# Patient Record
Sex: FEMALE | Race: WHITE | NOT HISPANIC OR LATINO | Employment: OTHER | URBAN - METROPOLITAN AREA
[De-identification: names, ages, dates, MRNs, and addresses within clinical notes are randomized per-mention and may not be internally consistent; named-entity substitution may affect disease eponyms.]

---

## 2017-01-26 RX ORDER — LOSARTAN POTASSIUM 25 MG/1
TABLET ORAL
Qty: 90 TAB | Refills: 2 | Status: SHIPPED | OUTPATIENT
Start: 2017-01-26 | End: 2017-10-24 | Stop reason: SDUPTHER

## 2017-02-24 DIAGNOSIS — F41.8 SITUATIONAL ANXIETY: ICD-10-CM

## 2017-02-27 RX ORDER — LORAZEPAM 0.5 MG/1
0.5 TABLET ORAL EVERY 8 HOURS PRN
Qty: 15 TAB | Refills: 0 | Status: CANCELLED | OUTPATIENT
Start: 2017-02-27

## 2017-02-27 NOTE — TELEPHONE ENCOUNTER
From: Ambar Jj  To: Khloe Bernstein M.D.  Sent: 2/24/2017 12:03 PM PST  Subject: Medication Renewal Request    Original authorizing provider: Khloe Bernstein M.D.    Ambar Jj would like a refill of the following medications:  lorazepam (ATIVAN) 0.5 MG Tab [Khloe Bernstein M.D.]    Preferred pharmacy: [facility] [address] [place] [address], [place] - [address]    Comment:

## 2017-03-30 ENCOUNTER — TELEPHONE (OUTPATIENT)
Dept: MEDICAL GROUP | Facility: LAB | Age: 57
End: 2017-03-30

## 2017-03-30 DIAGNOSIS — K57.32 DIVERTICULITIS OF SIGMOID COLON: ICD-10-CM

## 2017-03-30 NOTE — TELEPHONE ENCOUNTER
----- Message from Khloe Bernstein M.D. sent at 3/30/2017  7:49 AM PDT -----  Regarding: FW: Procedure Question  Contact: [phone]  Please see if we can fit her in somewhere. Also does she have a GI doc?  ----- Message -----     From: Pratima Meza, Praveen Ass't     Sent: 3/29/2017   9:15 AM       To: Khloe Bernstein M.D.  Subject: FW: Procedure Question                               ----- Message -----     From: Ambar Jj     Sent: 3/29/2017   9:11 AM       To: Jennifer Allen Ma  Subject: Procedure Question                               Dear Dr. Bernstein,  I am still having a lot of problems with my stomach.  Unless I am eating a clear liquid diet, I have pain and difficulty having a BM.  I am having pain on my lower left side in the front and the back.  It's not constant, comes and goes.  I don't think I have a fever. Since finishing the antibiotics for diverticulitis,  I have about 2 good days a week and the rest I don't feel good.  Should I make an appointment with you or should I go see a gastroenterologist?

## 2017-03-30 NOTE — TELEPHONE ENCOUNTER
----- Message from Your Healthcare Team sent at 3/30/2017  9:50 AM PDT -----  Regarding: RE:(No subject)  Contact: [phone]  Yes, please.  I have been to him before.  He did my colonoscopy in 2014.  04/12 would work really good for me as I have that day off.  Thank you  ----- Message -----  From: Aminta Chapa, Praveen Ass't  Sent: 3/30/2017  8:34 AM PDT  To: Ambar Garcia Aleshialinda  Subject: (No subject)    Dr Bernstein would like to refer you to a GI specialist.  Do you want her to refer you to Dr Donaldson at [facility]? unfortunately we do not have any appts here until the week of 4/10/17  Thanks  Pratima.

## 2017-04-17 ENCOUNTER — OFFICE VISIT (OUTPATIENT)
Dept: MEDICAL GROUP | Facility: LAB | Age: 57
End: 2017-04-17
Payer: COMMERCIAL

## 2017-04-17 ENCOUNTER — HOSPITAL ENCOUNTER (OUTPATIENT)
Dept: RADIOLOGY | Facility: MEDICAL CENTER | Age: 57
End: 2017-04-17
Attending: FAMILY MEDICINE
Payer: COMMERCIAL

## 2017-04-17 ENCOUNTER — HOSPITAL ENCOUNTER (OUTPATIENT)
Dept: LAB | Facility: MEDICAL CENTER | Age: 57
End: 2017-04-17
Attending: FAMILY MEDICINE
Payer: COMMERCIAL

## 2017-04-17 ENCOUNTER — TELEPHONE (OUTPATIENT)
Dept: MEDICAL GROUP | Facility: LAB | Age: 57
End: 2017-04-17

## 2017-04-17 VITALS
BODY MASS INDEX: 26.53 KG/M2 | WEIGHT: 169 LBS | SYSTOLIC BLOOD PRESSURE: 102 MMHG | HEART RATE: 84 BPM | HEIGHT: 67 IN | OXYGEN SATURATION: 97 % | DIASTOLIC BLOOD PRESSURE: 76 MMHG | TEMPERATURE: 97.8 F | RESPIRATION RATE: 16 BRPM

## 2017-04-17 DIAGNOSIS — E55.9 VITAMIN D DEFICIENCY DISEASE: ICD-10-CM

## 2017-04-17 DIAGNOSIS — R10.32 CHRONIC BILATERAL LOWER ABDOMINAL PAIN: ICD-10-CM

## 2017-04-17 DIAGNOSIS — R10.31 CHRONIC BILATERAL LOWER ABDOMINAL PAIN: ICD-10-CM

## 2017-04-17 DIAGNOSIS — K57.32 DIVERTICULITIS OF LARGE INTESTINE WITHOUT PERFORATION OR ABSCESS WITHOUT BLEEDING: ICD-10-CM

## 2017-04-17 DIAGNOSIS — G89.29 CHRONIC BILATERAL LOWER ABDOMINAL PAIN: ICD-10-CM

## 2017-04-17 DIAGNOSIS — R80.9 PROTEINURIA: ICD-10-CM

## 2017-04-17 DIAGNOSIS — R19.5 CHANGE IN STOOL: ICD-10-CM

## 2017-04-17 LAB
25(OH)D3 SERPL-MCNC: 22 NG/ML (ref 30–100)
ALBUMIN SERPL BCP-MCNC: 4.1 G/DL (ref 3.2–4.9)
ALBUMIN/GLOB SERPL: 1.2 G/DL
ALP SERPL-CCNC: 106 U/L (ref 30–99)
ALT SERPL-CCNC: 16 U/L (ref 2–50)
ANION GAP SERPL CALC-SCNC: 6 MMOL/L (ref 0–11.9)
AST SERPL-CCNC: 17 U/L (ref 12–45)
BASOPHILS # BLD AUTO: 0.9 % (ref 0–1.8)
BASOPHILS # BLD: 0.05 K/UL (ref 0–0.12)
BILIRUB SERPL-MCNC: 0.3 MG/DL (ref 0.1–1.5)
BUN SERPL-MCNC: 8 MG/DL (ref 8–22)
CALCIUM SERPL-MCNC: 9.3 MG/DL (ref 8.5–10.5)
CHLORIDE SERPL-SCNC: 106 MMOL/L (ref 96–112)
CO2 SERPL-SCNC: 28 MMOL/L (ref 20–33)
CREAT SERPL-MCNC: 0.67 MG/DL (ref 0.5–1.4)
CREAT UR-MCNC: 18.7 MG/DL
EOSINOPHIL # BLD AUTO: 0.11 K/UL (ref 0–0.51)
EOSINOPHIL NFR BLD: 1.9 % (ref 0–6.9)
ERYTHROCYTE [DISTWIDTH] IN BLOOD BY AUTOMATED COUNT: 39.9 FL (ref 35.9–50)
GFR SERPL CREATININE-BSD FRML MDRD: >60 ML/MIN/1.73 M 2
GLOBULIN SER CALC-MCNC: 3.5 G/DL (ref 1.9–3.5)
GLUCOSE SERPL-MCNC: 100 MG/DL (ref 65–99)
HCT VFR BLD AUTO: 42 % (ref 37–47)
HGB BLD-MCNC: 13.9 G/DL (ref 12–16)
IMM GRANULOCYTES # BLD AUTO: 0 K/UL (ref 0–0.11)
IMM GRANULOCYTES NFR BLD AUTO: 0 % (ref 0–0.9)
LYMPHOCYTES # BLD AUTO: 1.64 K/UL (ref 1–4.8)
LYMPHOCYTES NFR BLD: 28.6 % (ref 22–41)
MCH RBC QN AUTO: 29.3 PG (ref 27–33)
MCHC RBC AUTO-ENTMCNC: 33.1 G/DL (ref 33.6–35)
MCV RBC AUTO: 88.4 FL (ref 81.4–97.8)
MICROALBUMIN UR-MCNC: <0.7 MG/DL
MICROALBUMIN/CREAT UR: NORMAL MG/G (ref 0–30)
MONOCYTES # BLD AUTO: 0.29 K/UL (ref 0–0.85)
MONOCYTES NFR BLD AUTO: 5.1 % (ref 0–13.4)
NEUTROPHILS # BLD AUTO: 3.64 K/UL (ref 2–7.15)
NEUTROPHILS NFR BLD: 63.5 % (ref 44–72)
NRBC # BLD AUTO: 0 K/UL
NRBC BLD AUTO-RTO: 0 /100 WBC
PLATELET # BLD AUTO: 268 K/UL (ref 164–446)
PMV BLD AUTO: 9.9 FL (ref 9–12.9)
POTASSIUM SERPL-SCNC: 4.4 MMOL/L (ref 3.6–5.5)
PROT SERPL-MCNC: 7.6 G/DL (ref 6–8.2)
RBC # BLD AUTO: 4.75 M/UL (ref 4.2–5.4)
SODIUM SERPL-SCNC: 140 MMOL/L (ref 135–145)
WBC # BLD AUTO: 5.7 K/UL (ref 4.8–10.8)

## 2017-04-17 PROCEDURE — 82570 ASSAY OF URINE CREATININE: CPT

## 2017-04-17 PROCEDURE — 85025 COMPLETE CBC W/AUTO DIFF WBC: CPT

## 2017-04-17 PROCEDURE — 36415 COLL VENOUS BLD VENIPUNCTURE: CPT

## 2017-04-17 PROCEDURE — 82306 VITAMIN D 25 HYDROXY: CPT

## 2017-04-17 PROCEDURE — 80053 COMPREHEN METABOLIC PANEL: CPT

## 2017-04-17 PROCEDURE — 99214 OFFICE O/P EST MOD 30 MIN: CPT | Performed by: FAMILY MEDICINE

## 2017-04-17 PROCEDURE — 82043 UR ALBUMIN QUANTITATIVE: CPT

## 2017-04-17 PROCEDURE — 74177 CT ABD & PELVIS W/CONTRAST: CPT

## 2017-04-17 PROCEDURE — 700117 HCHG RX CONTRAST REV CODE 255: Performed by: FAMILY MEDICINE

## 2017-04-17 RX ORDER — CIPROFLOXACIN 500 MG/1
500 TABLET, FILM COATED ORAL 2 TIMES DAILY
Qty: 20 TAB | Refills: 0 | Status: SHIPPED | OUTPATIENT
Start: 2017-04-17 | End: 2018-05-03

## 2017-04-17 RX ORDER — METRONIDAZOLE 500 MG/1
500 TABLET ORAL 3 TIMES DAILY
Qty: 30 TAB | Refills: 0 | Status: SHIPPED | OUTPATIENT
Start: 2017-04-17 | End: 2018-05-03

## 2017-04-17 RX ADMIN — IOHEXOL 100 ML: 350 INJECTION, SOLUTION INTRAVENOUS at 13:12

## 2017-04-17 NOTE — TELEPHONE ENCOUNTER
[email or web address] Released Result Comments      Entered by Khloe Bernstein M.D. at 4/17/2017  2:24 PM     Read by Ambar Jj at 4/17/2017  3:32 PM     Ambar,     The CT scan shows that you have some inflammation but it's difficult to tell if it's new or old. I would like to start you on antibiotics for diverticulitis again to see how you feel. I also think that we should have you see a surgeon to see if we need to have this part of the colon resected. Is this okay with you?     Dr SPRAGUE       Result Notes      Sent my chart asking patient if this is ok

## 2017-04-17 NOTE — MR AVS SNAPSHOT
"        Ambar Jj   2017 9:20 AM   Office Visit   MRN: [id number]    Department:  [facility]   Dept Phone:  [phone]    Description:  Female : 1960   Provider:  Khloe Bernstein M.D.           Reason for Visit     Follow-Up stomach issues      Allergies as of 2017     No Known Allergies      You were diagnosed with     Chronic bilateral lower abdominal pain   [925404]       Change in stool   [726584]       Proteinuria   [791.0.ICD-9-CM]       Vitamin D deficiency disease   [908773]         Vital Signs     Blood Pressure Pulse Temperature Respirations Height Weight    102/76 mmHg 84 36.6 °C (97.8 °F) 16 1.702 m (5' 7\") 76.658 kg (169 lb)    Body Mass Index Oxygen Saturation Smoking Status             26.46 kg/m2 97% Former Smoker         Basic Information     Date Of Birth Sex Race Ethnicity Preferred Language    1960 Female White Non- English      Your appointments     2017  1:00 PM   CT BODY WITH with [facility] CT 1   IMAGING [facility]    [facility] [place]  [address]  [address]  [facility] [id number] [phone]           Taking medications as regularly scheduled is strongly encouraged.  *For Abdominal CT-Patient needs to  oral contrast and instruction from the department at least 2 hours prior to exam. Patient may  contrast at any imaging facility.              Problem List              ICD-10-CM Priority Class Noted - Resolved    Hypertension I10   Unknown - Present    Reactive airway disease J45.909   Unknown - Present    GERD (gastroesophageal reflux disease) K21.9   Unknown - Present    Vitamin D deficiency disease E55.9   10/7/2013 - Present    Urinary incontinence R32   2015 - Present    Family history of breast cancer in mother Z80.3   10/15/2015 - Present    History of tobacco use Z87.891   10/15/2015 - Present    History of hysterectomy for benign disease Z90.710   10/15/2015 - Present   " Hypertriglyceridemia E78.1   11/29/2016 - Present    Diverticulosis of sigmoid colon K57.30   12/9/2016 - Present      Health Maintenance        Date Due Completion Dates    MAMMOGRAM 12/1/2016 12/1/2015, 11/1/2013, 9/5/2012, 3/21/2011, 3/16/2011, 3/15/2010, 3/15/2010, 2/19/2009, 2/19/2009, 8/23/2005    COLONOSCOPY 1/23/2019 1/23/2014    IMM DTaP/Tdap/Td Vaccine (2 - Td) 8/6/2022 8/6/2012            Current Immunizations     Tdap Vaccine 8/6/2012  8:54 AM      Below and/or attached are the medications your provider expects you to take. Review all of your home medications and newly ordered medications with your provider and/or pharmacist. Follow medication instructions as directed by your provider and/or pharmacist. Please keep your medication list with you and share with your provider. Update the information when medications are discontinued, doses are changed, or new medications (including over-the-counter products) are added; and carry medication information at all times in the event of emergency situations     Allergies:  No Known Allergies          Medications  Valid as of: April 17, 2017 - 10:06 AM    Generic Name Brand Name Tablet Size Instructions for use    Albuterol Sulfate (Aero Soln) albuterol 108 (90 BASE) MCG/ACT INHALE TWO PUFFS BY MOUTH EVERY 6 HOURS AS NEEDED FOR SHORTNESS OF BREATH        Ciprofloxacin HCl (Tab) CIPRO 500 MG Take 1 Tab by mouth 2 times a day.        Ibuprofen (Tab) MOTRIN 200 MG Take 200 mg by mouth every 6 hours as needed.        LORazepam (Tab) ATIVAN 0.5 MG Take 1 Tab by mouth every 8 hours as needed for Anxiety.        Losartan Potassium (Tab) COZAAR 25 MG TAKE 1 TABLET BY MOUTH EVERY DAY        MetroNIDAZOLE (Tab) FLAGYL 500 MG Take 1 Tab by mouth 3 times a day.        Multiple Vitamins-Minerals   Take 1 Tab by mouth every day.        Nitrofurantoin Monohyd Macro (Cap) MACROBID 100 MG Take 1 Cap by mouth 2 times a day.        Omeprazole (CAPSULE DELAYED RELEASE) PRILOSEC 20 MG  TAKE ONE CAPSULE BY MOUTH EVERY DAY        .                 Medicines prescribed today were sent to:     [id number] - [place], [place] - [address] AT [facility] [address]    [address] [place] [address]    Phone: [phone] Fax: [phone]    Open 24 Hours?: No      Medication refill instructions:       If your prescription bottle indicates you have medication refills left, it is not necessary to call your provider’s office. Please contact your pharmacy and they will refill your medication.    If your prescription bottle indicates you do not have any refills left, you may request refills at any time through one of the following ways: The online [facility] system (except Urgent Care), by calling your provider’s office, or by asking your pharmacy to contact your provider’s office with a refill request. Medication refills are processed only during regular business hours and may not be available until the next business day. Your provider may request additional information or to have a follow-up visit with you prior to refilling your medication.   *Please Note: Medication refills are assigned a new Rx number when refilled electronically. Your pharmacy may indicate that no refills were authorized even though a new prescription for the same medication is available at the pharmacy. Please request the medicine by name with the pharmacy before contacting your provider for a refill.        Your To Do List     Future Labs/Procedures Complete By Expires    CBC WITH DIFFERENTIAL  As directed 4/17/2018    COMP METABOLIC PANEL  As directed 4/17/2018    CT-ABDOMEN-PELVIS WITH  As directed 4/17/2018    Comments:    Co sign reqstd, chngd to just with iv & oral contrast- jlw 4/17    MICROALBUMIN CREAT RATIO URINE  As directed 4/17/2018    VITAMIN D,25 HYDROXY  As directed 4/17/2018         [employer] Access Code: Activation code not generated  Current [facility] Status: Active

## 2017-04-17 NOTE — PROGRESS NOTES
Chief Complaint   Patient presents with   • Follow-Up     stomach issues       HISTORY OF PRESENT ILLNESS: Patient is a 56 y.o. female established patient who presents today to follow-up on abdominal pain. She has made an appointment with GI however she cannot be seen until June     Patient has a history of diverticulosis and diverticulitis. She was treated at the end of November, early December 2016 for diverticulitis. She states that she felt good for about 5 weeks then after that 5 week she started to have issues again which included chronic cramping and abdominal pain. She states that she will sometimes use the restroom 8 times a day. She feels like she has to have a bowel movement but can only go a little bit. Her stool is smaller, like a chunk or a piece. She is taking Metamucil once a day. Over the weekend she took some prune juice and she had a good bowel movement but she had a lot of cramping with her bowel movement. Reports that the pain is on both the right lower side and the left lower side of her abdomen, more on the left lower side. She states that sometimes she does just follow a liquid diet and she feels better but when she goes back to regular diet she has cramping again.     GI appt in June with Dr Donaldson . He did her previous colonoscopy. She denies any family history of colon cancer. She did have a colonoscopy in January 2014 and she had a polyp removed and she is on the 5 year plan.    Patient also had a hysterectomy done in 2001 and also had mesh placed for a cystocele. She is wondering if this is causing some of her pain.    She denies any fever, cough. No chest pain. She is having more anxiety lately because of how she feels.    Coffee this am     Patient Active Problem List    Diagnosis Date Noted   • Diverticulosis of sigmoid colon 12/09/2016   • Hypertriglyceridemia 11/29/2016   • Family history of breast cancer in mother 10/15/2015   • History of tobacco use 10/15/2015   • History of  hysterectomy for benign disease 10/15/2015   • Urinary incontinence 05/06/2015   • Vitamin D deficiency disease 10/07/2013   • GERD (gastroesophageal reflux disease)    • Hypertension    • Reactive airway disease         Allergies:Review of patient's allergies indicates no known allergies.    Current Outpatient Prescriptions   Medication Sig Dispense Refill   • losartan (COZAAR) 25 MG Tab TAKE 1 TABLET BY MOUTH EVERY DAY 90 Tab 2   • omeprazole (PRILOSEC) 20 MG delayed-release capsule TAKE ONE CAPSULE BY MOUTH EVERY DAY 90 Cap 0   • nitrofurantoin monohydr macro (MACROBID) 100 MG Cap Take 1 Cap by mouth 2 times a day. 14 Cap 0   • ciprofloxacin (CIPRO) 500 MG Tab Take 1 Tab by mouth 2 times a day. 20 Tab 0   • metronidazole (FLAGYL) 500 MG Tab Take 1 Tab by mouth 3 times a day. 30 Tab 0   • Multiple Vitamins-Minerals (EMERGEN-C FIVE PO) Take 1 Tab by mouth every day.     • lorazepam (ATIVAN) 0.5 MG Tab Take 1 Tab by mouth every 8 hours as needed for Anxiety. 15 Tab 0   • albuterol (PROVENTIL HFA) 108 (90 BASE) MCG/ACT Aero Soln inhalation aerosol INHALE TWO PUFFS BY MOUTH EVERY 6 HOURS AS NEEDED FOR SHORTNESS OF BREATH 6.7 g 3   • ibuprofen (MOTRIN) 200 MG TABS Take 200 mg by mouth every 6 hours as needed.       No current facility-administered medications for this visit.       Social History   Substance Use Topics   • Smoking status: Former Smoker -- 1.00 packs/day for 34 years     Types: Cigarettes     Quit date: 01/01/2010   • Smokeless tobacco: Never Used      Comment: Jan 2010   • Alcohol Use: 7.2 oz/week     6 Standard drinks or equivalent, 6 Cans of beer per week       Social History     Social History Narrative       Family History   Problem Relation Age of Onset   • Cancer Mother      Breast   • Lung Disease Mother      COPD   • Heart Disease Mother    • Diabetes Mother    • Lung Disease Father      COPD   • Heart Disease Father    • Alcohol/Drug Sister    • Lung Disease Sister      sister 2 Asthma   •  "Heart Disease Sister      sister 1 Heart murmur   • Alcohol/Drug Brother    • Other Son      IBS; working on his diet       Exam:    Blood pressure 102/76, pulse 84, temperature 36.6 °C (97.8 °F), resp. rate 16, height 1.702 m (5' 7\"), weight 76.658 kg (169 lb), SpO2 97 %.      Physical Exam   Constitutional:  Appears well-developed and well-nourished. Is active and cooperative.  Non-toxic appearance but appears to not feel well.   Head: Normocephalic and atraumatic.   Right Ear: External ear normal. Left Ear: External ear normal.   Eyes: Conjunctivae, EOM and lids are normal. No scleral icterus.   Cardiovascular: Normal rate, regular rhythm and normal heart sounds.    Pulmonary/Chest: Effort normal and breath sounds normal.   Abdominal: Soft. No CVA tenderness. She does have some tenderness to palpation in the right lower quadrant, left lower quadrant, more on the left lower quadrant. The pain in the left lower quadrant is worse with palpation.  No rigidity and no guarding.   Neurological: Alert. No tremor. No display of seizure activity. Coordination and gait normal.   Skin: Skin is warm and dry. Patient is not diaphoretic.   Psychiatric: patient has a normal mood and affect; speech is normal and behavior is normal.      Assessment/Plan:    1. Chronic bilateral lower abdominal pain  Discussed with patient. I would like to check a CT scan. Concerned that she may have an abscess or diverticulitis again. Patient is in agreement with plan. If her CT scan is negative I would like her to return to clinic for a GYN exam. Labs as below. CT scan has been scheduled for today.  - COMP METABOLIC PANEL; Future  - CBC WITH DIFFERENTIAL; Future  - CT-ABDOMEN-PELVIS WITH; Future    2. Change in stool  CT of the abdomen, pelvis  - CT-ABDOMEN-PELVIS WITH; Future    3. Proteinuria  Check labs  - MICROALBUMIN CREAT RATIO URINE; Future    4. Vitamin D deficiency disease  Check vitamin D  - VITAMIN D,25 HYDROXY; Future           Please " note that this dictation was created using voice recognition software. I have made every reasonable attempt to correct obvious errors, but I expect that there are errors of grammar and possibly content that I did not discover before finalizing the note.

## 2017-04-17 NOTE — TELEPHONE ENCOUNTER
----- Message from Khloe Bernstein M.D. sent at 4/17/2017  2:24 PM PDT -----  Ambar,    The CT scan shows that you have some inflammation but it's difficult to tell if it's new or old. I would like to start you on antibiotics for diverticulitis again to see how you feel. I also think that we should have you see a surgeon to see if we need to have this part of the colon resected. Is this okay with you?    Dr SPRAGUE

## 2017-04-21 DIAGNOSIS — F41.8 SITUATIONAL ANXIETY: ICD-10-CM

## 2017-04-21 RX ORDER — LORAZEPAM 0.5 MG/1
0.5 TABLET ORAL EVERY 8 HOURS PRN
Qty: 15 TAB | Refills: 0 | Status: SHIPPED
Start: 2017-04-21 | End: 2018-05-03

## 2017-04-21 NOTE — TELEPHONE ENCOUNTER
Was the patient seen in the last year in this department? Yes     4/11/16 #15. LOV 4/17/17    Does patient have an active prescription for medications requested? No     Received Request Via: Patient

## 2017-05-25 RX ORDER — OMEPRAZOLE 20 MG/1
CAPSULE, DELAYED RELEASE ORAL
Qty: 90 CAP | Refills: 0 | Status: SHIPPED | OUTPATIENT
Start: 2017-05-25 | End: 2017-10-24 | Stop reason: SDUPTHER

## 2017-05-25 NOTE — TELEPHONE ENCOUNTER
Was the patient seen in the last year in this department? Yes     Does patient have an active prescription for medications requested? No     Received Request Via: Pharmacy     Last visit:4/14/17

## 2017-06-12 DIAGNOSIS — J45.902: ICD-10-CM

## 2017-06-12 RX ORDER — ALBUTEROL SULFATE 90 UG/1
AEROSOL, METERED RESPIRATORY (INHALATION)
Qty: 6.7 G | Refills: 3 | Status: SHIPPED | OUTPATIENT
Start: 2017-06-12 | End: 2018-06-14 | Stop reason: SDUPTHER

## 2017-06-12 NOTE — TELEPHONE ENCOUNTER
Was the patient seen in the last year in this department? Yes     Does patient have an active prescription for medications requested? No     Received Request Via: Patient     Last visit:12/9/16

## 2017-10-24 RX ORDER — OMEPRAZOLE 20 MG/1
20 CAPSULE, DELAYED RELEASE ORAL
Qty: 90 CAP | Refills: 0 | Status: SHIPPED | OUTPATIENT
Start: 2017-10-24 | End: 2018-03-05 | Stop reason: SDUPTHER

## 2018-01-18 ENCOUNTER — HOSPITAL ENCOUNTER (OUTPATIENT)
Dept: RADIOLOGY | Facility: MEDICAL CENTER | Age: 58
End: 2018-01-18
Attending: FAMILY MEDICINE
Payer: COMMERCIAL

## 2018-01-18 DIAGNOSIS — Z12.31 SCREENING MAMMOGRAM, ENCOUNTER FOR: ICD-10-CM

## 2018-01-18 PROCEDURE — 77067 SCR MAMMO BI INCL CAD: CPT

## 2018-03-06 RX ORDER — OMEPRAZOLE 20 MG/1
CAPSULE, DELAYED RELEASE ORAL
Qty: 90 CAP | Refills: 0 | Status: SHIPPED | OUTPATIENT
Start: 2018-03-06 | End: 2018-06-26 | Stop reason: SDUPTHER

## 2018-03-14 ENCOUNTER — PATIENT OUTREACH (OUTPATIENT)
Dept: HEALTH INFORMATION MANAGEMENT | Facility: OTHER | Age: 58
End: 2018-03-14

## 2018-03-14 NOTE — PROGRESS NOTES
Outcome: Left Message    Please transfer to Patient Outreach Team at [phone] when patient returns call.    WebIZ Checked & Epic Updated:  yes    HealthConnect Verified: no    Attempt # 1

## 2018-04-23 RX ORDER — LOSARTAN POTASSIUM 25 MG/1
TABLET ORAL
Refills: 0 | OUTPATIENT
Start: 2018-04-23

## 2018-04-23 NOTE — TELEPHONE ENCOUNTER
Was the patient seen in the last year in this department? No 4/17/17    Does patient have an active prescription for medications requested? No     Received Request Via: Pharmacy

## 2018-04-24 RX ORDER — LOSARTAN POTASSIUM 25 MG/1
25 TABLET ORAL
Qty: 30 TAB | Refills: 0 | Status: SHIPPED | OUTPATIENT
Start: 2018-04-24 | End: 2018-05-21 | Stop reason: SDUPTHER

## 2018-04-24 NOTE — TELEPHONE ENCOUNTER
Was the patient seen in the last year in this department? No   APPT 5/3  Does patient have an active prescription for medications requested? No     Received Request Via: Patient

## 2018-04-27 NOTE — PROGRESS NOTES
1. Attempt #: 2    2. HealthConnect Verified: yes    3. Verify PCP: no      5.  Reviewed/Updated the following with patient:       •   Communication Preference Obtained? NO    9. Care Gap Scheduling (Attempt to Schedule EACH Overdue Care Gap!)     There are no preventive care reminders to display for this patient.     Patient has completed No Call - Was made to the pt for the [employer] project - Health Maintenance is up todate.

## 2018-05-02 ENCOUNTER — TELEPHONE (OUTPATIENT)
Dept: MEDICAL GROUP | Facility: LAB | Age: 58
End: 2018-05-02

## 2018-05-02 NOTE — TELEPHONE ENCOUNTER
ESTABLISHED PATIENT PRE-VISIT PLANNING     Note: Patient will not be contacted if there is no indication to call.     1.  Reviewed notes from the last few office visits within the medical group: Yes    2.  If any orders were placed at last visit or intended to be done for this visit (i.e. 6 mos follow-up), do we have Results/Consult Notes?        •  Labs - Labs ordered, completed on 4/17/17 and results are in chart.       •  Imaging - Imaging was not ordered at last office visit.       •  Referrals - No referrals were ordered at last office visit.    3. Is this appointment scheduled as a Hospital Follow-Up? No    4.  Immunizations were updated in [employer] using WebIZ?: Yes       •  Web Iz Recommendations: MMR  and VARICELLA (Chicken Pox)     5.  Patient is due for the following Health Maintenance Topics:   There are no preventive care reminders to display for this patient.    - Patient has completed TDAP Immunization(s) per WebIZ. Chart has been updated.    6.  MDX printed for Provider? NO    7.  Patient was NOT informed to arrive 15 min prior to their scheduled appointment and bring in their medication bottles.

## 2018-05-03 ENCOUNTER — OFFICE VISIT (OUTPATIENT)
Dept: MEDICAL GROUP | Facility: LAB | Age: 58
End: 2018-05-03
Payer: COMMERCIAL

## 2018-05-03 VITALS
BODY MASS INDEX: 27.15 KG/M2 | DIASTOLIC BLOOD PRESSURE: 78 MMHG | RESPIRATION RATE: 16 BRPM | TEMPERATURE: 98.3 F | HEIGHT: 67 IN | HEART RATE: 84 BPM | WEIGHT: 173 LBS | SYSTOLIC BLOOD PRESSURE: 110 MMHG | OXYGEN SATURATION: 97 %

## 2018-05-03 DIAGNOSIS — I10 ESSENTIAL HYPERTENSION: ICD-10-CM

## 2018-05-03 DIAGNOSIS — E55.9 VITAMIN D DEFICIENCY DISEASE: ICD-10-CM

## 2018-05-03 DIAGNOSIS — R80.9 PROTEINURIA, UNSPECIFIED TYPE: ICD-10-CM

## 2018-05-03 DIAGNOSIS — E78.1 HYPERTRIGLYCERIDEMIA: ICD-10-CM

## 2018-05-03 DIAGNOSIS — K21.9 GASTROESOPHAGEAL REFLUX DISEASE, ESOPHAGITIS PRESENCE NOT SPECIFIED: ICD-10-CM

## 2018-05-03 PROCEDURE — 99214 OFFICE O/P EST MOD 30 MIN: CPT | Performed by: FAMILY MEDICINE

## 2018-05-03 ASSESSMENT — PATIENT HEALTH QUESTIONNAIRE - PHQ9: CLINICAL INTERPRETATION OF PHQ2 SCORE: 0

## 2018-05-03 NOTE — PROGRESS NOTES
"Chief Complaint   Patient presents with   • Hypertension     med refills       HISTORY OF PRESENT ILLNESS: Patient is a 57 y.o. female established patient who presents today to follow up     Hypertension  This is a chronic problem. She is taking medication as directed. Is checking BP at home and it is running /70-80. No CP.   Sometimes \"funny little heart beats\" and she takes a deep breath and it goes away. She is due for labs      Vitamin d insufficiency   This is chronic. She is not taking vit d supplementation     Started on probiotic and this helps a lot. Her GI tract is still difficult, sometimes diarrhea and sometimes constipation     Patient Active Problem List    Diagnosis Date Noted   • Chronic bilateral lower abdominal pain 04/17/2017   • Change in stool 04/17/2017   • Proteinuria 04/17/2017   • Diverticulosis of sigmoid colon 12/09/2016   • Hypertriglyceridemia 11/29/2016   • Family history of breast cancer in mother 10/15/2015   • History of tobacco use 10/15/2015   • History of hysterectomy for benign disease 10/15/2015   • Urinary incontinence 05/06/2015   • Vitamin D deficiency disease 10/07/2013   • GERD (gastroesophageal reflux disease)  Chronic. Prilosec controls symptoms     • Hypertension    • Reactive airway disease         Allergies:Patient has no known allergies.      Social History     Social History   • Marital status:      Spouse name: N/A   • Number of children: N/A   • Years of education: N/A     Occupational History   • Not on file.     Social History Main Topics   • Smoking status: Former Smoker     Packs/day: 1.00     Years: 34.00     Types: Cigarettes     Quit date: 1/1/2010   • Smokeless tobacco: Never Used      Comment: Jan 2010   • Alcohol use 7.2 oz/week     6 Standard drinks or equivalent, 6 Cans of beer per week   • Drug use: No   • Sexual activity: Yes     Partners: Male     Other Topics Concern   • Not on file     Social History Narrative   • No narrative on file " "    She is    Family History   Problem Relation Age of Onset   • Cancer Mother      Breast   • Lung Disease Mother      COPD   • Heart Disease Mother    • Diabetes Mother    • Lung Disease Father      COPD   • Heart Disease Father    • Alcohol/Drug Sister    • Lung Disease Sister      sister 2 Asthma   • Heart Disease Sister      sister 1 Heart murmur   • Alcohol/Drug Brother    • Other Son      IBS; working on his diet       ROS:         Exam:    Blood pressure 110/78, pulse 84, temperature 36.8 °C (98.3 °F), resp. rate 16, height 1.702 m (5' 7\"), weight 78.5 kg (173 lb), SpO2 97 %.    General:  Well nourished, well developed female in NAD    Physical Exam   Constitutional:  Appears well-developed and well-nourished. Is active and cooperative.  Non-toxic appearance.   HENT:   Head: Normocephalic and atraumatic.   Right Ear: External ear normal. Left Ear: External ear normal.   Eyes: Conjunctivae, EOM and lids are normal. No scleral icterus.   Cardiovascular: Normal rate, regular rhythm and normal heart sounds.    Pulmonary/Chest: Effort normal and breath sounds normal.   Neurological:. No tremor. No display of seizure activity. Coordination and gait normal.   Skin: Skin is warm and dry. Patient is not diaphoretic.   Psychiatric: patient has a normal mood and affect; speech is normal and behavior is normal.         Assessment/Plan:    1. Essential hypertension  Controlled. No change in medication. Check labs   - MICROALBUMIN CREAT RATIO URINE; Future  - CBC WITH DIFFERENTIAL; Future  - COMP METABOLIC PANEL; Future  - TSH; Future    2. Gastroesophageal reflux disease, esophagitis presence not specified  Stable on medication, no change     3. Vitamin D deficiency disease  Check vitamin d level   - VITAMIN D,25 HYDROXY; Future    4. Hypertriglyceridemia  Check labs. Not on medication   - COMP METABOLIC PANEL; Future  - LIPID PROFILE; Future    5. Proteinuria, unspecified type  Check labs   - MICROALBUMIN CREAT RATIO " URINE; Future     Follow up after labs     Please note that this dictation was created using voice recognition software. I have made every reasonable attempt to correct obvious errors, but I expect that there are errors of grammar and possibly content that I did not discover before finalizing the note.

## 2018-06-08 DIAGNOSIS — F41.8 SITUATIONAL ANXIETY: ICD-10-CM

## 2018-06-08 RX ORDER — LORAZEPAM 0.5 MG/1
0.5 TABLET ORAL EVERY 8 HOURS PRN
Qty: 15 TAB | Refills: 0 | Status: SHIPPED
Start: 2018-06-08 | End: 2019-07-15 | Stop reason: SDUPTHER

## 2018-06-08 NOTE — TELEPHONE ENCOUNTER
Was the patient seen in the last year in this department? Yes     Does patient have an active prescription for medications requested? No     Received Request Via: Patient     Per  last fill: 4-26-17 # 15  Patient only takes this when flying.

## 2018-06-14 DIAGNOSIS — J45.902: ICD-10-CM

## 2018-06-15 ENCOUNTER — PATIENT OUTREACH (OUTPATIENT)
Dept: HEALTH INFORMATION MANAGEMENT | Facility: OTHER | Age: 58
End: 2018-06-15

## 2018-06-15 NOTE — PROGRESS NOTES
Outcome: Left Message    Please transfer to Patient Outreach Team at [phone] when patient returns call.    WebIZ Checked & Epic Updated:  yes    HealthConnect Verified: yes    Attempt # 1

## 2018-06-26 ENCOUNTER — OFFICE VISIT (OUTPATIENT)
Dept: MEDICAL GROUP | Facility: PHYSICIAN GROUP | Age: 58
End: 2018-06-26
Payer: COMMERCIAL

## 2018-06-26 VITALS
DIASTOLIC BLOOD PRESSURE: 66 MMHG | HEIGHT: 67 IN | BODY MASS INDEX: 27.47 KG/M2 | OXYGEN SATURATION: 95 % | RESPIRATION RATE: 16 BRPM | SYSTOLIC BLOOD PRESSURE: 122 MMHG | TEMPERATURE: 98.1 F | HEART RATE: 91 BPM | WEIGHT: 175 LBS

## 2018-06-26 DIAGNOSIS — D22.9 MULTIPLE NEVI: ICD-10-CM

## 2018-06-26 DIAGNOSIS — K57.30 DIVERTICULOSIS OF SIGMOID COLON: ICD-10-CM

## 2018-06-26 DIAGNOSIS — E55.9 VITAMIN D DEFICIENCY DISEASE: ICD-10-CM

## 2018-06-26 DIAGNOSIS — K21.9 GASTROESOPHAGEAL REFLUX DISEASE, ESOPHAGITIS PRESENCE NOT SPECIFIED: ICD-10-CM

## 2018-06-26 DIAGNOSIS — Z00.00 WELL ADULT EXAM: ICD-10-CM

## 2018-06-26 DIAGNOSIS — Z12.11 ENCOUNTER FOR SCREENING COLONOSCOPY: ICD-10-CM

## 2018-06-26 DIAGNOSIS — I10 ESSENTIAL HYPERTENSION: ICD-10-CM

## 2018-06-26 PROCEDURE — 99214 OFFICE O/P EST MOD 30 MIN: CPT | Performed by: NURSE PRACTITIONER

## 2018-06-26 RX ORDER — OMEPRAZOLE 20 MG/1
20 CAPSULE, DELAYED RELEASE ORAL
Qty: 90 CAP | Refills: 3 | Status: SHIPPED | OUTPATIENT
Start: 2018-06-26 | End: 2018-10-28

## 2018-06-26 ASSESSMENT — PAIN SCALES - GENERAL: PAINLEVEL: NO PAIN

## 2018-06-26 NOTE — ASSESSMENT & PLAN NOTE
Chronic in nature. BP today is 122/66.  She continues to take losartan 25 mg daily states that she noticed her blood pressure got a little more elevated when she gained some weight.  Patient states that she is overall feeling well and not having side effects on medication.  Patient denies chest pain, palpitations, dizziness, blurry vision.

## 2018-06-26 NOTE — ASSESSMENT & PLAN NOTE
Chronic in nature.  Stable.  Patient states that last diverticulitis was 2015.  States that at that time she did complete a course of antibiotics.  States that she has not had symptoms since that time.  Patient does follow with gastroenterology and will be due for an updated colonoscopy in January.

## 2018-06-27 NOTE — ASSESSMENT & PLAN NOTE
Chronic in nature.  Stable.  Patient is taking omeprazole 20 mg daily.  States that this is a chronic long-term issue and she has previously had esophageal dilation.  Patient states that she does follow with gastroenterology.

## 2018-06-27 NOTE — PROGRESS NOTES
Chief Complaint   Patient presents with   • Establish Care     New Patient        HISTORY OF THE PRESENT ILLNESS: This is a 57 y.o. female new patient to our practice. This pleasant patient is here today to discuss HTN, GERD.    Hypertension  Chronic in nature. BP today is 122/66.  She continues to take losartan 25 mg daily states that she noticed her blood pressure got a little more elevated when she gained some weight.  Patient states that she is overall feeling well and not having side effects on medication.  Patient denies chest pain, palpitations, dizziness, blurry vision.    Diverticulosis of sigmoid colon  Chronic in nature.  Stable.  Patient states that last diverticulitis was 2015.  States that at that time she did complete a course of antibiotics.  States that she has not had symptoms since that time.  Patient does follow with gastroenterology and will be due for an updated colonoscopy in January.      GERD (gastroesophageal reflux disease)  Chronic in nature.  Stable.  Patient is taking omeprazole 20 mg daily.  States that this is a chronic long-term issue and she has previously had esophageal dilation.  Patient states that she does follow with gastroenterology.      Past Medical History:   Diagnosis Date   • Anesthesia     PONV   • Bronchitis 2010   • GERD (gastroesophageal reflux disease)    • Heart burn    • HTN     resolved with wt loss and lifestyle changes   • Indigestion    • Other specified symptom associated with female genital organs    • Pap smear 1/29/9    Normal   • Psychiatric problem     anxiety   • Reactive airway disease    • Unspecified urinary incontinence    • Urinary bladder disorder        Past Surgical History:   Procedure Laterality Date   • BLADDER SUSPENSION N/A 5/6/2015    Procedure: BLADDER SUSPENSION [57.89] TOT;  Surgeon: aYzmin Armando M.D.;  Location: SURGERY SAME DAY [facility];  Service:    • ANTERIOR AND POSTERIOR REPAIR N/A 5/6/2015    Procedure: ANTERIOR AND  POSTERIOR REPAIR [70.53];  Surgeon: Yazmin Armando M.D.;  Location: SURGERY SAME DAY [facility];  Service:    • CYSTOSCOPY N/A 2015    Procedure: CYSTOSCOPY;  Surgeon: Yazmin Armando M.D.;  Location: SURGERY SAME DAY [facility];  Service:    • ABDOMINAL HYSTERECTOMY TOTAL      Ovaries intact   • BLADDER SUSPENSION     • CHOLECYSTECTOMY     • ENDOSCOPY     • ENDOSCOPY PROCEDURE      dilation due to stricture   • TONSILLECTOMY AND ADENOIDECTOMY         Family Status   Relation Status   • Mother    • Father    • Sister Alive   • Brother    • Son Alive     Family History   Problem Relation Age of Onset   • Cancer Mother      Breast   • Lung Disease Mother      COPD   • Heart Disease Mother    • Diabetes Mother    • Lung Disease Father      COPD   • Heart Disease Father    • Alcohol/Drug Sister    • Lung Disease Sister      sister 2 Asthma   • Heart Disease Sister      sister 1 Heart murmur   • Alcohol/Drug Brother    • Other Son      IBS; working on his diet       Social History   Substance Use Topics   • Smoking status: Former Smoker     Packs/day: 1.00     Years: 34.00     Types: Cigarettes     Quit date: 2010   • Smokeless tobacco: Never Used      Comment: 2010   • Alcohol use 7.2 oz/week     6 Cans of beer, 6 Standard drinks or equivalent per week       Allergies: Codeine    Current Outpatient Prescriptions Ordered in [facility]   Medication Sig Dispense Refill   • omeprazole (PRILOSEC) 20 MG delayed-release capsule Take 1 Cap by mouth every day. 90 Cap 3   • PROAIR  (90 Base) MCG/ACT Aero Soln inhalation aerosol INHALE 2 PUFFS BY MOUTH EVERY 6 HOURS AS NEEDED FOR SHORTNESS OF BREATH 8.5 g 0   • losartan (COZAAR) 25 MG Tab TAKE 1 TABLET BY MOUTH EVERY DAY 30 Tab 3   • ibuprofen (MOTRIN) 200 MG TABS Take 200 mg by mouth every 6 hours as needed.       No current Epic-ordered facility-administered medications on file.        Review of Systems   Constitutional:  Negative  "for fever, chills, weight loss and malaise/fatigue.   HENT:  Negative for ear pain, nosebleeds, congestion, sore throat and neck pain.    Eyes:  Negative for blurred vision.   Respiratory:  Negative for cough, sputum production, shortness of breath and wheezing.    Cardiovascular:  Negative for chest pain, palpitations, orthopnea and leg swelling.   Gastrointestinal:  Negative for heartburn, nausea, vomiting and abdominal pain.   Genitourinary:  Negative for dysuria, urgency and frequency.   Musculoskeletal:  Negative for myalgias, back pain and joint pain.   Skin:  Negative for rash and itching.   Neurological:  Negative for dizziness, tingling, tremors, sensory change, focal weakness and headaches.   Endo/Heme/Allergies:  Does not bruise/bleed easily.   Psychiatric/Behavioral:  Negative for depression, anxiety, or memory loss.     All other systems reviewed and are negative except as in HPI.    Exam: Blood pressure 122/66, pulse 91, temperature 36.7 °C (98.1 °F), resp. rate 16, height 1.702 m (5' 7\"), weight 79.4 kg (175 lb), SpO2 95 %.  General:  Normal appearing. No distress.  HEENT:  Normocephalic. Eyes conjunctiva clear lids without ptosis, pupils equal and reactive to light accommodation, ears normal shape and contour, canals with bilateral cerumen impaction, tympanic membranes are not visualized.  Nasal mucosa benign, oropharynx is without erythema, edema or exudates.   Neck:  Supple without JVD or bruit. Thyroid is not enlarged.  Pulmonary:  Clear to ausculation.  Normal effort. No rales, ronchi, or wheezing.  Cardiovascular:  Regular rate and rhythm without murmur. Carotid and radial pulses are intact and equal bilaterally.  Neurologic:  Grossly nonfocal  Skin:  Warm and dry.  No obvious lesions.  Musculoskeletal:  Normal gait. No extremity cyanosis, clubbing, or edema.  Psych:  Normal mood and affect. Alert and oriented x3. Judgment and insight is normal.    Procedure: Cerumen Removal  Risks and benefits " of procedure discussed with patient.  Cerumen removed with  Lavage  Patient tolerated the procedure well  Pt educated about proper care of ear canal. Q-tip cleaning discouraged, use of debrox and warm water lavage discussed.  Post lavage curette performed by provider, Post procedure exam with clear canal and normal TM.      PLAN:    1. Encounter for screening colonoscopy  - REFERRAL TO GI FOR COLONOSCOPY    2. Essential hypertension  Continue current medication.  - MICROALBUMIN CREAT RATIO URINE; Future    3. Diverticulosis of sigmoid colon  Continue follow-up as needed, follow-up with GI.    4. Well adult exam  - COMP METABOLIC PANEL; Future  - LIPID PROFILE; Future    5. Vitamin D deficiency disease  - VITAMIN D,25 HYDROXY; Future    6. Multiple nevi  Patient states she does not want a referral at this time but will re-referral to dermatology regarding multiple moles.    7. Gastroesophageal reflux disease, esophagitis presence not specified  Plan to continue current medication, refill provided at this time.  - omeprazole (PRILOSEC) 20 MG delayed-release capsule; Take 1 Cap by mouth every day.  Dispense: 90 Cap; Refill: 3    Follow-up for annual exam or sooner as needed. Patient is encouraged to be seen in the emergency room for chest pain, palpitations, shortness of breath, dizziness, severe abdominal pain or other concerning symptoms.    Please note that this dictation was created using voice recognition software. I have made every reasonable attempt to correct obvious errors, but I expect that there are errors of grammar and possibly content that I did not discover before finalizing the note.      Assessment/Plan  1. Encounter for screening colonoscopy  REFERRAL TO GI FOR COLONOSCOPY   2. Essential hypertension  MICROALBUMIN CREAT RATIO URINE   3. Diverticulosis of sigmoid colon     4. Well adult exam  COMP METABOLIC PANEL    LIPID PROFILE   5. Vitamin D deficiency disease  VITAMIN D,25 HYDROXY   6. Multiple  katrina     7. Gastroesophageal reflux disease, esophagitis presence not specified  omeprazole (PRILOSEC) 20 MG delayed-release capsule         I have placed the below orders and discussed them with an approved delegating provider. The MA is performing the below orders under the direction of Dr. Silva.

## 2018-07-23 ENCOUNTER — HOSPITAL ENCOUNTER (OUTPATIENT)
Dept: LAB | Facility: MEDICAL CENTER | Age: 58
End: 2018-07-23
Attending: NURSE PRACTITIONER
Payer: COMMERCIAL

## 2018-07-23 DIAGNOSIS — I10 ESSENTIAL HYPERTENSION: ICD-10-CM

## 2018-07-23 DIAGNOSIS — Z00.00 WELL ADULT EXAM: ICD-10-CM

## 2018-07-23 DIAGNOSIS — E55.9 VITAMIN D DEFICIENCY DISEASE: ICD-10-CM

## 2018-07-23 LAB
25(OH)D3 SERPL-MCNC: 27 NG/ML (ref 30–100)
ALBUMIN SERPL BCP-MCNC: 4.4 G/DL (ref 3.2–4.9)
ALBUMIN/GLOB SERPL: 1.7 G/DL
ALP SERPL-CCNC: 96 U/L (ref 30–99)
ALT SERPL-CCNC: 17 U/L (ref 2–50)
ANION GAP SERPL CALC-SCNC: 5 MMOL/L (ref 0–11.9)
AST SERPL-CCNC: 18 U/L (ref 12–45)
BILIRUB SERPL-MCNC: 0.4 MG/DL (ref 0.1–1.5)
BUN SERPL-MCNC: 9 MG/DL (ref 8–22)
CALCIUM SERPL-MCNC: 9.4 MG/DL (ref 8.5–10.5)
CHLORIDE SERPL-SCNC: 107 MMOL/L (ref 96–112)
CHOLEST SERPL-MCNC: 137 MG/DL (ref 100–199)
CO2 SERPL-SCNC: 27 MMOL/L (ref 20–33)
CREAT SERPL-MCNC: 0.71 MG/DL (ref 0.5–1.4)
CREAT UR-MCNC: 212.8 MG/DL
GLOBULIN SER CALC-MCNC: 2.6 G/DL (ref 1.9–3.5)
GLUCOSE SERPL-MCNC: 95 MG/DL (ref 65–99)
HDLC SERPL-MCNC: 35 MG/DL
LDLC SERPL CALC-MCNC: 80 MG/DL
MICROALBUMIN UR-MCNC: <0.7 MG/DL
MICROALBUMIN/CREAT UR: NORMAL MG/G (ref 0–30)
POTASSIUM SERPL-SCNC: 4 MMOL/L (ref 3.6–5.5)
PROT SERPL-MCNC: 7 G/DL (ref 6–8.2)
SODIUM SERPL-SCNC: 139 MMOL/L (ref 135–145)
TRIGL SERPL-MCNC: 112 MG/DL (ref 0–149)

## 2018-07-23 PROCEDURE — 82570 ASSAY OF URINE CREATININE: CPT

## 2018-07-23 PROCEDURE — 36415 COLL VENOUS BLD VENIPUNCTURE: CPT

## 2018-07-23 PROCEDURE — 80061 LIPID PANEL: CPT

## 2018-07-23 PROCEDURE — 82043 UR ALBUMIN QUANTITATIVE: CPT

## 2018-07-23 PROCEDURE — 80053 COMPREHEN METABOLIC PANEL: CPT

## 2018-07-23 PROCEDURE — 82306 VITAMIN D 25 HYDROXY: CPT

## 2018-08-15 ENCOUNTER — APPOINTMENT (OUTPATIENT)
Dept: RADIOLOGY | Facility: MEDICAL CENTER | Age: 58
End: 2018-08-15
Attending: EMERGENCY MEDICINE
Payer: COMMERCIAL

## 2018-08-15 ENCOUNTER — HOSPITAL ENCOUNTER (EMERGENCY)
Facility: MEDICAL CENTER | Age: 58
End: 2018-08-15
Attending: EMERGENCY MEDICINE
Payer: COMMERCIAL

## 2018-08-15 VITALS
WEIGHT: 172.4 LBS | HEIGHT: 67 IN | BODY MASS INDEX: 27.06 KG/M2 | SYSTOLIC BLOOD PRESSURE: 138 MMHG | OXYGEN SATURATION: 97 % | HEART RATE: 75 BPM | DIASTOLIC BLOOD PRESSURE: 80 MMHG | TEMPERATURE: 97.3 F | RESPIRATION RATE: 20 BRPM

## 2018-08-15 DIAGNOSIS — K57.92 DIVERTICULITIS: Primary | ICD-10-CM

## 2018-08-15 LAB
ALBUMIN SERPL BCP-MCNC: 4.2 G/DL (ref 3.2–4.9)
ALBUMIN/GLOB SERPL: 1.2 G/DL
ALP SERPL-CCNC: 108 U/L (ref 30–99)
ALT SERPL-CCNC: 16 U/L (ref 2–50)
ANION GAP SERPL CALC-SCNC: 10 MMOL/L (ref 0–11.9)
APTT PPP: 24.4 SEC (ref 24.7–36)
AST SERPL-CCNC: 20 U/L (ref 12–45)
BASOPHILS # BLD AUTO: 0.2 % (ref 0–1.8)
BASOPHILS # BLD: 0.02 K/UL (ref 0–0.12)
BILIRUB SERPL-MCNC: 0.7 MG/DL (ref 0.1–1.5)
BUN SERPL-MCNC: 7 MG/DL (ref 8–22)
CALCIUM SERPL-MCNC: 9.6 MG/DL (ref 8.5–10.5)
CHLORIDE SERPL-SCNC: 107 MMOL/L (ref 96–112)
CO2 SERPL-SCNC: 23 MMOL/L (ref 20–33)
CREAT SERPL-MCNC: 0.69 MG/DL (ref 0.5–1.4)
EOSINOPHIL # BLD AUTO: 0.03 K/UL (ref 0–0.51)
EOSINOPHIL NFR BLD: 0.4 % (ref 0–6.9)
ERYTHROCYTE [DISTWIDTH] IN BLOOD BY AUTOMATED COUNT: 41.2 FL (ref 35.9–50)
GLOBULIN SER CALC-MCNC: 3.4 G/DL (ref 1.9–3.5)
GLUCOSE SERPL-MCNC: 111 MG/DL (ref 65–99)
HCT VFR BLD AUTO: 26.8 % (ref 37–47)
HGB BLD-MCNC: 9.1 G/DL (ref 12–16)
IMM GRANULOCYTES # BLD AUTO: 0.03 K/UL (ref 0–0.11)
IMM GRANULOCYTES NFR BLD AUTO: 0.4 % (ref 0–0.9)
INR PPP: 1.1 (ref 0.87–1.13)
LIPASE SERPL-CCNC: 10 U/L (ref 11–82)
LYMPHOCYTES # BLD AUTO: 0.87 K/UL (ref 1–4.8)
LYMPHOCYTES NFR BLD: 10.5 % (ref 22–41)
MCH RBC QN AUTO: 30.4 PG (ref 27–33)
MCHC RBC AUTO-ENTMCNC: 34 G/DL (ref 33.6–35)
MCV RBC AUTO: 89.6 FL (ref 81.4–97.8)
MONOCYTES # BLD AUTO: 0.43 K/UL (ref 0–0.85)
MONOCYTES NFR BLD AUTO: 5.2 % (ref 0–13.4)
NEUTROPHILS # BLD AUTO: 6.92 K/UL (ref 2–7.15)
NEUTROPHILS NFR BLD: 83.3 % (ref 44–72)
NRBC # BLD AUTO: 0 K/UL
NRBC BLD-RTO: 0 /100 WBC
PLATELET # BLD AUTO: 133 K/UL (ref 164–446)
PMV BLD AUTO: 10.4 FL (ref 9–12.9)
POTASSIUM SERPL-SCNC: 3.8 MMOL/L (ref 3.6–5.5)
PROT SERPL-MCNC: 7.6 G/DL (ref 6–8.2)
PROTHROMBIN TIME: 13.9 SEC (ref 12–14.6)
RBC # BLD AUTO: 2.99 M/UL (ref 4.2–5.4)
SODIUM SERPL-SCNC: 140 MMOL/L (ref 135–145)
WBC # BLD AUTO: 8.3 K/UL (ref 4.8–10.8)

## 2018-08-15 PROCEDURE — 700111 HCHG RX REV CODE 636 W/ 250 OVERRIDE (IP): Performed by: EMERGENCY MEDICINE

## 2018-08-15 PROCEDURE — 99285 EMERGENCY DEPT VISIT HI MDM: CPT

## 2018-08-15 PROCEDURE — 700102 HCHG RX REV CODE 250 W/ 637 OVERRIDE(OP): Performed by: EMERGENCY MEDICINE

## 2018-08-15 PROCEDURE — 80053 COMPREHEN METABOLIC PANEL: CPT

## 2018-08-15 PROCEDURE — 85610 PROTHROMBIN TIME: CPT

## 2018-08-15 PROCEDURE — 700117 HCHG RX CONTRAST REV CODE 255: Performed by: EMERGENCY MEDICINE

## 2018-08-15 PROCEDURE — 96374 THER/PROPH/DIAG INJ IV PUSH: CPT

## 2018-08-15 PROCEDURE — 83690 ASSAY OF LIPASE: CPT

## 2018-08-15 PROCEDURE — 36415 COLL VENOUS BLD VENIPUNCTURE: CPT

## 2018-08-15 PROCEDURE — 304561 HCHG STAT O2

## 2018-08-15 PROCEDURE — 94760 N-INVAS EAR/PLS OXIMETRY 1: CPT

## 2018-08-15 PROCEDURE — 85730 THROMBOPLASTIN TIME PARTIAL: CPT

## 2018-08-15 PROCEDURE — 85025 COMPLETE CBC W/AUTO DIFF WBC: CPT

## 2018-08-15 PROCEDURE — 96375 TX/PRO/DX INJ NEW DRUG ADDON: CPT

## 2018-08-15 PROCEDURE — 74177 CT ABD & PELVIS W/CONTRAST: CPT

## 2018-08-15 PROCEDURE — A9270 NON-COVERED ITEM OR SERVICE: HCPCS | Performed by: EMERGENCY MEDICINE

## 2018-08-15 RX ORDER — HYDROMORPHONE HYDROCHLORIDE 2 MG/ML
0.5 INJECTION, SOLUTION INTRAMUSCULAR; INTRAVENOUS; SUBCUTANEOUS ONCE
Status: COMPLETED | OUTPATIENT
Start: 2018-08-15 | End: 2018-08-15

## 2018-08-15 RX ORDER — METRONIDAZOLE 500 MG/1
500 TABLET ORAL 2 TIMES DAILY
Qty: 20 TAB | Refills: 0 | Status: SHIPPED | OUTPATIENT
Start: 2018-08-15 | End: 2018-08-25

## 2018-08-15 RX ORDER — ONDANSETRON 4 MG/1
4 TABLET, ORALLY DISINTEGRATING ORAL EVERY 4 HOURS PRN
Qty: 10 TAB | Refills: 0 | Status: SHIPPED | OUTPATIENT
Start: 2018-08-15 | End: 2018-10-28

## 2018-08-15 RX ORDER — METRONIDAZOLE 500 MG/1
500 TABLET ORAL ONCE
Status: COMPLETED | OUTPATIENT
Start: 2018-08-15 | End: 2018-08-15

## 2018-08-15 RX ORDER — CIPROFLOXACIN 500 MG/1
500 TABLET, FILM COATED ORAL 2 TIMES DAILY
Qty: 20 TAB | Refills: 0 | Status: SHIPPED | OUTPATIENT
Start: 2018-08-15 | End: 2018-08-25

## 2018-08-15 RX ORDER — CIPROFLOXACIN 500 MG/1
500 TABLET, FILM COATED ORAL ONCE
Status: COMPLETED | OUTPATIENT
Start: 2018-08-15 | End: 2018-08-15

## 2018-08-15 RX ORDER — TRAMADOL HYDROCHLORIDE 50 MG/1
50 TABLET ORAL EVERY 4 HOURS PRN
Qty: 10 TAB | Refills: 0 | Status: SHIPPED | OUTPATIENT
Start: 2018-08-15 | End: 2018-08-18

## 2018-08-15 RX ORDER — ONDANSETRON 2 MG/ML
4 INJECTION INTRAMUSCULAR; INTRAVENOUS ONCE
Status: COMPLETED | OUTPATIENT
Start: 2018-08-15 | End: 2018-08-15

## 2018-08-15 RX ADMIN — IOHEXOL 100 ML: 350 INJECTION, SOLUTION INTRAVENOUS at 08:55

## 2018-08-15 RX ADMIN — ONDANSETRON 4 MG: 2 INJECTION, SOLUTION INTRAMUSCULAR; INTRAVENOUS at 07:35

## 2018-08-15 RX ADMIN — FENTANYL CITRATE 50 MCG: 50 INJECTION, SOLUTION INTRAMUSCULAR; INTRAVENOUS at 07:35

## 2018-08-15 RX ADMIN — METRONIDAZOLE 500 MG: 500 TABLET ORAL at 11:54

## 2018-08-15 RX ADMIN — HYDROMORPHONE HYDROCHLORIDE 0.5 MG: 2 INJECTION INTRAMUSCULAR; INTRAVENOUS; SUBCUTANEOUS at 09:03

## 2018-08-15 RX ADMIN — CIPROFLOXACIN HYDROCHLORIDE 500 MG: 500 TABLET, FILM COATED ORAL at 12:15

## 2018-08-15 NOTE — DISCHARGE INSTRUCTIONS
Follow-up with primary care 1-2 days for reevaluation, medication management close blood pressure monitoring.  Follow-up with GI as scheduled next month.    Ciprofloxacin twice daily for 10 days.  Flagyl twice daily for 10 days.  Tramadol every 4-6 hours as needed for breakthrough pain.  Zofran every 4-6 hours as needed for nausea or vomiting.  Clear liquid diet for 24-48 hours, then advance to bland as tolerated.    Return to the emergency department for persistent worsening abdominal pain, vomiting, rectal bleeding, fever or other new concerns.    Diverticulitis  Diverticulitis is when small pockets that have formed in your colon (large intestine) become infected or swollen.  Follow these instructions at home:  · Follow your doctor's instructions.  · Follow a special diet if told by your doctor.  · When you feel better, your doctor may tell you to change your diet. You may be told to eat a lot of fiber. Fruits and vegetables are good sources of fiber. Fiber makes it easier to poop (have bowel movements).  · Take supplements or probiotics as told by your doctor.  · Only take medicines as told by your doctor.  · Keep all follow-up visits with your doctor.  Contact a doctor if:  · Your pain does not get better.  · You have a hard time eating food.  · You are not pooping like normal.  Get help right away if:  · Your pain gets worse.  · Your problems do not get better.  · Your problems suddenly get worse.  · You have a fever.  · You keep throwing up (vomiting).  · You have bloody or black, tarry poop (stool).  This information is not intended to replace advice given to you by your health care provider. Make sure you discuss any questions you have with your health care provider.  Document Released: 06/05/2009 Document Revised: 05/25/2017 Document Reviewed: 11/12/2014  [employer] Interactive Patient Education © 2017 [employer]      Diverticulitis  Diverticulitis is when small pockets that have formed in your colon  (large intestine) become infected or swollen.  Follow these instructions at home:  · Follow your doctor's instructions.  · Follow a special diet if told by your doctor.  · When you feel better, your doctor may tell you to change your diet. You may be told to eat a lot of fiber. Fruits and vegetables are good sources of fiber. Fiber makes it easier to poop (have bowel movements).  · Take supplements or probiotics as told by your doctor.  · Only take medicines as told by your doctor.  · Keep all follow-up visits with your doctor.  Contact a doctor if:  · Your pain does not get better.  · You have a hard time eating food.  · You are not pooping like normal.  Get help right away if:  · Your pain gets worse.  · Your problems do not get better.  · Your problems suddenly get worse.  · You have a fever.  · You keep throwing up (vomiting).  · You have bloody or black, tarry poop (stool).  This information is not intended to replace advice given to you by your health care provider. Make sure you discuss any questions you have with your health care provider.  Document Released: 06/05/2009 Document Revised: 05/25/2017 Document Reviewed: 11/12/2014  [employer] Interactive Patient Education © 2017 [employer]

## 2018-08-15 NOTE — ED NOTES
PIV established and patent, dressing CDI. Blood drawn and sent to lab.   Pt medicated for pain per MAR.

## 2018-08-15 NOTE — ED PROVIDER NOTES
"ED Provider Note    CHIEF COMPLAINT  Chief Complaint   Patient presents with   • LLQ Pain     since July increasing to sever last night, hx diverticulitis   • Fever     Last night 100.4 at home last night, pt took Advil       HPI  Ambar Jj is a 57 y.o. female who presents to the emergency department through triage for abdominal pain.  Patient with history of diverticulitis, describes intermittent abdominal pain \"since July\" that feels similar to previous episodes of diverticulitis.  Patient made an appointment with her GI physician, Dr. Osgood, but does not have an appointment until September.  States pain has improved intermittently with liquid diet but as soon as she returns to softer solid diet pain recurs.  Pain is constant and progressive over the last few days, 8 out of 10, sharp currently in the suprapubic and left lower quadrant with pain radiating to her low back.  Fever overnight.  Nausea without vomiting.  Urinary frequency, dysuria without urgency.  Patient describes failed bladder suspension but denies history for recurrent infections.    REVIEW OF SYSTEMS  See HPI for further details. All other systems are negative.     PAST MEDICAL HISTORY   has a past medical history of Anesthesia; Bronchitis (2010); Diverticulitis (11/2017); GERD (gastroesophageal reflux disease); Heart burn; HTN; Indigestion; Other specified symptom associated with female genital organs; Pap smear (1/29/9); Psychiatric problem; Reactive airway disease; Unspecified urinary incontinence; and Urinary bladder disorder.    SOCIAL HISTORY  Social History     Social History Main Topics   • Smoking status: Former Smoker     Packs/day: 1.00     Years: 34.00     Types: Cigarettes     Quit date: 1/1/2010   • Smokeless tobacco: Never Used      Comment: Jan 2010   • Alcohol use 7.2 oz/week     6 Cans of beer, 6 Standard drinks or equivalent per week      Comment: weekly    • Drug use: Yes     Types: Inhaled      Comment: marijuana " "occasionally    • Sexual activity: Yes     Partners: Male       SURGICAL HISTORY   has a past surgical history that includes bladder suspension; endoscopy; tonsillectomy and adenoidectomy; cholecystectomy; abdominal hysterectomy total; endoscopy procedure; bladder suspension (N/A, 5/6/2015); anterior and posterior repair (N/A, 5/6/2015); and cystoscopy (N/A, 5/6/2015).    CURRENT MEDICATIONS  Home Medications     Reviewed by Vida Mckinnon R.N. (Registered Nurse) on 08/15/18 at 0659  Med List Status: <None>   Medication Last Dose Status   ibuprofen (MOTRIN) 200 MG TABS 6/26/2018 Active   losartan (COZAAR) 25 MG Tab 6/26/2018 Active   omeprazole (PRILOSEC) 20 MG delayed-release capsule  Active   PROAIR  (90 Base) MCG/ACT Aero Soln inhalation aerosol 6/26/2018 Active                ALLERGIES  Allergies   Allergen Reactions   • Codeine Vomiting       PHYSICAL EXAM  VITAL SIGNS: /80   Pulse 75   Temp 36.3 °C (97.3 °F)   Resp 20   Ht 1.702 m (5' 7\")   Wt 78.2 kg (172 lb 6.4 oz)   SpO2 97%   BMI 27.00 kg/m²   Pulse ox interpretation: I interpret this pulse ox as normal.  Constitutional: Alert.  Mild distress secondary discomfort.  Tearful.  HENT: Normocephalic, atraumatic. Bilateral external ears normal, Nose normal. Moist mucous membranes.    Eyes: Pupils are equal and reactive, Conjunctiva normal.   Neck: Normal range of motion, Supple  Lymphatic: No lymphadenopathy noted.   Cardiovascular: Regular rate and rhythm, no murmurs. Distal pulses intact.    Thorax & Lungs: Normal breath sounds.  No wheezing/rales/ronchi. No increased work of breathing  Abdomen: Soft, non-distended.  Tender to palpation lower abdomen without rebound, guarding or peritonitis.  No palpable pulsatile mass.  Skin: Warm, Dry, No erythema, No rash.   Musculoskeletal: Good range of motion in all major joints.   Neurologic: Alert and oriented ×4.  Moves for extremity spontaneously.  Psychiatric: Affect normal, Judgment normal, " Mood normal.       DIAGNOSTIC STUDIES / PROCEDURES    LABS  Results for orders placed or performed during the hospital encounter of 08/15/18   COMP METABOLIC PANEL   Result Value Ref Range    Sodium 140 135 - 145 mmol/L    Potassium 3.8 3.6 - 5.5 mmol/L    Chloride 107 96 - 112 mmol/L    Co2 23 20 - 33 mmol/L    Anion Gap 10.0 0.0 - 11.9    Glucose 111 (H) 65 - 99 mg/dL    Bun 7 (L) 8 - 22 mg/dL    Creatinine 0.69 0.50 - 1.40 mg/dL    Calcium 9.6 8.5 - 10.5 mg/dL    AST(SGOT) 20 12 - 45 U/L    ALT(SGPT) 16 2 - 50 U/L    Alkaline Phosphatase 108 (H) 30 - 99 U/L    Total Bilirubin 0.7 0.1 - 1.5 mg/dL    Albumin 4.2 3.2 - 4.9 g/dL    Total Protein 7.6 6.0 - 8.2 g/dL    Globulin 3.4 1.9 - 3.5 g/dL    A-G Ratio 1.2 g/dL   LIPASE   Result Value Ref Range    Lipase 10 (L) 11 - 82 U/L   APTT   Result Value Ref Range    APTT 24.4 (L) 24.7 - 36.0 sec   PROTHROMBIN TIME (INR)   Result Value Ref Range    PT 13.9 12.0 - 14.6 sec    INR 1.10 0.87 - 1.13   ESTIMATED GFR   Result Value Ref Range    GFR If African American >60 >60 mL/min/1.73 m 2    GFR If Non African American >60 >60 mL/min/1.73 m 2   CBC WITH DIFFERENTIAL   Result Value Ref Range    WBC 8.3 4.8 - 10.8 K/uL    RBC 2.99 (L) 4.20 - 5.40 M/uL    Hemoglobin 9.1 (L) 12.0 - 16.0 g/dL    Hematocrit 26.8 (L) 37.0 - 47.0 %    MCV 89.6 81.4 - 97.8 fL    MCH 30.4 27.0 - 33.0 pg    MCHC 34.0 33.6 - 35.0 g/dL    RDW 41.2 35.9 - 50.0 fL    Platelet Count 133 (L) 164 - 446 K/uL    MPV 10.4 9.0 - 12.9 fL    Neutrophils-Polys 83.30 (H) 44.00 - 72.00 %    Lymphocytes 10.50 (L) 22.00 - 41.00 %    Monocytes 5.20 0.00 - 13.40 %    Eosinophils 0.40 0.00 - 6.90 %    Basophils 0.20 0.00 - 1.80 %    Immature Granulocytes 0.40 0.00 - 0.90 %    Nucleated RBC 0.00 /100 WBC    Neutrophils (Absolute) 6.92 2.00 - 7.15 K/uL    Lymphs (Absolute) 0.87 (L) 1.00 - 4.80 K/uL    Monos (Absolute) 0.43 0.00 - 0.85 K/uL    Eos (Absolute) 0.03 0.00 - 0.51 K/uL    Baso (Absolute) 0.02 0.00 - 0.12 K/uL     Immature Granulocytes (abs) 0.03 0.00 - 0.11 K/uL    NRBC (Absolute) 0.00 K/uL       RADIOLOGY  CT-ABDOMEN-PELVIS WITH   Final Result         1. Long segment wall thickening in the sigmoid colon with surrounding stranding in the area of multiple diverticula, in keeping with acute diverticulitis.      2. The left ovary adjacent to the diverticulitis is enlarged and heterogeneous, concerning for infection as well          COURSE & MEDICAL DECISION MAKING  Nursing notes and vital signs were reviewed. (See chart for details)  The patients records were reviewed, history was obtained from the patient;     ED evaluation for left lower quadrant abdominal pain most consistent with diverticulitis.  No radiographic evidence for abscess or perforation.  Labs are within normal limits, no leukocytosis or bandemia.  No electrolyte derangement.  Renal function is preserved.  Abdominal exam with tenderness, otherwise benign, no peritonitis or acute abdomen.  Imaging does suggest enlarging heterogeneous left ovary, patient is aware of this finding will follow up with primary care/GI/gynecology  for resolution, however suspect inflammatory response due to adjacent infection.  Patient denies any vaginal discharge or bleeding.  Symptomatology is consistent with previous diverticulitis flares.  Vital signs are stable without fever tachycardia.  Patient was never hypotensive.  No clinical evidence for sepsis.  Pain controlled with fentanyl and much more so with Dilaudid.  Tolerating oral fluids.  First of ciprofloxacin and Flagyl given in the emergency department.    Patient is stable for discharge at this time, anticipatory guidance provided, ciprofloxacin and Flagyl for 10 days, tramadol and Zofran for breakthrough symptoms, close follow-up is encouraged, and strict ED return instructions have been detailed. Patient is agreeable to the disposition and plan.    Patient's blood pressure was elevated in the emergency department, and has  been referred to primary care for close monitoring.      FINAL IMPRESSION  (K57.92) Diverticulitis  (primary encounter diagnosis)      Electronically signed by: Eli Martínez, 8/15/2018 8:24 AM      This dictation was created using voice recognition software. The accuracy of the dictation is limited to the abilities of the software. I expect there may be some errors of grammar and possibly content. The nursing notes were reviewed and certain aspects of this information were incorporated into this note.

## 2018-08-15 NOTE — ED NOTES
Pt states she was nauseated in the night but didn't vomit. Pt states it is painful to void and have a BM.

## 2018-08-15 NOTE — ED TRIAGE NOTES
"Chief Complaint   Patient presents with   • LLQ Pain     since July increasing to sever last night, hx diverticulitis   • Fever     Last night 100.4 at home last night, pt took Advil     Blood pressure 138/80, pulse (!) 101, temperature 36.3 °C (97.3 °F), resp. rate 20, height 1.702 m (5' 7\"), weight 78.2 kg (172 lb 6.4 oz), SpO2 100 %.    Pt ambulated with a steady gait to triage c/o 7/10 LLQ pain. Pt has an appointment with her GI specialist September, states she is in so much at this point she cannot wait until then to be seen. Pt reports Advil usually helps with this type of pain but it did not touch it this time.   "

## 2018-08-16 ENCOUNTER — PATIENT OUTREACH (OUTPATIENT)
Dept: HEALTH INFORMATION MANAGEMENT | Facility: OTHER | Age: 58
End: 2018-08-16

## 2018-08-16 NOTE — PROGRESS NOTES
Placed discharge outreach phone call to pt s/p ER discharge 8/15/18.  Left voicemail providing my contact information and instructions to call with any questions or concerns.

## 2018-08-18 ENCOUNTER — PATIENT MESSAGE (OUTPATIENT)
Dept: MEDICAL GROUP | Facility: PHYSICIAN GROUP | Age: 58
End: 2018-08-18

## 2018-08-20 ENCOUNTER — PATIENT MESSAGE (OUTPATIENT)
Dept: MEDICAL GROUP | Facility: PHYSICIAN GROUP | Age: 58
End: 2018-08-20

## 2018-09-05 ENCOUNTER — APPOINTMENT (OUTPATIENT)
Dept: RADIOLOGY | Facility: MEDICAL CENTER | Age: 58
DRG: 872 | End: 2018-09-05
Attending: EMERGENCY MEDICINE
Payer: COMMERCIAL

## 2018-09-05 ENCOUNTER — HOSPITAL ENCOUNTER (INPATIENT)
Facility: MEDICAL CENTER | Age: 58
LOS: 6 days | DRG: 872 | End: 2018-09-12
Attending: EMERGENCY MEDICINE | Admitting: SURGERY
Payer: COMMERCIAL

## 2018-09-05 ENCOUNTER — HOSPITAL ENCOUNTER (OUTPATIENT)
Dept: RADIOLOGY | Facility: MEDICAL CENTER | Age: 58
End: 2018-09-05
Attending: INTERNAL MEDICINE
Payer: COMMERCIAL

## 2018-09-05 DIAGNOSIS — K57.92 DIVERTICULITIS: ICD-10-CM

## 2018-09-05 DIAGNOSIS — R10.32 ABDOMINAL PAIN, LEFT LOWER QUADRANT: ICD-10-CM

## 2018-09-05 DIAGNOSIS — E86.0 DEHYDRATION: ICD-10-CM

## 2018-09-05 LAB
ALBUMIN SERPL BCP-MCNC: 3.6 G/DL (ref 3.2–4.9)
ALBUMIN/GLOB SERPL: 1 G/DL
ALP SERPL-CCNC: 81 U/L (ref 30–99)
ALT SERPL-CCNC: 12 U/L (ref 2–50)
ANION GAP SERPL CALC-SCNC: 15 MMOL/L (ref 0–11.9)
APPEARANCE UR: CLEAR
AST SERPL-CCNC: 15 U/L (ref 12–45)
BACTERIA #/AREA URNS HPF: ABNORMAL /HPF
BASOPHILS # BLD AUTO: 0.3 % (ref 0–1.8)
BASOPHILS # BLD: 0.04 K/UL (ref 0–0.12)
BILIRUB SERPL-MCNC: 0.5 MG/DL (ref 0.1–1.5)
BILIRUB UR QL STRIP.AUTO: ABNORMAL
BUN SERPL-MCNC: 6 MG/DL (ref 8–22)
CALCIUM SERPL-MCNC: 9.1 MG/DL (ref 8.5–10.5)
CHLORIDE SERPL-SCNC: 105 MMOL/L (ref 96–112)
CO2 SERPL-SCNC: 21 MMOL/L (ref 20–33)
COLOR UR: ABNORMAL
CREAT SERPL-MCNC: 0.68 MG/DL (ref 0.5–1.4)
EKG IMPRESSION: NORMAL
EOSINOPHIL # BLD AUTO: 0.06 K/UL (ref 0–0.51)
EOSINOPHIL NFR BLD: 0.4 % (ref 0–6.9)
EPI CELLS #/AREA URNS HPF: ABNORMAL /HPF
ERYTHROCYTE [DISTWIDTH] IN BLOOD BY AUTOMATED COUNT: 40.9 FL (ref 35.9–50)
GLOBULIN SER CALC-MCNC: 3.6 G/DL (ref 1.9–3.5)
GLUCOSE SERPL-MCNC: 119 MG/DL (ref 65–99)
GLUCOSE UR STRIP.AUTO-MCNC: NEGATIVE MG/DL
GRAN CASTS #/AREA URNS LPF: ABNORMAL /LPF
HCT VFR BLD AUTO: 33.3 % (ref 37–47)
HGB BLD-MCNC: 11.4 G/DL (ref 12–16)
HYALINE CASTS #/AREA URNS LPF: ABNORMAL /LPF
IMM GRANULOCYTES # BLD AUTO: 0.05 K/UL (ref 0–0.11)
IMM GRANULOCYTES NFR BLD AUTO: 0.4 % (ref 0–0.9)
KETONES UR STRIP.AUTO-MCNC: NEGATIVE MG/DL
LACTATE BLD-SCNC: 1.5 MMOL/L (ref 0.5–2)
LEUKOCYTE ESTERASE UR QL STRIP.AUTO: ABNORMAL
LIPASE SERPL-CCNC: 12 U/L (ref 11–82)
LYMPHOCYTES # BLD AUTO: 1.12 K/UL (ref 1–4.8)
LYMPHOCYTES NFR BLD: 8.4 % (ref 22–41)
MCH RBC QN AUTO: 29.9 PG (ref 27–33)
MCHC RBC AUTO-ENTMCNC: 34.2 G/DL (ref 33.6–35)
MCV RBC AUTO: 87.4 FL (ref 81.4–97.8)
MICRO URNS: ABNORMAL
MONOCYTES # BLD AUTO: 0.97 K/UL (ref 0–0.85)
MONOCYTES NFR BLD AUTO: 7.3 % (ref 0–13.4)
NEUTROPHILS # BLD AUTO: 11.13 K/UL (ref 2–7.15)
NEUTROPHILS NFR BLD: 83.2 % (ref 44–72)
NITRITE UR QL STRIP.AUTO: NEGATIVE
NRBC # BLD AUTO: 0 K/UL
NRBC BLD-RTO: 0 /100 WBC
PH UR STRIP.AUTO: 5.5 [PH]
PLATELET # BLD AUTO: 325 K/UL (ref 164–446)
PMV BLD AUTO: 10.1 FL (ref 9–12.9)
POTASSIUM SERPL-SCNC: 3 MMOL/L (ref 3.6–5.5)
PROT SERPL-MCNC: 7.2 G/DL (ref 6–8.2)
PROT UR QL STRIP: 30 MG/DL
RBC # BLD AUTO: 3.81 M/UL (ref 4.2–5.4)
RBC # URNS HPF: ABNORMAL /HPF
RBC UR QL AUTO: NEGATIVE
SODIUM SERPL-SCNC: 141 MMOL/L (ref 135–145)
SP GR UR STRIP.AUTO: >=1.045
TROPONIN I SERPL-MCNC: <0.01 NG/ML (ref 0–0.04)
UROBILINOGEN UR STRIP.AUTO-MCNC: 1 MG/DL
WBC # BLD AUTO: 13.4 K/UL (ref 4.8–10.8)
WBC #/AREA URNS HPF: ABNORMAL /HPF

## 2018-09-05 PROCEDURE — 76830 TRANSVAGINAL US NON-OB: CPT

## 2018-09-05 PROCEDURE — G0378 HOSPITAL OBSERVATION PER HR: HCPCS

## 2018-09-05 PROCEDURE — 99285 EMERGENCY DEPT VISIT HI MDM: CPT

## 2018-09-05 PROCEDURE — 99220 PR INITIAL OBSERVATION CARE,LEVL III: CPT | Performed by: INTERNAL MEDICINE

## 2018-09-05 PROCEDURE — 700111 HCHG RX REV CODE 636 W/ 250 OVERRIDE (IP): Performed by: EMERGENCY MEDICINE

## 2018-09-05 PROCEDURE — 96375 TX/PRO/DX INJ NEW DRUG ADDON: CPT

## 2018-09-05 PROCEDURE — 96365 THER/PROPH/DIAG IV INF INIT: CPT

## 2018-09-05 PROCEDURE — 83690 ASSAY OF LIPASE: CPT

## 2018-09-05 PROCEDURE — 700102 HCHG RX REV CODE 250 W/ 637 OVERRIDE(OP): Performed by: EMERGENCY MEDICINE

## 2018-09-05 PROCEDURE — 80053 COMPREHEN METABOLIC PANEL: CPT

## 2018-09-05 PROCEDURE — 84484 ASSAY OF TROPONIN QUANT: CPT

## 2018-09-05 PROCEDURE — 96367 TX/PROPH/DG ADDL SEQ IV INF: CPT

## 2018-09-05 PROCEDURE — 85025 COMPLETE CBC W/AUTO DIFF WBC: CPT

## 2018-09-05 PROCEDURE — A9270 NON-COVERED ITEM OR SERVICE: HCPCS | Performed by: EMERGENCY MEDICINE

## 2018-09-05 PROCEDURE — 700101 HCHG RX REV CODE 250: Performed by: EMERGENCY MEDICINE

## 2018-09-05 PROCEDURE — 700105 HCHG RX REV CODE 258: Performed by: EMERGENCY MEDICINE

## 2018-09-05 PROCEDURE — 74177 CT ABD & PELVIS W/CONTRAST: CPT

## 2018-09-05 PROCEDURE — 96361 HYDRATE IV INFUSION ADD-ON: CPT

## 2018-09-05 PROCEDURE — 83605 ASSAY OF LACTIC ACID: CPT

## 2018-09-05 PROCEDURE — 93005 ELECTROCARDIOGRAM TRACING: CPT | Performed by: EMERGENCY MEDICINE

## 2018-09-05 PROCEDURE — 81001 URINALYSIS AUTO W/SCOPE: CPT

## 2018-09-05 RX ORDER — DEXTROSE AND SODIUM CHLORIDE 5; .45 G/100ML; G/100ML
INJECTION, SOLUTION INTRAVENOUS CONTINUOUS
Status: DISCONTINUED | OUTPATIENT
Start: 2018-09-05 | End: 2018-09-06

## 2018-09-05 RX ORDER — MORPHINE SULFATE 10 MG/ML
5 INJECTION, SOLUTION INTRAMUSCULAR; INTRAVENOUS
Status: COMPLETED | OUTPATIENT
Start: 2018-09-05 | End: 2018-09-06

## 2018-09-05 RX ORDER — POTASSIUM CHLORIDE 20 MEQ/1
40 TABLET, EXTENDED RELEASE ORAL ONCE
Status: COMPLETED | OUTPATIENT
Start: 2018-09-05 | End: 2018-09-05

## 2018-09-05 RX ORDER — SODIUM CHLORIDE 9 MG/ML
1000 INJECTION, SOLUTION INTRAVENOUS ONCE
Status: COMPLETED | OUTPATIENT
Start: 2018-09-05 | End: 2018-09-05

## 2018-09-05 RX ORDER — ONDANSETRON 2 MG/ML
4 INJECTION INTRAMUSCULAR; INTRAVENOUS ONCE
Status: COMPLETED | OUTPATIENT
Start: 2018-09-05 | End: 2018-09-05

## 2018-09-05 RX ORDER — POTASSIUM CHLORIDE 7.45 MG/ML
10 INJECTION INTRAVENOUS ONCE
Status: COMPLETED | OUTPATIENT
Start: 2018-09-05 | End: 2018-09-06

## 2018-09-05 RX ORDER — ONDANSETRON 2 MG/ML
4 INJECTION INTRAMUSCULAR; INTRAVENOUS EVERY 8 HOURS PRN
Status: DISCONTINUED | OUTPATIENT
Start: 2018-09-05 | End: 2018-09-06

## 2018-09-05 RX ORDER — LORAZEPAM 2 MG/ML
1 INJECTION INTRAMUSCULAR ONCE
Status: COMPLETED | OUTPATIENT
Start: 2018-09-05 | End: 2018-09-05

## 2018-09-05 RX ORDER — CIPROFLOXACIN 500 MG/1
500 TABLET, FILM COATED ORAL 2 TIMES DAILY
Status: ON HOLD | COMMUNITY
End: 2018-09-12

## 2018-09-05 RX ORDER — METRONIDAZOLE 500 MG/1
500 TABLET ORAL 3 TIMES DAILY
Status: ON HOLD | COMMUNITY
End: 2018-09-12

## 2018-09-05 RX ORDER — TRAMADOL HYDROCHLORIDE 50 MG/1
50 TABLET ORAL EVERY 4 HOURS PRN
COMMUNITY
End: 2018-10-28

## 2018-09-05 RX ORDER — MORPHINE SULFATE 4 MG/ML
4 INJECTION, SOLUTION INTRAMUSCULAR; INTRAVENOUS ONCE
Status: DISPENSED | OUTPATIENT
Start: 2018-09-05 | End: 2018-09-06

## 2018-09-05 RX ORDER — CIPROFLOXACIN 2 MG/ML
400 INJECTION, SOLUTION INTRAVENOUS EVERY 12 HOURS
Status: DISCONTINUED | OUTPATIENT
Start: 2018-09-05 | End: 2018-09-07

## 2018-09-05 RX ADMIN — LORAZEPAM 1 MG: 2 INJECTION INTRAMUSCULAR; INTRAVENOUS at 21:01

## 2018-09-05 RX ADMIN — CIPROFLOXACIN 400 MG: 2 INJECTION INTRAVENOUS at 21:48

## 2018-09-05 RX ADMIN — SODIUM CHLORIDE 1000 ML: 9 INJECTION, SOLUTION INTRAVENOUS at 22:30

## 2018-09-05 RX ADMIN — ONDANSETRON 4 MG: 2 INJECTION INTRAMUSCULAR; INTRAVENOUS at 21:01

## 2018-09-05 RX ADMIN — POTASSIUM CHLORIDE 40 MEQ: 1500 TABLET, EXTENDED RELEASE ORAL at 23:27

## 2018-09-05 RX ADMIN — METRONIDAZOLE 500 MG: 500 INJECTION, SOLUTION INTRAVENOUS at 23:24

## 2018-09-05 RX ADMIN — SODIUM CHLORIDE 1000 ML: 9 INJECTION, SOLUTION INTRAVENOUS at 20:52

## 2018-09-05 ASSESSMENT — PAIN SCALES - GENERAL: PAINLEVEL_OUTOF10: 0

## 2018-09-06 ENCOUNTER — APPOINTMENT (OUTPATIENT)
Dept: RADIOLOGY | Facility: MEDICAL CENTER | Age: 58
DRG: 872 | End: 2018-09-06
Attending: SURGERY
Payer: COMMERCIAL

## 2018-09-06 PROBLEM — A41.9 SEPSIS (HCC): Status: ACTIVE | Noted: 2018-09-06

## 2018-09-06 PROBLEM — K57.20 COLONIC DIVERTICULAR ABSCESS: Status: ACTIVE | Noted: 2018-09-06

## 2018-09-06 PROBLEM — E87.6 HYPOKALEMIA: Status: ACTIVE | Noted: 2018-09-06

## 2018-09-06 LAB
ANION GAP SERPL CALC-SCNC: 6 MMOL/L (ref 0–11.9)
APTT PPP: 33.7 SEC (ref 24.7–36)
BASOPHILS # BLD AUTO: 0.4 % (ref 0–1.8)
BASOPHILS # BLD: 0.05 K/UL (ref 0–0.12)
BUN SERPL-MCNC: 3 MG/DL (ref 8–22)
CALCIUM SERPL-MCNC: 8.8 MG/DL (ref 8.5–10.5)
CHLORIDE SERPL-SCNC: 108 MMOL/L (ref 96–112)
CO2 SERPL-SCNC: 26 MMOL/L (ref 20–33)
CREAT SERPL-MCNC: 0.65 MG/DL (ref 0.5–1.4)
EOSINOPHIL # BLD AUTO: 0.04 K/UL (ref 0–0.51)
EOSINOPHIL NFR BLD: 0.3 % (ref 0–6.9)
ERYTHROCYTE [DISTWIDTH] IN BLOOD BY AUTOMATED COUNT: 42.2 FL (ref 35.9–50)
GLUCOSE SERPL-MCNC: 118 MG/DL (ref 65–99)
HCT VFR BLD AUTO: 30.5 % (ref 37–47)
HGB BLD-MCNC: 9.9 G/DL (ref 12–16)
IMM GRANULOCYTES # BLD AUTO: 0.05 K/UL (ref 0–0.11)
IMM GRANULOCYTES NFR BLD AUTO: 0.4 % (ref 0–0.9)
INR PPP: 1.47 (ref 0.87–1.13)
LYMPHOCYTES # BLD AUTO: 1.34 K/UL (ref 1–4.8)
LYMPHOCYTES NFR BLD: 11.7 % (ref 22–41)
MAGNESIUM SERPL-MCNC: 1.5 MG/DL (ref 1.5–2.5)
MCH RBC QN AUTO: 28.6 PG (ref 27–33)
MCHC RBC AUTO-ENTMCNC: 32.5 G/DL (ref 33.6–35)
MCV RBC AUTO: 88.2 FL (ref 81.4–97.8)
MONOCYTES # BLD AUTO: 1.04 K/UL (ref 0–0.85)
MONOCYTES NFR BLD AUTO: 9.1 % (ref 0–13.4)
NEUTROPHILS # BLD AUTO: 8.91 K/UL (ref 2–7.15)
NEUTROPHILS NFR BLD: 78.1 % (ref 44–72)
NRBC # BLD AUTO: 0 K/UL
NRBC BLD-RTO: 0 /100 WBC
PLATELET # BLD AUTO: 287 K/UL (ref 164–446)
PMV BLD AUTO: 10 FL (ref 9–12.9)
POTASSIUM SERPL-SCNC: 3.5 MMOL/L (ref 3.6–5.5)
PROTHROMBIN TIME: 17.5 SEC (ref 12–14.6)
RBC # BLD AUTO: 3.46 M/UL (ref 4.2–5.4)
SODIUM SERPL-SCNC: 140 MMOL/L (ref 135–145)
TROPONIN I SERPL-MCNC: 0.03 NG/ML (ref 0–0.04)
WBC # BLD AUTO: 11.4 K/UL (ref 4.8–10.8)

## 2018-09-06 PROCEDURE — 87040 BLOOD CULTURE FOR BACTERIA: CPT | Mod: 91

## 2018-09-06 PROCEDURE — A9270 NON-COVERED ITEM OR SERVICE: HCPCS | Performed by: INTERNAL MEDICINE

## 2018-09-06 PROCEDURE — 700101 HCHG RX REV CODE 250: Performed by: EMERGENCY MEDICINE

## 2018-09-06 PROCEDURE — 36415 COLL VENOUS BLD VENIPUNCTURE: CPT

## 2018-09-06 PROCEDURE — 87070 CULTURE OTHR SPECIMN AEROBIC: CPT

## 2018-09-06 PROCEDURE — 700101 HCHG RX REV CODE 250

## 2018-09-06 PROCEDURE — 83735 ASSAY OF MAGNESIUM: CPT

## 2018-09-06 PROCEDURE — 700112 HCHG RX REV CODE 229: Performed by: SURGERY

## 2018-09-06 PROCEDURE — 700111 HCHG RX REV CODE 636 W/ 250 OVERRIDE (IP): Performed by: INTERNAL MEDICINE

## 2018-09-06 PROCEDURE — 85025 COMPLETE CBC W/AUTO DIFF WBC: CPT

## 2018-09-06 PROCEDURE — 96366 THER/PROPH/DIAG IV INF ADDON: CPT

## 2018-09-06 PROCEDURE — 700102 HCHG RX REV CODE 250 W/ 637 OVERRIDE(OP): Performed by: INTERNAL MEDICINE

## 2018-09-06 PROCEDURE — 94760 N-INVAS EAR/PLS OXIMETRY 1: CPT

## 2018-09-06 PROCEDURE — 99233 SBSQ HOSP IP/OBS HIGH 50: CPT | Performed by: INTERNAL MEDICINE

## 2018-09-06 PROCEDURE — 700111 HCHG RX REV CODE 636 W/ 250 OVERRIDE (IP): Performed by: EMERGENCY MEDICINE

## 2018-09-06 PROCEDURE — 84484 ASSAY OF TROPONIN QUANT: CPT

## 2018-09-06 PROCEDURE — 0W9J30Z DRAINAGE OF PELVIC CAVITY WITH DRAINAGE DEVICE, PERCUTANEOUS APPROACH: ICD-10-PCS | Performed by: RADIOLOGY

## 2018-09-06 PROCEDURE — 85610 PROTHROMBIN TIME: CPT

## 2018-09-06 PROCEDURE — A9270 NON-COVERED ITEM OR SERVICE: HCPCS | Performed by: SURGERY

## 2018-09-06 PROCEDURE — 700105 HCHG RX REV CODE 258: Performed by: INTERNAL MEDICINE

## 2018-09-06 PROCEDURE — 700101 HCHG RX REV CODE 250: Performed by: INTERNAL MEDICINE

## 2018-09-06 PROCEDURE — 85730 THROMBOPLASTIN TIME PARTIAL: CPT

## 2018-09-06 PROCEDURE — 87205 SMEAR GRAM STAIN: CPT

## 2018-09-06 PROCEDURE — 700105 HCHG RX REV CODE 258: Performed by: EMERGENCY MEDICINE

## 2018-09-06 PROCEDURE — 4410013 CT-DRAIN-RETROPERITONEAL

## 2018-09-06 PROCEDURE — 96367 TX/PROPH/DG ADDL SEQ IV INF: CPT

## 2018-09-06 PROCEDURE — 700111 HCHG RX REV CODE 636 W/ 250 OVERRIDE (IP)

## 2018-09-06 PROCEDURE — 96376 TX/PRO/DX INJ SAME DRUG ADON: CPT

## 2018-09-06 PROCEDURE — 770020 HCHG ROOM/CARE - TELE (206)

## 2018-09-06 PROCEDURE — 80048 BASIC METABOLIC PNL TOTAL CA: CPT

## 2018-09-06 RX ORDER — SODIUM CHLORIDE 9 MG/ML
30 INJECTION, SOLUTION INTRAVENOUS
Status: DISCONTINUED | OUTPATIENT
Start: 2018-09-06 | End: 2018-09-11

## 2018-09-06 RX ORDER — OXYCODONE HYDROCHLORIDE 10 MG/1
10 TABLET ORAL
Status: DISCONTINUED | OUTPATIENT
Start: 2018-09-06 | End: 2018-09-12 | Stop reason: HOSPADM

## 2018-09-06 RX ORDER — DEXTROSE MONOHYDRATE, SODIUM CHLORIDE, AND POTASSIUM CHLORIDE 50; 2.98; 4.5 G/1000ML; G/1000ML; G/1000ML
INJECTION, SOLUTION INTRAVENOUS CONTINUOUS
Status: DISCONTINUED | OUTPATIENT
Start: 2018-09-06 | End: 2018-09-08

## 2018-09-06 RX ORDER — OMEPRAZOLE 20 MG/1
20 CAPSULE, DELAYED RELEASE ORAL
Status: DISCONTINUED | OUTPATIENT
Start: 2018-09-06 | End: 2018-09-12 | Stop reason: HOSPADM

## 2018-09-06 RX ORDER — MAGNESIUM SULFATE HEPTAHYDRATE 40 MG/ML
2 INJECTION, SOLUTION INTRAVENOUS ONCE
Status: COMPLETED | OUTPATIENT
Start: 2018-09-06 | End: 2018-09-06

## 2018-09-06 RX ORDER — ONDANSETRON 2 MG/ML
4 INJECTION INTRAMUSCULAR; INTRAVENOUS EVERY 4 HOURS PRN
Status: DISCONTINUED | OUTPATIENT
Start: 2018-09-06 | End: 2018-09-12 | Stop reason: HOSPADM

## 2018-09-06 RX ORDER — ONDANSETRON 2 MG/ML
INJECTION INTRAMUSCULAR; INTRAVENOUS
Status: COMPLETED
Start: 2018-09-06 | End: 2018-09-07

## 2018-09-06 RX ORDER — SODIUM CHLORIDE 9 MG/ML
500 INJECTION, SOLUTION INTRAVENOUS
Status: ACTIVE | OUTPATIENT
Start: 2018-09-06 | End: 2018-09-06

## 2018-09-06 RX ORDER — SODIUM CHLORIDE 9 MG/ML
INJECTION, SOLUTION INTRAVENOUS
Status: ACTIVE
Start: 2018-09-06 | End: 2018-09-06

## 2018-09-06 RX ORDER — LIDOCAINE HYDROCHLORIDE 10 MG/ML
INJECTION, SOLUTION INFILTRATION; PERINEURAL
Status: COMPLETED
Start: 2018-09-06 | End: 2018-09-06

## 2018-09-06 RX ORDER — MIDAZOLAM HYDROCHLORIDE 1 MG/ML
.5-2 INJECTION INTRAMUSCULAR; INTRAVENOUS PRN
Status: ACTIVE | OUTPATIENT
Start: 2018-09-06 | End: 2018-09-06

## 2018-09-06 RX ORDER — NALOXONE HYDROCHLORIDE 0.4 MG/ML
INJECTION, SOLUTION INTRAMUSCULAR; INTRAVENOUS; SUBCUTANEOUS
Status: COMPLETED
Start: 2018-09-06 | End: 2018-09-06

## 2018-09-06 RX ORDER — MIDAZOLAM HYDROCHLORIDE 1 MG/ML
INJECTION INTRAMUSCULAR; INTRAVENOUS
Status: COMPLETED
Start: 2018-09-06 | End: 2018-09-06

## 2018-09-06 RX ORDER — LOSARTAN POTASSIUM 50 MG/1
25 TABLET ORAL
Status: DISCONTINUED | OUTPATIENT
Start: 2018-09-06 | End: 2018-09-12 | Stop reason: HOSPADM

## 2018-09-06 RX ORDER — ONDANSETRON 4 MG/1
4 TABLET, ORALLY DISINTEGRATING ORAL EVERY 4 HOURS PRN
Status: DISCONTINUED | OUTPATIENT
Start: 2018-09-06 | End: 2018-09-12 | Stop reason: HOSPADM

## 2018-09-06 RX ORDER — DOCUSATE SODIUM 100 MG/1
100 CAPSULE, LIQUID FILLED ORAL 2 TIMES DAILY
Status: DISCONTINUED | OUTPATIENT
Start: 2018-09-06 | End: 2018-09-12 | Stop reason: HOSPADM

## 2018-09-06 RX ORDER — ACETAMINOPHEN 325 MG/1
650 TABLET ORAL EVERY 6 HOURS PRN
Status: DISCONTINUED | OUTPATIENT
Start: 2018-09-06 | End: 2018-09-12 | Stop reason: HOSPADM

## 2018-09-06 RX ORDER — SODIUM CHLORIDE 9 MG/ML
500 INJECTION, SOLUTION INTRAVENOUS
Status: COMPLETED | OUTPATIENT
Start: 2018-09-06 | End: 2018-09-06

## 2018-09-06 RX ORDER — ALBUTEROL SULFATE 90 UG/1
2 AEROSOL, METERED RESPIRATORY (INHALATION)
Status: DISCONTINUED | OUTPATIENT
Start: 2018-09-06 | End: 2018-09-12 | Stop reason: HOSPADM

## 2018-09-06 RX ORDER — ONDANSETRON 2 MG/ML
4 INJECTION INTRAMUSCULAR; INTRAVENOUS PRN
Status: ACTIVE | OUTPATIENT
Start: 2018-09-06 | End: 2018-09-06

## 2018-09-06 RX ORDER — OXYCODONE HYDROCHLORIDE 5 MG/1
5 TABLET ORAL
Status: DISCONTINUED | OUTPATIENT
Start: 2018-09-06 | End: 2018-09-12 | Stop reason: HOSPADM

## 2018-09-06 RX ADMIN — OXYCODONE HYDROCHLORIDE 2.5 MG: 5 TABLET ORAL at 09:27

## 2018-09-06 RX ADMIN — DEXTROSE AND SODIUM CHLORIDE: 5; 450 INJECTION, SOLUTION INTRAVENOUS at 00:32

## 2018-09-06 RX ADMIN — LIDOCAINE HYDROCHLORIDE: 10 INJECTION, SOLUTION INFILTRATION; PERINEURAL at 15:00

## 2018-09-06 RX ADMIN — FENTANYL CITRATE 50 MCG: 50 INJECTION, SOLUTION INTRAMUSCULAR; INTRAVENOUS at 15:18

## 2018-09-06 RX ADMIN — MORPHINE SULFATE 5 MG: 10 INJECTION INTRAVENOUS at 00:33

## 2018-09-06 RX ADMIN — DOCUSATE SODIUM 100 MG: 100 CAPSULE, LIQUID FILLED ORAL at 18:18

## 2018-09-06 RX ADMIN — MAGNESIUM SULFATE IN WATER 2 G: 40 INJECTION, SOLUTION INTRAVENOUS at 10:27

## 2018-09-06 RX ADMIN — POTASSIUM CHLORIDE 10 MEQ: 7.46 INJECTION, SOLUTION INTRAVENOUS at 00:33

## 2018-09-06 RX ADMIN — MIDAZOLAM 2 MG: 1 INJECTION INTRAMUSCULAR; INTRAVENOUS at 15:18

## 2018-09-06 RX ADMIN — OXYCODONE HYDROCHLORIDE 2.5 MG: 5 TABLET ORAL at 18:18

## 2018-09-06 RX ADMIN — METRONIDAZOLE 500 MG: 500 INJECTION, SOLUTION INTRAVENOUS at 14:04

## 2018-09-06 RX ADMIN — CIPROFLOXACIN 400 MG: 2 INJECTION INTRAVENOUS at 05:25

## 2018-09-06 RX ADMIN — OMEPRAZOLE 20 MG: 20 CAPSULE, DELAYED RELEASE ORAL at 05:24

## 2018-09-06 RX ADMIN — METRONIDAZOLE 500 MG: 500 INJECTION, SOLUTION INTRAVENOUS at 21:35

## 2018-09-06 RX ADMIN — SODIUM CHLORIDE 500 ML: 9 INJECTION, SOLUTION INTRAVENOUS at 06:38

## 2018-09-06 RX ADMIN — CIPROFLOXACIN 400 MG: 2 INJECTION INTRAVENOUS at 18:19

## 2018-09-06 RX ADMIN — LOSARTAN POTASSIUM 25 MG: 50 TABLET ORAL at 05:22

## 2018-09-06 RX ADMIN — METRONIDAZOLE 500 MG: 500 INJECTION, SOLUTION INTRAVENOUS at 06:42

## 2018-09-06 RX ADMIN — MIDAZOLAM HYDROCHLORIDE 2 MG: 1 INJECTION INTRAMUSCULAR; INTRAVENOUS at 15:18

## 2018-09-06 RX ADMIN — ONDANSETRON 4 MG: 2 INJECTION INTRAMUSCULAR; INTRAVENOUS at 14:04

## 2018-09-06 RX ADMIN — OXYCODONE HYDROCHLORIDE 5 MG: 5 TABLET ORAL at 02:21

## 2018-09-06 RX ADMIN — POTASSIUM CHLORIDE, DEXTROSE MONOHYDRATE AND SODIUM CHLORIDE: 300; 5; 450 INJECTION, SOLUTION INTRAVENOUS at 09:34

## 2018-09-06 RX ADMIN — ONDANSETRON 4 MG: 2 INJECTION INTRAMUSCULAR; INTRAVENOUS at 09:24

## 2018-09-06 ASSESSMENT — ENCOUNTER SYMPTOMS
RESPIRATORY NEGATIVE: 1
FEVER: 0
NEUROLOGICAL NEGATIVE: 1
CHILLS: 0
HEADACHES: 0
NECK PAIN: 0
PSYCHIATRIC NEGATIVE: 1
DIZZINESS: 0
SORE THROAT: 0
DIARRHEA: 0
WHEEZING: 0
VOMITING: 1
ABDOMINAL PAIN: 1
COUGH: 0
MYALGIAS: 0
CARDIOVASCULAR NEGATIVE: 1
NAUSEA: 0
PALPITATIONS: 0
SPUTUM PRODUCTION: 0
SEIZURES: 0
NAUSEA: 1
FLANK PAIN: 0
DIAPHORESIS: 0
SHORTNESS OF BREATH: 0
BRUISES/BLEEDS EASILY: 0
BACK PAIN: 0
BLOOD IN STOOL: 0
CONSTITUTIONAL NEGATIVE: 1
EYES NEGATIVE: 1
BLURRED VISION: 0
VOMITING: 0
MUSCULOSKELETAL NEGATIVE: 1
FOCAL WEAKNESS: 0

## 2018-09-06 ASSESSMENT — COGNITIVE AND FUNCTIONAL STATUS - GENERAL
SUGGESTED CMS G CODE MODIFIER DAILY ACTIVITY: CH
SUGGESTED CMS G CODE MODIFIER MOBILITY: CH
DAILY ACTIVITIY SCORE: 24
MOBILITY SCORE: 24

## 2018-09-06 ASSESSMENT — PATIENT HEALTH QUESTIONNAIRE - PHQ9
2. FEELING DOWN, DEPRESSED, IRRITABLE, OR HOPELESS: NOT AT ALL
SUM OF ALL RESPONSES TO PHQ9 QUESTIONS 1 AND 2: 0
1. LITTLE INTEREST OR PLEASURE IN DOING THINGS: NOT AT ALL

## 2018-09-06 ASSESSMENT — LIFESTYLE VARIABLES
TOTAL SCORE: 2
HAVE YOU EVER FELT YOU SHOULD CUT DOWN ON YOUR DRINKING: YES
EVER_SMOKED: YES
TOTAL SCORE: 2
HOW MANY TIMES IN THE PAST YEAR HAVE YOU HAD 5 OR MORE DRINKS IN A DAY: 7
EVER_SMOKED: YES
HAVE PEOPLE ANNOYED YOU BY CRITICIZING YOUR DRINKING: NO
ON A TYPICAL DAY WHEN YOU DRINK ALCOHOL HOW MANY DRINKS DO YOU HAVE: 2
CONSUMPTION TOTAL: POSITIVE
EVER HAD A DRINK FIRST THING IN THE MORNING TO STEADY YOUR NERVES TO GET RID OF A HANGOVER: YES
AVERAGE NUMBER OF DAYS PER WEEK YOU HAVE A DRINK CONTAINING ALCOHOL: 3
EVER FELT BAD OR GUILTY ABOUT YOUR DRINKING: NO
ALCOHOL_USE: YES
DOES PATIENT WANT TO STOP DRINKING: NO
TOTAL SCORE: 2

## 2018-09-06 ASSESSMENT — PAIN SCALES - GENERAL
PAINLEVEL_OUTOF10: 2
PAINLEVEL_OUTOF10: 5
PAINLEVEL_OUTOF10: 0
PAINLEVEL_OUTOF10: 2
PAINLEVEL_OUTOF10: 6
PAINLEVEL_OUTOF10: 5
PAINLEVEL_OUTOF10: 5
PAINLEVEL_OUTOF10: 2

## 2018-09-06 ASSESSMENT — COPD QUESTIONNAIRES
DURING THE PAST 4 WEEKS HOW MUCH DID YOU FEEL SHORT OF BREATH: NONE/LITTLE OF THE TIME
DO YOU EVER COUGH UP ANY MUCUS OR PHLEGM?: NO/ONLY WITH OCCASIONAL COLDS OR INFECTIONS
HAVE YOU SMOKED AT LEAST 100 CIGARETTES IN YOUR ENTIRE LIFE: YES
IN THE PAST 12 MONTHS DO YOU DO LESS THAN YOU USED TO BECAUSE OF YOUR BREATHING PROBLEMS: DISAGREE/UNSURE
COPD SCREENING SCORE: 3
HAVE YOU SMOKED AT LEAST 100 CIGARETTES IN YOUR ENTIRE LIFE: YES
DO YOU EVER COUGH UP ANY MUCUS OR PHLEGM?: NO/ONLY WITH OCCASIONAL COLDS OR INFECTIONS
COPD SCREENING SCORE: 3
DURING THE PAST 4 WEEKS HOW MUCH DID YOU FEEL SHORT OF BREATH: NONE/LITTLE OF THE TIME

## 2018-09-06 NOTE — PROGRESS NOTES
[facility]ist Progress Note    Date of Service: 2018    Chief Complaint  57 y.o. female admitted 2018 with sepsis due to acute diverticulitis with diverticular abscesses.    Interval Problem Update  Pt states pain is controlled.  Pt reports flatus.  Denies palpitations.    Primary  Gen Surgery  IR    Disposition  TBD      Review of Systems   Constitutional: Negative.    HENT: Negative.    Eyes: Negative.    Respiratory: Negative.    Cardiovascular: Negative.    Gastrointestinal: Positive for abdominal pain. Negative for nausea and vomiting.   Genitourinary: Negative.    Musculoskeletal: Negative.    Skin: Negative.    Neurological: Negative.    Psychiatric/Behavioral: Negative.       Physical Exam  Laboratory/Imaging   Hemodynamics  Temp (24hrs), Av °C (98.6 °F), Min:36.3 °C (97.4 °F), Max:37.3 °C (99.2 °F)   Temperature: 37.2 °C (98.9 °F)  Pulse  Av.4  Min: 100  Max: 119 Heart Rate (Monitored): (!) 108  Blood Pressure: 102/62, NIBP: 129/64      Respiratory      Respiration: 17, Pulse Oximetry: 92 %, O2 Daily Delivery Respiratory : Room Air with O2 Available             Fluids    Intake/Output Summary (Last 24 hours) at 18 0915  Last data filed at 18 0223   Gross per 24 hour   Intake                0 ml   Output              400 ml   Net             -400 ml       Nutrition  Orders Placed This Encounter   Procedures   • DIET NPO     Standing Status:   Standing     Number of Occurrences:   1     Order Specific Question:   Type:     Answer:   At Midnight [5]     Order Specific Question:   Restrict to:     Answer:   Sips with Medications [3]     Physical Exam   Constitutional: She is oriented to person, place, and time. No distress.   HENT:   Head: Normocephalic and atraumatic.   Mouth/Throat: No oropharyngeal exudate.   Eyes: Pupils are equal, round, and reactive to light. Conjunctivae and EOM are normal. No scleral icterus.   Neck: Normal range of motion. Neck supple.    Cardiovascular: Tachycardia present.  Exam reveals no gallop and no friction rub.    No murmur heard.  Pulmonary/Chest: Effort normal and breath sounds normal.   Abdominal: Soft. Bowel sounds are normal. She exhibits no distension. There is tenderness (LLQ). There is no rebound and no guarding.   Musculoskeletal: Normal range of motion. She exhibits no edema, tenderness or deformity.   Neurological: She is alert and oriented to person, place, and time. She has normal reflexes. No cranial nerve deficit.   Skin: Skin is warm and dry.   Psychiatric: She has a normal mood and affect. Her behavior is normal.   Nursing note and vitals reviewed.      Recent Labs      09/05/18 2010 09/06/18 0458   WBC  13.4*  11.4*   RBC  3.81*  3.46*   HEMOGLOBIN  11.4*  9.9*   HEMATOCRIT  33.3*  30.5*   MCV  87.4  88.2   MCH  29.9  28.6   MCHC  34.2  32.5*   RDW  40.9  42.2   PLATELETCT  325  287   MPV  10.1  10.0     Recent Labs      09/05/18 2010 09/06/18 0459   SODIUM  141  140   POTASSIUM  3.0*  3.5*   CHLORIDE  105  108   CO2  21  26   GLUCOSE  119*  118*   BUN  6*  3*   CREATININE  0.68  0.65   CALCIUM  9.1  8.8     Recent Labs      09/06/18 0458   APTT  33.7   INR  1.47*                  Assessment/Plan     Sepsis (HCC)- (present on admission)   Assessment & Plan    This is sepsis (without associated acute organ dysfunction).   Likely source is diverticulitis  Patient has been started on IV fluids per septic protocol  Lactate is within normal limits  Patient is started on IV ciprofloxacin and Flagyl  Follow blood cultures  - leukocytosis and tachycardia improving; monitor        Colonic diverticular abscess- (present on admission)   Assessment & Plan    Patient has been started on IV ciprofloxacin and IV Flagyl  N.p.o, IV fluid hydration with D5NS+KCl  Surg recs appreciated; ordered CT-guided drainage by IR  Pain control with oxycodone and IV Dilaudid, monitor respiratory status closely        Hypokalemia- (present on  admission)   Assessment & Plan    Repleting and monitoring        GERD (gastroesophageal reflux disease)- (present on admission)   Assessment & Plan    Continue omeprazole        Reactive airway disease- (present on admission)   Assessment & Plan    Continue albuterol as needed        Hypertension- (present on admission)   Assessment & Plan    Continue losartan          Quality-Core Measures

## 2018-09-06 NOTE — PROGRESS NOTES
IR Procedure Note:    Site Marked and Confirmed with MD, patient and RN pre procedure. Dr. Zepeda placed a left buttock peritoneal drain.  Aspirate x 18cc taken by Sudheer.  The pt tolerated the procedure well; ETCo2 baseline 32, with consistent waveform during the procedure.  Percustay applied to left buttock, CDI and soft.  Pt alert and verbally appropriate post procedure, vital signs stable during procedure and transport, see flow sheet for vital signs.  Report given to Isabelle CLARKE.  RN transported pt to T4-.      Total aspirate = 51cc    Procedure End Time: 1524

## 2018-09-06 NOTE — CARE PLAN
Problem: Nutritional:  Goal: Achieve adequate nutritional intake  Patient will tolerate diet advancement beyond clear liquids and consume 50% of meals.  If goal not achieved in 3 days, then would recommend nutrition supplements.   Outcome: NOT MET

## 2018-09-06 NOTE — ED TRIAGE NOTES
"Chief Complaint   Patient presents with   • Sent by MD     for abnormal ct   • Abdominal Pain     left lower     Pt ambulated to triage , she was sent here by her GI doctor for abnormal CT. CT showed \"Fluid collections in left hemipelvis measuring up to 3.7 cm in diameter are noted which could indicate diverticular abscess\". . Nad.  Informed charge rn.   "

## 2018-09-06 NOTE — ED NOTES
Plan to give IVB of 1 LNc and EKG.  IF pt's tachycardia resolved with second bolus and EKG is clear, she is cleared to transfer to non-cardiac monitored bed per KRISTAL Castillo.  Alerted TRICIA Garcia [facility].

## 2018-09-06 NOTE — PROGRESS NOTES
"Bedside report completed.  A&O x4.  In no acute distress.   /62   Pulse 100   Temp 37.2 °C (98.9 °F)   Resp 17   Ht 1.702 m (5' 7\")   Wt 74.8 kg (164 lb 14.5 oz)   SpO2 92%   Breastfeeding? No   BMI 25.83 kg/m²   Tele monitoring in place.     ml/hr.   Ambulating with stand-by assistance, steady gait.   NPO, Denies N/V at this time.   C/o 4/10 pain, will medicate per MAR.   Last BM pta.    + void.  Call light and personal belongings within reach.  SCDs in place.  Family at bedside.  POC discussed and all questions answered.  Hourly rounding and fall precautions in place.  No additional needs at this time.  "

## 2018-09-06 NOTE — ED NOTES
Med rec complete per pt at bedside  Ok per Pt to discuss medications with visitor/s present  Allergies verified and updated: yes  Antibiotics taken in the last 30 days: Yes  CIPROFLOXACIN 500 mg bid for 10 day course started 9/4/2018.  METRONIDAZOLE 500 mg tid for 10 day course started 9/4/2018.

## 2018-09-06 NOTE — PROGRESS NOTES
Surgical Progress Note    Author: Justus Will Date & Time created: 2018   10:33 AM     Interval Events:  Pain well controlled.  Passing flatus.  CT guided drainage of pelvic fluid collections ordered this morning by me, after discussion with radiologist.    Review of Systems   Constitutional: Negative for fever.   Gastrointestinal: Positive for abdominal pain. Negative for nausea and vomiting.     Hemodynamics:  Temp (24hrs), Av °C (98.6 °F), Min:36.3 °C (97.4 °F), Max:37.3 °C (99.2 °F)  Temperature: 37.2 °C (98.9 °F)  Pulse  Av.4  Min: 100  Max: 119Heart Rate (Monitored): (!) 108  Blood Pressure: 102/62, NIBP: 129/64     Respiratory:    Respiration: 17, Pulse Oximetry: 92 %, O2 Daily Delivery Respiratory : Room Air with O2 Available           Neuro:  GCS = 15       Fluids:    Intake/Output Summary (Last 24 hours) at 18 1033  Last data filed at 18 0223   Gross per 24 hour   Intake                0 ml   Output              400 ml   Net             -400 ml     Weight: 74.8 kg (164 lb 14.5 oz)  Current Diet Order   Procedures   • DIET NPO     Physical Exam   Constitutional: She is oriented to person, place, and time. She appears well-nourished. No distress.   HENT:   Head: Normocephalic.   Eyes: Pupils are equal, round, and reactive to light. No scleral icterus.   Cardiovascular: Normal rate and regular rhythm.    Pulmonary/Chest: Effort normal and breath sounds normal. No respiratory distress.   Abdominal: Soft. Bowel sounds are normal. She exhibits no distension. There is tenderness (LLQ). There is no rebound and no guarding.   Neurological: She is alert and oriented to person, place, and time.   Skin: Skin is warm and dry.   Psychiatric: She has a normal mood and affect. Her behavior is normal.     Labs:  Recent Results (from the past 24 hour(s))   URINALYSIS    Collection Time: 18  7:38 PM   Result Value Ref Range    Micro Urine Req Microscopic     Color DK Yellow      Character Clear     Specific Gravity >=1.045 (A) <1.035    Ph 5.5 5.0 - 8.0    Glucose Negative Negative mg/dL    Ketones Negative Negative mg/dL    Protein 30 (A) Negative mg/dL    Bilirubin Small (A) Negative    Urobilinogen, Urine 1.0 Negative    Nitrite Negative Negative    Leukocyte Esterase Small (A) Negative    Occult Blood Negative Negative   URINE MICROSCOPIC (W/UA)    Collection Time: 09/05/18  7:38 PM   Result Value Ref Range    WBC 5-10 (A) /hpf    RBC 5-10 (A) /hpf    Bacteria Few (A) None /hpf    Epithelial Cells Many (A) /hpf    Hyaline Cast 0-2 /lpf    Granular Casts 0-2 /lpf   CBC WITH DIFFERENTIAL    Collection Time: 09/05/18  8:10 PM   Result Value Ref Range    WBC 13.4 (H) 4.8 - 10.8 K/uL    RBC 3.81 (L) 4.20 - 5.40 M/uL    Hemoglobin 11.4 (L) 12.0 - 16.0 g/dL    Hematocrit 33.3 (L) 37.0 - 47.0 %    MCV 87.4 81.4 - 97.8 fL    MCH 29.9 27.0 - 33.0 pg    MCHC 34.2 33.6 - 35.0 g/dL    RDW 40.9 35.9 - 50.0 fL    Platelet Count 325 164 - 446 K/uL    MPV 10.1 9.0 - 12.9 fL    Neutrophils-Polys 83.20 (H) 44.00 - 72.00 %    Lymphocytes 8.40 (L) 22.00 - 41.00 %    Monocytes 7.30 0.00 - 13.40 %    Eosinophils 0.40 0.00 - 6.90 %    Basophils 0.30 0.00 - 1.80 %    Immature Granulocytes 0.40 0.00 - 0.90 %    Nucleated RBC 0.00 /100 WBC    Neutrophils (Absolute) 11.13 (H) 2.00 - 7.15 K/uL    Lymphs (Absolute) 1.12 1.00 - 4.80 K/uL    Monos (Absolute) 0.97 (H) 0.00 - 0.85 K/uL    Eos (Absolute) 0.06 0.00 - 0.51 K/uL    Baso (Absolute) 0.04 0.00 - 0.12 K/uL    Immature Granulocytes (abs) 0.05 0.00 - 0.11 K/uL    NRBC (Absolute) 0.00 K/uL   CMP    Collection Time: 09/05/18  8:10 PM   Result Value Ref Range    Sodium 141 135 - 145 mmol/L    Potassium 3.0 (L) 3.6 - 5.5 mmol/L    Chloride 105 96 - 112 mmol/L    Co2 21 20 - 33 mmol/L    Anion Gap 15.0 (H) 0.0 - 11.9    Glucose 119 (H) 65 - 99 mg/dL    Bun 6 (L) 8 - 22 mg/dL    Creatinine 0.68 0.50 - 1.40 mg/dL    Calcium 9.1 8.5 - 10.5 mg/dL    AST(SGOT) 15 12 - 45  U/L    ALT(SGPT) 12 2 - 50 U/L    Alkaline Phosphatase 81 30 - 99 U/L    Total Bilirubin 0.5 0.1 - 1.5 mg/dL    Albumin 3.6 3.2 - 4.9 g/dL    Total Protein 7.2 6.0 - 8.2 g/dL    Globulin 3.6 (H) 1.9 - 3.5 g/dL    A-G Ratio 1.0 g/dL   LIPASE    Collection Time: 18  8:10 PM   Result Value Ref Range    Lipase 12 11 - 82 U/L   LACTIC ACID    Collection Time: 18  8:10 PM   Result Value Ref Range    Lactic Acid 1.5 0.5 - 2.0 mmol/L   ESTIMATED GFR    Collection Time: 18  8:10 PM   Result Value Ref Range    GFR If African American >60 >60 mL/min/1.73 m 2    GFR If Non African American >60 >60 mL/min/1.73 m 2   TROPONIN    Collection Time: 18  8:10 PM   Result Value Ref Range    Troponin I <0.01 0.00 - 0.04 ng/mL   EKG (ER)    Collection Time: 18 10:11 PM   Result Value Ref Range    Report       [facility] Emergency Dept.    Test Date:  2018  Pt Name:    SIMEON KAUR                  Department: ER  MRN:        [id number]                      Room:       [facility]  Gender:     Female                       Technician: [id number]  :        1960                   Requested By:TANISHA WORKMAN  Order #:    [id number]                    Reading MD: TANISHA WORKMAN MD    Measurements  Intervals                                Axis  Rate:       108                          P:          38  OR:         148                          QRS:        50  QRSD:       86                           T:          -62  QT:         332  QTc:        445    Interpretive Statements  SINUS TACHYCARDIA  BORDERLINE T ABNORMALITIES, DIFFUSE LEADS  Compared to ECG 2015 14:48:45  T-wave abnormality now present  Sinus rhythm no longer present    Electronically Signed On 2018 22:39:14 PDT by TANISHA WORKMAN MD     CBC WITH DIFFERENTIAL    Collection Time: 18  4:58 AM   Result Value Ref Range    WBC 11.4 (H) 4.8 - 10.8 K/uL    RBC 3.46 (L) 4.20 - 5.40 M/uL    Hemoglobin 9.9 (L) 12.0 - 16.0  g/dL    Hematocrit 30.5 (L) 37.0 - 47.0 %    MCV 88.2 81.4 - 97.8 fL    MCH 28.6 27.0 - 33.0 pg    MCHC 32.5 (L) 33.6 - 35.0 g/dL    RDW 42.2 35.9 - 50.0 fL    Platelet Count 287 164 - 446 K/uL    MPV 10.0 9.0 - 12.9 fL    Neutrophils-Polys 78.10 (H) 44.00 - 72.00 %    Lymphocytes 11.70 (L) 22.00 - 41.00 %    Monocytes 9.10 0.00 - 13.40 %    Eosinophils 0.30 0.00 - 6.90 %    Basophils 0.40 0.00 - 1.80 %    Immature Granulocytes 0.40 0.00 - 0.90 %    Nucleated RBC 0.00 /100 WBC    Neutrophils (Absolute) 8.91 (H) 2.00 - 7.15 K/uL    Lymphs (Absolute) 1.34 1.00 - 4.80 K/uL    Monos (Absolute) 1.04 (H) 0.00 - 0.85 K/uL    Eos (Absolute) 0.04 0.00 - 0.51 K/uL    Baso (Absolute) 0.05 0.00 - 0.12 K/uL    Immature Granulocytes (abs) 0.05 0.00 - 0.11 K/uL    NRBC (Absolute) 0.00 K/uL   Prothrombin time (INR)    Collection Time: 09/06/18  4:58 AM   Result Value Ref Range    PT 17.5 (H) 12.0 - 14.6 sec    INR 1.47 (H) 0.87 - 1.13   APTT    Collection Time: 09/06/18  4:58 AM   Result Value Ref Range    APTT 33.7 24.7 - 36.0 sec   Magnesium    Collection Time: 09/06/18  4:58 AM   Result Value Ref Range    Magnesium 1.5 1.5 - 2.5 mg/dL   TROPONIN    Collection Time: 09/06/18  4:58 AM   Result Value Ref Range    Troponin I 0.03 0.00 - 0.04 ng/mL   BASIC METABOLIC PANEL    Collection Time: 09/06/18  4:59 AM   Result Value Ref Range    Sodium 140 135 - 145 mmol/L    Potassium 3.5 (L) 3.6 - 5.5 mmol/L    Chloride 108 96 - 112 mmol/L    Co2 26 20 - 33 mmol/L    Glucose 118 (H) 65 - 99 mg/dL    Bun 3 (L) 8 - 22 mg/dL    Creatinine 0.65 0.50 - 1.40 mg/dL    Calcium 8.8 8.5 - 10.5 mg/dL    Anion Gap 6.0 0.0 - 11.9   ESTIMATED GFR    Collection Time: 09/06/18  4:59 AM   Result Value Ref Range    GFR If African American >60 >60 mL/min/1.73 m 2    GFR If Non African American >60 >60 mL/min/1.73 m 2     Medical Decision Making, by Problem:  Active Hospital Problems    Diagnosis   • Colonic diverticular abscess [K57.20]     Priority: High   •  Sepsis (HCC) [A41.9]     Priority: High   • Hypokalemia [E87.6]   • GERD (gastroesophageal reflux disease) [K21.9]   • Reactive airway disease [J45.909]   • Hypertension [I10]     Plan:  CT guided drainage of pelvic fluid collections today.  Okay for clear liquid diet post procedure.  Not sure why IM call on this patient, and I will be happy to take this patient back if IM would like.    Quality Measures:  Quality-Core Measures   Reviewed items::  Labs reviewed and Medications reviewed  Simms catheter::  No Simms  DVT: Scheduled for IR procedure today.  Ulcer Prophylaxis::  Yes  Antibiotics:  Treating active infection/contamination beyond 24 hours perioperative coverage and Treating fecal contamination      Discussed patient condition with Family, RN and Patient

## 2018-09-06 NOTE — DIETARY
"Nutrition services: Day 0 of admit.  Ambar Jj is a 57 y.o. female with admitting DX of Colonic diverticular abscess    RD alerted to pt with poor PO intake and weight loss PTA per nutritional admit screen.    Per chart review:  · H/o Diverticulitis several years ago; HTN, GERD.  · Adapted from Surgery note 9/5: Began feeling intermittent abdominal pain in 06/2018 which she attributed to a recurrent bout of diverticulitis. She made an appointment with her gastroenterologist, Dr. Segovia but was not able to be seen until later this month. On 08/15/2018, she presented to the ER at [facility], where she states that she was found to have a simple diverticulitis. She was started on a 10-day regimen of ciprofloxacin and Flagyl, which she tolerated well. She states that she did well until 2 days after that completed and then began feeling poorly. Again, she was seen yesterday by Dr. Segovia who the patient states gave her complete examination and was concerned about the fact that she was anemic. He ordered a CT scan of the abdomen and pelvis which was to be done today. This was done today and he called her stating that she had what appeared to be an abscess in her pelvis and that she should present to the ER. She has not had a bowel movement for 3 days, but states that she has been passing flatus.   · 10 lb weight loss since 8/13/2018; 6% weight loss in 3 weeks is severe.  · S/p CT-guided drainage of diverticular abscess today 9/6.    Assessment:  Height: 170.2 cm (5' 7\")  Weight: 74.8 kg (164 lb 14.5 oz)  Body mass index is 25.83 kg/m².     Diet/Intake: Clear liquid diet now ordered, will start @ dinner.     Evaluation:   1. Poor PO intake and severe weight loss PTA, now on restricted liquid diet. Anticipate timely diet advancement.  2. Labs and meds reviewed.     Malnutrition Risk: Non-severe malnutrition in the context of acute illness as evidenced by decreased energy intake and severe weight loss. "     Recommendations/Plan:  1. ADAT and clinically appropriate.  2. Consider nutrition supplements if pt needs to remain on liquid diet and/or if PO intake <50% of meals.  3. Document PO intake in ADLs.  4. Monitor weight.  5. Nutrition Representative will see pt for ongoing meal and snack preferences as diet advances.     RD following for diet advancement, adequate PO intake, and additional needs.

## 2018-09-06 NOTE — CARE PLAN
Problem: Safety  Goal: Will remain free from injury  Patient is self ambulatory and is not a fall risk. Patient instructed to call for assistance when necessary. Call light left within reach of patient. Hourly rounding in place.     Problem: Infection  Goal: Will remain free from infection    Intervention: Implement standard precautions and perform hand washing before and after patient contact  Hand hygiene performed prior to and after entering pt room. Gloves worn during patient interactions.

## 2018-09-06 NOTE — ASSESSMENT & PLAN NOTE
Patient has been started on IV ciprofloxacin and IV Flagyl  N.p.o, IV fluid hydration with D5NS+KCl  Surg recs appreciated; ordered CT-guided drainage by IR  Pain control with oxycodone and IV Dilaudid, monitor respiratory status closely

## 2018-09-06 NOTE — ASSESSMENT & PLAN NOTE
This is sepsis (without associated acute organ dysfunction).   Likely source is diverticulitis  Patient has been started on IV fluids per septic protocol  Lactate is within normal limits  Patient is started on IV ciprofloxacin and Flagyl  Follow blood cultures  - leukocytosis and tachycardia improving; monitor

## 2018-09-06 NOTE — PROGRESS NOTES
Patient back to floor with IR RN.      Tele  verified by monitor room.      IR drain to L buttock, serosanguineous output.      Per walter Molina for clear liquid diet.      /44, HR 98, SpO2 92% on 2L NC.  A&O x4.

## 2018-09-06 NOTE — OR SURGEON
Immediate Post- Operative Note        PostOp Diagnosis: DIVERTICULAR ABSCESS    Procedure(s): CT DRAINAGE    Estimated Blood Loss: Less than 5 ml        Complications: None            9/6/2018     3:28 PM     Gaurav Zepeda

## 2018-09-06 NOTE — ED PROVIDER NOTES
ED Provider Note    CHIEF COMPLAINT  Chief Complaint   Patient presents with   • Sent by MD     for abnormal ct   • Abdominal Pain     left lower       HPI  Ambar Jj is a 57 y.o. female with a history of diverticulitis and hypertension who presents complaining of left lower quadrant pain and abnormal CT results.    Patient states she had an episode of diverticulitis seen on CT scan several weeks ago.  She was prescribed Flagyl and Cipro which she completed.  She states she felt improved for approximately 2 days and then the pain returned earlier this week.  She has had ongoing, sharp/dull/nonradiating left lower quadrant pain which has not been responding to supratherapeutic amounts of ibuprofen.  Patient admits to nausea and vomiting today.  She denies fever, chills, melena, hematochezia, urinary symptoms.    Patient saw Dr. Segovia from GI yesterday who restarted her on Cipro and Flagyl and ordered an outpatient CT scan.  He called her this evening with the results and advised her to come to the ER as she has an abscess.      ALLERGIES  Allergies   Allergen Reactions   • Codeine Vomiting       CURRENT MEDICATIONS  Home Medications     Reviewed by Bev Navas (Pharmacy Tech) on 09/05/18 at 2153  Med List Status: Complete   Medication Last Dose Status   ciprofloxacin (CIPRO) 500 MG Tab 9/5/2018 Active   ibuprofen (MOTRIN) 200 MG TABS 9/5/2018 Active   losartan (COZAAR) 25 MG Tab 9/5/2018 Active   metroNIDAZOLE (FLAGYL) 500 MG Tab 9/5/2018 Active   Multiple Vitamins-Minerals (EMERGEN-C IMMUNE PO) 9/2/2018 Active   omeprazole (PRILOSEC) 20 MG delayed-release capsule 9/5/2018 Active   ondansetron (ZOFRAN ODT) 4 MG TABLET DISPERSIBLE 8/17/2018 Active   PROAIR  (90 Base) MCG/ACT Aero Soln inhalation aerosol 9/5/2018 Active   Probiotic Product (PROBIOTIC PO) 9/5/2018 Active   tramadol (ULTRAM) 50 MG Tab 9/4/2018 Active                PAST MEDICAL HISTORY   has a past medical history of Anesthesia;  "Bronchitis (2010); Diverticulitis (11/2017); GERD (gastroesophageal reflux disease); Heart burn; HTN; Indigestion; Other specified symptom associated with female genital organs; Pap smear (1/29/9); Psychiatric problem; Reactive airway disease; Unspecified urinary incontinence; and Urinary bladder disorder.    SURGICAL HISTORY   has a past surgical history that includes bladder suspension; endoscopy; tonsillectomy and adenoidectomy; cholecystectomy; abdominal hysterectomy total; endoscopy procedure; bladder suspension (N/A, 5/6/2015); anterior and posterior repair (N/A, 5/6/2015); and cystoscopy (N/A, 5/6/2015).    SOCIAL HISTORY  Social History     Social History Main Topics   • Smoking status: Former Smoker     Packs/day: 1.00     Years: 34.00     Types: Cigarettes     Quit date: 1/1/2010   • Smokeless tobacco: Never Used      Comment: Jan 2010   • Alcohol use 7.2 oz/week     6 Cans of beer, 6 Standard drinks or equivalent per week      Comment: weekly    • Drug use: Yes     Types: Inhaled      Comment: marijuana occasionally    • Sexual activity: Yes     Partners: Male       Family Hx:  Diverticulosis  No history of colon cancer      REVIEW OF SYSTEMS  See HPI for further details.  All other systems are negative except as above in HPI.      PHYSICAL EXAM  VITAL SIGNS: /74   Pulse (!) 108   Temp 36.3 °C (97.4 °F)   Resp (!) 9   Ht 1.702 m (5' 7\")   Wt 73.9 kg (162 lb 14.7 oz)   SpO2 91%   Breastfeeding? No   BMI 25.52 kg/m²     General:  WDWN, nontoxic appearing, anxious appearing; A+Ox3; V/S as above; tachycardic  Skin: warm and dry; good color; no rash  HEENT: NCAT; EOMs intact; PERRL; no scleral icterus; dry mouth and lips  Neck: FROM; no meningismus, no JVD  Cardiovascular: Tachycardic heart rate and regular rhythm.  No murmurs, rubs, or gallops; pulses 2+ bilaterally radially and DP areas  Lungs: Clear to auscultation with good air movement bilaterally.  No wheezes, rhonchi, or rales.   Abdomen: " BS present; soft; minimal tenderness over the left lower quadrant, ND; no rebound, guarding, or rigidity.  No organomegaly or pulsatile mass; no CVAT   Extremities: ARGUETA x 4; no e/o trauma; no pedal edema; neg Damien's  Neurologic: CNs III-XII grossly intact; speech clear; distal sensation intact; strength 5/5 UE/LEs  Psychiatric: Appropriate affect, normal mood    LABS  Results for orders placed or performed during the hospital encounter of 09/05/18   CBC WITH DIFFERENTIAL   Result Value Ref Range    WBC 13.4 (H) 4.8 - 10.8 K/uL    RBC 3.81 (L) 4.20 - 5.40 M/uL    Hemoglobin 11.4 (L) 12.0 - 16.0 g/dL    Hematocrit 33.3 (L) 37.0 - 47.0 %    MCV 87.4 81.4 - 97.8 fL    MCH 29.9 27.0 - 33.0 pg    MCHC 34.2 33.6 - 35.0 g/dL    RDW 40.9 35.9 - 50.0 fL    Platelet Count 325 164 - 446 K/uL    MPV 10.1 9.0 - 12.9 fL    Neutrophils-Polys 83.20 (H) 44.00 - 72.00 %    Lymphocytes 8.40 (L) 22.00 - 41.00 %    Monocytes 7.30 0.00 - 13.40 %    Eosinophils 0.40 0.00 - 6.90 %    Basophils 0.30 0.00 - 1.80 %    Immature Granulocytes 0.40 0.00 - 0.90 %    Nucleated RBC 0.00 /100 WBC    Neutrophils (Absolute) 11.13 (H) 2.00 - 7.15 K/uL    Lymphs (Absolute) 1.12 1.00 - 4.80 K/uL    Monos (Absolute) 0.97 (H) 0.00 - 0.85 K/uL    Eos (Absolute) 0.06 0.00 - 0.51 K/uL    Baso (Absolute) 0.04 0.00 - 0.12 K/uL    Immature Granulocytes (abs) 0.05 0.00 - 0.11 K/uL    NRBC (Absolute) 0.00 K/uL   CMP   Result Value Ref Range    Sodium 141 135 - 145 mmol/L    Potassium 3.0 (L) 3.6 - 5.5 mmol/L    Chloride 105 96 - 112 mmol/L    Co2 21 20 - 33 mmol/L    Anion Gap 15.0 (H) 0.0 - 11.9    Glucose 119 (H) 65 - 99 mg/dL    Bun 6 (L) 8 - 22 mg/dL    Creatinine 0.68 0.50 - 1.40 mg/dL    Calcium 9.1 8.5 - 10.5 mg/dL    AST(SGOT) 15 12 - 45 U/L    ALT(SGPT) 12 2 - 50 U/L    Alkaline Phosphatase 81 30 - 99 U/L    Total Bilirubin 0.5 0.1 - 1.5 mg/dL    Albumin 3.6 3.2 - 4.9 g/dL    Total Protein 7.2 6.0 - 8.2 g/dL    Globulin 3.6 (H) 1.9 - 3.5 g/dL    A-G Ratio  1.0 g/dL   LIPASE   Result Value Ref Range    Lipase 12 11 - 82 U/L   URINALYSIS   Result Value Ref Range    Micro Urine Req Microscopic     Color DK Yellow     Character Clear     Specific Gravity >=1.045 (A) <1.035    Ph 5.5 5.0 - 8.0    Glucose Negative Negative mg/dL    Ketones Negative Negative mg/dL    Protein 30 (A) Negative mg/dL    Bilirubin Small (A) Negative    Urobilinogen, Urine 1.0 Negative    Nitrite Negative Negative    Leukocyte Esterase Small (A) Negative    Occult Blood Negative Negative   LACTIC ACID   Result Value Ref Range    Lactic Acid 1.5 0.5 - 2.0 mmol/L   URINE MICROSCOPIC (W/UA)   Result Value Ref Range    WBC 5-10 (A) /hpf    RBC 5-10 (A) /hpf    Bacteria Few (A) None /hpf    Epithelial Cells Many (A) /hpf    Hyaline Cast 0-2 /lpf    Granular Casts 0-2 /lpf   ESTIMATED GFR   Result Value Ref Range    GFR If African American >60 >60 mL/min/1.73 m 2    GFR If Non African American >60 >60 mL/min/1.73 m 2   TROPONIN   Result Value Ref Range    Troponin I <0.01 0.00 - 0.04 ng/mL   EKG (ER)   Result Value Ref Range    Report       [facility] Emergency Dept.    Test Date:  2018  Pt Name:    SIMEON KAUR                  Department: ER  MRN:        [id number]                      Room:       [facility]  Gender:     Female                       Technician: [id number]  :        1960                   Requested By:TANISHA WORKMAN  Order #:    [id number]                    Reading MD: TANISHA WORKMAN MD    Measurements  Intervals                                Axis  Rate:       108                          P:          38  MI:         148                          QRS:        50  QRSD:       86                           T:          -62  QT:         332  QTc:        445    Interpretive Statements  SINUS TACHYCARDIA  BORDERLINE T ABNORMALITIES, DIFFUSE LEADS  Compared to ECG 2015 14:48:45  T-wave abnormality now present  Sinus rhythm no longer present    Electronically  Signed On 9-5-2018 22:39:14 PDT by TANISHA WORKMAN MD           IMAGING  US-GYN-PELVIS TRANSVAGINAL   Final Result         1.  Technically limited evaluation, the ovaries are not visualized.   2.  Complex masslike structure in the left adnexa, indeterminate, appears to correspond with complex mass or abscess in the left adnexa on CT performed today.              MEDICAL RECORD  I have reviewed patient's medical record and pertinent results are listed below.      CT results from 9/5/18:    1.  Extensive diverticulosis again noted mainly in the sigmoid colon.    2.  Induration and inflammatory change and small mesenteric lymph nodes are noted surrounding the sigmoid colon and there is some wall thickening of the sigmoid colon. Progression of diverticulitis since prior CT is a possibility. Since wall thickening   appears to be present particularly distally in the sigmoid colon, carcinoma is also in the differential diagnosis.    3.  Fluid collections in left hemipelvis measuring up to 3.7 cm in diameter are noted which could indicate diverticular abscess. Infectious involvement of left adnexa and/or left ovary is also a possibility as mentioned on the prior CT. However, these   fluid collections or cysts are new since the prior CT.      COURSE & MEDICAL DECISION MAKING  I have reviewed any medical record information, laboratory studies and radiographic results as noted.    Ambar Jj is a 57 y.o. female who presents for evaluation of diverticulitis with abscess.  Patient has a soft, tender but nonsurgical abdomen.  She is tachycardic and is quite anxious.  She has a dry mouth, has been vomiting, and has had little to eat or drink today.      NS bolus was ordered for possible dehydration related to patient's sxs of vomiting.      9:19 PM  Discussed with Dr. Will who requests holding orders    Pt was re-evaluated at 9:50 PM  Patient was advised of the plan to admit to surgery with continued Cipro/Flagyl.       10:07 PM  I was contacted by the RN regarding patient's disposition to med telemetry.  I feel the patient should go to telemetry given her persistent tachycardia.  Patient's tachycardia is improved after 1 L of normal saline.  I ordered an EKG.    Blood work demonstrates a leukocytosis of 13.4, hemoglobin 11.4, potassium 3.0, anion gap 15, glucose 119.  Patient appears dehydrated based on blood work as well as specific gravity of 1.045 and ketones in her urine.  Second liter of normal saline was ordered.    10:28 PM  Patient's EKG demonstrates sinus tachycardia at a heart rate of 108.  Patient does have borderline T-wave abnormalities in leads V3, V4, V5, V6.  Troponin added to blood work.    10:41 PM  Paging hospitalist to coordinate patient's care given need for serial troponins and management of hypokalemia overnight.    10:44 PM  Discussed with the hospitalist who agrees with plan to admit her and follow troponins. hypoK and tachycardia.    10:48 PM  Dr. Will here in ER and has seen the patient.  I updated him on the patient's course and my plan to have the hospitalist follow the patient given her dehydration, hypokalemia, and EKG changes.  Troponin is pending.    12:36 AM  Lactic acid and troponin are negative.    The total critical care time on this patient is 40 minutes, resuscitating patient, speaking with admitting physician, and deciphering test results. This 40 minutes is exclusive of separately billable procedures.      FINAL IMPRESSION  1. Diverticulitis    2. Dehydration    2. Diverticular abscess    Electronically signed by: Jackie Cisneros, 9/5/2018 8:15 PM

## 2018-09-06 NOTE — ED NOTES
Report to TRICIA Hutchison at [facility].  PT does have a telemetry bed available.  Accepting RN aware of orders for potassium replacement.

## 2018-09-06 NOTE — H&P
DATE OF ADMISSION:  09/05/2018.    CHIEF COMPLAINT:  Abdominal pain.    HISTORY OF PRESENT ILLNESS:  The patient is a 57-year-old female who has had   one episode of diverticulitis a number of years ago.  She states that she   began feeling intermittent abdominal pain in 06/2018 which she attributed to a   recurrent bout of diverticulitis.  She made an appointment with her   gastroenterologist, Dr. Segovia but was not able to be seen until later this   month.  On 08/15/2018, she presented to the ER at [facility], where she states that she was found to have a simple diverticulitis.    She was started on a 10-day regimen of ciprofloxacin and Flagyl, which she   tolerated well.  She states that she did well until 2 days after that   completed and then began feeling poorly.  Again, she was seen yesterday by Dr. Segovia who the patient states gave her complete examination and was concerned   about the fact that she was anemic.  He ordered a CT scan of the abdomen and   pelvis which was to be done today.  This was done today and he called her   stating that she had what appeared to be an abscess in her pelvis and that she   should present to the ER.  She states that she was actually feeling the best   she has felt today and I was actually eating dinner when called, but presented   to the ER.  Of note, she did undergo a colonoscopy performed by Dr. Donaldson in   2014 where she had at least 1 polyp removed.  She was scheduled for followup   colonoscopy in 2019.  She has not had a bowel movement for 3 days, but states   that she has been passing flatus.  She states that Dr. Segovia also told that she   had an anal fissure.    PAST MEDICAL HISTORY:  1.  Hypertension.  2.  Gastroesophageal reflux disease.  3.  Asthma.  4.  History of diverticulitis.    HOME MEDICATIONS:  Losartan 25 mg p.o. daily, omeprazole 20 mg p.o. daily,   ProAir inhaler as needed, Advil p.r.n., and Toradol prescribed from the ER on    08/15/2018 which she uses very little.    ALLERGIES:  PENICILLIN.    PAST SURGICAL HISTORY:  Tonsillectomy as a child, hysterectomy in 2001.  She   states her ovaries remain, 2006 laparoscopic cholecystectomy, 2015 bladder   lift and posterior suspension performed by Dr. Armando which she states was   performed for urinary incontinence.  She remains urinary incontinent.    FAMILY HISTORY:  Mother with breast cancer at age 64.  Sister with   diverticulitis requiring partial colectomy in her 50s.    SOCIAL HISTORY:  She quit smoking in 2010, was a previous pack per day smoker   for 34 years.  She drinks 2-3 drinks of alcohol on the weekends only.  Denies   IV drug use.  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    REVIEW OF SYSTEMS:  A 16-point review of systems is positive for left lower   quadrant abdominal pain, urinary incontinence and loss of 10 pounds since   08/13/2018.    PHYSICAL EXAMINATION:  VITAL SIGNS:  Temperature 36.3 at 1851 this evening, heart rate at 2200 is   108, blood pressure is 129/64, respiratory rate at 2130 is 9, O2 saturation is   91% on 2 L of oxygen by nasal cannula.  GENERAL:  She is an age appropriate looking female in no acute distress.  HEENT:  Pupils equal, round, reactive to light.  No scleral icterus.    Extraocular movement is intact.  She has moist oral mucosa and adequate upper   and lower dentition.  NECK:  Supple.  No JVD, no lymphadenopathy, no thyromegaly.  LUNGS:  Clear to auscultation bilaterally with normal bilateral chest rise.  HEART:  Has a regular rhythm and is mildly tachycardic.  ABDOMEN:  Nondistended.  Has positive bowel sounds, is soft and is tender in   the left lower quadrant.  She has no gross peritonitis.  RECTAL:  Deferred.  PELVIC:  Deferred.  EXTREMITIES:  1+ pulses in all 4 extremities.  No clubbing, cyanosis or edema.  NEUROLOGIC:  Cranial nerves II-XII are grossly intact.  She has no focal   deficits and her gait is not  examined.    LABORATORY DATA:  CBC is remarkable for an elevated white blood cell count at   13,400.  She has a low hemoglobin and hematocrit of 11.4 and 33.3   respectively, which is actually increased from 08/15/2018 when it was noted to   be 9.1 and 26.8.  She has 83% neutrophils, which represents left shift.  A   comprehensive metabolic panel is remarkable for a low potassium at 3.0, and an   elevated glucose of 119 and a low BUN at 6 and low globulin at 3.6.  Lactic   acid is within normal limits at 1.5.  EKG obtained by the ER physician and   this shows borderline T abnormalities and compared to an EKG in 04/30/2015,   the T-wave abnormality is now present and she is in sinus tachycardia.  For   this reason, a troponin was obtained, which is within normal limits.  A   urinalysis shows small amount of bilirubin, elevated protein, small amount of   leukocyte esterase, 5-10 white blood cells per high powered field, 5-10 red   blood cells per high powered field, many epithelial cells, few bacteria.  CT   scan of the abdomen and pelvis shows diverticulosis of the sigmoid colon,   induration of the pericolonic fat throughout the sigmoid region increased   since prior examination, mildly enlarged pericolonic lymph nodes, which are   noted to have increased in size compared to the prior exam, wall thickening of   the sigmoid colon and loculated fluid collections in the left hemipelvis with   2 adjacent collections located on the left side near the left adnexa, the   largest of which measures 3.7 cm in diameter.  No air is noted within these   collections.    ASSESSMENT:  A 57-year-old female with:  1.  Diverticulitis.  2.  Diverticular abscess.  3.  Hypertension.  4.  Gastroesophageal reflux disease.  5.  Asthma.  6.  Hypokalemia.  7.  Hyperglycemia.    The ER physician has contacted the medicine service due to her concerns about   the EKG and he has proposed that this patient will get serial troponins.  The   patient  does not complain of chest pain, but I will be happy to have this done   if this is a recommendation of the medicine service who the ER physician has   asked to see the patient.  This patient will be admitted to my service, kept   n.p.o., given IV fluids including replacement of her potassium, which the ER   physician is doing and I will discuss her in the morning with the   interventional radiologist to see if she is a candidate for drain placement.    If not, we have discussed the fact that she may need to go to the operating   room for a sigmoid colectomy and abdominal washout.  She will be continued on   antibiotics and kept n.p.o. tonight.       ____________________________________     MD RAÚL Sanford / VELMA    DD:  09/05/2018 23:23:35  DT:  09/06/2018 03:40:08    D#:  [id number]  Job#:  [id number]

## 2018-09-06 NOTE — H&P
Hospital Medicine History & Physical Note    Date of Service  9/5/2018    Primary Care Physician  AVINASH Gramajo    Consultants  Surgery     Code Status  Full code    Chief Complaint  Abdominal pain    History of Presenting Illness  57 y.o. female with a past medical history of hypertension and asthma who presented 9/5/2018 with abdominal pain.  Patient was evaluated for abdominal pain in the ER on 8/15/18 and was diagnosed with diverticulitis for which she was prescribed Flagyl and ciprofloxacin.  The patient's pain had initially improved however it recurred again earlier this week.  She reports left lower quadrant sharp pain without radiation.  She reports nausea and vomiting.  She denies any fevers, chills or blood in stool.  She was evaluated by Dr. patterson who started her on ciprofloxacin and Flagyl and ordered a CT abdomen pelvis with IV contrast that revealed diverticulitis and left hemipelvis diverticular abscesses.  The patient was instructed to present to the ER.  Surgery was consulted by the ER physician.    EKG interpreted by me reveals sinus tachycardia with ST depressions from V3 to V6    Review of Systems  Review of Systems   Constitutional: Negative for chills, diaphoresis and fever.   HENT: Negative for hearing loss and sore throat.    Eyes: Negative for blurred vision.   Respiratory: Negative for cough, sputum production, shortness of breath and wheezing.    Cardiovascular: Negative for chest pain, palpitations and leg swelling.   Gastrointestinal: Positive for abdominal pain, nausea and vomiting. Negative for blood in stool and diarrhea.   Genitourinary: Negative for dysuria, flank pain and urgency.   Musculoskeletal: Negative for back pain, joint pain, myalgias and neck pain.   Skin: Negative for rash.   Neurological: Negative for dizziness, focal weakness, seizures and headaches.   Endo/Heme/Allergies: Does not bruise/bleed easily.   Psychiatric/Behavioral: Negative for  suicidal ideas.   All other systems reviewed and are negative.      Past Medical History   has a past medical history of Anesthesia; Bronchitis (2010); Diverticulitis (11/2017); GERD (gastroesophageal reflux disease); Heart burn; HTN; Indigestion; Other specified symptom associated with female genital organs; Pap smear (1/29/9); Psychiatric problem; Reactive airway disease; Unspecified urinary incontinence; and Urinary bladder disorder.    Surgical History   has a past surgical history that includes bladder suspension; endoscopy; tonsillectomy and adenoidectomy; cholecystectomy; abdominal hysterectomy total; endoscopy procedure; bladder suspension (N/A, 5/6/2015); anterior and posterior repair (N/A, 5/6/2015); and cystoscopy (N/A, 5/6/2015).     Family History  family history includes Alcohol/Drug in her brother and sister; Cancer in her mother; Diabetes in her mother; Heart Disease in her father, mother, and sister; Lung Disease in her father, mother, and sister; Other in her son.     Social History   reports that she quit smoking about 8 years ago. Her smoking use included Cigarettes. She has a 34.00 pack-year smoking history. She has never used smokeless tobacco. She reports that she drinks about 7.2 oz of alcohol per week . She reports that she uses drugs, including Inhaled.    Allergies  Allergies   Allergen Reactions   • Codeine Vomiting       Medications  Prior to Admission Medications   Prescriptions Last Dose Informant Patient Reported? Taking?   Multiple Vitamins-Minerals (EMERGEN-C IMMUNE PO) 9/2/2018 at AM Patient Yes Yes   Sig: Take 1 Packet by mouth every day.   PROAIR  (90 Base) MCG/ACT Aero Soln inhalation aerosol 9/5/2018 at am Patient No No   Sig: INHALE 2 PUFFS BY MOUTH EVERY 6 HOURS AS NEEDED FOR SHORTNESS OF BREATH   Probiotic Product (PROBIOTIC PO) 9/5/2018 at 0730 Patient Yes Yes   Sig: Take 1 Cap by mouth every day.   ciprofloxacin (CIPRO) 500 MG Tab 9/5/2018 at 0900 Patient Yes Yes    Sig: Take 500 mg by mouth 2 times a day.   ibuprofen (MOTRIN) 200 MG TABS 9/5/2018 at 1545 Patient Yes No   Sig: Take 200 mg by mouth every 6 hours as needed.   losartan (COZAAR) 25 MG Tab 9/5/2018 at am Patient No No   Sig: TAKE 1 TABLET BY MOUTH EVERY DAY   metroNIDAZOLE (FLAGYL) 500 MG Tab 9/5/2018 at 0900 Patient Yes Yes   Sig: Take 500 mg by mouth 3 times a day.   omeprazole (PRILOSEC) 20 MG delayed-release capsule 9/5/2018 at 0730 Patient No No   Sig: Take 1 Cap by mouth every day.   ondansetron (ZOFRAN ODT) 4 MG TABLET DISPERSIBLE 8/17/2018 at UNKNOWN Patient No No   Sig: Take 1 Tab by mouth every four hours as needed.   tramadol (ULTRAM) 50 MG Tab 9/4/2018 at 1900 Patient Yes Yes   Sig: Take 50 mg by mouth every four hours as needed.      Facility-Administered Medications: None       Physical Exam  Blood Pressure: 139/59   Temperature: 36.9 °C (98.5 °F)   Pulse: (!) 113   Respiration: 16   Pulse Oximetry: 97 %     Physical Exam   Constitutional: She is oriented to person, place, and time. She appears well-developed and well-nourished. No distress.   HENT:   Head: Normocephalic and atraumatic.   Mouth/Throat: Oropharynx is clear and moist.   Eyes: Pupils are equal, round, and reactive to light. Conjunctivae are normal.   Neck: Neck supple. No thyromegaly present.   Cardiovascular: Regular rhythm and normal heart sounds.    Tachycardia   Pulmonary/Chest: Effort normal and breath sounds normal. No respiratory distress. She has no wheezes. She has no rales.   Abdominal: Soft. Bowel sounds are normal. She exhibits no distension. There is tenderness (Left lower quadrant). There is no rebound.   Musculoskeletal: Normal range of motion. She exhibits no edema or tenderness.   Neurological: She is alert and oriented to person, place, and time. No cranial nerve deficit. Coordination normal.   Skin: Skin is warm and dry.   Psychiatric: She has a normal mood and affect. Her behavior is normal.   Nursing note and vitals  reviewed.      Laboratory:  Recent Labs      09/05/18 2010   WBC  13.4*   RBC  3.81*   HEMOGLOBIN  11.4*   HEMATOCRIT  33.3*   MCV  87.4   MCH  29.9   MCHC  34.2   RDW  40.9   PLATELETCT  325   MPV  10.1     Recent Labs      09/05/18 2010   SODIUM  141   POTASSIUM  3.0*   CHLORIDE  105   CO2  21   GLUCOSE  119*   BUN  6*   CREATININE  0.68   CALCIUM  9.1     Recent Labs      09/05/18 2010   ALTSGPT  12   ASTSGOT  15   ALKPHOSPHAT  81   TBILIRUBIN  0.5   LIPASE  12   GLUCOSE  119*                 Lab Results   Component Value Date    TROPONINI <0.01 09/05/2018       Urinalysis:    Recent Labs      09/05/18   1938   SPECGRAVITY  >=1.045*   GLUCOSEUR  Negative   KETONES  Negative   NITRITE  Negative   LEUKESTERAS  Small*   WBCURINE  5-10*   RBCURINE  5-10*   BACTERIA  Few*   EPITHELCELL  Many*        Imaging:  US-GYN-PELVIS TRANSVAGINAL   Final Result         1.  Technically limited evaluation, the ovaries are not visualized.   2.  Complex masslike structure in the left adnexa, indeterminate, appears to correspond with complex mass or abscess in the left adnexa on CT performed today.            Assessment/Plan:  I anticipate this patient will require at least two midnights for appropriate medical management, necessitating inpatient admission.    Sepsis (HCC)   Assessment & Plan    This is sepsis (without associated acute organ dysfunction).   Likely source is diverticulitis  Patient has been started on IV fluids per septic protocol  Lactate is within normal limits  Patient is started on IV ciprofloxacin and Flagyl  Follow blood cultures          Colonic diverticular abscess- (present on admission)   Assessment & Plan    Patient has been started on IV ciprofloxacin and IV Flagyl  N.p.o.  IV fluid hydration with D5NS  Surgery has been consulted  Pain control with oxycodone and IV Dilaudid, monitor respiratory status closely        Hypokalemia- (present on admission)   Assessment & Plan    Replace with IV KCl and K  Dur  Check mag  Monitor BMP        GERD (gastroesophageal reflux disease)- (present on admission)   Assessment & Plan    Continue omeprazole        Reactive airway disease- (present on admission)   Assessment & Plan    Continue albuterol as needed        Hypertension- (present on admission)   Assessment & Plan    Continue losartan            VTE prophylaxis: SCD

## 2018-09-06 NOTE — ED NOTES
Per Dr. Castillo, pt will need telemetry monitoring due to t-wave changes on EKG. This RN alerted [facility] RN about telemetry need.  Added on troponin to specimens sent stat.

## 2018-09-06 NOTE — ED TRIAGE NOTES
Chief Complaint   Patient presents with   • Sent by MD     for abnormal ct   • Abdominal Pain     left lower     Ambulated to room.  Agree with triage assessment.  Changing into gown.  Chart placed for ERP eval.

## 2018-09-06 NOTE — PROGRESS NOTES
Patient arrived onto unit with patient transport. Patient ambulated from gurney to bed.     2RN skin check: skin intact.

## 2018-09-07 LAB
ANION GAP SERPL CALC-SCNC: 6 MMOL/L (ref 0–11.9)
BASOPHILS # BLD AUTO: 0.3 % (ref 0–1.8)
BASOPHILS # BLD: 0.03 K/UL (ref 0–0.12)
BUN SERPL-MCNC: 3 MG/DL (ref 8–22)
CALCIUM SERPL-MCNC: 8.7 MG/DL (ref 8.5–10.5)
CHLORIDE SERPL-SCNC: 109 MMOL/L (ref 96–112)
CO2 SERPL-SCNC: 22 MMOL/L (ref 20–33)
CREAT SERPL-MCNC: 0.42 MG/DL (ref 0.5–1.4)
EOSINOPHIL # BLD AUTO: 0.02 K/UL (ref 0–0.51)
EOSINOPHIL NFR BLD: 0.2 % (ref 0–6.9)
ERYTHROCYTE [DISTWIDTH] IN BLOOD BY AUTOMATED COUNT: 42.5 FL (ref 35.9–50)
GLUCOSE SERPL-MCNC: 148 MG/DL (ref 65–99)
GRAM STN SPEC: NORMAL
HCT VFR BLD AUTO: 29.6 % (ref 37–47)
HGB BLD-MCNC: 9.6 G/DL (ref 12–16)
IMM GRANULOCYTES # BLD AUTO: 0.04 K/UL (ref 0–0.11)
IMM GRANULOCYTES NFR BLD AUTO: 0.4 % (ref 0–0.9)
LYMPHOCYTES # BLD AUTO: 1.01 K/UL (ref 1–4.8)
LYMPHOCYTES NFR BLD: 9.4 % (ref 22–41)
MCH RBC QN AUTO: 28.6 PG (ref 27–33)
MCHC RBC AUTO-ENTMCNC: 32.4 G/DL (ref 33.6–35)
MCV RBC AUTO: 88.1 FL (ref 81.4–97.8)
MONOCYTES # BLD AUTO: 0.87 K/UL (ref 0–0.85)
MONOCYTES NFR BLD AUTO: 8.1 % (ref 0–13.4)
NEUTROPHILS # BLD AUTO: 8.72 K/UL (ref 2–7.15)
NEUTROPHILS NFR BLD: 81.6 % (ref 44–72)
NRBC # BLD AUTO: 0 K/UL
NRBC BLD-RTO: 0 /100 WBC
PLATELET # BLD AUTO: 272 K/UL (ref 164–446)
PMV BLD AUTO: 9.8 FL (ref 9–12.9)
POTASSIUM SERPL-SCNC: 3.5 MMOL/L (ref 3.6–5.5)
RBC # BLD AUTO: 3.36 M/UL (ref 4.2–5.4)
SIGNIFICANT IND 70042: NORMAL
SITE SITE: NORMAL
SODIUM SERPL-SCNC: 137 MMOL/L (ref 135–145)
SOURCE SOURCE: NORMAL
WBC # BLD AUTO: 10.7 K/UL (ref 4.8–10.8)

## 2018-09-07 PROCEDURE — 700102 HCHG RX REV CODE 250 W/ 637 OVERRIDE(OP): Performed by: SURGERY

## 2018-09-07 PROCEDURE — 700111 HCHG RX REV CODE 636 W/ 250 OVERRIDE (IP): Performed by: FAMILY MEDICINE

## 2018-09-07 PROCEDURE — 700111 HCHG RX REV CODE 636 W/ 250 OVERRIDE (IP)

## 2018-09-07 PROCEDURE — 700101 HCHG RX REV CODE 250: Performed by: EMERGENCY MEDICINE

## 2018-09-07 PROCEDURE — 36415 COLL VENOUS BLD VENIPUNCTURE: CPT

## 2018-09-07 PROCEDURE — 700112 HCHG RX REV CODE 229: Performed by: SURGERY

## 2018-09-07 PROCEDURE — 700101 HCHG RX REV CODE 250: Performed by: SURGERY

## 2018-09-07 PROCEDURE — A9270 NON-COVERED ITEM OR SERVICE: HCPCS | Performed by: SURGERY

## 2018-09-07 PROCEDURE — 80048 BASIC METABOLIC PNL TOTAL CA: CPT

## 2018-09-07 PROCEDURE — 700111 HCHG RX REV CODE 636 W/ 250 OVERRIDE (IP): Performed by: EMERGENCY MEDICINE

## 2018-09-07 PROCEDURE — A9270 NON-COVERED ITEM OR SERVICE: HCPCS | Performed by: INTERNAL MEDICINE

## 2018-09-07 PROCEDURE — 85025 COMPLETE CBC W/AUTO DIFF WBC: CPT

## 2018-09-07 PROCEDURE — 700102 HCHG RX REV CODE 250 W/ 637 OVERRIDE(OP): Performed by: INTERNAL MEDICINE

## 2018-09-07 PROCEDURE — 700101 HCHG RX REV CODE 250: Performed by: INTERNAL MEDICINE

## 2018-09-07 PROCEDURE — 700111 HCHG RX REV CODE 636 W/ 250 OVERRIDE (IP): Performed by: INTERNAL MEDICINE

## 2018-09-07 PROCEDURE — 770020 HCHG ROOM/CARE - TELE (206)

## 2018-09-07 PROCEDURE — 700111 HCHG RX REV CODE 636 W/ 250 OVERRIDE (IP): Performed by: SURGERY

## 2018-09-07 RX ORDER — BISACODYL 10 MG
10 SUPPOSITORY, RECTAL RECTAL DAILY
Status: DISCONTINUED | OUTPATIENT
Start: 2018-09-07 | End: 2018-09-12 | Stop reason: HOSPADM

## 2018-09-07 RX ORDER — CIPROFLOXACIN 500 MG/1
500 TABLET, FILM COATED ORAL EVERY 12 HOURS
Status: DISCONTINUED | OUTPATIENT
Start: 2018-09-08 | End: 2018-09-12 | Stop reason: HOSPADM

## 2018-09-07 RX ORDER — METRONIDAZOLE 500 MG/1
500 TABLET ORAL EVERY 8 HOURS
Status: DISCONTINUED | OUTPATIENT
Start: 2018-09-07 | End: 2018-09-12 | Stop reason: HOSPADM

## 2018-09-07 RX ORDER — DIPHENHYDRAMINE HCL 25 MG
25 TABLET ORAL NIGHTLY PRN
Status: DISCONTINUED | OUTPATIENT
Start: 2018-09-07 | End: 2018-09-12 | Stop reason: HOSPADM

## 2018-09-07 RX ADMIN — ONDANSETRON 4 MG: 2 INJECTION INTRAMUSCULAR; INTRAVENOUS at 01:01

## 2018-09-07 RX ADMIN — ENOXAPARIN SODIUM 30 MG: 100 INJECTION SUBCUTANEOUS at 08:40

## 2018-09-07 RX ADMIN — OMEPRAZOLE 20 MG: 20 CAPSULE, DELAYED RELEASE ORAL at 10:43

## 2018-09-07 RX ADMIN — METRONIDAZOLE 500 MG: 500 INJECTION, SOLUTION INTRAVENOUS at 05:05

## 2018-09-07 RX ADMIN — METRONIDAZOLE 500 MG: 500 INJECTION, SOLUTION INTRAVENOUS at 14:00

## 2018-09-07 RX ADMIN — PROCHLORPERAZINE EDISYLATE 10 MG: 5 INJECTION INTRAMUSCULAR; INTRAVENOUS at 22:22

## 2018-09-07 RX ADMIN — METRONIDAZOLE 500 MG: 500 TABLET ORAL at 22:21

## 2018-09-07 RX ADMIN — POTASSIUM CHLORIDE, DEXTROSE MONOHYDRATE AND SODIUM CHLORIDE: 300; 5; 450 INJECTION, SOLUTION INTRAVENOUS at 01:05

## 2018-09-07 RX ADMIN — CIPROFLOXACIN 400 MG: 2 INJECTION INTRAVENOUS at 06:04

## 2018-09-07 RX ADMIN — ONDANSETRON 4 MG: 2 INJECTION INTRAMUSCULAR; INTRAVENOUS at 14:36

## 2018-09-07 RX ADMIN — CIPROFLOXACIN 400 MG: 2 INJECTION INTRAVENOUS at 18:35

## 2018-09-07 RX ADMIN — POTASSIUM CHLORIDE, DEXTROSE MONOHYDRATE AND SODIUM CHLORIDE: 300; 5; 450 INJECTION, SOLUTION INTRAVENOUS at 08:40

## 2018-09-07 RX ADMIN — ONDANSETRON 4 MG: 2 INJECTION INTRAMUSCULAR; INTRAVENOUS at 05:05

## 2018-09-07 RX ADMIN — ENOXAPARIN SODIUM 30 MG: 100 INJECTION SUBCUTANEOUS at 18:35

## 2018-09-07 RX ADMIN — DOCUSATE SODIUM 100 MG: 100 CAPSULE, LIQUID FILLED ORAL at 18:35

## 2018-09-07 RX ADMIN — ONDANSETRON 4 MG: 2 INJECTION INTRAMUSCULAR; INTRAVENOUS at 20:39

## 2018-09-07 RX ADMIN — DOCUSATE SODIUM 100 MG: 100 CAPSULE, LIQUID FILLED ORAL at 10:43

## 2018-09-07 ASSESSMENT — PAIN SCALES - GENERAL: PAINLEVEL_OUTOF10: 2

## 2018-09-07 ASSESSMENT — ENCOUNTER SYMPTOMS: ABDOMINAL PAIN: 1

## 2018-09-07 NOTE — CARE PLAN
Problem: Safety  Goal: Will remain free from injury  Outcome: PROGRESSING AS EXPECTED  Patient uses call light appropriately. Bed in low position with wheels locked. Call light light within reach. Hourly rounding completed.    Problem: Infection  Goal: Will remain free from infection  Outcome: PROGRESSING AS EXPECTED  Patient receiving IVABX as ordered by MD.    Problem: Pain Management  Goal: Pain level will decrease to patient's comfort goal  Outcome: PROGRESSING AS EXPECTED  Patient using 2.5 mg Oxycodone sparingly for pain.

## 2018-09-07 NOTE — PROGRESS NOTES
+ BM, medium, formed.     Continued nausea throughout day, medicated with zofran per MAR.  Unable to take in much PO nutrition/liquid d/t nausea. IV fluids still in use.     Ambulating around hallway with no assistance, steady gait.    IR drain flushed with 20cc.

## 2018-09-07 NOTE — PROGRESS NOTES
Patient refusing suppository at this time, wants to try other measures first (ambulation, prune juice and stool softeners).  RN educated on importance of suppository, patient continues to refuse.  Patient agreeable to have RN administer suppository this afternoon if unable to achieve BM.

## 2018-09-07 NOTE — DOCUMENTATION QUERY
DOCUMENTATION QUERY    PROVIDERS: Please select “Cosign w/ note”to reply to query.    To better represent the severity of illness of your patient, please review the following information and exercise your independent professional judgment in responding to this query.     Non-severe malnutrition in the context of acute illness as evidenced by decreased energy intake and severe weight loss is noted in the Registered Dietitian assessment. Based upon the clinical findings, risk factors, and treatment, can a diagnosis be provided to support this finding?    • Mild Protein Calorie Malnutrition  • Moderate Protein Calorie Malnutrition  • Severe Protein Calorie Malnutrition  • Malnutrition ruled out  • Findings of no clinical significance  • Other explanation of clinical findings  • Unable to determine    The medical record reflects the following:   Clinical Findings 9/6 RD assessment:  · Malnutrition Risk: Non-severe malnutrition in the context of acute illness as evidenced by decreased energy intake and severe weight loss.  · Poor po intake and severe weight loss PTA  · 10 lb weight loss since 8/13/2018; 6% weight loss in 3 weeks is severe  · BMI 25.83    9/5 Surgery H&P: positive for LLQ abdominal pain, urinary incontinence and loss of 10 pounds since 08/13/2018   Treatment IVF  Potassium replacement  RD recommendations:  · Advance diet as tolerated  · Consider nutrition supplements if pt needs to remain on liquid diet and/or if PO intake <50% of meals  · Document PO intake  · Monitor weight  · Nutrition rep to see pt for ongoing meal and snack preferences   Risk Factors Diverticulitis with abscess  S/p pelvic drain  Hypokalemia  GERD   Location within medical record History and Physical and Progress Notes     Patient meets criteria for malnutrition when 2 or more characteristics are met:  Malnutrition in the context of: Acute Illness or Injury Chronic Illness    Moderate Severe Moderate Severe   Energy Intake <75%  estimated energy req for >7 days <50%estimated energy req for >5 days <75% estimated energy req for > 1 month <75% estimated energy req for >3 months   Weight Loss   (from baseline) 1 -2% over 1 wk  5% over 1 mo  7.5% over 3 mos >1 - 2% over 1 wk  >5%  over 1 mo  >7.5% over 3 mos 5% over 1 mo  7.5% over 3 mos  10% over 6 mos  20 over 1 year >5% over 1 mo  >7.5% over 3 mos  >10% over 6 mos  20> over 1 year   Body Fat Loss  (Loss of SQ fat from the orbits, triceps or fat underlying the ribs) Mild Moderate Mild Severe   Muscle Loss  (Muscle wasting at the temples, clavicles, shoulders, interosseous spaces, scapula, thigh, calf) Mild Moderate Mild Severe   Fluid Accumulation  (Localized or generalized edema of the extremities, vulva, scrotum and/or weight loss masked by edema) Mild Moderate to Severe Mild Severe    Strength n/a Measurably reduced n/a Measurably reduced     Thank you,   Rosalba Brewer, RN, BSN  Clinical   [phone]

## 2018-09-07 NOTE — PROGRESS NOTES
Surgical Progress Note    Author: Justus Will Date & Time created: 2018   6:33 AM     Interval Events:  IM signed off, which was appropriate.  Drain placed, purulent fluid noted and sent to lab.  Tolerating clear liquid diet.  No BM.  Minimal ambulation    Review of Systems   Gastrointestinal: Positive for abdominal pain.     Hemodynamics:  Temp (24hrs), Av.2 °C (99 °F), Min:36.1 °C (97 °F), Max:37.6 °C (99.6 °F)  Temperature: 36.1 °C (97 °F)  Pulse  Av.2  Min: 95  Max: 119Heart Rate (Monitored): (!) 106  Blood Pressure: 108/57, NIBP: 120/67     Respiratory:    Respiration: 16, Pulse Oximetry: 96 %           Neuro:  GCS = 15       Fluids:    Intake/Output Summary (Last 24 hours) at 18 0633  Last data filed at 18 0400   Gross per 24 hour   Intake             4000 ml   Output              680 ml   Net             3320 ml        Current Diet Order   Procedures   • Diet Order Full Liquid     Physical Exam   Constitutional: She is oriented to person, place, and time. She appears well-developed and well-nourished. No distress.   HENT:   Head: Normocephalic.   Eyes: Pupils are equal, round, and reactive to light. No scleral icterus.   Cardiovascular: Normal rate, regular rhythm and normal heart sounds.    Pulmonary/Chest: Effort normal and breath sounds normal. No respiratory distress.   Abdominal: Soft. Bowel sounds are normal. She exhibits no distension. There is tenderness (very mild, LLQ). There is no rebound and no guarding.   Drain in place, output thick, bloody.  Drain output = 80 cc post procedure (recorded on floor)   Neurological: She is alert and oriented to person, place, and time.   Psychiatric: She has a normal mood and affect. Her behavior is normal.     Labs:  Recent Results (from the past 24 hour(s))   CBC with Differential    Collection Time: 18  4:26 AM   Result Value Ref Range    WBC 10.7 4.8 - 10.8 K/uL    RBC 3.36 (L) 4.20 - 5.40 M/uL    Hemoglobin 9.6 (L) 12.0  - 16.0 g/dL    Hematocrit 29.6 (L) 37.0 - 47.0 %    MCV 88.1 81.4 - 97.8 fL    MCH 28.6 27.0 - 33.0 pg    MCHC 32.4 (L) 33.6 - 35.0 g/dL    RDW 42.5 35.9 - 50.0 fL    Platelet Count 272 164 - 446 K/uL    MPV 9.8 9.0 - 12.9 fL    Neutrophils-Polys 81.60 (H) 44.00 - 72.00 %    Lymphocytes 9.40 (L) 22.00 - 41.00 %    Monocytes 8.10 0.00 - 13.40 %    Eosinophils 0.20 0.00 - 6.90 %    Basophils 0.30 0.00 - 1.80 %    Immature Granulocytes 0.40 0.00 - 0.90 %    Nucleated RBC 0.00 /100 WBC    Neutrophils (Absolute) 8.72 (H) 2.00 - 7.15 K/uL    Lymphs (Absolute) 1.01 1.00 - 4.80 K/uL    Monos (Absolute) 0.87 (H) 0.00 - 0.85 K/uL    Eos (Absolute) 0.02 0.00 - 0.51 K/uL    Baso (Absolute) 0.03 0.00 - 0.12 K/uL    Immature Granulocytes (abs) 0.04 0.00 - 0.11 K/uL    NRBC (Absolute) 0.00 K/uL   Basic Metabolic Panel (BMP)    Collection Time: 09/07/18  4:26 AM   Result Value Ref Range    Sodium 137 135 - 145 mmol/L    Potassium 3.5 (L) 3.6 - 5.5 mmol/L    Chloride 109 96 - 112 mmol/L    Co2 22 20 - 33 mmol/L    Glucose 148 (H) 65 - 99 mg/dL    Bun 3 (L) 8 - 22 mg/dL    Creatinine 0.42 (L) 0.50 - 1.40 mg/dL    Calcium 8.7 8.5 - 10.5 mg/dL    Anion Gap 6.0 0.0 - 11.9   ESTIMATED GFR    Collection Time: 09/07/18  4:26 AM   Result Value Ref Range    GFR If African American >60 >60 mL/min/1.73 m 2    GFR If Non African American >60 >60 mL/min/1.73 m 2     Medical Decision Making, by Problem:  Active Hospital Problems    Diagnosis   • Colonic diverticular abscess [K57.20]     Priority: High   • Sepsis (HCC) [A41.9]     Priority: High   • Hypokalemia [E87.6]   • GERD (gastroesophageal reflux disease) [K21.9]   • Reactive airway disease [J45.909]   • Hypertension [I10]     Plan:  Ambulate today.  Advance to full liquid diet, advance to low residue diet this weekend, if doing well.  Dulcolax suppository daily.  Decrease IVF, check K in AM tomorrow.  Follow up cultures, change antibiotics as indicated    Quality Measures:  Quality-Core  Measures   Reviewed items::  Labs reviewed and Medications reviewed  Simms catheter::  No Simms  DVT prophylaxis pharmacological::  Enoxaparin (Lovenox)  Ulcer Prophylaxis::  Yes  Antibiotics:  Treating active infection/contamination beyond 24 hours perioperative coverage      Discussed patient condition with Patient

## 2018-09-08 LAB
ANION GAP SERPL CALC-SCNC: 5 MMOL/L (ref 0–11.9)
BASOPHILS # BLD AUTO: 0.4 % (ref 0–1.8)
BASOPHILS # BLD: 0.04 K/UL (ref 0–0.12)
BUN SERPL-MCNC: 3 MG/DL (ref 8–22)
CALCIUM SERPL-MCNC: 8.9 MG/DL (ref 8.5–10.5)
CHLORIDE SERPL-SCNC: 108 MMOL/L (ref 96–112)
CO2 SERPL-SCNC: 25 MMOL/L (ref 20–33)
CREAT SERPL-MCNC: 0.43 MG/DL (ref 0.5–1.4)
EOSINOPHIL # BLD AUTO: 0.05 K/UL (ref 0–0.51)
EOSINOPHIL NFR BLD: 0.5 % (ref 0–6.9)
ERYTHROCYTE [DISTWIDTH] IN BLOOD BY AUTOMATED COUNT: 41.5 FL (ref 35.9–50)
GLUCOSE SERPL-MCNC: 140 MG/DL (ref 65–99)
HCT VFR BLD AUTO: 29.9 % (ref 37–47)
HGB BLD-MCNC: 9.8 G/DL (ref 12–16)
IMM GRANULOCYTES # BLD AUTO: 0.07 K/UL (ref 0–0.11)
IMM GRANULOCYTES NFR BLD AUTO: 0.7 % (ref 0–0.9)
LYMPHOCYTES # BLD AUTO: 1.11 K/UL (ref 1–4.8)
LYMPHOCYTES NFR BLD: 11 % (ref 22–41)
MCH RBC QN AUTO: 28.8 PG (ref 27–33)
MCHC RBC AUTO-ENTMCNC: 32.8 G/DL (ref 33.6–35)
MCV RBC AUTO: 87.9 FL (ref 81.4–97.8)
MONOCYTES # BLD AUTO: 0.93 K/UL (ref 0–0.85)
MONOCYTES NFR BLD AUTO: 9.2 % (ref 0–13.4)
NEUTROPHILS # BLD AUTO: 7.89 K/UL (ref 2–7.15)
NEUTROPHILS NFR BLD: 78.2 % (ref 44–72)
NRBC # BLD AUTO: 0 K/UL
NRBC BLD-RTO: 0 /100 WBC
PLATELET # BLD AUTO: 291 K/UL (ref 164–446)
PMV BLD AUTO: 9.9 FL (ref 9–12.9)
POTASSIUM SERPL-SCNC: 3.6 MMOL/L (ref 3.6–5.5)
RBC # BLD AUTO: 3.4 M/UL (ref 4.2–5.4)
SODIUM SERPL-SCNC: 138 MMOL/L (ref 135–145)
WBC # BLD AUTO: 10.1 K/UL (ref 4.8–10.8)

## 2018-09-08 PROCEDURE — A9270 NON-COVERED ITEM OR SERVICE: HCPCS | Performed by: INTERNAL MEDICINE

## 2018-09-08 PROCEDURE — 700102 HCHG RX REV CODE 250 W/ 637 OVERRIDE(OP): Performed by: FAMILY MEDICINE

## 2018-09-08 PROCEDURE — 85025 COMPLETE CBC W/AUTO DIFF WBC: CPT

## 2018-09-08 PROCEDURE — 700102 HCHG RX REV CODE 250 W/ 637 OVERRIDE(OP): Performed by: INTERNAL MEDICINE

## 2018-09-08 PROCEDURE — A9270 NON-COVERED ITEM OR SERVICE: HCPCS | Performed by: SURGERY

## 2018-09-08 PROCEDURE — 700112 HCHG RX REV CODE 229: Performed by: SURGERY

## 2018-09-08 PROCEDURE — 36415 COLL VENOUS BLD VENIPUNCTURE: CPT

## 2018-09-08 PROCEDURE — 770006 HCHG ROOM/CARE - MED/SURG/GYN SEMI*

## 2018-09-08 PROCEDURE — A9270 NON-COVERED ITEM OR SERVICE: HCPCS | Performed by: FAMILY MEDICINE

## 2018-09-08 PROCEDURE — 80048 BASIC METABOLIC PNL TOTAL CA: CPT

## 2018-09-08 PROCEDURE — 700102 HCHG RX REV CODE 250 W/ 637 OVERRIDE(OP): Performed by: SURGERY

## 2018-09-08 PROCEDURE — 700111 HCHG RX REV CODE 636 W/ 250 OVERRIDE (IP): Performed by: FAMILY MEDICINE

## 2018-09-08 PROCEDURE — 700101 HCHG RX REV CODE 250: Performed by: SURGERY

## 2018-09-08 PROCEDURE — 770001 HCHG ROOM/CARE - MED/SURG/GYN PRIV*

## 2018-09-08 PROCEDURE — 700111 HCHG RX REV CODE 636 W/ 250 OVERRIDE (IP): Performed by: SURGERY

## 2018-09-08 RX ADMIN — METRONIDAZOLE 500 MG: 500 TABLET ORAL at 06:08

## 2018-09-08 RX ADMIN — DIPHENHYDRAMINE HCL 25 MG: 25 TABLET ORAL at 21:23

## 2018-09-08 RX ADMIN — ACETAMINOPHEN 650 MG: 325 TABLET, FILM COATED ORAL at 17:41

## 2018-09-08 RX ADMIN — CIPROFLOXACIN HYDROCHLORIDE 500 MG: 500 TABLET, FILM COATED ORAL at 06:07

## 2018-09-08 RX ADMIN — METRONIDAZOLE 500 MG: 500 TABLET ORAL at 14:26

## 2018-09-08 RX ADMIN — OMEPRAZOLE 20 MG: 20 CAPSULE, DELAYED RELEASE ORAL at 06:08

## 2018-09-08 RX ADMIN — CIPROFLOXACIN HYDROCHLORIDE 500 MG: 500 TABLET, FILM COATED ORAL at 17:39

## 2018-09-08 RX ADMIN — DOCUSATE SODIUM 100 MG: 100 CAPSULE, LIQUID FILLED ORAL at 06:08

## 2018-09-08 RX ADMIN — OXYCODONE HYDROCHLORIDE 2.5 MG: 5 TABLET ORAL at 21:18

## 2018-09-08 RX ADMIN — POTASSIUM CHLORIDE, DEXTROSE MONOHYDRATE AND SODIUM CHLORIDE: 300; 5; 450 INJECTION, SOLUTION INTRAVENOUS at 06:07

## 2018-09-08 RX ADMIN — ENOXAPARIN SODIUM 30 MG: 100 INJECTION SUBCUTANEOUS at 17:41

## 2018-09-08 RX ADMIN — PROCHLORPERAZINE EDISYLATE 10 MG: 5 INJECTION INTRAMUSCULAR; INTRAVENOUS at 06:05

## 2018-09-08 RX ADMIN — DOCUSATE SODIUM 100 MG: 100 CAPSULE, LIQUID FILLED ORAL at 17:39

## 2018-09-08 RX ADMIN — METRONIDAZOLE 500 MG: 500 TABLET ORAL at 21:19

## 2018-09-08 RX ADMIN — ENOXAPARIN SODIUM 30 MG: 100 INJECTION SUBCUTANEOUS at 06:08

## 2018-09-08 ASSESSMENT — ENCOUNTER SYMPTOMS
CARDIOVASCULAR NEGATIVE: 1
RESPIRATORY NEGATIVE: 1

## 2018-09-08 ASSESSMENT — PAIN SCALES - GENERAL
PAINLEVEL_OUTOF10: 5
PAINLEVEL_OUTOF10: 2
PAINLEVEL_OUTOF10: 2

## 2018-09-08 NOTE — PROGRESS NOTES
Pt laying in bed, call light within reach, bed lowered and locked, fall education reinforced. Pt bowel sounds are hypoactive in all four quadrants, heart sounds are within defined limits, lung sounds are clear in all lobes. Pt is A&Ox4 on room air. Pt IV is clean,dry,intact,and patent running the appropriate fluids. Pt left buttock IR drain is clean,dry,intact,and patent outputting a small amount of fluid. Pt reports no pain at this time but continuing nausea. Zofran given PRN and pt educated to report to staff if nausea is not controlled.

## 2018-09-08 NOTE — PROGRESS NOTES
Surgical Progress Note    Author: Venkatesh Monroy Date & Time created: 2018   9:57 AM     Interval Events:    Review of Systems   Respiratory: Negative.    Cardiovascular: Negative.    Gastrointestinal:        Had nausea and vomiting yesterday, but none today. Pain better     Hemodynamics:  Temp (24hrs), Av.9 °C (98.5 °F), Min:36.2 °C (97.2 °F), Max:37.5 °C (99.5 °F)  Temperature: 36.2 °C (97.2 °F)  Pulse  Av.4  Min: 95  Max: 119  Blood Pressure: 118/58     Respiratory:    Respiration: 18, Pulse Oximetry: 97 %        RUL Breath Sounds: Clear, RML Breath Sounds: Diminished, RLL Breath Sounds: Diminished, PEG Breath Sounds: Clear, LLL Breath Sounds: Diminished  Neuro:  GCS       Fluids:    Intake/Output Summary (Last 24 hours) at 18 0957  Last data filed at 18 0843   Gross per 24 hour   Intake             2460 ml   Output              995 ml   Net             1465 ml        Current Diet Order   Procedures   • Diet Order Full Liquid     Physical Exam   Cardiovascular: Normal rate.    Pulmonary/Chest: Effort normal.   Abdominal: Soft.   Serosanguineous drainage, no purulent exudate.      Labs:  Recent Results (from the past 24 hour(s))   CBC WITH DIFFERENTIAL    Collection Time: 18  5:11 AM   Result Value Ref Range    WBC 10.1 4.8 - 10.8 K/uL    RBC 3.40 (L) 4.20 - 5.40 M/uL    Hemoglobin 9.8 (L) 12.0 - 16.0 g/dL    Hematocrit 29.9 (L) 37.0 - 47.0 %    MCV 87.9 81.4 - 97.8 fL    MCH 28.8 27.0 - 33.0 pg    MCHC 32.8 (L) 33.6 - 35.0 g/dL    RDW 41.5 35.9 - 50.0 fL    Platelet Count 291 164 - 446 K/uL    MPV 9.9 9.0 - 12.9 fL    Neutrophils-Polys 78.20 (H) 44.00 - 72.00 %    Lymphocytes 11.00 (L) 22.00 - 41.00 %    Monocytes 9.20 0.00 - 13.40 %    Eosinophils 0.50 0.00 - 6.90 %    Basophils 0.40 0.00 - 1.80 %    Immature Granulocytes 0.70 0.00 - 0.90 %    Nucleated RBC 0.00 /100 WBC    Neutrophils (Absolute) 7.89 (H) 2.00 - 7.15 K/uL    Lymphs (Absolute) 1.11 1.00 - 4.80 K/uL    Monos  (Absolute) 0.93 (H) 0.00 - 0.85 K/uL    Eos (Absolute) 0.05 0.00 - 0.51 K/uL    Baso (Absolute) 0.04 0.00 - 0.12 K/uL    Immature Granulocytes (abs) 0.07 0.00 - 0.11 K/uL    NRBC (Absolute) 0.00 K/uL   BASIC METABOLIC PANEL    Collection Time: 09/08/18  5:11 AM   Result Value Ref Range    Sodium 138 135 - 145 mmol/L    Potassium 3.6 3.6 - 5.5 mmol/L    Chloride 108 96 - 112 mmol/L    Co2 25 20 - 33 mmol/L    Glucose 140 (H) 65 - 99 mg/dL    Bun 3 (L) 8 - 22 mg/dL    Creatinine 0.43 (L) 0.50 - 1.40 mg/dL    Calcium 8.9 8.5 - 10.5 mg/dL    Anion Gap 5.0 0.0 - 11.9   ESTIMATED GFR    Collection Time: 09/08/18  5:11 AM   Result Value Ref Range    GFR If African American >60 >60 mL/min/1.73 m 2    GFR If Non African American >60 >60 mL/min/1.73 m 2     Medical Decision Making, by Problem:  Active Hospital Problems    Diagnosis   • Colonic diverticular abscess [K57.20]     Priority: High   • Sepsis (HCC) [A41.9]     Priority: High   • Hypokalemia [E87.6]   • GERD (gastroesophageal reflux disease) [K21.9]   • Reactive airway disease [J45.909]   • Hypertension [I10]     Plan:  Continue drain and IVF antibiotics. Advance to full liquids.    Quality Measures:  Quality-Core Measures   Antibiotics:  Treating active infection/contamination beyond 24 hours perioperative coverage      Discussed patient condition with Patient

## 2018-09-08 NOTE — PROGRESS NOTES
Bedside report received.    Assessment complete.  A&O x 4. Patient calls appropriately.  Patient up self.   Patient denies pain at this time.   Denies N&V. Tolerating full liquid diet.  IR drain left buttock.  + void, + flatus. Last Bm 9/8/18  Patient denies SOB.  Patient on continuous tele monitoring.    Review plan with of care with patient. Call light and personal belongings with in reach. Hourly rounding in place. All needs met at this time.

## 2018-09-08 NOTE — CARE PLAN
Problem: Communication  Goal: The ability to communicate needs accurately and effectively will improve  Outcome: PROGRESSING AS EXPECTED  Pt is able to appropriately communicate needs with staff    Problem: Safety  Goal: Will remain free from falls  Outcome: PROGRESSING AS EXPECTED  Pt has a steady gait and ambulates up self.

## 2018-09-09 PROCEDURE — A9270 NON-COVERED ITEM OR SERVICE: HCPCS | Performed by: SURGERY

## 2018-09-09 PROCEDURE — 700112 HCHG RX REV CODE 229: Performed by: SURGERY

## 2018-09-09 PROCEDURE — 770006 HCHG ROOM/CARE - MED/SURG/GYN SEMI*

## 2018-09-09 PROCEDURE — A9270 NON-COVERED ITEM OR SERVICE: HCPCS | Performed by: INTERNAL MEDICINE

## 2018-09-09 PROCEDURE — 700111 HCHG RX REV CODE 636 W/ 250 OVERRIDE (IP): Performed by: SURGERY

## 2018-09-09 PROCEDURE — 700102 HCHG RX REV CODE 250 W/ 637 OVERRIDE(OP): Performed by: SURGERY

## 2018-09-09 PROCEDURE — 700102 HCHG RX REV CODE 250 W/ 637 OVERRIDE(OP): Performed by: INTERNAL MEDICINE

## 2018-09-09 PROCEDURE — A9270 NON-COVERED ITEM OR SERVICE: HCPCS | Performed by: FAMILY MEDICINE

## 2018-09-09 PROCEDURE — 770001 HCHG ROOM/CARE - MED/SURG/GYN PRIV*

## 2018-09-09 PROCEDURE — 700102 HCHG RX REV CODE 250 W/ 637 OVERRIDE(OP): Performed by: FAMILY MEDICINE

## 2018-09-09 RX ORDER — CALCIUM CARBONATE 500 MG/1
500 TABLET, CHEWABLE ORAL 2 TIMES DAILY PRN
Status: DISCONTINUED | OUTPATIENT
Start: 2018-09-09 | End: 2018-09-12 | Stop reason: HOSPADM

## 2018-09-09 RX ADMIN — CIPROFLOXACIN HYDROCHLORIDE 500 MG: 500 TABLET, FILM COATED ORAL at 18:08

## 2018-09-09 RX ADMIN — DOCUSATE SODIUM 100 MG: 100 CAPSULE, LIQUID FILLED ORAL at 18:08

## 2018-09-09 RX ADMIN — OMEPRAZOLE 20 MG: 20 CAPSULE, DELAYED RELEASE ORAL at 05:21

## 2018-09-09 RX ADMIN — METRONIDAZOLE 500 MG: 500 TABLET ORAL at 05:21

## 2018-09-09 RX ADMIN — ENOXAPARIN SODIUM 30 MG: 100 INJECTION SUBCUTANEOUS at 05:21

## 2018-09-09 RX ADMIN — METRONIDAZOLE 500 MG: 500 TABLET ORAL at 21:42

## 2018-09-09 RX ADMIN — DIPHENHYDRAMINE HCL 25 MG: 25 TABLET ORAL at 21:48

## 2018-09-09 RX ADMIN — DOCUSATE SODIUM 100 MG: 100 CAPSULE, LIQUID FILLED ORAL at 05:21

## 2018-09-09 RX ADMIN — ENOXAPARIN SODIUM 30 MG: 100 INJECTION SUBCUTANEOUS at 18:09

## 2018-09-09 RX ADMIN — METRONIDAZOLE 500 MG: 500 TABLET ORAL at 16:07

## 2018-09-09 RX ADMIN — CIPROFLOXACIN HYDROCHLORIDE 500 MG: 500 TABLET, FILM COATED ORAL at 05:21

## 2018-09-09 ASSESSMENT — PAIN SCALES - GENERAL
PAINLEVEL_OUTOF10: 2

## 2018-09-09 ASSESSMENT — ENCOUNTER SYMPTOMS
RESPIRATORY NEGATIVE: 1
CARDIOVASCULAR NEGATIVE: 1
GASTROINTESTINAL NEGATIVE: 1

## 2018-09-09 NOTE — CARE PLAN
Problem: Knowledge Deficit  Goal: Knowledge of the prescribed therapeutic regimen will improve  Outcome: PROGRESSING AS EXPECTED  RN discussed medications with patient.     Problem: Respiratory:  Goal: Respiratory status will improve  Outcome: PROGRESSING AS EXPECTED  Patient not requiring supplemental O2 at this time.

## 2018-09-09 NOTE — PROGRESS NOTES
Surgical Progress Note    Author: Venkatesh Monroy Date & Time created: 2018   12:49 PM     Interval Events:    Review of Systems   Respiratory: Negative.    Cardiovascular: Negative.    Gastrointestinal: Negative.         Passing gas and had bowel movement     Hemodynamics:  Temp (24hrs), Av.7 °C (98.1 °F), Min:36.1 °C (97 °F), Max:37.1 °C (98.8 °F)  Temperature: 36.1 °C (97 °F)  Pulse  Av.7  Min: 79  Max: 119  Blood Pressure: 116/72     Respiratory:    Respiration: 17, Pulse Oximetry: 94 %        RUL Breath Sounds: Clear, RML Breath Sounds: Diminished, RLL Breath Sounds: Diminished, PEG Breath Sounds: Clear, LLL Breath Sounds: Diminished  Neuro:  GCS       Fluids:    Intake/Output Summary (Last 24 hours) at 18 1249  Last data filed at 18 1200   Gross per 24 hour   Intake              950 ml   Output              -20 ml   Net              970 ml        Current Diet Order   Procedures   • Diet Order Low Fiber(GI Soft)     Physical Exam   Abdominal: Soft. Bowel sounds are normal. She exhibits no distension. There is no tenderness. There is no rebound.     Labs:  No results found for this or any previous visit (from the past 24 hour(s)).  Medical Decision Making, by Problem:  Active Hospital Problems    Diagnosis   • Colonic diverticular abscess [K57.20]     Priority: High   • Sepsis (HCC) [A41.9]     Priority: High   • Hypokalemia [E87.6]   • GERD (gastroesophageal reflux disease) [K21.9]   • Reactive airway disease [J45.909]   • Hypertension [I10]     Plan:  Continue IV antibiotics, advance diet to soft GI diet. Doing well    Quality Measures:  Quality-Core Measures   Antibiotics:  Treating active infection/contamination beyond 24 hours perioperative coverage      Discussed patient condition with Patient

## 2018-09-09 NOTE — PROGRESS NOTES
"Pt reports that IV flushes burn but refuses to have the IV removed and states \"please please I can't have another IV done today, not today, they took 8 times yesterday to get it, I can't do it.\" Pt educated on importance of having IV access and pt agrees to start another IV tomorrow.  "

## 2018-09-09 NOTE — PROGRESS NOTES
"Bedside report completed.  A&O x4.  In no acute distress. Patient states she is \"feeling a little better.\"   VSS.  SpO2 95% on RA.   Ambulating with no assistance, steady gait.  Full liquid diet, denies N/V at this time.    C/o 2/10 pain, declining intervention at this time.   IR drain to left buttock, small serosanguineous output.   Last BM 9/9/18, large, formed. + flatus.   + void.  Call light and personal belongings within reach. POC discussed and all questions answered.  Hourly rounding and fall precautions in place.  No additional needs at this time.  "

## 2018-09-09 NOTE — PROGRESS NOTES
Pt laying in bed, call light within reach, bed lowered and locked, fall education reinforced. Pt bowel sounds are hypoactive in all four quadrants, heart sounds are within defined limits, lung sounds are clear in all lobes. Pt is A&Ox4 on room air. Pt IV is clean,dry,intact, and saline locked. Pt left buttock IR drain is clean,dry,intact,and patent outputting a small amount of fluid. Pt reports no pain or nausea at this time. Orders acquired for PRN compazine and benadryl. Pt reports complete relief from nausea with compazine. Pt reports slight discomfort at the drain site. Skin assessed for skin breakdown but none apparent.

## 2018-09-09 NOTE — CARE PLAN
Problem: Bowel/Gastric:  Goal: Normal bowel function is maintained or improved  Outcome: PROGRESSING AS EXPECTED  Pt has been having regular bowel movements     Problem: Knowledge Deficit  Goal: Knowledge of disease process/condition, treatment plan, diagnostic tests, and medications will improve  Outcome: PROGRESSING AS EXPECTED  Pt understands plan of care and has no further questions at this time

## 2018-09-10 ENCOUNTER — APPOINTMENT (OUTPATIENT)
Dept: RADIOLOGY | Facility: MEDICAL CENTER | Age: 58
DRG: 872 | End: 2018-09-10
Attending: SURGERY
Payer: COMMERCIAL

## 2018-09-10 LAB
BACTERIA WND AEROBE CULT: ABNORMAL
BACTERIA WND AEROBE CULT: ABNORMAL
GRAM STN SPEC: ABNORMAL
SIGNIFICANT IND 70042: ABNORMAL
SITE SITE: ABNORMAL
SOURCE SOURCE: ABNORMAL

## 2018-09-10 PROCEDURE — 700111 HCHG RX REV CODE 636 W/ 250 OVERRIDE (IP): Performed by: SURGERY

## 2018-09-10 PROCEDURE — 74177 CT ABD & PELVIS W/CONTRAST: CPT

## 2018-09-10 PROCEDURE — A9270 NON-COVERED ITEM OR SERVICE: HCPCS | Performed by: SURGERY

## 2018-09-10 PROCEDURE — A9270 NON-COVERED ITEM OR SERVICE: HCPCS | Performed by: INTERNAL MEDICINE

## 2018-09-10 PROCEDURE — 700102 HCHG RX REV CODE 250 W/ 637 OVERRIDE(OP): Performed by: SURGERY

## 2018-09-10 PROCEDURE — 700102 HCHG RX REV CODE 250 W/ 637 OVERRIDE(OP): Performed by: INTERNAL MEDICINE

## 2018-09-10 PROCEDURE — 700105 HCHG RX REV CODE 258

## 2018-09-10 PROCEDURE — 700111 HCHG RX REV CODE 636 W/ 250 OVERRIDE (IP): Performed by: FAMILY MEDICINE

## 2018-09-10 PROCEDURE — 700112 HCHG RX REV CODE 229: Performed by: SURGERY

## 2018-09-10 PROCEDURE — 770006 HCHG ROOM/CARE - MED/SURG/GYN SEMI*

## 2018-09-10 PROCEDURE — 700117 HCHG RX CONTRAST REV CODE 255: Performed by: SURGERY

## 2018-09-10 RX ORDER — SODIUM CHLORIDE 9 MG/ML
INJECTION, SOLUTION INTRAVENOUS
Status: COMPLETED
Start: 2018-09-10 | End: 2018-09-10

## 2018-09-10 RX ADMIN — DOCUSATE SODIUM 100 MG: 100 CAPSULE, LIQUID FILLED ORAL at 05:51

## 2018-09-10 RX ADMIN — PROCHLORPERAZINE EDISYLATE 10 MG: 5 INJECTION INTRAMUSCULAR; INTRAVENOUS at 18:32

## 2018-09-10 RX ADMIN — CIPROFLOXACIN HYDROCHLORIDE 500 MG: 500 TABLET, FILM COATED ORAL at 05:51

## 2018-09-10 RX ADMIN — METRONIDAZOLE 500 MG: 500 TABLET ORAL at 23:01

## 2018-09-10 RX ADMIN — METRONIDAZOLE 500 MG: 500 TABLET ORAL at 14:13

## 2018-09-10 RX ADMIN — ENOXAPARIN SODIUM 30 MG: 100 INJECTION SUBCUTANEOUS at 18:32

## 2018-09-10 RX ADMIN — OMEPRAZOLE 20 MG: 20 CAPSULE, DELAYED RELEASE ORAL at 05:51

## 2018-09-10 RX ADMIN — METRONIDAZOLE 500 MG: 500 TABLET ORAL at 05:51

## 2018-09-10 RX ADMIN — LOSARTAN POTASSIUM 25 MG: 50 TABLET ORAL at 05:51

## 2018-09-10 RX ADMIN — IOHEXOL 100 ML: 350 INJECTION, SOLUTION INTRAVENOUS at 18:02

## 2018-09-10 RX ADMIN — CIPROFLOXACIN HYDROCHLORIDE 500 MG: 500 TABLET, FILM COATED ORAL at 18:32

## 2018-09-10 RX ADMIN — OXYCODONE HYDROCHLORIDE 2.5 MG: 5 TABLET ORAL at 23:02

## 2018-09-10 RX ADMIN — OXYCODONE HYDROCHLORIDE 2.5 MG: 5 TABLET ORAL at 19:30

## 2018-09-10 RX ADMIN — SODIUM CHLORIDE 500 ML: 9 INJECTION, SOLUTION INTRAVENOUS at 09:13

## 2018-09-10 RX ADMIN — ENOXAPARIN SODIUM 30 MG: 100 INJECTION SUBCUTANEOUS at 05:50

## 2018-09-10 ASSESSMENT — PAIN SCALES - GENERAL
PAINLEVEL_OUTOF10: 6
PAINLEVEL_OUTOF10: 5

## 2018-09-10 ASSESSMENT — ENCOUNTER SYMPTOMS
ABDOMINAL PAIN: 0
NAUSEA: 1
CHILLS: 0
FEVER: 0
VOMITING: 0

## 2018-09-10 NOTE — PROGRESS NOTES
Bedside report completed.  A&O x4.    VSS.  SpO2 95% on RA.   Ambulating with no assistance, steady gait.  GI soft diet, + nausea and emesis this morning.    Denies pain.   IR drain to left buttock, small serosanguineous output.   Last BM 9/9/18.  + void.  Call light and personal belongings within reach. POC discussed and all questions answered.  Hourly rounding and fall precautions in place.  No additional needs at this time.

## 2018-09-10 NOTE — PROGRESS NOTES
Surgical Progress Note    Author: Justus Will Date & Time created: 9/10/2018   8:53 AM     Interval Events:  Felt great yesterday, a little nauseated this morning.  Ambulating.  Passing flatus, having BMs  Denies abdominal pain    Review of Systems   Constitutional: Negative for chills, fever and malaise/fatigue.   Gastrointestinal: Positive for nausea. Negative for abdominal pain and vomiting.     Hemodynamics:  Temp (24hrs), Av.7 °C (98 °F), Min:36.6 °C (97.8 °F), Max:36.8 °C (98.3 °F)  Temperature: 36.6 °C (97.8 °F)  Pulse  Av.1  Min: 72  Max: 119  Blood Pressure: 123/78     Respiratory:    Respiration: 16, Pulse Oximetry: 95 %        RUL Breath Sounds: Clear, RML Breath Sounds: Diminished, RLL Breath Sounds: Diminished, PEG Breath Sounds: Clear, LLL Breath Sounds: Diminished  Neuro:  GCS = 15       Fluids:    Intake/Output Summary (Last 24 hours) at 09/10/18 0853  Last data filed at 09/10/18 0545   Gross per 24 hour   Intake             1320 ml   Output               15 ml   Net             1305 ml        Current Diet Order   Procedures   • Diet Order Low Fiber(GI Soft)     Physical Exam   Constitutional: She is oriented to person, place, and time. She appears well-developed and well-nourished. No distress.   HENT:   Head: Normocephalic.   Eyes: Pupils are equal, round, and reactive to light. No scleral icterus.   Cardiovascular: Normal rate and regular rhythm.    Pulmonary/Chest: Effort normal and breath sounds normal. No respiratory distress.   Abdominal: Soft. Bowel sounds are normal. She exhibits no distension. There is no tenderness. There is no rebound and no guarding.   Drain output serosanguinous, 15 cc out recorded in last 24 hours   Neurological: She is alert and oriented to person, place, and time.   Skin: Skin is warm.   Psychiatric: She has a normal mood and affect. Her behavior is normal.     Labs:  No results found for this or any previous visit (from the past 24  hour(s)).  Medical Decision Making, by Problem:  Active Hospital Problems    Diagnosis   • Colonic diverticular abscess [K57.20]     Priority: High   • Sepsis (HCC) [A41.9]     Priority: High   • Hypokalemia [E87.6]   • GERD (gastroesophageal reflux disease) [K21.9]   • Reactive airway disease [J45.909]   • Hypertension [I10]     Plan:  CT scan of abdomen and pelvis with IV contrast today, follow up drain position.    Quality Measures:  Quality-Core Measures   Reviewed items::  Labs reviewed and Medications reviewed  Simms catheter::  No Simms  DVT prophylaxis pharmacological::  Enoxaparin (Lovenox)  Ulcer Prophylaxis::  Yes  Antibiotics:  Treating active infection/contamination beyond 24 hours perioperative coverage and Treating fecal contamination      Discussed patient condition with RN and Patient

## 2018-09-10 NOTE — CARE PLAN
Problem: Bowel/Gastric:  Goal: Will not experience complications related to bowel motility  Outcome: PROGRESSING AS EXPECTED  Pt has not had a bowel movement but has been passing flatus and declines suppositories    Problem: Psychosocial Needs:  Goal: Level of anxiety will decrease  Outcome: PROGRESSING AS EXPECTED  Pt reports anxiety regarding new diet plan and her course of stay. Pt encouraged to voice feelings and speak to staff

## 2018-09-10 NOTE — CARE PLAN
Problem: Nutritional:  Goal: Achieve adequate nutritional intake  Patient will tolerate diet advancement beyond clear liquids and consume 50% of meals.  If goal not achieved in 3 days, then would recommend nutrition supplements.    Outcome: PROGRESSING AS EXPECTED  Pt advanced to GI soft; low fiber diet w/ improvement in PO at consistent intake >50% since diet advancement and tolerating w/ no c/o GI distress. RD to continue to encourage consistent adequate PO of meals to leave further recommendations if warranted.

## 2018-09-11 LAB
BACTERIA BLD CULT: NORMAL
BACTERIA BLD CULT: NORMAL
SIGNIFICANT IND 70042: NORMAL
SIGNIFICANT IND 70042: NORMAL
SITE SITE: NORMAL
SITE SITE: NORMAL
SOURCE SOURCE: NORMAL
SOURCE SOURCE: NORMAL

## 2018-09-11 PROCEDURE — 700102 HCHG RX REV CODE 250 W/ 637 OVERRIDE(OP): Performed by: SURGERY

## 2018-09-11 PROCEDURE — 700111 HCHG RX REV CODE 636 W/ 250 OVERRIDE (IP): Performed by: SURGERY

## 2018-09-11 PROCEDURE — 700111 HCHG RX REV CODE 636 W/ 250 OVERRIDE (IP): Performed by: INTERNAL MEDICINE

## 2018-09-11 PROCEDURE — 700105 HCHG RX REV CODE 258

## 2018-09-11 PROCEDURE — 700102 HCHG RX REV CODE 250 W/ 637 OVERRIDE(OP): Performed by: INTERNAL MEDICINE

## 2018-09-11 PROCEDURE — 770006 HCHG ROOM/CARE - MED/SURG/GYN SEMI*

## 2018-09-11 PROCEDURE — A9270 NON-COVERED ITEM OR SERVICE: HCPCS | Performed by: INTERNAL MEDICINE

## 2018-09-11 PROCEDURE — A9270 NON-COVERED ITEM OR SERVICE: HCPCS | Performed by: SURGERY

## 2018-09-11 RX ORDER — SODIUM CHLORIDE 9 MG/ML
INJECTION, SOLUTION INTRAVENOUS
Status: COMPLETED
Start: 2018-09-11 | End: 2018-09-11

## 2018-09-11 RX ADMIN — CIPROFLOXACIN HYDROCHLORIDE 500 MG: 500 TABLET, FILM COATED ORAL at 17:19

## 2018-09-11 RX ADMIN — ENOXAPARIN SODIUM 30 MG: 100 INJECTION SUBCUTANEOUS at 06:48

## 2018-09-11 RX ADMIN — METRONIDAZOLE 500 MG: 500 TABLET ORAL at 06:48

## 2018-09-11 RX ADMIN — ENOXAPARIN SODIUM 30 MG: 100 INJECTION SUBCUTANEOUS at 17:19

## 2018-09-11 RX ADMIN — OMEPRAZOLE 20 MG: 20 CAPSULE, DELAYED RELEASE ORAL at 06:48

## 2018-09-11 RX ADMIN — CIPROFLOXACIN HYDROCHLORIDE 500 MG: 500 TABLET, FILM COATED ORAL at 06:48

## 2018-09-11 RX ADMIN — LOSARTAN POTASSIUM 25 MG: 50 TABLET ORAL at 06:48

## 2018-09-11 RX ADMIN — ONDANSETRON 4 MG: 2 INJECTION INTRAMUSCULAR; INTRAVENOUS at 06:11

## 2018-09-11 RX ADMIN — SODIUM CHLORIDE 500 ML: 9 INJECTION, SOLUTION INTRAVENOUS at 10:30

## 2018-09-11 RX ADMIN — METRONIDAZOLE 500 MG: 500 TABLET ORAL at 23:42

## 2018-09-11 RX ADMIN — METRONIDAZOLE 500 MG: 500 TABLET ORAL at 13:10

## 2018-09-11 ASSESSMENT — ENCOUNTER SYMPTOMS
ABDOMINAL PAIN: 0
DIARRHEA: 0
NAUSEA: 0
CHILLS: 0
VOMITING: 0
FEVER: 0

## 2018-09-11 ASSESSMENT — PAIN SCALES - GENERAL
PAINLEVEL_OUTOF10: 1
PAINLEVEL_OUTOF10: 0

## 2018-09-11 NOTE — CARE PLAN
Problem: Venous Thromboembolism (VTW)/Deep Vein Thrombosis (DVT) Prevention:  Goal: Patient will participate in Venous Thrombosis (VTE)/Deep Vein Thrombosis (DVT)Prevention Measures  Outcome: PROGRESSING AS EXPECTED  Patient ambulating frequently, Lovenox per MAR.     Problem: Respiratory:  Goal: Respiratory status will improve  Outcome: PROGRESSING AS EXPECTED  SpO2 >90% on RA.

## 2018-09-11 NOTE — PROGRESS NOTES
Surgical Progress Note    Author: Justus Will Date & Time created: 2018   11:22 AM     Interval Events:  Tolerating diet, denies nausea  CT scan last night shows much decreased fluid collection.  Reviewed CT with Dr. Zepeda, who feels fluid cavity basically resolved, no indication for repositioning of drain.    Review of Systems   Constitutional: Negative for chills, fever and malaise/fatigue.   Gastrointestinal: Negative for abdominal pain, diarrhea, nausea and vomiting.     Hemodynamics:  Temp (24hrs), Av.6 °C (97.8 °F), Min:36 °C (96.8 °F), Max:37.6 °C (99.6 °F)  Temperature: 36.3 °C (97.3 °F)  Pulse  Av.6  Min: 72  Max: 119  Blood Pressure: 119/63     Respiratory:    Respiration: 17, Pulse Oximetry: 93 %        RUL Breath Sounds: Clear, RML Breath Sounds: Diminished, RLL Breath Sounds: Diminished, PEG Breath Sounds: Clear, LLL Breath Sounds: Diminished  Neuro:  GCS = 15       Fluids:    Intake/Output Summary (Last 24 hours) at 18 1122  Last data filed at 18 0743   Gross per 24 hour   Intake             1300 ml   Output                6 ml   Net             1294 ml     Weight: 74.6 kg (164 lb 7.4 oz)  Current Diet Order   Procedures   • Diet Order Low Fiber(GI Soft)     Physical Exam   Constitutional: She is oriented to person, place, and time. She appears well-developed and well-nourished. No distress.   HENT:   Head: Normocephalic.   Eyes: Pupils are equal, round, and reactive to light. No scleral icterus.   Cardiovascular: Normal rate, regular rhythm and normal heart sounds.    Pulmonary/Chest: Effort normal and breath sounds normal. No respiratory distress.   Abdominal: Soft. She exhibits no distension. There is no tenderness. There is no rebound and no guarding.   Drain pulled by me without difficulty   Neurological: She is alert and oriented to person, place, and time.   Skin: Skin is warm and dry.   Psychiatric: She has a normal mood and affect. Her behavior is normal.      Labs:  No results found for this or any previous visit (from the past 24 hour(s)).  Medical Decision Making, by Problem:  Active Hospital Problems    Diagnosis   • Colonic diverticular abscess [K57.20]     Priority: High   • Sepsis (HCC) [A41.9]     Priority: High   • Hypokalemia [E87.6]   • GERD (gastroesophageal reflux disease) [K21.9]   • Reactive airway disease [J45.909]   • Hypertension [I10]     Plan:  AM CBC tomorrow.  Low residue diet for one month.  Hopefully home tomorrow, if CBC okay    Quality Measures:  Quality-Core Measures   Reviewed items::  Medications reviewed  Simms catheter::  No Simms  DVT prophylaxis pharmacological::  Enoxaparin (Lovenox)  Ulcer Prophylaxis::  Yes  Antibiotics:  Treating active infection/contamination beyond 24 hours perioperative coverage and Treating fecal contamination      Discussed patient condition with Patient, RN

## 2018-09-11 NOTE — PROGRESS NOTES
Bedside report completed.  A&O x4.  In no acute distress.   VSS.  SpO2 96% on RA.  Ambulating with no assistance, steady gait.  + nausea, 25 cc green emesis.  NOC RN medicated per MAR.   C/o 1/10 pain, declining intervention at this time.    IR drain to left buttock, scant serosanguineous output.   Last BM 9/11/18, + flatus.   + void.  Call light and personal belongings within reach. POC discussed and all questions answered.  Hourly rounding and fall precautions in place.  No additional needs at this time.

## 2018-09-11 NOTE — PROGRESS NOTES
A/Ox 4.  VSS.  Reports minimal left buttock pain 1 /10, medicated per MAR.    Left buttock IR drain, small serosanguinous drainage, flushed, patent, compressed.    + ARGUETA, denies numbness and tingling.  RA, satting >90%, denies SOB or difficulty breathing, IS used appropriately.  SCDs refused.  + hypoactive BSx4, + flatus, no BM this shift, LBM 09/10, +nausea.  Tolerated GI soft diet well, poor PO intake  + void, QS.  Up self, tolerates activity well.  Ambulated unit, up to BR, repositions self frequently.  PIV TKO, PO intake encouraged.    Call light within reach, calls appropriately.  Bed in lowest locked position.

## 2018-09-12 VITALS
BODY MASS INDEX: 25.81 KG/M2 | TEMPERATURE: 97.8 F | HEART RATE: 68 BPM | HEIGHT: 67 IN | DIASTOLIC BLOOD PRESSURE: 73 MMHG | RESPIRATION RATE: 18 BRPM | WEIGHT: 164.46 LBS | SYSTOLIC BLOOD PRESSURE: 133 MMHG | OXYGEN SATURATION: 95 %

## 2018-09-12 LAB
BASOPHILS # BLD AUTO: 0.7 % (ref 0–1.8)
BASOPHILS # BLD: 0.06 K/UL (ref 0–0.12)
EOSINOPHIL # BLD AUTO: 0.17 K/UL (ref 0–0.51)
EOSINOPHIL NFR BLD: 2.1 % (ref 0–6.9)
ERYTHROCYTE [DISTWIDTH] IN BLOOD BY AUTOMATED COUNT: 41.4 FL (ref 35.9–50)
HCT VFR BLD AUTO: 32.2 % (ref 37–47)
HGB BLD-MCNC: 11 G/DL (ref 12–16)
IMM GRANULOCYTES # BLD AUTO: 0.11 K/UL (ref 0–0.11)
IMM GRANULOCYTES NFR BLD AUTO: 1.3 % (ref 0–0.9)
LYMPHOCYTES # BLD AUTO: 1.45 K/UL (ref 1–4.8)
LYMPHOCYTES NFR BLD: 17.7 % (ref 22–41)
MCH RBC QN AUTO: 29.6 PG (ref 27–33)
MCHC RBC AUTO-ENTMCNC: 34.2 G/DL (ref 33.6–35)
MCV RBC AUTO: 86.8 FL (ref 81.4–97.8)
MONOCYTES # BLD AUTO: 0.56 K/UL (ref 0–0.85)
MONOCYTES NFR BLD AUTO: 6.8 % (ref 0–13.4)
NEUTROPHILS # BLD AUTO: 5.83 K/UL (ref 2–7.15)
NEUTROPHILS NFR BLD: 71.4 % (ref 44–72)
NRBC # BLD AUTO: 0 K/UL
NRBC BLD-RTO: 0 /100 WBC
PLATELET # BLD AUTO: 295 K/UL (ref 164–446)
PMV BLD AUTO: 9.9 FL (ref 9–12.9)
RBC # BLD AUTO: 3.71 M/UL (ref 4.2–5.4)
WBC # BLD AUTO: 8.2 K/UL (ref 4.8–10.8)

## 2018-09-12 PROCEDURE — 36415 COLL VENOUS BLD VENIPUNCTURE: CPT

## 2018-09-12 PROCEDURE — 700102 HCHG RX REV CODE 250 W/ 637 OVERRIDE(OP): Performed by: INTERNAL MEDICINE

## 2018-09-12 PROCEDURE — A9270 NON-COVERED ITEM OR SERVICE: HCPCS | Performed by: INTERNAL MEDICINE

## 2018-09-12 PROCEDURE — 85025 COMPLETE CBC W/AUTO DIFF WBC: CPT

## 2018-09-12 PROCEDURE — 700102 HCHG RX REV CODE 250 W/ 637 OVERRIDE(OP): Performed by: SURGERY

## 2018-09-12 PROCEDURE — 700111 HCHG RX REV CODE 636 W/ 250 OVERRIDE (IP): Performed by: SURGERY

## 2018-09-12 PROCEDURE — A9270 NON-COVERED ITEM OR SERVICE: HCPCS | Performed by: SURGERY

## 2018-09-12 RX ORDER — METRONIDAZOLE 500 MG/1
500 TABLET ORAL EVERY 8 HOURS
Qty: 42 TAB | Refills: 0 | Status: SHIPPED | OUTPATIENT
Start: 2018-09-12 | End: 2018-09-26

## 2018-09-12 RX ORDER — CIPROFLOXACIN 500 MG/1
500 TABLET, FILM COATED ORAL EVERY 12 HOURS
Qty: 28 TAB | Refills: 0 | Status: SHIPPED | OUTPATIENT
Start: 2018-09-12 | End: 2018-09-26

## 2018-09-12 RX ORDER — METRONIDAZOLE 500 MG/1
500 TABLET ORAL 3 TIMES DAILY
Qty: 42 TAB | Refills: 0 | Status: SHIPPED | OUTPATIENT
Start: 2018-09-12 | End: 2018-10-22

## 2018-09-12 RX ADMIN — OMEPRAZOLE 20 MG: 20 CAPSULE, DELAYED RELEASE ORAL at 06:12

## 2018-09-12 RX ADMIN — ENOXAPARIN SODIUM 30 MG: 100 INJECTION SUBCUTANEOUS at 06:12

## 2018-09-12 RX ADMIN — CIPROFLOXACIN HYDROCHLORIDE 500 MG: 500 TABLET, FILM COATED ORAL at 06:12

## 2018-09-12 RX ADMIN — LOSARTAN POTASSIUM 25 MG: 50 TABLET ORAL at 06:12

## 2018-09-12 RX ADMIN — METRONIDAZOLE 500 MG: 500 TABLET ORAL at 06:12

## 2018-09-12 ASSESSMENT — PAIN SCALES - GENERAL
PAINLEVEL_OUTOF10: 0
PAINLEVEL_OUTOF10: 0

## 2018-09-12 ASSESSMENT — ENCOUNTER SYMPTOMS
VOMITING: 0
DIARRHEA: 0
CONSTIPATION: 0
NAUSEA: 0
CHILLS: 0

## 2018-09-12 NOTE — DISCHARGE INSTRUCTIONS
Discharge Instructions    Discharged to home by car with relative. Discharged via walking, hospital escort: Refused.  Special equipment needed: Not Applicable    Be sure to schedule a follow-up appointment with your primary care doctor or any specialists as instructed.     Discharge Plan:   Influenza Vaccine Indication: Patient Refuses    I understand that a diet low in cholesterol, fat, and sodium is recommended for good health. Unless I have been given specific instructions below for another diet, I accept this instruction as my diet prescription.   Other diet: GI soft as tolerated     Special Instructions:   No lifting restrictions.   Use over the counter laxative of choice if constipated.   Follow up with Dr. Will ([facility], [phone]) in 10-14 days.    · Is patient discharged on Warfarin / Coumadin?   No         Diverticulitis  Diverticulitis is inflammation or infection of small pouches in your colon that form when you have a condition called diverticulosis. The pouches in your colon are called diverticula. Your colon, or large intestine, is where water is absorbed and stool is formed.  Complications of diverticulitis can include:  · Bleeding.  · Severe infection.  · Severe pain.  · Perforation of your colon.  · Obstruction of your colon.  What are the causes?  Diverticulitis is caused by bacteria.  Diverticulitis happens when stool becomes trapped in diverticula. This allows bacteria to grow in the diverticula, which can lead to inflammation and infection.  What increases the risk?  People with diverticulosis are at risk for diverticulitis. Eating a diet that does not include enough fiber from fruits and vegetables may make diverticulitis more likely to develop.  What are the signs or symptoms?  Symptoms of diverticulitis may include:  · Abdominal pain and tenderness. The pain is normally located on the left side of the abdomen, but may occur in other areas.  · Fever and  chills.  · Bloating.  · Cramping.  · Nausea.  · Vomiting.  · Constipation.  · Diarrhea.  · Blood in your stool.  How is this diagnosed?  Your health care provider will ask you about your medical history and do a physical exam. You may need to have tests done because many medical conditions can cause the same symptoms as diverticulitis. Tests may include:  · Blood tests.  · Urine tests.  · Imaging tests of the abdomen, including X-rays and CT scans.  When your condition is under control, your health care provider may recommend that you have a colonoscopy. A colonoscopy can show how severe your diverticula are and whether something else is causing your symptoms.  How is this treated?  Most cases of diverticulitis are mild and can be treated at home. Treatment may include:  · Taking over-the-counter pain medicines.  · Following a clear liquid diet.  · Taking antibiotic medicines by mouth for 7-10 days.  More severe cases may be treated at a hospital. Treatment may include:  · Not eating or drinking.  · Taking prescription pain medicine.  · Receiving antibiotic medicines through an IV tube.  · Receiving fluids and nutrition through an IV tube.  · Surgery.  Follow these instructions at home:  · Follow your health care provider’s instructions carefully.  · Follow a full liquid diet or other diet as directed by your health care provider. After your symptoms improve, your health care provider may tell you to change your diet. He or she may recommend you eat a high-fiber diet. Fruits and vegetables are good sources of fiber. Fiber makes it easier to pass stool.  · Take fiber supplements or probiotics as directed by your health care provider.  · Only take medicines as directed by your health care provider.  · Keep all your follow-up appointments.  Contact a health care provider if:  · Your pain does not improve.  · You have a hard time eating food.  · Your bowel movements do not return to normal.  Get help right away  if:  · Your pain becomes worse.  · Your symptoms do not get better.  · Your symptoms suddenly get worse.  · You have a fever.  · You have repeated vomiting.  · You have bloody or black, tarry stools.  This information is not intended to replace advice given to you by your health care provider. Make sure you discuss any questions you have with your health care provider.  Document Released: 09/27/2006 Document Revised: 05/25/2017 Document Reviewed: 11/12/2014  [employer] Interactive Patient Education © 2017 [employer]    Depression / Suicide Risk    As you are discharged from this [facility] facility, it is important to learn how to keep safe from harming yourself.    Recognize the warning signs:  · Abrupt changes in personality, positive or negative- including increase in energy   · Giving away possessions  · Change in eating patterns- significant weight changes-  positive or negative  · Change in sleeping patterns- unable to sleep or sleeping all the time   · Unwillingness or inability to communicate  · Depression  · Unusual sadness, discouragement and loneliness  · Talk of wanting to die  · Neglect of personal appearance   · Rebelliousness- reckless behavior  · Withdrawal from people/activities they love  · Confusion- inability to concentrate     If you or a loved one observes any of these behaviors or has concerns about self-harm, here's what you can do:  · Talk about it- your feelings and reasons for harming yourself  · Remove any means that you might use to hurt yourself (examples: pills, rope, extension cords, firearm)  · Get professional help from the community (Mental Health, Substance Abuse, psychological counseling)  · Do not be alone:Call your Safe Contact- someone whom you trust who will be there for you.  · Call your local CRISIS HOTLINE 258-8717 or 466-364-8945  · Call your local Children's Mobile Crisis Response Team [place] (405) 011-6882 or www.[employer]  · Call the toll free  National Suicide Prevention Hotlines   · National Suicide Prevention Lifeline 630-008-TIFO (4507)  · National Hope Line Network 800-SUICIDE (704-4295)

## 2018-09-12 NOTE — PROGRESS NOTES
A/Ox 4.  VSS.  Reports no pain, will continue to monitor.    Old IR drain site to left buttock, dressing in place, CDI.  Abdomen soft, tender.     + ARGUETA, denies numbness and tingling.  RA, satting >90%, denies SOB or difficulty breathing, IS used appropriately.  SCDs refused.  + hypoactive BSx4, + flatus, no BM this shift, LBM 09/11, denies nausea.  Tolerated GI soft diet well, poor PO intake  + void, QS.  Up self, tolerates activity well.  Ambulated unit, up to BR, repositions self frequently.  No PIV, PO intake encouraged.     Call light within reach, calls appropriately.  Bed in lowest locked position.

## 2018-09-12 NOTE — DISCHARGE SUMMARY
DATE OF ADMISSION:  09/05/2018    DATE OF DISCHARGE:  09/12/2018    DIAGNOSES ON ADMISSION:  1.  Diverticular abscess.  2.  Diverticulitis.  3.  Hypertension.  4.  Gastroesophageal reflux.  5.  Asthma.  6.  Hypokalemia.  7.  Hyperglycemia.    DIAGNOSES ON DISCHARGE:  1.  Diverticular abscess.  2.  Diverticulitis.  3.  Hypertension.  4.  Gastroesophageal reflux.  5.  Asthma.  6.  Hypokalemia.  7.  Hyperglycemia.    HISTORY AND PHYSICAL:  Please see the dictated history and physical.    HOSPITAL COURSE:  Patient was admitted to the medicine service due to ER   concerns over possible EKG changes.  I very much appreciate their help, but   the patient was transferred to my service the next day after.  There was no   evidence of cardiac ischemia.  She underwent a CT-guided drainage of   diverticular abscess on 09/06/2018.  She tolerated this well.  Her diet was   able to be advanced from a clear liquid through a full liquid to a low residue   diet by.  By 09/10/2018, she was noted to be putting out very minimal   drainage from the drain.  Her white count had normalized.  She was tolerating   a low residue diet.  A followup CT scan was performed that evening and it was   noted that the abscess had been drained.  This was discussed in person with   Dr. Zepeda who will place a drain and felt that repositioning of that drain   would not be worthwhile.  The drain was removed by me on 09/11/2018 and the   patient was noted on 09/12/2018 to again have a normal white blood cell count   and a benign exam and was able to tolerate a low residue diet.  She was deemed   ready for discharge home and was discharged home on that date.    DISCHARGE MEDICATIONS:  She will resume her home medications and she will be   discharged home on ciprofloxacin 500 mg 1 p.o. q. 12 hours and metronidazole   500 mg 1 p.o. q. 8 hours.    DISCHARGE RECOMMENDATIONS:  She is to follow up with me, Dr. Will, in   10-14 days and she is to call me if she  has any questions or concerns prior to   that followup visit.  She is to stay on a low residue diet for 30 days after   her discharge home.       ____________________________________     MD RAÚL Sanford / VELMA    DD:  09/12/2018 07:36:35  DT:  09/12/2018 11:05:26    D#:  [id number]  Job#:  [id number]

## 2018-09-12 NOTE — PROGRESS NOTES
Bedside report completed.  A&O x4.    VSS.  Ambulating with no assistance, steady gait.  Tolerating GI soft diet.   Denies pain.  Left buttock with old IR drain site, dressing CDI.    Last BM 9/12/18, + flatus.   + void.  Call light and personal belongings within reach.  POC discussed and all questions answered.  Hourly rounding and fall precautions in place.  No additional needs at this time.

## 2018-09-12 NOTE — PROGRESS NOTES
Discharging Patient home per physician order.    Discharged with sister.    Demonstrated understanding of discharge instructions, follow up appointments, home medications, prescriptions, home care instructions for diverticulitis.    Ambulating with no assistance, voiding without difficulty, pain well controlled, tolerating oral medications, oxygen saturation greater than 90% on RA, tolerating diet.  Educational handouts given and discussed.    Verbalized understanding of discharge instructions and educational handouts.    All questions answered.  Belongings with patient at time of discharge.

## 2018-09-12 NOTE — PROGRESS NOTES
Surgical Progress Note    Author: Justus Will Date & Time created: 2018   7:26 AM     Interval Events:  Tolerating low residue diet.  Nutrition consult not performed.  Patient states she feels comfortable with low residue diet.  Passing flatus, + BM     Review of Systems   Constitutional: Negative for chills.   Gastrointestinal: Negative for constipation, diarrhea, nausea and vomiting.     Hemodynamics:  Temp (24hrs), Av.6 °C (97.8 °F), Min:36.1 °C (97 °F), Max:36.9 °C (98.5 °F)  Temperature: 36.9 °C (98.5 °F)  Pulse  Av.1  Min: 72  Max: 119  Blood Pressure: 134/82     Respiratory:    Respiration: 16, Pulse Oximetry: 97 %        RUL Breath Sounds: Clear, RML Breath Sounds: Diminished, RLL Breath Sounds: Diminished, PEG Breath Sounds: Clear, LLL Breath Sounds: Diminished  Neuro:  GCS = 15       Fluids:    Intake/Output Summary (Last 24 hours) at 18 0726  Last data filed at 18 0600   Gross per 24 hour   Intake              900 ml   Output                5 ml   Net              895 ml        Current Diet Order   Procedures   • Diet Order Low Fiber(GI Soft)     Physical Exam   Constitutional: She is oriented to person, place, and time. She appears well-developed and well-nourished. No distress.   Eyes: Pupils are equal, round, and reactive to light. No scleral icterus.   Cardiovascular: Normal rate, regular rhythm and normal heart sounds.    Pulmonary/Chest: Effort normal and breath sounds normal. No respiratory distress.   Abdominal: Soft. Bowel sounds are normal. She exhibits no distension. There is no tenderness. There is no rebound and no guarding.   Neurological: She is alert and oriented to person, place, and time.   Skin: Skin is warm and dry.   Psychiatric: She has a normal mood and affect. Her behavior is normal.     Labs:  Recent Results (from the past 24 hour(s))   CBC WITH DIFFERENTIAL    Collection Time: 18  5:17 AM   Result Value Ref Range    WBC 8.2 4.8 - 10.8 K/uL     RBC 3.71 (L) 4.20 - 5.40 M/uL    Hemoglobin 11.0 (L) 12.0 - 16.0 g/dL    Hematocrit 32.2 (L) 37.0 - 47.0 %    MCV 86.8 81.4 - 97.8 fL    MCH 29.6 27.0 - 33.0 pg    MCHC 34.2 33.6 - 35.0 g/dL    RDW 41.4 35.9 - 50.0 fL    Platelet Count 295 164 - 446 K/uL    MPV 9.9 9.0 - 12.9 fL    Neutrophils-Polys 71.40 44.00 - 72.00 %    Lymphocytes 17.70 (L) 22.00 - 41.00 %    Monocytes 6.80 0.00 - 13.40 %    Eosinophils 2.10 0.00 - 6.90 %    Basophils 0.70 0.00 - 1.80 %    Immature Granulocytes 1.30 (H) 0.00 - 0.90 %    Nucleated RBC 0.00 /100 WBC    Neutrophils (Absolute) 5.83 2.00 - 7.15 K/uL    Lymphs (Absolute) 1.45 1.00 - 4.80 K/uL    Monos (Absolute) 0.56 0.00 - 0.85 K/uL    Eos (Absolute) 0.17 0.00 - 0.51 K/uL    Baso (Absolute) 0.06 0.00 - 0.12 K/uL    Immature Granulocytes (abs) 0.11 0.00 - 0.11 K/uL    NRBC (Absolute) 0.00 K/uL     Medical Decision Making, by Problem:  Active Hospital Problems    Diagnosis   • Colonic diverticular abscess [K57.20]     Priority: High   • Sepsis (HCC) [A41.9]     Priority: High   • Hypokalemia [E87.6]   • GERD (gastroesophageal reflux disease) [K21.9]   • Reactive airway disease [J45.909]   • Hypertension [I10]     Plan:  Home today with 2 weeks antibiotics.  Follow up with me in 10-14 days  Low residue diet for one month.    Quality Measures:  Quality-Core Measures   Reviewed items::  Labs reviewed and Medications reviewed  Simms catheter::  No Simms  DVT prophylaxis pharmacological::  Enoxaparin (Lovenox)  DVT prophylaxis - mechanical:  SCDs  Ulcer Prophylaxis::  Yes  Antibiotics:  Treating active infection/contamination beyond 24 hours perioperative coverage and Treating fecal contamination      Discussed patient condition with RN and Patient

## 2018-09-20 ENCOUNTER — TELEPHONE (OUTPATIENT)
Dept: MEDICAL GROUP | Facility: PHYSICIAN GROUP | Age: 58
End: 2018-09-20

## 2018-09-20 ENCOUNTER — PATIENT MESSAGE (OUTPATIENT)
Dept: MEDICAL GROUP | Facility: PHYSICIAN GROUP | Age: 58
End: 2018-09-20

## 2018-09-20 DIAGNOSIS — L02.211 ABSCESS OF ABDOMINAL WALL: ICD-10-CM

## 2018-09-20 NOTE — TELEPHONE ENCOUNTER
----- Message from JOSELIN Burch sent at 9/20/2018 12:28 PM PDT -----  Regarding: FW: Referral Request  Contact: [phone]      ----- Message -----  From: Ambar Jj  Sent: 9/20/2018  11:24 AM  To: Amb All Mas  Subject: Referral Request                                 Ambar Jj would like to request a referral.  Reason: Follow up on abdominal abscess  Requested provider: Justus Chou  Comment:  I was in the hospital from 09/12 - 09/19 with an abdominal abscess and need to follow up with the surgeon, Dr. Chou, who was seeing me there.  I have an appt scheduled for 09/25/18 but they just called to say I need a referral from my primary before I can see him.  Can you please get me a referral and let me know at your earliest convenience if I can keep that appt.  I hope I can as I am still not feeling well.  Please do not hesitate to contact me at [phone].    Thank you  Ambar Jj

## 2018-09-26 ENCOUNTER — HOSPITAL ENCOUNTER (OUTPATIENT)
Dept: LAB | Facility: MEDICAL CENTER | Age: 58
End: 2018-09-26
Attending: SURGERY
Payer: COMMERCIAL

## 2018-09-26 ENCOUNTER — HOSPITAL ENCOUNTER (OUTPATIENT)
Dept: RADIOLOGY | Facility: MEDICAL CENTER | Age: 58
End: 2018-09-26
Attending: SURGERY
Payer: COMMERCIAL

## 2018-09-26 DIAGNOSIS — K57.20 PERFORATED DIVERTICULUM OF LARGE INTESTINE: ICD-10-CM

## 2018-09-26 LAB
ANION GAP SERPL CALC-SCNC: 11 MMOL/L (ref 0–11.9)
BASOPHILS # BLD AUTO: 0.7 % (ref 0–1.8)
BASOPHILS # BLD: 0.04 K/UL (ref 0–0.12)
BUN SERPL-MCNC: 14 MG/DL (ref 8–22)
CALCIUM SERPL-MCNC: 9.4 MG/DL (ref 8.5–10.5)
CHLORIDE SERPL-SCNC: 105 MMOL/L (ref 96–112)
CO2 SERPL-SCNC: 24 MMOL/L (ref 20–33)
CREAT SERPL-MCNC: 0.6 MG/DL (ref 0.5–1.4)
EOSINOPHIL # BLD AUTO: 0.07 K/UL (ref 0–0.51)
EOSINOPHIL NFR BLD: 1.2 % (ref 0–6.9)
ERYTHROCYTE [DISTWIDTH] IN BLOOD BY AUTOMATED COUNT: 47.7 FL (ref 35.9–50)
GLUCOSE SERPL-MCNC: 81 MG/DL (ref 65–99)
HCT VFR BLD AUTO: 41.6 % (ref 37–47)
HGB BLD-MCNC: 13 G/DL (ref 12–16)
IMM GRANULOCYTES # BLD AUTO: 0.01 K/UL (ref 0–0.11)
IMM GRANULOCYTES NFR BLD AUTO: 0.2 % (ref 0–0.9)
LYMPHOCYTES # BLD AUTO: 1.51 K/UL (ref 1–4.8)
LYMPHOCYTES NFR BLD: 25.5 % (ref 22–41)
MCH RBC QN AUTO: 28.9 PG (ref 27–33)
MCHC RBC AUTO-ENTMCNC: 31.3 G/DL (ref 33.6–35)
MCV RBC AUTO: 92.4 FL (ref 81.4–97.8)
MONOCYTES # BLD AUTO: 0.32 K/UL (ref 0–0.85)
MONOCYTES NFR BLD AUTO: 5.4 % (ref 0–13.4)
NEUTROPHILS # BLD AUTO: 3.97 K/UL (ref 2–7.15)
NEUTROPHILS NFR BLD: 67 % (ref 44–72)
NRBC # BLD AUTO: 0 K/UL
NRBC BLD-RTO: 0 /100 WBC
PLATELET # BLD AUTO: 279 K/UL (ref 164–446)
PMV BLD AUTO: 11.4 FL (ref 9–12.9)
POTASSIUM SERPL-SCNC: 4 MMOL/L (ref 3.6–5.5)
RBC # BLD AUTO: 4.5 M/UL (ref 4.2–5.4)
SODIUM SERPL-SCNC: 140 MMOL/L (ref 135–145)
WBC # BLD AUTO: 5.9 K/UL (ref 4.8–10.8)

## 2018-09-26 PROCEDURE — 700117 HCHG RX CONTRAST REV CODE 255: Performed by: SURGERY

## 2018-09-26 PROCEDURE — 85025 COMPLETE CBC W/AUTO DIFF WBC: CPT

## 2018-09-26 PROCEDURE — 36415 COLL VENOUS BLD VENIPUNCTURE: CPT

## 2018-09-26 PROCEDURE — 80048 BASIC METABOLIC PNL TOTAL CA: CPT

## 2018-09-26 PROCEDURE — 74177 CT ABD & PELVIS W/CONTRAST: CPT

## 2018-09-26 RX ADMIN — IOHEXOL 50 ML: 240 INJECTION, SOLUTION INTRATHECAL; INTRAVASCULAR; INTRAVENOUS; ORAL at 14:00

## 2018-09-26 RX ADMIN — IOHEXOL 100 ML: 350 INJECTION, SOLUTION INTRAVENOUS at 14:00

## 2018-09-28 ENCOUNTER — HOSPITAL ENCOUNTER (OUTPATIENT)
Dept: LAB | Facility: MEDICAL CENTER | Age: 58
End: 2018-09-28
Attending: SURGERY
Payer: COMMERCIAL

## 2018-09-28 LAB
APPEARANCE UR: CLEAR
BILIRUB UR QL STRIP.AUTO: NEGATIVE
COLOR UR: YELLOW
GLUCOSE UR STRIP.AUTO-MCNC: NEGATIVE MG/DL
KETONES UR STRIP.AUTO-MCNC: NEGATIVE MG/DL
LEUKOCYTE ESTERASE UR QL STRIP.AUTO: NEGATIVE
MICRO URNS: NORMAL
NITRITE UR QL STRIP.AUTO: NEGATIVE
PH UR STRIP.AUTO: 5 [PH]
PROT UR QL STRIP: NEGATIVE MG/DL
RBC UR QL AUTO: NEGATIVE
SP GR UR STRIP.AUTO: 1.02
UROBILINOGEN UR STRIP.AUTO-MCNC: 0.2 MG/DL

## 2018-09-28 PROCEDURE — 81003 URINALYSIS AUTO W/O SCOPE: CPT

## 2018-10-02 RX ORDER — LOSARTAN POTASSIUM 25 MG/1
TABLET ORAL
Qty: 90 TAB | Refills: 3 | Status: SHIPPED | OUTPATIENT
Start: 2018-10-02 | End: 2019-11-06 | Stop reason: SDUPTHER

## 2018-10-03 NOTE — TELEPHONE ENCOUNTER
Was the patient seen in the last year in this department? Yes lov 6/26/18    Does patient have an active prescription for medications requested? No     Received Request Via: Pharmacy

## 2018-10-09 ENCOUNTER — PATIENT OUTREACH (OUTPATIENT)
Dept: HEALTH INFORMATION MANAGEMENT | Facility: OTHER | Age: 58
End: 2018-10-09

## 2018-10-16 ENCOUNTER — HOSPITAL ENCOUNTER (OUTPATIENT)
Facility: MEDICAL CENTER | Age: 58
End: 2018-10-16
Attending: SURGERY | Admitting: SURGERY
Payer: COMMERCIAL

## 2018-10-17 ENCOUNTER — TELEPHONE (OUTPATIENT)
Dept: MEDICAL GROUP | Facility: PHYSICIAN GROUP | Age: 58
End: 2018-10-17

## 2018-10-17 DIAGNOSIS — R80.9 PROTEINURIA, UNSPECIFIED TYPE: ICD-10-CM

## 2018-10-17 DIAGNOSIS — K57.30 DIVERTICULOSIS OF COLON WITHOUT DIVERTICULITIS: ICD-10-CM

## 2018-10-17 NOTE — TELEPHONE ENCOUNTER
1. Caller Name: [facility]                                          Call Back Number: [phone]  Fax:[phone]       Patient approves a detailed voicemail message: yes    2. SPECIFIC Action To Be Taken: Referral pending, please sign.    3. Diagnosis/Clinical Reason for Request: Surgery for a stent     4. Specialty & Provider [facility]aging [facility]     5. Is appointment scheduled for requested order/referral: yes - Surgery on 11/07/18.     Patient was not informed they will receive a return phone call from the office ONLY if there are any questions before processing their request. Advised to call back if they haven't received a call from the referral department in 5 days.    Patient is scheduled to have a stent placed 11/07. Patient needs office visit approved. Please advise. LM

## 2018-10-22 ENCOUNTER — OFFICE VISIT (OUTPATIENT)
Dept: URGENT CARE | Facility: CLINIC | Age: 58
End: 2018-10-22
Payer: COMMERCIAL

## 2018-10-22 VITALS
BODY MASS INDEX: 25.3 KG/M2 | HEART RATE: 108 BPM | RESPIRATION RATE: 16 BRPM | OXYGEN SATURATION: 96 % | WEIGHT: 157.4 LBS | HEIGHT: 66 IN | DIASTOLIC BLOOD PRESSURE: 88 MMHG | SYSTOLIC BLOOD PRESSURE: 122 MMHG | TEMPERATURE: 99 F

## 2018-10-22 DIAGNOSIS — K57.92 DIVERTICULITIS: ICD-10-CM

## 2018-10-22 PROCEDURE — 99213 OFFICE O/P EST LOW 20 MIN: CPT | Performed by: FAMILY MEDICINE

## 2018-10-22 RX ORDER — METRONIDAZOLE 500 MG/1
500 TABLET ORAL 3 TIMES DAILY
Qty: 30 TAB | Refills: 0 | Status: SHIPPED | OUTPATIENT
Start: 2018-10-22 | End: 2018-10-28

## 2018-10-22 RX ORDER — CIPROFLOXACIN 500 MG/1
500 TABLET, FILM COATED ORAL 2 TIMES DAILY
Qty: 20 TAB | Refills: 0 | Status: SHIPPED | OUTPATIENT
Start: 2018-10-22 | End: 2018-10-28

## 2018-10-22 ASSESSMENT — ENCOUNTER SYMPTOMS
COUGH: 0
FEVER: 1
SORE THROAT: 0
VOMITING: 0
SHORTNESS OF BREATH: 0
CHILLS: 1
NAUSEA: 0
HEADACHES: 0
ABDOMINAL PAIN: 1

## 2018-10-23 NOTE — PROGRESS NOTES
Subjective:     Ambar Jj is a 57 y.o. female who presents for Abdominal Pain (X a week ago sunday pain, fever started friday, colon surgery scheduled for 11/7)    HPI  Pt presents for left lower quadrant abdominal pain  Patient with left lower quadrant abdominal pain for the past 8 days  She has history of recurrent diverticulitis and actually has surgery planned to resect colon in a little over 2 weeks  Pain is constant, sharp, and worse with movement  Pain does not radiate   Pain feels exactly like past episodes of diverticulitis  Having fevers up to 101 at home  Has been taking ibuprofen 400 mg every 5 hours which helps slightly   No vomiting  No blood in stools  Pain is keeping her up at night    Review of Systems   Constitutional: Positive for chills, fever and malaise/fatigue.   HENT: Negative for congestion and sore throat.    Respiratory: Negative for cough and shortness of breath.    Cardiovascular: Negative for chest pain.   Gastrointestinal: Positive for abdominal pain. Negative for nausea and vomiting.   Skin: Negative for rash.   Neurological: Negative for headaches.       PMH:  has a past medical history of Anesthesia; Bronchitis (2010); Diverticulitis (11/2017); GERD (gastroesophageal reflux disease); Heart burn; HTN; Indigestion; Other specified symptom associated with female genital organs; Pap smear (1/29/9); Psychiatric problem; Reactive airway disease; Unspecified urinary incontinence; and Urinary bladder disorder.  MEDS:   Current Outpatient Prescriptions:   •  losartan (COZAAR) 25 MG Tab, TAKE 1 TABLET BY MOUTH EVERY DAY, Disp: 90 Tab, Rfl: 3  •  omeprazole (PRILOSEC) 20 MG delayed-release capsule, Take 1 Cap by mouth every day., Disp: 90 Cap, Rfl: 3  •  PROAIR  (90 Base) MCG/ACT Aero Soln inhalation aerosol, INHALE 2 PUFFS BY MOUTH EVERY 6 HOURS AS NEEDED FOR SHORTNESS OF BREATH, Disp: 8.5 g, Rfl: 0  •  ibuprofen (MOTRIN) 200 MG TABS, Take 200 mg by mouth every 6 hours as  "needed., Disp: , Rfl:   •  metroNIDAZOLE (FLAGYL) 500 MG Tab, Take 1 Tab by mouth 3 times a day. (Patient not taking: Reported on 10/22/2018), Disp: 42 Tab, Rfl: 0  •  tramadol (ULTRAM) 50 MG Tab, Take 50 mg by mouth every four hours as needed., Disp: , Rfl:   •  Multiple Vitamins-Minerals (EMERGEN-C IMMUNE PO), Take 1 Packet by mouth every day., Disp: , Rfl:   •  ondansetron (ZOFRAN ODT) 4 MG TABLET DISPERSIBLE, Take 1 Tab by mouth every four hours as needed. (Patient not taking: Reported on 10/22/2018), Disp: 10 Tab, Rfl: 0  ALLERGIES:   Allergies   Allergen Reactions   • Codeine Vomiting     SURGHX:   Past Surgical History:   Procedure Laterality Date   • BLADDER SUSPENSION N/A 5/6/2015    Procedure: BLADDER SUSPENSION [57.89] TOT;  Surgeon: Yazmin Armando M.D.;  Location: SURGERY SAME DAY [facility];  Service:    • ANTERIOR AND POSTERIOR REPAIR N/A 5/6/2015    Procedure: ANTERIOR AND POSTERIOR REPAIR [70.53];  Surgeon: Yazmin Armando M.D.;  Location: SURGERY SAME DAY [facility];  Service:    • CYSTOSCOPY N/A 5/6/2015    Procedure: CYSTOSCOPY;  Surgeon: Yazmin Armando M.D.;  Location: SURGERY SAME DAY [facility];  Service:    • ABDOMINAL HYSTERECTOMY TOTAL      Ovaries intact   • BLADDER SUSPENSION     • CHOLECYSTECTOMY     • ENDOSCOPY     • ENDOSCOPY PROCEDURE      dilation due to stricture   • TONSILLECTOMY AND ADENOIDECTOMY       SOCHX:  reports that she quit smoking about 8 years ago. Her smoking use included Cigarettes. She has a 34.00 pack-year smoking history. She has never used smokeless tobacco. She reports that she drinks about 7.2 oz of alcohol per week . She reports that she uses drugs, including Inhaled.     Objective:   /88 (BP Location: Left arm, Patient Position: Sitting, BP Cuff Size: Adult)   Pulse (!) 108   Temp 37.2 °C (99 °F) (Temporal)   Resp 16   Ht 1.68 m (5' 6.14\")   Wt 71.4 kg (157 lb 6.4 oz)   SpO2 96%   BMI 25.30 kg/m²     Physical Exam   Constitutional: She " appears well-developed and well-nourished. No distress.   HENT:   Head: Normocephalic and atraumatic.   Neck: Normal range of motion. No tracheal deviation present.   Cardiovascular: Normal rate, regular rhythm and normal heart sounds.    Pulmonary/Chest: Effort normal and breath sounds normal. No respiratory distress. She has no wheezes. She has no rales.   Abdominal: Soft. Bowel sounds are normal. She exhibits no distension and no mass. There is no rebound and no guarding.   Tender in left lower quadrant   Neurological: She is alert. Coordination normal.   Skin: Skin is warm and dry. No rash noted. She is not diaphoretic.   Psychiatric: She has a normal mood and affect. Her behavior is normal. Judgment and thought content normal.     Assessment/Plan:   Assessment    1. Diverticulitis  Patient is a 57-year-old female with history of recurrent diverticulitis.  She has surgery to remove part of her colon in a little over 2 weeks.  Will start her on Cipro/Flagyl and she will contact her surgeon to let him know she is starting antibiotics again.  Reviewed red flags and emergency precautions.  Patient will follow-up with her surgeon  - ciprofloxacin (CIPRO) 500 MG Tab; Take 1 Tab by mouth 2 times a day.  Dispense: 20 Tab; Refill: 0  - metroNIDAZOLE (FLAGYL) 500 MG Tab; Take 1 Tab by mouth 3 times a day.  Dispense: 30 Tab; Refill: 0    F/U PRN

## 2018-10-28 ENCOUNTER — APPOINTMENT (OUTPATIENT)
Dept: RADIOLOGY | Facility: MEDICAL CENTER | Age: 58
DRG: 357 | End: 2018-10-28
Attending: EMERGENCY MEDICINE
Payer: COMMERCIAL

## 2018-10-28 ENCOUNTER — HOSPITAL ENCOUNTER (INPATIENT)
Facility: MEDICAL CENTER | Age: 58
LOS: 9 days | DRG: 357 | End: 2018-11-06
Attending: EMERGENCY MEDICINE | Admitting: SURGERY
Payer: COMMERCIAL

## 2018-10-28 ENCOUNTER — APPOINTMENT (OUTPATIENT)
Dept: RADIOLOGY | Facility: MEDICAL CENTER | Age: 58
DRG: 357 | End: 2018-10-28
Attending: SURGERY
Payer: COMMERCIAL

## 2018-10-28 DIAGNOSIS — K57.20 COLONIC DIVERTICULAR ABSCESS: ICD-10-CM

## 2018-10-28 DIAGNOSIS — R10.32 LEFT LOWER QUADRANT PAIN: ICD-10-CM

## 2018-10-28 DIAGNOSIS — E87.6 HYPOKALEMIA: ICD-10-CM

## 2018-10-28 DIAGNOSIS — K57.92 DIVERTICULITIS: ICD-10-CM

## 2018-10-28 LAB
ALBUMIN SERPL BCP-MCNC: 3.7 G/DL (ref 3.2–4.9)
ALBUMIN/GLOB SERPL: 0.9 G/DL
ALP SERPL-CCNC: 80 U/L (ref 30–99)
ALT SERPL-CCNC: 9 U/L (ref 2–50)
ANION GAP SERPL CALC-SCNC: 11 MMOL/L (ref 0–11.9)
APTT PPP: 27.3 SEC (ref 24.7–36)
AST SERPL-CCNC: 12 U/L (ref 12–45)
BASOPHILS # BLD AUTO: 0.4 % (ref 0–1.8)
BASOPHILS # BLD: 0.05 K/UL (ref 0–0.12)
BILIRUB SERPL-MCNC: 0.5 MG/DL (ref 0.1–1.5)
BUN SERPL-MCNC: 4 MG/DL (ref 8–22)
CALCIUM SERPL-MCNC: 9.6 MG/DL (ref 8.5–10.5)
CHLORIDE SERPL-SCNC: 102 MMOL/L (ref 96–112)
CO2 SERPL-SCNC: 26 MMOL/L (ref 20–33)
CREAT SERPL-MCNC: 0.65 MG/DL (ref 0.5–1.4)
EKG IMPRESSION: NORMAL
EOSINOPHIL # BLD AUTO: 0.05 K/UL (ref 0–0.51)
EOSINOPHIL NFR BLD: 0.4 % (ref 0–6.9)
ERYTHROCYTE [DISTWIDTH] IN BLOOD BY AUTOMATED COUNT: 43.6 FL (ref 35.9–50)
GLOBULIN SER CALC-MCNC: 4 G/DL (ref 1.9–3.5)
GLUCOSE SERPL-MCNC: 111 MG/DL (ref 65–99)
GRAM STN SPEC: ABNORMAL
HCT VFR BLD AUTO: 34.9 % (ref 37–47)
HGB BLD-MCNC: 12 G/DL (ref 12–16)
IMM GRANULOCYTES # BLD AUTO: 0.09 K/UL (ref 0–0.11)
IMM GRANULOCYTES NFR BLD AUTO: 0.7 % (ref 0–0.9)
INR PPP: 1.37 (ref 0.87–1.13)
LIPASE SERPL-CCNC: 14 U/L (ref 11–82)
LYMPHOCYTES # BLD AUTO: 0.97 K/UL (ref 1–4.8)
LYMPHOCYTES NFR BLD: 7.6 % (ref 22–41)
MCH RBC QN AUTO: 29.1 PG (ref 27–33)
MCHC RBC AUTO-ENTMCNC: 34.4 G/DL (ref 33.6–35)
MCV RBC AUTO: 84.7 FL (ref 81.4–97.8)
MONOCYTES # BLD AUTO: 0.9 K/UL (ref 0–0.85)
MONOCYTES NFR BLD AUTO: 7 % (ref 0–13.4)
NEUTROPHILS # BLD AUTO: 10.78 K/UL (ref 2–7.15)
NEUTROPHILS NFR BLD: 83.9 % (ref 44–72)
NRBC # BLD AUTO: 0 K/UL
NRBC BLD-RTO: 0 /100 WBC
PLATELET # BLD AUTO: 339 K/UL (ref 164–446)
PMV BLD AUTO: 9.3 FL (ref 9–12.9)
POTASSIUM SERPL-SCNC: 2.7 MMOL/L (ref 3.6–5.5)
PROT SERPL-MCNC: 7.7 G/DL (ref 6–8.2)
PROTHROMBIN TIME: 16.9 SEC (ref 12–14.6)
RBC # BLD AUTO: 4.12 M/UL (ref 4.2–5.4)
SIGNIFICANT IND 70042: ABNORMAL
SITE SITE: ABNORMAL
SODIUM SERPL-SCNC: 139 MMOL/L (ref 135–145)
SOURCE SOURCE: ABNORMAL
WBC # BLD AUTO: 12.8 K/UL (ref 4.8–10.8)

## 2018-10-28 PROCEDURE — 87077 CULTURE AEROBIC IDENTIFY: CPT

## 2018-10-28 PROCEDURE — 700111 HCHG RX REV CODE 636 W/ 250 OVERRIDE (IP): Performed by: SURGERY

## 2018-10-28 PROCEDURE — 700117 HCHG RX CONTRAST REV CODE 255: Performed by: EMERGENCY MEDICINE

## 2018-10-28 PROCEDURE — 94760 N-INVAS EAR/PLS OXIMETRY 1: CPT

## 2018-10-28 PROCEDURE — 85610 PROTHROMBIN TIME: CPT

## 2018-10-28 PROCEDURE — 85025 COMPLETE CBC W/AUTO DIFF WBC: CPT

## 2018-10-28 PROCEDURE — 700101 HCHG RX REV CODE 250: Performed by: SURGERY

## 2018-10-28 PROCEDURE — 99153 MOD SED SAME PHYS/QHP EA: CPT

## 2018-10-28 PROCEDURE — 770006 HCHG ROOM/CARE - MED/SURG/GYN SEMI*

## 2018-10-28 PROCEDURE — 700111 HCHG RX REV CODE 636 W/ 250 OVERRIDE (IP): Performed by: RADIOLOGY

## 2018-10-28 PROCEDURE — 96365 THER/PROPH/DIAG IV INF INIT: CPT

## 2018-10-28 PROCEDURE — 87070 CULTURE OTHR SPECIMN AEROBIC: CPT

## 2018-10-28 PROCEDURE — 700111 HCHG RX REV CODE 636 W/ 250 OVERRIDE (IP): Performed by: EMERGENCY MEDICINE

## 2018-10-28 PROCEDURE — 96367 TX/PROPH/DG ADDL SEQ IV INF: CPT

## 2018-10-28 PROCEDURE — 93005 ELECTROCARDIOGRAM TRACING: CPT | Performed by: EMERGENCY MEDICINE

## 2018-10-28 PROCEDURE — 36415 COLL VENOUS BLD VENIPUNCTURE: CPT

## 2018-10-28 PROCEDURE — 85730 THROMBOPLASTIN TIME PARTIAL: CPT

## 2018-10-28 PROCEDURE — 87205 SMEAR GRAM STAIN: CPT

## 2018-10-28 PROCEDURE — 96375 TX/PRO/DX INJ NEW DRUG ADDON: CPT

## 2018-10-28 PROCEDURE — 700111 HCHG RX REV CODE 636 W/ 250 OVERRIDE (IP)

## 2018-10-28 PROCEDURE — 74177 CT ABD & PELVIS W/CONTRAST: CPT

## 2018-10-28 PROCEDURE — 99285 EMERGENCY DEPT VISIT HI MDM: CPT

## 2018-10-28 PROCEDURE — 71045 X-RAY EXAM CHEST 1 VIEW: CPT

## 2018-10-28 PROCEDURE — 700105 HCHG RX REV CODE 258

## 2018-10-28 PROCEDURE — 83690 ASSAY OF LIPASE: CPT

## 2018-10-28 PROCEDURE — 0W9J30Z DRAINAGE OF PELVIC CAVITY WITH DRAINAGE DEVICE, PERCUTANEOUS APPROACH: ICD-10-PCS | Performed by: RADIOLOGY

## 2018-10-28 PROCEDURE — 80053 COMPREHEN METABOLIC PANEL: CPT

## 2018-10-28 RX ORDER — MIDAZOLAM HYDROCHLORIDE 1 MG/ML
.5-2 INJECTION INTRAMUSCULAR; INTRAVENOUS PRN
Status: ACTIVE | OUTPATIENT
Start: 2018-10-28 | End: 2018-10-28

## 2018-10-28 RX ORDER — CIPROFLOXACIN 500 MG/1
500 TABLET, FILM COATED ORAL 2 TIMES DAILY
Status: ON HOLD | COMMUNITY
Start: 2018-10-22 | End: 2018-11-06

## 2018-10-28 RX ORDER — SODIUM CHLORIDE 9 MG/ML
INJECTION, SOLUTION INTRAVENOUS CONTINUOUS
Status: DISCONTINUED | OUTPATIENT
Start: 2018-10-28 | End: 2018-10-28

## 2018-10-28 RX ORDER — METRONIDAZOLE 500 MG/1
500 TABLET ORAL 3 TIMES DAILY
Status: ON HOLD | COMMUNITY
Start: 2018-10-22 | End: 2018-11-06

## 2018-10-28 RX ORDER — AMOXICILLIN 250 MG
2 CAPSULE ORAL 2 TIMES DAILY
Status: DISCONTINUED | OUTPATIENT
Start: 2018-10-28 | End: 2018-11-05

## 2018-10-28 RX ORDER — CIPROFLOXACIN 2 MG/ML
400 INJECTION, SOLUTION INTRAVENOUS EVERY 12 HOURS
Status: DISCONTINUED | OUTPATIENT
Start: 2018-10-28 | End: 2018-10-28

## 2018-10-28 RX ORDER — SODIUM CHLORIDE 9 MG/ML
500 INJECTION, SOLUTION INTRAVENOUS
Status: ACTIVE | OUTPATIENT
Start: 2018-10-28 | End: 2018-10-28

## 2018-10-28 RX ORDER — DEXTROSE MONOHYDRATE, SODIUM CHLORIDE, AND POTASSIUM CHLORIDE 50; 2.98; 4.5 G/1000ML; G/1000ML; G/1000ML
INJECTION, SOLUTION INTRAVENOUS CONTINUOUS
Status: DISCONTINUED | OUTPATIENT
Start: 2018-10-28 | End: 2018-10-31

## 2018-10-28 RX ORDER — ONDANSETRON 2 MG/ML
4 INJECTION INTRAMUSCULAR; INTRAVENOUS PRN
Status: DISPENSED | OUTPATIENT
Start: 2018-10-28 | End: 2018-10-28

## 2018-10-28 RX ORDER — MORPHINE SULFATE 4 MG/ML
2-4 INJECTION, SOLUTION INTRAMUSCULAR; INTRAVENOUS
Status: DISCONTINUED | OUTPATIENT
Start: 2018-10-28 | End: 2018-10-29

## 2018-10-28 RX ORDER — OMEPRAZOLE 20 MG/1
20 CAPSULE, DELAYED RELEASE ORAL
COMMUNITY
End: 2019-10-14

## 2018-10-28 RX ORDER — ONDANSETRON 2 MG/ML
4 INJECTION INTRAMUSCULAR; INTRAVENOUS EVERY 4 HOURS PRN
Status: DISCONTINUED | OUTPATIENT
Start: 2018-10-28 | End: 2018-11-06 | Stop reason: HOSPADM

## 2018-10-28 RX ORDER — ALBUTEROL SULFATE 90 UG/1
2 AEROSOL, METERED RESPIRATORY (INHALATION)
Status: DISCONTINUED | OUTPATIENT
Start: 2018-10-28 | End: 2018-10-30

## 2018-10-28 RX ORDER — OMEPRAZOLE 20 MG/1
20 CAPSULE, DELAYED RELEASE ORAL DAILY
Status: DISCONTINUED | OUTPATIENT
Start: 2018-10-28 | End: 2018-11-06 | Stop reason: HOSPADM

## 2018-10-28 RX ORDER — MORPHINE SULFATE 4 MG/ML
4 INJECTION, SOLUTION INTRAMUSCULAR; INTRAVENOUS ONCE
Status: COMPLETED | OUTPATIENT
Start: 2018-10-28 | End: 2018-10-28

## 2018-10-28 RX ORDER — NALOXONE HYDROCHLORIDE 0.4 MG/ML
INJECTION, SOLUTION INTRAMUSCULAR; INTRAVENOUS; SUBCUTANEOUS
Status: COMPLETED
Start: 2018-10-28 | End: 2018-10-28

## 2018-10-28 RX ORDER — BISACODYL 10 MG
10 SUPPOSITORY, RECTAL RECTAL
Status: DISCONTINUED | OUTPATIENT
Start: 2018-10-28 | End: 2018-11-06 | Stop reason: HOSPADM

## 2018-10-28 RX ORDER — CIPROFLOXACIN 2 MG/ML
400 INJECTION, SOLUTION INTRAVENOUS EVERY 12 HOURS
Status: DISCONTINUED | OUTPATIENT
Start: 2018-10-28 | End: 2018-10-29

## 2018-10-28 RX ORDER — POTASSIUM CHLORIDE 7.45 MG/ML
10 INJECTION INTRAVENOUS
Status: DISPENSED | OUTPATIENT
Start: 2018-10-28 | End: 2018-10-28

## 2018-10-28 RX ORDER — POTASSIUM CHLORIDE 7.45 MG/ML
10 INJECTION INTRAVENOUS ONCE
Status: COMPLETED | OUTPATIENT
Start: 2018-10-28 | End: 2018-10-28

## 2018-10-28 RX ORDER — MIDAZOLAM HYDROCHLORIDE 1 MG/ML
INJECTION INTRAMUSCULAR; INTRAVENOUS
Status: COMPLETED
Start: 2018-10-28 | End: 2018-10-28

## 2018-10-28 RX ORDER — POLYETHYLENE GLYCOL 3350 17 G/17G
1 POWDER, FOR SOLUTION ORAL
Status: DISCONTINUED | OUTPATIENT
Start: 2018-10-28 | End: 2018-11-05

## 2018-10-28 RX ORDER — LIDOCAINE HYDROCHLORIDE 10 MG/ML
INJECTION, SOLUTION EPIDURAL; INFILTRATION; INTRACAUDAL; PERINEURAL
Status: COMPLETED
Start: 2018-10-28 | End: 2018-10-28

## 2018-10-28 RX ORDER — LOSARTAN POTASSIUM 50 MG/1
25 TABLET ORAL
Status: DISCONTINUED | OUTPATIENT
Start: 2018-10-28 | End: 2018-11-06 | Stop reason: HOSPADM

## 2018-10-28 RX ADMIN — ONDANSETRON 4 MG: 2 INJECTION INTRAMUSCULAR; INTRAVENOUS at 23:22

## 2018-10-28 RX ADMIN — MORPHINE SULFATE 4 MG: 4 INJECTION INTRAVENOUS at 09:04

## 2018-10-28 RX ADMIN — METRONIDAZOLE 500 MG: 500 INJECTION, SOLUTION INTRAVENOUS at 23:22

## 2018-10-28 RX ADMIN — MIDAZOLAM 1 MG: 1 INJECTION INTRAMUSCULAR; INTRAVENOUS at 14:58

## 2018-10-28 RX ADMIN — PROCHLORPERAZINE EDISYLATE 10 MG: 5 INJECTION INTRAMUSCULAR; INTRAVENOUS at 09:04

## 2018-10-28 RX ADMIN — FENTANYL CITRATE 50 MCG: 50 INJECTION, SOLUTION INTRAMUSCULAR; INTRAVENOUS at 14:58

## 2018-10-28 RX ADMIN — LIDOCAINE HYDROCHLORIDE: 10 INJECTION, SOLUTION EPIDURAL; INFILTRATION; INTRACAUDAL; PERINEURAL at 14:45

## 2018-10-28 RX ADMIN — MORPHINE SULFATE 4 MG: 4 INJECTION INTRAVENOUS at 18:20

## 2018-10-28 RX ADMIN — MIDAZOLAM 1 MG: 1 INJECTION INTRAMUSCULAR; INTRAVENOUS at 15:04

## 2018-10-28 RX ADMIN — POTASSIUM CHLORIDE 10 MEQ: 7.46 INJECTION, SOLUTION INTRAVENOUS at 11:25

## 2018-10-28 RX ADMIN — SODIUM CHLORIDE: 9 INJECTION, SOLUTION INTRAVENOUS at 09:20

## 2018-10-28 RX ADMIN — MORPHINE SULFATE 4 MG: 4 INJECTION INTRAVENOUS at 23:22

## 2018-10-28 RX ADMIN — FENTANYL CITRATE 50 MCG: 50 INJECTION, SOLUTION INTRAMUSCULAR; INTRAVENOUS at 15:04

## 2018-10-28 RX ADMIN — CIPROFLOXACIN 400 MG: 2 INJECTION INTRAVENOUS at 14:34

## 2018-10-28 RX ADMIN — MIDAZOLAM 1 MG: 1 INJECTION INTRAMUSCULAR; INTRAVENOUS at 15:08

## 2018-10-28 RX ADMIN — IOHEXOL 100 ML: 350 INJECTION, SOLUTION INTRAVENOUS at 09:53

## 2018-10-28 RX ADMIN — POTASSIUM CHLORIDE, DEXTROSE MONOHYDRATE AND SODIUM CHLORIDE: 300; 5; 450 INJECTION, SOLUTION INTRAVENOUS at 16:29

## 2018-10-28 RX ADMIN — POTASSIUM CHLORIDE 10 MEQ: 7.46 INJECTION, SOLUTION INTRAVENOUS at 16:55

## 2018-10-28 RX ADMIN — MORPHINE SULFATE 4 MG: 4 INJECTION INTRAVENOUS at 16:03

## 2018-10-28 ASSESSMENT — PAIN SCALES - GENERAL
PAINLEVEL_OUTOF10: 0
PAINLEVEL_OUTOF10: 9
PAINLEVEL_OUTOF10: 6
PAINLEVEL_OUTOF10: 5
PAINLEVEL_OUTOF10: 6
PAINLEVEL_OUTOF10: 7
PAINLEVEL_OUTOF10: 7

## 2018-10-28 ASSESSMENT — COGNITIVE AND FUNCTIONAL STATUS - GENERAL
DAILY ACTIVITIY SCORE: 24
SUGGESTED CMS G CODE MODIFIER DAILY ACTIVITY: CH
MOBILITY SCORE: 24
SUGGESTED CMS G CODE MODIFIER MOBILITY: CH

## 2018-10-28 ASSESSMENT — PATIENT HEALTH QUESTIONNAIRE - PHQ9
2. FEELING DOWN, DEPRESSED, IRRITABLE, OR HOPELESS: NOT AT ALL
1. LITTLE INTEREST OR PLEASURE IN DOING THINGS: NOT AT ALL
SUM OF ALL RESPONSES TO PHQ9 QUESTIONS 1 AND 2: 0

## 2018-10-28 ASSESSMENT — LIFESTYLE VARIABLES
DO YOU DRINK ALCOHOL: NO
EVER_SMOKED: YES

## 2018-10-28 NOTE — ED NOTES
Bedside report to IR RN. Updated on oral swelling, abnormal labs, and hx. All questions addressed.

## 2018-10-28 NOTE — ED NOTES
Ambulatory to G-28 with steady gait. Tearful regarding diagnosis of Diverticulitis. States flare-up since colonoscopy. Prescribe oral antibiotics yesterday at , however feels worse and sent to ED by GI MD.

## 2018-10-28 NOTE — OR SURGEON
Immediate Post- Operative Note        PostOp Diagnosis: perisigmoid abscess      Procedure(s): CT drain      Estimated Blood Loss: Less than 5 ml        Complications: None            10/28/2018     3:13 PM     Dilshad Barroso

## 2018-10-28 NOTE — ED NOTES
Call light answered and pt c/o swelling of bottom lip. ERP informed. Potassium infusion stopped and will continue to monitor symptoms.

## 2018-10-28 NOTE — PROGRESS NOTES
CT Procedure Note:    Site Marked and Confirmed with MD, patient and RN pre procedure. Dr. Barroso placed a peritoneal drain.  Aspirates x 20cc taken by Escobar CLARKE.  The pt tolerated the procedure well; ETCo2 baseline 30, with consistent waveform during the procedure.  Sutures, gauze medipore applied to left buttock, CDI and soft.  Pt alert and verbally appropriate post procedure, vital signs stable during procedure and transport, see flow sheet for vital signs.  Report given to Cary CLARKE.  RN transported pt to [facility].      Procedure End Time: 1511    [employer] [facility]. REF: [id number]. LOT: [id number]

## 2018-10-28 NOTE — ED PROVIDER NOTES
ED Provider Note    CHIEF COMPLAINT  Chief Complaint   Patient presents with   • Bloody Stools   • N/V   • Abdominal Pain       HPI  Ambar Jj is a 57 y.o. female who presents for evaluation of abdominal pain, vomiting, and bloody stools.  Patient has a history of diverticulitis.  She was initially diagnosed in November 2016.  7 weeks ago she was admitted to the hospital and was found to have a diverticular abscess that required drainage.  Dr. Will is her surgeon.  He patient was started on ciprofloxacin and Flagyl 5 days ago.  3 days ago she was seen by  and told to come to the emergency department if her symptoms worsened.  She is now having left lower quadrant pain and states that she started passing blood clots from her rectum and therefore has come to the emergency department.  She states that she is supposedly being scheduled for a partial colectomy but was told by her surgeon that it may need to be moved up based on how she does.  She denies any fevers.  She has had no urinary symptoms.  She points to her left lower quadrant as her area of greatest discomfort.    REVIEW OF SYSTEMS  See HPI for further details. All other systems negative.    PAST MEDICAL HISTORY  Past Medical History:   Diagnosis Date   • Anesthesia     PONV   • Bronchitis 2010   • Diverticulitis 11/2017   • GERD (gastroesophageal reflux disease)    • Heart burn    • HTN     resolved with wt loss and lifestyle changes   • Indigestion    • Other specified symptom associated with female genital organs    • Pap smear 1/29/9    Normal   • Psychiatric problem     anxiety   • Reactive airway disease    • Unspecified urinary incontinence    • Urinary bladder disorder        FAMILY HISTORY  Family History   Problem Relation Age of Onset   • Cancer Mother         Breast   • Lung Disease Mother         COPD   • Heart Disease Mother    • Diabetes Mother    • Lung Disease Father         COPD   • Heart Disease Father    •  Alcohol/Drug Sister    • Lung Disease Sister         sister 2 Asthma   • Heart Disease Sister         sister 1 Heart murmur   • Alcohol/Drug Brother    • Other Son         IBS; working on his diet       SOCIAL HISTORY  Social History     Social History   • Marital status:      Spouse name: N/A   • Number of children: N/A   • Years of education: N/A     Social History Main Topics   • Smoking status: Former Smoker     Packs/day: 0.00     Years: 34.00     Quit date: 1/1/2010   • Smokeless tobacco: Never Used      Comment: Jan 2010   • Alcohol use 7.2 oz/week     6 Cans of beer, 6 Standard drinks or equivalent per week      Comment: occ   • Drug use: Yes     Types: Inhaled      Comment: marijuana occasionally    • Sexual activity: Yes     Partners: Male     Other Topics Concern   • Not on file     Social History Narrative   • No narrative on file       SURGICAL HISTORY  Past Surgical History:   Procedure Laterality Date   • BLADDER SUSPENSION N/A 5/6/2015    Procedure: BLADDER SUSPENSION [57.89] TOT;  Surgeon: Yazmin Armando M.D.;  Location: SURGERY SAME DAY [facility];  Service:    • ANTERIOR AND POSTERIOR REPAIR N/A 5/6/2015    Procedure: ANTERIOR AND POSTERIOR REPAIR [70.53];  Surgeon: Yazmin Armando M.D.;  Location: SURGERY SAME DAY [facility];  Service:    • CYSTOSCOPY N/A 5/6/2015    Procedure: CYSTOSCOPY;  Surgeon: Yazmin Armando M.D.;  Location: SURGERY SAME DAY [facility];  Service:    • ABDOMINAL HYSTERECTOMY TOTAL      Ovaries intact   • BLADDER SUSPENSION     • CHOLECYSTECTOMY     • ENDOSCOPY     • ENDOSCOPY PROCEDURE      dilation due to stricture   • TONSILLECTOMY AND ADENOIDECTOMY         CURRENT MEDICATIONS  Home Medications     Reviewed by Ryan Glez R.N. (Registered Nurse) on 10/28/18 at 0747  Med List Status: Partial   Medication Last Dose Status   ciprofloxacin (CIPRO) 500 MG Tab 10/28/2018 Active   ibuprofen (MOTRIN) 200 MG TABS prn Active   losartan (COZAAR) 25 MG Tab  "10/28/2018 Active   metroNIDAZOLE (FLAGYL) 500 MG Tab 10/28/2018 Active   Multiple Vitamins-Minerals (EMERGEN-C IMMUNE PO) Not Taking Active   omeprazole (PRILOSEC) 20 MG delayed-release capsule 10/27/2018 Active   ondansetron (ZOFRAN ODT) 4 MG TABLET DISPERSIBLE Not Taking Active   PROAIR  (90 Base) MCG/ACT Aero Soln inhalation aerosol PRN Active   tramadol (ULTRAM) 50 MG Tab Not Taking Active                ALLERGIES  Allergies   Allergen Reactions   • Codeine Vomiting       PHYSICAL EXAM  VITAL SIGNS: /75   Pulse 93   Temp 36 °C (96.8 °F)   Resp 18   Ht 1.676 m (5' 6\")   Wt 70.4 kg (155 lb 3.3 oz)   SpO2 97%   BMI 25.05 kg/m²   Constitutional: Well developed, Well nourished, Non-toxic appearance.   HENT: Normocephalic, Atraumatic.  Eyes:  EOMI, Conjunctiva normal, No discharge.   Cardiovascular: Normal heart rate, Normal rhythm, No murmurs, No rubs, No gallops.   Thorax & Lungs: Clear to auscultation without wheezes, rales, or rhonchi.   Abdomen: Soft, left lower quadrant tenderness with some guarding but no rebound.  No masses.  Skin: Warm, Dry.  Musculoskeletal: Good range of motion in all major joints.   Neurologic: Awake and alert.        RADIOLOGY/PROCEDURES  CT-ABDOMEN-PELVIS WITH   Final Result      1. A recurrent 6.5 cm diverticular abscess just above the left sigmoid colon, containing pockets of fluid and air.   2. The abscesses results in distal left ureteral narrowing and associated mild left hydronephrosis.   3. Cholecystectomy.   4. Mild splenomegaly.      DX-CHEST-PORTABLE (1 VIEW)    (Results Pending)         COURSE & MEDICAL DECISION MAKING  Pertinent Labs & Imaging studies reviewed. (See chart for details)  Is a 57-year-old here for evaluation of abdominal pain.  She has a history of diverticulitis and in fact has been seen by Dr. Will of general surgery as recently as 3 days ago.  She is currently on Cipro and Flagyl.  She is afebrile on arrival.  She is not " tachycardic.  She does have left lower quadrant tenderness on exam.  An IV is established and laboratories include chemistries which are normal with the exception of a glucose of 111 and a potassium of 2.7.  Liver function studies and lipase are unremarkable.  INR is elevated at 1.37.  CBC shows a white count of 2.8 with a differential of 83 polys and 7 lymphocytes.  CT scan of the abdomen and pelvis shows a recurrent 6.5 cm diverticular abscess just above the left sigmoid colon containing pockets of fluid and air.  The patient is treated with Compazine and morphine with good improvement in her symptoms.  I discussed the results of all the studies with the patient.  I discussed the case with  who will be in to see the patient for admission.  The patient has had nothing to eat since 6 AM this morning where she states she just had a couple of bites of speech.  She is started on her normal saline drip due to her n.p.o. status and the fact that she will be going to the operating room.  I have ordered IV potassium as well.  Preoperative chest x-ray and EKG are pending.    FINAL IMPRESSION  1.  Abdominal pain  2.  Diverticulitis  3.  Diverticular abscess  4.  Hypokalemia         Electronically signed by: Toño Red, 10/28/2018 8:31 AM

## 2018-10-28 NOTE — PROGRESS NOTES
Spoke with MD Will regarding Potassium 2.7 prior to admission to [facility].  IVPB 10 meq reported stopped in ER due to lip swelling.  Spoke with ER RN MD zechariah in ER did not give new orders for potassium.  MD Will verbalized ok for patient to come up to floor with continuous IVF including potassium.  Will recheck labs in AM per MD.  Patient placed.

## 2018-10-28 NOTE — H&P
DATE OF ADMISSION:  10/28/2018    CHIEF COMPLAINT:  Abdominal pain.    HISTORY OF PRESENT ILLNESS:  The patient is a 57-year-old female who was first   seen by me on 09/05/2018 when she was noted to have evidence of   diverticulitis and diverticular abscess.  She was admitted to my service and   underwent drainage of a diverticular abscess.  She had had a previous episode   of diverticulitis and had been feeling similar pain since 06/2018.  She was   seen at [facility] and diagnosed with simple   diverticulitis.  She was started on a 10-day regimen of ciprofloxacin and   Flagyl, which she tolerated well.  She was seen by her gastroenterologist just   prior to her admission in September and he was concerned about the fact that   she was anemic.  A CT scan performed the day of her admission showed evidence   of a diverticular abscess and she was advised to present immediately to the   ER.  As noted above, she did have a drain placed and she tolerated this well.    She was able to be discharged home with a plan for her to undergo   robotic-assisted laparoscopic sigmoid colectomy.  She had an attempt of a   colonoscopy in the office of Dr. Segovia, which was unsuccessful.  She states   that as soon as she had that attempted colonoscopy, she began experiencing   more pain.  This has increased to the point that she was seen in an urgent   care and prescribed antibiotics.  She was seen by me late last week and it was   decided that she would continue on her antibiotics and we would move up her   surgery and changed to a simple open sigmoid colectomy.  She states she had   increasing pain this weekend and noticed bloody bowel movements and for that   reason, she called me, I advised her to come directly to the ER.  She   underwent a CT scan today, which again shows a 6 cm diverticular abscess.  She   states she has been able to pass stools, although they have been small.  She   denies nausea, vomiting.  She  has had no fevers at home.    PAST MEDICAL HISTORY:  1.  Hypertension.  2.  Gastroesophageal reflux disease.  3.  Asthma.  4.  History of diverticulitis.    HOME MEDICATIONS:  1.  Losartan 25 mg 1 p.o. daily.  2.  Omeprazole 20 mg p.o. daily.  3.  ProAir inhaler as needed.  4.  Ciprofloxacin 500 mg tablets 1 p.o. b.i.d.  5.  Flagyl 500 mg tablets 1 p.o. t.i.d.    ALLERGIES:  PENICILLIN.    PAST SURGICAL HISTORY:  1.  Tonsillectomy as a child.  2.  Hysterectomy in 2001 and she states that her ovaries remain.  3.  Laparoscopic cholecystectomy in 2006.  4.  Bladder lift and posterior suspension performed for urinary incontinence   in 2015.  Of note, she does remain incontinent of urine.    FAMILY HISTORY:  Mother was diagnosed with breast cancer at age 64.  Sister   had an episode of diverticulitis requiring partial colectomy in her 50s.    SOCIAL HISTORY:  She quit smoking in 2010, was a previous 1-pack per day   smoker for 34 years.  She drinks 2-3 drinks of alcohol on the weekends only,   but has not been doing that lately.  She denies IV drug use now or a history   of IV drug use.  She does smoke a small amount of marijuana 2-3 times per   week.    REVIEW OF SYSTEMS:  A 16-point review of systems is negative except as noted   in the history of present illness.    PHYSICAL EXAMINATION:  VITAL SIGNS:  Temperature 36.0, heart rate 92, blood pressure 119/61, O2   saturation 97% on room air.  GENERAL:  She is somewhat ill-appearing, but not in acute distress.  She does   appear her stated age.  HEENT:  Pupils equal, round, reactive to light.  No scleral icterus.    Extraocular movement is intact.  She has moist oral mucosa and adequate upper   and lower dentition.  NECK:  Supple.  No JVD.  No lymphadenopathy.  No thyromegaly.  LUNGS:  Clear to auscultation bilaterally with normal bilateral chest rise.  HEART:  Has a regular rate and rhythm.  No murmurs, gallops, or rubs.  No   carotid bruits are appreciated.  ABDOMEN:   Nondistended, has positive bowel sounds, is soft and is tender in   the left lower quadrant.  She has no gross peritonitis.  She has no guarding.  RECTAL:  Deferred.  PELVIC:  Deferred.  EXTREMITIES:  2+ pulses in all 4 extremities.  No clubbing, cyanosis, or   edema.  NEUROLOGIC:  Cranial nerves II-XII are grossly intact.  She has no focal   deficits and her gait is not examined.    LABORATORY DATA:  CBC is remarkable for an elevated white blood cell count at   12,800 with a left shift of 84% neutrophils.  A comprehensive metabolic panel   is remarkable for a low potassium at 2.7, an elevated glucose at 111, low BUN   at 4 and low globulin at 4.0.  INR is elevated at 1.37.  UA performed today is   without gross abnormality.  CT scan of the abdomen and pelvis performed today   shows recurrent 6.5 cm diverticular abscess just to the left of the sigmoid   colon containing pockets of fluid and air resulting in distal left ureteral   narrowing and associated mild left hydronephrosis and she is also noted to   have mild splenomegaly.    ASSESSMENT:  A 57-year-old female with:  1.  Recurrent diverticular abscess.  2.  Hypertension.  3.  Gastroesophageal reflux disease.  4.  Asthma.  5.  Urinary incontinence.    I have discussed with this patient her condition at length today.  We did   discuss the fact that if she went for an operation today, she would be left   with a colostomy and very likely an open midline wound given the recurrence of   her abscess.  I did also discuss her findings on CT scan with the   radiologist, who felt that this was amenable to drainage.  He noted that he   was not the interventional radiologist; however, the patient states that she   would rather attempt a second drainage in order to eventually undergo a   sigmoid colectomy with primary reanastomosis.  We did discuss the fact that   even if she does undergo drainage that this cannot be guaranteed and that she   may end up with a colostomy even  with successful drainage of this abscess,   which she states she understands.    PLAN:  She will be admitted to my service, kept n.p.o. and I will order a   CT-guided drainage of this recurrent abscess.  I will also discuss this with   the interventional radiologist if possible.  She will be started on   antibiotics and her home medications with sips of water.  She will have   followup labs obtained tomorrow.  She will be given IV fluids.  Her potassium   will be replaced and she will undergo serial exams.       ____________________________________     MD RAÚL Sanford / NTS    DD:  10/28/2018 11:36:45  DT:  10/28/2018 12:21:46    D#:  [id number]  Job#:  [id number]

## 2018-10-28 NOTE — PROGRESS NOTES
IR called to get report on patient, patient still hasn't arrived to unit. IR to transfer patient from ER, then to [facility]- after procedure.

## 2018-10-28 NOTE — ED TRIAGE NOTES
Pt ambulatory to triage. Hx of diverticulitis. Pt is having a flare-up now. C/o pain, n/v, and blood in stools. Pt was told by Dr. Mckeon to come to the ER.     Chief Complaint   Patient presents with   • Bloody Stools   • N/V   • Abdominal Pain     Pt placed in lobby, updated on triage process. Pt educated to notified RN or triage tech if changes in condition.

## 2018-10-28 NOTE — ED NOTES
Jeanette from Lab called with critical result of Potassium of 2.7 at 09:30. Critical lab result read back to Jeanette.   Dr. Red notified of critical lab result at 09:31.  Critical lab result read back by Dr. Red.

## 2018-10-29 LAB
ALBUMIN SERPL BCP-MCNC: 3.1 G/DL (ref 3.2–4.9)
ALBUMIN/GLOB SERPL: 0.9 G/DL
ALP SERPL-CCNC: 66 U/L (ref 30–99)
ALT SERPL-CCNC: 7 U/L (ref 2–50)
ANION GAP SERPL CALC-SCNC: 6 MMOL/L (ref 0–11.9)
AST SERPL-CCNC: 9 U/L (ref 12–45)
BASOPHILS # BLD AUTO: 0.3 % (ref 0–1.8)
BASOPHILS # BLD: 0.04 K/UL (ref 0–0.12)
BILIRUB SERPL-MCNC: 0.4 MG/DL (ref 0.1–1.5)
BUN SERPL-MCNC: 3 MG/DL (ref 8–22)
CALCIUM SERPL-MCNC: 8.7 MG/DL (ref 8.5–10.5)
CHLORIDE SERPL-SCNC: 105 MMOL/L (ref 96–112)
CO2 SERPL-SCNC: 26 MMOL/L (ref 20–33)
CREAT SERPL-MCNC: 0.61 MG/DL (ref 0.5–1.4)
EOSINOPHIL # BLD AUTO: 0.04 K/UL (ref 0–0.51)
EOSINOPHIL NFR BLD: 0.3 % (ref 0–6.9)
ERYTHROCYTE [DISTWIDTH] IN BLOOD BY AUTOMATED COUNT: 45.4 FL (ref 35.9–50)
GLOBULIN SER CALC-MCNC: 3.4 G/DL (ref 1.9–3.5)
GLUCOSE SERPL-MCNC: 128 MG/DL (ref 65–99)
HCT VFR BLD AUTO: 32.8 % (ref 37–47)
HGB BLD-MCNC: 10.9 G/DL (ref 12–16)
IMM GRANULOCYTES # BLD AUTO: 0.06 K/UL (ref 0–0.11)
IMM GRANULOCYTES NFR BLD AUTO: 0.5 % (ref 0–0.9)
LYMPHOCYTES # BLD AUTO: 1.22 K/UL (ref 1–4.8)
LYMPHOCYTES NFR BLD: 10.4 % (ref 22–41)
MCH RBC QN AUTO: 28.9 PG (ref 27–33)
MCHC RBC AUTO-ENTMCNC: 33.2 G/DL (ref 33.6–35)
MCV RBC AUTO: 87 FL (ref 81.4–97.8)
MONOCYTES # BLD AUTO: 0.73 K/UL (ref 0–0.85)
MONOCYTES NFR BLD AUTO: 6.2 % (ref 0–13.4)
NEUTROPHILS # BLD AUTO: 9.63 K/UL (ref 2–7.15)
NEUTROPHILS NFR BLD: 82.3 % (ref 44–72)
NRBC # BLD AUTO: 0 K/UL
NRBC BLD-RTO: 0 /100 WBC
PLATELET # BLD AUTO: 309 K/UL (ref 164–446)
PMV BLD AUTO: 9.4 FL (ref 9–12.9)
POTASSIUM SERPL-SCNC: 3.4 MMOL/L (ref 3.6–5.5)
PROT SERPL-MCNC: 6.5 G/DL (ref 6–8.2)
RBC # BLD AUTO: 3.77 M/UL (ref 4.2–5.4)
SODIUM SERPL-SCNC: 137 MMOL/L (ref 135–145)
WBC # BLD AUTO: 11.7 K/UL (ref 4.8–10.8)

## 2018-10-29 PROCEDURE — 770006 HCHG ROOM/CARE - MED/SURG/GYN SEMI*

## 2018-10-29 PROCEDURE — 700111 HCHG RX REV CODE 636 W/ 250 OVERRIDE (IP): Performed by: SURGERY

## 2018-10-29 PROCEDURE — 700101 HCHG RX REV CODE 250: Performed by: SURGERY

## 2018-10-29 PROCEDURE — 700102 HCHG RX REV CODE 250 W/ 637 OVERRIDE(OP): Performed by: SURGERY

## 2018-10-29 PROCEDURE — 36415 COLL VENOUS BLD VENIPUNCTURE: CPT

## 2018-10-29 PROCEDURE — A9270 NON-COVERED ITEM OR SERVICE: HCPCS | Performed by: SURGERY

## 2018-10-29 PROCEDURE — 80053 COMPREHEN METABOLIC PANEL: CPT

## 2018-10-29 PROCEDURE — 85025 COMPLETE CBC W/AUTO DIFF WBC: CPT

## 2018-10-29 PROCEDURE — 700105 HCHG RX REV CODE 258: Performed by: SURGERY

## 2018-10-29 PROCEDURE — 94760 N-INVAS EAR/PLS OXIMETRY 1: CPT

## 2018-10-29 RX ORDER — ACETAMINOPHEN 500 MG
500 TABLET ORAL EVERY 6 HOURS PRN
Status: DISCONTINUED | OUTPATIENT
Start: 2018-10-29 | End: 2018-11-06 | Stop reason: HOSPADM

## 2018-10-29 RX ORDER — MORPHINE SULFATE 4 MG/ML
2-4 INJECTION, SOLUTION INTRAMUSCULAR; INTRAVENOUS
Status: DISCONTINUED | OUTPATIENT
Start: 2018-10-29 | End: 2018-11-06 | Stop reason: HOSPADM

## 2018-10-29 RX ORDER — OXYCODONE HYDROCHLORIDE AND ACETAMINOPHEN 5; 325 MG/1; MG/1
1-2 TABLET ORAL EVERY 4 HOURS PRN
Status: DISCONTINUED | OUTPATIENT
Start: 2018-10-29 | End: 2018-11-06 | Stop reason: HOSPADM

## 2018-10-29 RX ORDER — POTASSIUM CHLORIDE 20 MEQ/1
20 TABLET, EXTENDED RELEASE ORAL 2 TIMES DAILY
Status: DISCONTINUED | OUTPATIENT
Start: 2018-10-29 | End: 2018-10-29

## 2018-10-29 RX ORDER — POTASSIUM CHLORIDE 20 MEQ/1
20 TABLET, EXTENDED RELEASE ORAL DAILY
Status: DISCONTINUED | OUTPATIENT
Start: 2018-10-29 | End: 2018-11-01

## 2018-10-29 RX ADMIN — AMPICILLIN SODIUM AND SULBACTAM SODIUM 3 G: 2; 1 INJECTION, POWDER, FOR SOLUTION INTRAMUSCULAR; INTRAVENOUS at 18:08

## 2018-10-29 RX ADMIN — PROCHLORPERAZINE EDISYLATE 10 MG: 5 INJECTION INTRAMUSCULAR; INTRAVENOUS at 18:08

## 2018-10-29 RX ADMIN — METRONIDAZOLE 500 MG: 500 INJECTION, SOLUTION INTRAVENOUS at 06:20

## 2018-10-29 RX ADMIN — OMEPRAZOLE 20 MG: 20 CAPSULE, DELAYED RELEASE ORAL at 06:20

## 2018-10-29 RX ADMIN — ONDANSETRON 4 MG: 2 INJECTION INTRAMUSCULAR; INTRAVENOUS at 08:22

## 2018-10-29 RX ADMIN — POTASSIUM CHLORIDE, DEXTROSE MONOHYDRATE AND SODIUM CHLORIDE: 300; 5; 450 INJECTION, SOLUTION INTRAVENOUS at 04:22

## 2018-10-29 RX ADMIN — ACETAMINOPHEN 500 MG: 500 TABLET ORAL at 19:16

## 2018-10-29 RX ADMIN — ONDANSETRON 4 MG: 2 INJECTION INTRAMUSCULAR; INTRAVENOUS at 04:22

## 2018-10-29 RX ADMIN — LOSARTAN POTASSIUM 25 MG: 50 TABLET ORAL at 06:20

## 2018-10-29 RX ADMIN — POTASSIUM CHLORIDE, DEXTROSE MONOHYDRATE AND SODIUM CHLORIDE: 300; 5; 450 INJECTION, SOLUTION INTRAVENOUS at 19:16

## 2018-10-29 RX ADMIN — ACETAMINOPHEN 500 MG: 500 TABLET ORAL at 11:00

## 2018-10-29 RX ADMIN — MORPHINE SULFATE 2 MG: 4 INJECTION INTRAVENOUS at 04:22

## 2018-10-29 RX ADMIN — METRONIDAZOLE 500 MG: 500 INJECTION, SOLUTION INTRAVENOUS at 14:22

## 2018-10-29 RX ADMIN — CIPROFLOXACIN 400 MG: 2 INJECTION INTRAVENOUS at 06:34

## 2018-10-29 RX ADMIN — ALBUTEROL SULFATE 2 PUFF: 90 AEROSOL, METERED RESPIRATORY (INHALATION) at 20:02

## 2018-10-29 RX ADMIN — PROCHLORPERAZINE EDISYLATE 10 MG: 5 INJECTION INTRAMUSCULAR; INTRAVENOUS at 10:56

## 2018-10-29 ASSESSMENT — PAIN SCALES - GENERAL
PAINLEVEL_OUTOF10: 5
PAINLEVEL_OUTOF10: 7
PAINLEVEL_OUTOF10: 4
PAINLEVEL_OUTOF10: 5
PAINLEVEL_OUTOF10: 4
PAINLEVEL_OUTOF10: 5

## 2018-10-29 ASSESSMENT — ENCOUNTER SYMPTOMS
ABDOMINAL PAIN: 1
NAUSEA: 1
VOMITING: 1

## 2018-10-29 NOTE — PROGRESS NOTES
Bedside report received.  Assessment complete.  A&O x 4. Patient calls appropriately.  Patient mobilzes with stand by assist.   Patient has 4/10 pain. Declines, patient states morphine is making her too nauseas.  Denies N&V. Tolerating clear diet.  IR drain incision is clean, dry, and intact.  + void, + flatus  Patient denies SOB.  SCD's off.  Review plan with of care with patient. Call light and personal belongings with in reach. Hourly rounding in place. All needs met at this time.

## 2018-10-29 NOTE — PROGRESS NOTES
Assessment complete.  A&O x 4. Patient calls appropriately.  Patient mobilzes with stand by assist.   Patient has 9/10 pain. Medicated per mar.  Denies N&V. Tolerating clear diet.  Surgical incision dressing clean, dry and intact .  + void, + flatus  Patient denies SOB.  SCD's off.  Review plan with of care with patient. Call light and personal belongings with in reach. Hourly rounding in place. All needs met at this time.    2 RN skin check complete, IR drain site noted, all other skin intact.

## 2018-10-29 NOTE — CARE PLAN
Problem: Safety  Goal: Will remain free from injury  Outcome: PROGRESSING AS EXPECTED  Patient to call before ambulating, call light and belongings in reach, bed locked and in lowest position.    Problem: Pain Management  Goal: Pain level will decrease to patient's comfort goal  Outcome: PROGRESSING AS EXPECTED  Patient educated on pain medications and alternatives.

## 2018-10-29 NOTE — CARE PLAN
Problem: Safety  Goal: Will remain free from injury  Outcome: PROGRESSING AS EXPECTED  Patient educated to call before ambulating, call light and belongings in reach, bed locked and in lowest position.    Problem: Pain Management  Goal: Pain level will decrease to patient's comfort goal  Outcome: PROGRESSING AS EXPECTED  Patient educated on pain medications and alternatives

## 2018-10-29 NOTE — PROGRESS NOTES
Patient sleeping in bed.  In no acute distress.   VSS.  SpO2 100% on RA.  IVF D% 1/2 NS w/40K @125mL/hr   Ambulating self  Tolerating clear liquid diet  Skin: IR drain to L buttock  Last BM PTA  Up to bathroom to void.    Call light and personal belongings within reach. POC discussed and all questions answered.  Hourly rounding in place.  No additional needs at this time.

## 2018-10-29 NOTE — PROGRESS NOTES
Surgical Progress Note    Author: Justus Will Date & Time created: 10/29/2018   8:50 AM     Interval Events:  Had drain placed into diverticualar abscess.  Notes nausea with administration of Morphine.  Passing flatus    Review of Systems   Gastrointestinal: Positive for abdominal pain, nausea and vomiting.     Hemodynamics:  Temp (24hrs), Av.2 °C (98.9 °F), Min:36.2 °C (97.1 °F), Max:37.8 °C (100 °F)  Temperature: 37.1 °C (98.7 °F)  Pulse  Av.3  Min: 88  Max: 113Heart Rate (Monitored): 95  Blood Pressure: 116/73, NIBP: 144/91     Respiratory:    Respiration: 16, Pulse Oximetry: 94 %           Neuro:  GCS = 15       Fluids:    Intake/Output Summary (Last 24 hours) at 10/29/18 0850  Last data filed at 10/29/18 0400   Gross per 24 hour   Intake          2339.58 ml   Output               10 ml   Net          2329.58 ml        Current Diet Order   Procedures   • Diet Order Low Fiber(GI Soft)     Physical Exam   Constitutional: She is oriented to person, place, and time. She appears well-developed and well-nourished. No distress.   HENT:   Head: Normocephalic.   Eyes: Pupils are equal, round, and reactive to light. No scleral icterus.   Cardiovascular: Normal rate, regular rhythm and normal heart sounds.    Pulmonary/Chest: Effort normal and breath sounds normal. No respiratory distress.   Abdominal: Soft. Bowel sounds are normal. She exhibits no distension. There is tenderness (LLQ). There is no rebound and no guarding.   Neurological: She is alert and oriented to person, place, and time.   Skin: Skin is warm and dry.   Psychiatric: She has a normal mood and affect.     Labs:  Recent Results (from the past 24 hour(s))   EKG    Collection Time: 10/28/18 10:59 AM   Result Value Ref Range    Report       [facility]    Test Date:  2018-10-28  Pt Name:    SIMEON KAUR                  Department: ER  MRN:        [id number]                      Room:       [facility]  Gender:      Female                       Technician: [id number]  :        [id number]                   Requested By:RAFI CHEUNG  Order #:    [id number]                    Reading MD:    Measurements  Intervals                                Axis  Rate:       87                           P:          37  ME:         136                          QRS:        49  QRSD:       80                           T:          -4  QT:         376  QTc:        453    Interpretive Statements  SINUS RHYTHM  Compared to ECG 2018 22:11:55  Sinus tachycardia no longer present  T-wave abnormality no longer present     GRAM STAIN    Collection Time: 10/28/18  3:10 PM   Result Value Ref Range    Significant Indicator . (POS)     Source WND     Site Peritoneal Drain Aspiration     Gram Stain Result Many WBCs.  Many Gram positive cocci.   (A)    CBC with Differential    Collection Time: 10/29/18  3:51 AM   Result Value Ref Range    WBC 11.7 (H) 4.8 - 10.8 K/uL    RBC 3.77 (L) 4.20 - 5.40 M/uL    Hemoglobin 10.9 (L) 12.0 - 16.0 g/dL    Hematocrit 32.8 (L) 37.0 - 47.0 %    MCV 87.0 81.4 - 97.8 fL    MCH 28.9 27.0 - 33.0 pg    MCHC 33.2 (L) 33.6 - 35.0 g/dL    RDW 45.4 35.9 - 50.0 fL    Platelet Count 309 164 - 446 K/uL    MPV 9.4 9.0 - 12.9 fL    Neutrophils-Polys 82.30 (H) 44.00 - 72.00 %    Lymphocytes 10.40 (L) 22.00 - 41.00 %    Monocytes 6.20 0.00 - 13.40 %    Eosinophils 0.30 0.00 - 6.90 %    Basophils 0.30 0.00 - 1.80 %    Immature Granulocytes 0.50 0.00 - 0.90 %    Nucleated RBC 0.00 /100 WBC    Neutrophils (Absolute) 9.63 (H) 2.00 - 7.15 K/uL    Lymphs (Absolute) 1.22 1.00 - 4.80 K/uL    Monos (Absolute) 0.73 0.00 - 0.85 K/uL    Eos (Absolute) 0.04 0.00 - 0.51 K/uL    Baso (Absolute) 0.04 0.00 - 0.12 K/uL    Immature Granulocytes (abs) 0.06 0.00 - 0.11 K/uL    NRBC (Absolute) 0.00 K/uL   Comp Metabolic Panel (CMP)    Collection Time: 10/29/18  3:51 AM   Result Value Ref Range    Sodium 137 135 - 145 mmol/L    Potassium 3.4 (L) 3.6 - 5.5  mmol/L    Chloride 105 96 - 112 mmol/L    Co2 26 20 - 33 mmol/L    Anion Gap 6.0 0.0 - 11.9    Glucose 128 (H) 65 - 99 mg/dL    Bun 3 (L) 8 - 22 mg/dL    Creatinine 0.61 0.50 - 1.40 mg/dL    Calcium 8.7 8.5 - 10.5 mg/dL    AST(SGOT) 9 (L) 12 - 45 U/L    ALT(SGPT) 7 2 - 50 U/L    Alkaline Phosphatase 66 30 - 99 U/L    Total Bilirubin 0.4 0.1 - 1.5 mg/dL    Albumin 3.1 (L) 3.2 - 4.9 g/dL    Total Protein 6.5 6.0 - 8.2 g/dL    Globulin 3.4 1.9 - 3.5 g/dL    A-G Ratio 0.9 g/dL   ESTIMATED GFR    Collection Time: 10/29/18  3:51 AM   Result Value Ref Range    GFR If African American >60 >60 mL/min/1.73 m 2    GFR If Non African American >60 >60 mL/min/1.73 m 2     Medical Decision Making, by Problem:  1.  Recurrent diverticular abscess, now status drain placement yesterday  2.  Hypertension.  3.  Gastroesophageal reflux disease.  4.  Asthma.  5.  Urinary incontinence  6.  Hypokalemia    Plan:  Start low residue diet.  Start Lovenox.  Add oral K to regimen, check BMP in AM tomorrow.  Decrease rate of IVF to 75 milliliters per hour.  Ask again to have urine output recorded, in millilters, every shift  Add Percocet, decrease frequency of Morphine.  Continue IV antibiotics    Quality Measures:  Quality-Core Measures   Reviewed items::  Labs reviewed and Medications reviewed  Simms catheter::  No Simms  DVT prophylaxis pharmacological::  Enoxaparin (Lovenox)  Ulcer Prophylaxis::  Yes  Antibiotics:  Treating active infection/contamination beyond 24 hours perioperative coverage and Treating fecal contamination      Discussed patient condition with Patient

## 2018-10-30 LAB
ANION GAP SERPL CALC-SCNC: 6 MMOL/L (ref 0–11.9)
BACTERIA WND AEROBE CULT: ABNORMAL
BACTERIA WND AEROBE CULT: ABNORMAL
BUN SERPL-MCNC: <3 MG/DL (ref 8–22)
CALCIUM SERPL-MCNC: 8.8 MG/DL (ref 8.5–10.5)
CHLORIDE SERPL-SCNC: 106 MMOL/L (ref 96–112)
CO2 SERPL-SCNC: 26 MMOL/L (ref 20–33)
CREAT SERPL-MCNC: 0.55 MG/DL (ref 0.5–1.4)
GLUCOSE SERPL-MCNC: 115 MG/DL (ref 65–99)
GRAM STN SPEC: ABNORMAL
POTASSIUM SERPL-SCNC: 3.2 MMOL/L (ref 3.6–5.5)
SIGNIFICANT IND 70042: ABNORMAL
SITE SITE: ABNORMAL
SODIUM SERPL-SCNC: 138 MMOL/L (ref 135–145)
SOURCE SOURCE: ABNORMAL

## 2018-10-30 PROCEDURE — 700111 HCHG RX REV CODE 636 W/ 250 OVERRIDE (IP): Performed by: SURGERY

## 2018-10-30 PROCEDURE — 700105 HCHG RX REV CODE 258: Performed by: SURGERY

## 2018-10-30 PROCEDURE — 94760 N-INVAS EAR/PLS OXIMETRY 1: CPT

## 2018-10-30 PROCEDURE — 700105 HCHG RX REV CODE 258

## 2018-10-30 PROCEDURE — 36415 COLL VENOUS BLD VENIPUNCTURE: CPT

## 2018-10-30 PROCEDURE — 700101 HCHG RX REV CODE 250: Performed by: SURGERY

## 2018-10-30 PROCEDURE — A9270 NON-COVERED ITEM OR SERVICE: HCPCS | Performed by: SURGERY

## 2018-10-30 PROCEDURE — 700102 HCHG RX REV CODE 250 W/ 637 OVERRIDE(OP): Performed by: SURGERY

## 2018-10-30 PROCEDURE — 770006 HCHG ROOM/CARE - MED/SURG/GYN SEMI*

## 2018-10-30 PROCEDURE — 80048 BASIC METABOLIC PNL TOTAL CA: CPT

## 2018-10-30 RX ORDER — ALBUTEROL SULFATE 90 UG/1
2 AEROSOL, METERED RESPIRATORY (INHALATION)
Status: DISCONTINUED | OUTPATIENT
Start: 2018-10-30 | End: 2018-11-06 | Stop reason: HOSPADM

## 2018-10-30 RX ORDER — SODIUM CHLORIDE 9 MG/ML
INJECTION, SOLUTION INTRAVENOUS
Status: COMPLETED
Start: 2018-10-30 | End: 2018-10-30

## 2018-10-30 RX ADMIN — POTASSIUM CHLORIDE 20 MEQ: 1500 TABLET, EXTENDED RELEASE ORAL at 09:25

## 2018-10-30 RX ADMIN — POTASSIUM CHLORIDE, DEXTROSE MONOHYDRATE AND SODIUM CHLORIDE: 300; 5; 450 INJECTION, SOLUTION INTRAVENOUS at 09:48

## 2018-10-30 RX ADMIN — ENOXAPARIN SODIUM 30 MG: 100 INJECTION SUBCUTANEOUS at 18:07

## 2018-10-30 RX ADMIN — PROCHLORPERAZINE EDISYLATE 10 MG: 5 INJECTION INTRAMUSCULAR; INTRAVENOUS at 09:33

## 2018-10-30 RX ADMIN — AMPICILLIN SODIUM AND SULBACTAM SODIUM 3 G: 2; 1 INJECTION, POWDER, FOR SOLUTION INTRAMUSCULAR; INTRAVENOUS at 18:08

## 2018-10-30 RX ADMIN — ALBUTEROL SULFATE 2 PUFF: 90 AEROSOL, METERED RESPIRATORY (INHALATION) at 07:27

## 2018-10-30 RX ADMIN — AMPICILLIN SODIUM AND SULBACTAM SODIUM 3 G: 2; 1 INJECTION, POWDER, FOR SOLUTION INTRAMUSCULAR; INTRAVENOUS at 00:02

## 2018-10-30 RX ADMIN — AMPICILLIN SODIUM AND SULBACTAM SODIUM 3 G: 2; 1 INJECTION, POWDER, FOR SOLUTION INTRAMUSCULAR; INTRAVENOUS at 13:10

## 2018-10-30 RX ADMIN — PROCHLORPERAZINE EDISYLATE 10 MG: 5 INJECTION INTRAMUSCULAR; INTRAVENOUS at 23:02

## 2018-10-30 RX ADMIN — POTASSIUM CHLORIDE, DEXTROSE MONOHYDRATE AND SODIUM CHLORIDE: 300; 5; 450 INJECTION, SOLUTION INTRAVENOUS at 23:02

## 2018-10-30 RX ADMIN — SODIUM CHLORIDE 500 ML: 9 INJECTION, SOLUTION INTRAVENOUS at 09:48

## 2018-10-30 RX ADMIN — LOSARTAN POTASSIUM 25 MG: 50 TABLET ORAL at 05:07

## 2018-10-30 RX ADMIN — ENOXAPARIN SODIUM 30 MG: 100 INJECTION SUBCUTANEOUS at 05:09

## 2018-10-30 RX ADMIN — OXYCODONE AND ACETAMINOPHEN 0.5 TABLET: 5; 325 TABLET ORAL at 18:07

## 2018-10-30 RX ADMIN — AMPICILLIN SODIUM AND SULBACTAM SODIUM 3 G: 2; 1 INJECTION, POWDER, FOR SOLUTION INTRAMUSCULAR; INTRAVENOUS at 05:10

## 2018-10-30 RX ADMIN — OXYCODONE AND ACETAMINOPHEN 0.5 TABLET: 5; 325 TABLET ORAL at 09:39

## 2018-10-30 RX ADMIN — ALBUTEROL SULFATE 2 PUFF: 90 AEROSOL, METERED RESPIRATORY (INHALATION) at 11:31

## 2018-10-30 RX ADMIN — OMEPRAZOLE 20 MG: 20 CAPSULE, DELAYED RELEASE ORAL at 05:08

## 2018-10-30 RX ADMIN — AMPICILLIN SODIUM AND SULBACTAM SODIUM 3 G: 2; 1 INJECTION, POWDER, FOR SOLUTION INTRAMUSCULAR; INTRAVENOUS at 23:02

## 2018-10-30 ASSESSMENT — COPD QUESTIONNAIRES
DO YOU EVER COUGH UP ANY MUCUS OR PHLEGM?: NO/ONLY WITH OCCASIONAL COLDS OR INFECTIONS
COPD SCREENING SCORE: 3
DURING THE PAST 4 WEEKS HOW MUCH DID YOU FEEL SHORT OF BREATH: NONE/LITTLE OF THE TIME
HAVE YOU SMOKED AT LEAST 100 CIGARETTES IN YOUR ENTIRE LIFE: YES

## 2018-10-30 ASSESSMENT — ENCOUNTER SYMPTOMS
NAUSEA: 1
VOMITING: 1
ABDOMINAL PAIN: 1

## 2018-10-30 ASSESSMENT — PAIN SCALES - GENERAL
PAINLEVEL_OUTOF10: 1
PAINLEVEL_OUTOF10: 3
PAINLEVEL_OUTOF10: 2
PAINLEVEL_OUTOF10: 5
PAINLEVEL_OUTOF10: 5
PAINLEVEL_OUTOF10: 2
PAINLEVEL_OUTOF10: 2

## 2018-10-30 ASSESSMENT — LIFESTYLE VARIABLES: EVER_SMOKED: YES

## 2018-10-30 NOTE — PROGRESS NOTES
Pt aao x 4, IR drain to left buttocks with clean dry gauze dressing. Tolerating regular diet however poor po intake d/t nausea--no emesis. Compazine provided to pt. Pt up ambulating self. Voiding. Pt instructed on measuring intake and output when able (pt incontinent of urine). Plan of care discussed.

## 2018-10-30 NOTE — CARE PLAN
Problem: Safety  Goal: Will remain free from falls  Patient is a low fall risk. Patient educated on level of risk. Educated to call for assistance. Call light left within reach of patient.    Problem: Infection  Goal: Will remain free from infection    Intervention: Implement standard precautions and perform hand washing before and after patient contact  Hand hygiene performed prior to and after entering pt room. Gloves worn during patient interactions.

## 2018-10-30 NOTE — CARE PLAN
Problem: Nutritional:  Goal: Achieve adequate nutritional intake  Patient will consume 50% of meals  Outcome: PROGRESSING AS EXPECTED  PO ~50% of meals.

## 2018-10-30 NOTE — PROGRESS NOTES
Surgical Progress Note    Author: Justus Will Date & Time created: 10/30/2018   8:14 AM     Interval Events:  Wasn't tolerating po's yesterday, feels better today.  Refused potassium po yesterday, due to nausea, will take it today.  Changed from Cipro/Flagyl yesterday to Unasym  UO again not recorded on day shift yesterday    Review of Systems   Gastrointestinal: Positive for abdominal pain, nausea and vomiting.     Hemodynamics:  Temp (24hrs), Av.9 °C (100.2 °F), Min:36.7 °C (98 °F), Max:39.2 °C (102.6 °F)  Temperature: 36.8 °C (98.2 °F)  Pulse  Av.2  Min: 84  Max: 113   Blood Pressure: 123/79     Respiratory:    Respiration: 16, Pulse Oximetry: 94 %, O2 Daily Delivery Respiratory : Room Air with O2 Available     #MDI/DPI Given: MDI/DPI x 1, Work Of Breathing / Effort: Mild  RUL Breath Sounds: Clear, RML Breath Sounds: Clear, RLL Breath Sounds: Diminished, PEG Breath Sounds: Clear, LLL Breath Sounds: Diminished  Neuro:  GCS = 15       Fluids:    Intake/Output Summary (Last 24 hours) at 10/30/18 0814  Last data filed at 10/30/18 0400   Gross per 24 hour   Intake             2860 ml   Output              415 ml   Net             2445 ml        Current Diet Order   Procedures   • Diet Order Low Fiber(GI Soft)     Physical Exam   Constitutional: She appears well-developed and well-nourished. No distress.   HENT:   Head: Normocephalic.   Eyes: Pupils are equal, round, and reactive to light. No scleral icterus.   Cardiovascular: Normal rate, regular rhythm and normal heart sounds.    Pulmonary/Chest: Effort normal and breath sounds normal. No respiratory distress.   Abdominal: Soft. Bowel sounds are normal. She exhibits no distension. There is tenderness (LLQ). There is no rebound and no guarding.   55 cc recorded out drain in last 24 hours   Skin: Skin is warm and dry.   Psychiatric: She has a normal mood and affect. Her behavior is normal.     Labs:  Recent Results (from the past 24 hour(s))   BASIC  METABOLIC PANEL    Collection Time: 10/30/18  1:07 AM   Result Value Ref Range    Sodium 138 135 - 145 mmol/L    Potassium 3.2 (L) 3.6 - 5.5 mmol/L    Chloride 106 96 - 112 mmol/L    Co2 26 20 - 33 mmol/L    Glucose 115 (H) 65 - 99 mg/dL    Bun <3 (L) 8 - 22 mg/dL    Creatinine 0.55 0.50 - 1.40 mg/dL    Calcium 8.8 8.5 - 10.5 mg/dL    Anion Gap 6.0 0.0 - 11.9   ESTIMATED GFR    Collection Time: 10/30/18  1:07 AM   Result Value Ref Range    GFR If African American >60 >60 mL/min/1.73 m 2    GFR If Non African American >60 >60 mL/min/1.73 m 2     Medical Decision Making, by Problem:  1.  Recurrent diverticular abscess, now status drain placement 2 days ago  2.  Hypertension.  3.  Gastroesophageal reflux disease.  4.  Asthma.  5.  Urinary incontinence  6.  Hypokalemia     Plan:  Continue low residue diet, Lovenox.  Add oral K to regimen, check BMP in AM tomorrow.  Check CBC with dif in AM  Increase rate of IVF to 90 milliliters per hour.  Continue IV antibiotics  Follow up drain output    Quality Measures:  Quality-Core Measures   Reviewed items::  Labs reviewed and Medications reviewed  Simms catheter::  No Simms  DVT prophylaxis pharmacological::  Enoxaparin (Lovenox)  Ulcer Prophylaxis::  Not indicated  Antibiotics:  Treating active infection/contamination beyond 24 hours perioperative coverage and Treating fecal contamination      Discussed patient condition with Patient

## 2018-10-30 NOTE — PROGRESS NOTES
Bedside report completed.  A&O x4.  In no acute distress.   VSS.  SpO2 100% on RA.  IVF D5 1/2NS w/40K @75mL/hr  Ambulating self  Tolerating clear liquid diet  Complaints of pain 5/10, does not want to be medicated at this time  Skin: IR drain to L buttock; dressing in place  incontinent    Call light and personal belongings within reach. POC discussed and all questions answered.  Hourly rounding in place.  No additional needs at this time.'

## 2018-10-30 NOTE — CARE PLAN
Problem: Infection  Goal: Will remain free from infection  IR drain flushed with 10 ml of NS    Problem: Knowledge Deficit  Goal: Knowledge of disease process/condition, treatment plan, diagnostic tests, and medications will improve  Plan of care for shift discussed with pt. Pt receptive to plan.    Problem: Pain Management  Goal: Pain level will decrease to patient's comfort goal  Percocet for pain control

## 2018-10-30 NOTE — DIETARY
"Nutrition services: Day 2 of admit.  Ambar Jj is a 57 y.o. female with admitting DX of Diverticulitis of large intestine with abscess without bleeding, hypertension, asthma, GERD, Urinary incontinence.    Poor PO, unplanned wt loss noted on admit screen.  Attempted to speak with pt at bedside, but pt was sleeping.  Upon visualization, pt appeared well nourished, however pt was covered with blankets. Lunch tray observed at bedside was untouched.  Per chart review, pt wt on 9/16 was 74.8 kg (164 lbs) - current wt is 70.4 kg (155 lbs).     Assessment:  Height: 167.6 cm (5' 6\")  Weight: 70.4 kg (155 lb 3.3 oz)  Body mass index is 25.05 kg/m².  Diet/Intake: Low Fiber; PO 50-75% for most recent meal    Evaluation:   1. Wt loss of 5% in one month is significant.  2. Potassium 3.2, Glucose 115, BUN <3  3. Pertinent meds: Unasyn, Lovenox, Prilosec, Kdur, Pericolace    Recommendations/Plan:  1. Encourage intake of meals.  2. Document intake of all meals as % taken in ADLs to provide interdisciplinary communication across all shifts.   3. Monitor weight.  4. Nutrition rep will continue to see patient for ongoing meal and snack preferences.             "

## 2018-10-31 LAB
ANION GAP SERPL CALC-SCNC: 6 MMOL/L (ref 0–11.9)
BASOPHILS # BLD AUTO: 0.8 % (ref 0–1.8)
BASOPHILS # BLD: 0.05 K/UL (ref 0–0.12)
BUN SERPL-MCNC: <3 MG/DL (ref 8–22)
CALCIUM SERPL-MCNC: 8.9 MG/DL (ref 8.5–10.5)
CHLORIDE SERPL-SCNC: 109 MMOL/L (ref 96–112)
CO2 SERPL-SCNC: 26 MMOL/L (ref 20–33)
CREAT SERPL-MCNC: 0.45 MG/DL (ref 0.5–1.4)
EOSINOPHIL # BLD AUTO: 0.1 K/UL (ref 0–0.51)
EOSINOPHIL NFR BLD: 1.5 % (ref 0–6.9)
ERYTHROCYTE [DISTWIDTH] IN BLOOD BY AUTOMATED COUNT: 44.8 FL (ref 35.9–50)
GLUCOSE SERPL-MCNC: 110 MG/DL (ref 65–99)
HCT VFR BLD AUTO: 33 % (ref 37–47)
HGB BLD-MCNC: 11.3 G/DL (ref 12–16)
IMM GRANULOCYTES # BLD AUTO: 0.13 K/UL (ref 0–0.11)
IMM GRANULOCYTES NFR BLD AUTO: 2 % (ref 0–0.9)
LYMPHOCYTES # BLD AUTO: 1.34 K/UL (ref 1–4.8)
LYMPHOCYTES NFR BLD: 20.4 % (ref 22–41)
MCH RBC QN AUTO: 29.4 PG (ref 27–33)
MCHC RBC AUTO-ENTMCNC: 34.2 G/DL (ref 33.6–35)
MCV RBC AUTO: 85.9 FL (ref 81.4–97.8)
MONOCYTES # BLD AUTO: 0.43 K/UL (ref 0–0.85)
MONOCYTES NFR BLD AUTO: 6.5 % (ref 0–13.4)
NEUTROPHILS # BLD AUTO: 4.52 K/UL (ref 2–7.15)
NEUTROPHILS NFR BLD: 68.8 % (ref 44–72)
NRBC # BLD AUTO: 0 K/UL
NRBC BLD-RTO: 0 /100 WBC
PLATELET # BLD AUTO: 306 K/UL (ref 164–446)
PMV BLD AUTO: 9.2 FL (ref 9–12.9)
POTASSIUM SERPL-SCNC: 4 MMOL/L (ref 3.6–5.5)
RBC # BLD AUTO: 3.84 M/UL (ref 4.2–5.4)
SODIUM SERPL-SCNC: 141 MMOL/L (ref 135–145)
WBC # BLD AUTO: 6.6 K/UL (ref 4.8–10.8)

## 2018-10-31 PROCEDURE — 80048 BASIC METABOLIC PNL TOTAL CA: CPT

## 2018-10-31 PROCEDURE — A9270 NON-COVERED ITEM OR SERVICE: HCPCS | Performed by: SURGERY

## 2018-10-31 PROCEDURE — 700105 HCHG RX REV CODE 258

## 2018-10-31 PROCEDURE — 36415 COLL VENOUS BLD VENIPUNCTURE: CPT

## 2018-10-31 PROCEDURE — 700111 HCHG RX REV CODE 636 W/ 250 OVERRIDE (IP): Performed by: SURGERY

## 2018-10-31 PROCEDURE — 700102 HCHG RX REV CODE 250 W/ 637 OVERRIDE(OP): Performed by: SURGERY

## 2018-10-31 PROCEDURE — 85025 COMPLETE CBC W/AUTO DIFF WBC: CPT

## 2018-10-31 PROCEDURE — 700101 HCHG RX REV CODE 250: Performed by: SURGERY

## 2018-10-31 PROCEDURE — 700105 HCHG RX REV CODE 258: Performed by: SURGERY

## 2018-10-31 PROCEDURE — 770006 HCHG ROOM/CARE - MED/SURG/GYN SEMI*

## 2018-10-31 RX ORDER — SODIUM CHLORIDE 9 MG/ML
INJECTION, SOLUTION INTRAVENOUS
Status: COMPLETED
Start: 2018-10-31 | End: 2018-10-31

## 2018-10-31 RX ADMIN — POTASSIUM CHLORIDE 20 MEQ: 1500 TABLET, EXTENDED RELEASE ORAL at 08:54

## 2018-10-31 RX ADMIN — AMPICILLIN SODIUM AND SULBACTAM SODIUM 3 G: 2; 1 INJECTION, POWDER, FOR SOLUTION INTRAMUSCULAR; INTRAVENOUS at 11:53

## 2018-10-31 RX ADMIN — LOSARTAN POTASSIUM 25 MG: 50 TABLET ORAL at 05:41

## 2018-10-31 RX ADMIN — OXYCODONE AND ACETAMINOPHEN 1 TABLET: 5; 325 TABLET ORAL at 18:49

## 2018-10-31 RX ADMIN — POTASSIUM CHLORIDE, DEXTROSE MONOHYDRATE AND SODIUM CHLORIDE: 300; 5; 450 INJECTION, SOLUTION INTRAVENOUS at 10:54

## 2018-10-31 RX ADMIN — ENOXAPARIN SODIUM 30 MG: 100 INJECTION SUBCUTANEOUS at 05:42

## 2018-10-31 RX ADMIN — OMEPRAZOLE 20 MG: 20 CAPSULE, DELAYED RELEASE ORAL at 05:41

## 2018-10-31 RX ADMIN — ENOXAPARIN SODIUM 30 MG: 100 INJECTION SUBCUTANEOUS at 17:43

## 2018-10-31 RX ADMIN — OXYCODONE AND ACETAMINOPHEN 0.5 TABLET: 5; 325 TABLET ORAL at 13:55

## 2018-10-31 RX ADMIN — OXYCODONE AND ACETAMINOPHEN 0.5 TABLET: 5; 325 TABLET ORAL at 08:54

## 2018-10-31 RX ADMIN — AMPICILLIN SODIUM AND SULBACTAM SODIUM 3 G: 2; 1 INJECTION, POWDER, FOR SOLUTION INTRAMUSCULAR; INTRAVENOUS at 17:42

## 2018-10-31 RX ADMIN — SODIUM CHLORIDE 1000 ML: 9 INJECTION, SOLUTION INTRAVENOUS at 13:59

## 2018-10-31 RX ADMIN — AMPICILLIN SODIUM AND SULBACTAM SODIUM 3 G: 2; 1 INJECTION, POWDER, FOR SOLUTION INTRAMUSCULAR; INTRAVENOUS at 05:41

## 2018-10-31 ASSESSMENT — PAIN SCALES - GENERAL
PAINLEVEL_OUTOF10: 2
PAINLEVEL_OUTOF10: 2
PAINLEVEL_OUTOF10: 3
PAINLEVEL_OUTOF10: 0
PAINLEVEL_OUTOF10: 3
PAINLEVEL_OUTOF10: 4
PAINLEVEL_OUTOF10: 6

## 2018-10-31 ASSESSMENT — ENCOUNTER SYMPTOMS
VOMITING: 0
NAUSEA: 1
CHILLS: 0
ABDOMINAL PAIN: 1
FEVER: 0

## 2018-10-31 ASSESSMENT — PATIENT HEALTH QUESTIONNAIRE - PHQ9
1. LITTLE INTEREST OR PLEASURE IN DOING THINGS: NOT AT ALL
SUM OF ALL RESPONSES TO PHQ9 QUESTIONS 1 AND 2: 0
2. FEELING DOWN, DEPRESSED, IRRITABLE, OR HOPELESS: NOT AT ALL

## 2018-10-31 NOTE — CARE PLAN
Problem: Knowledge Deficit  Goal: Knowledge of disease process/condition, treatment plan, diagnostic tests, and medications will improve  Plan of care discussed with pt. Pt receptive.    Problem: Mobility  Goal: Risk for activity intolerance will decrease  Pt up ambulating in hallway with steady gait.

## 2018-10-31 NOTE — PROGRESS NOTES
Surgical Progress Note    Author: Justus Will Date & Time created: 10/31/2018   11:58 AM     Interval Events:  Feels much better today.  Appetite has returned.  Nausea resolving.    Review of Systems   Constitutional: Negative for chills, fever and malaise/fatigue.   Gastrointestinal: Positive for abdominal pain and nausea. Negative for vomiting.     Hemodynamics:  Temp (24hrs), Av.6 °C (97.8 °F), Min:36.2 °C (97.2 °F), Max:36.9 °C (98.4 °F)  Temperature: 36.9 °C (98.4 °F)  Pulse  Av.4  Min: 81  Max: 113   Blood Pressure: 146/85     Respiratory:    Respiration: 18, Pulse Oximetry: 95 %     Work Of Breathing / Effort: Mild  RUL Breath Sounds: Clear, RML Breath Sounds: Clear, RLL Breath Sounds: Clear, PEG Breath Sounds: Clear, LLL Breath Sounds: Clear  Neuro:  GCS = 15       Fluids:    Intake/Output Summary (Last 24 hours) at 10/31/18 1158  Last data filed at 10/31/18 1153   Gross per 24 hour   Intake             3690 ml   Output             3140 ml   Net              550 ml        Current Diet Order   Procedures   • Diet Order Low Fiber(GI Soft)     Physical Exam   Constitutional: She appears well-developed and well-nourished. No distress.   Eyes: Pupils are equal, round, and reactive to light. No scleral icterus.   Cardiovascular: Normal rate, regular rhythm and normal heart sounds.    Pulmonary/Chest: Effort normal and breath sounds normal. No respiratory distress.   Abdominal: Soft. Bowel sounds are normal. She exhibits no distension. There is no rebound and no guarding.   50 cc out drain on day shift yesterday, 0 recorded over night, 10 cc out so far this morning.     Labs:  Recent Results (from the past 24 hour(s))   CBC WITH DIFFERENTIAL    Collection Time: 10/31/18  4:13 AM   Result Value Ref Range    WBC 6.6 4.8 - 10.8 K/uL    RBC 3.84 (L) 4.20 - 5.40 M/uL    Hemoglobin 11.3 (L) 12.0 - 16.0 g/dL    Hematocrit 33.0 (L) 37.0 - 47.0 %    MCV 85.9 81.4 - 97.8 fL    MCH 29.4 27.0 - 33.0 pg    MCHC  34.2 33.6 - 35.0 g/dL    RDW 44.8 35.9 - 50.0 fL    Platelet Count 306 164 - 446 K/uL    MPV 9.2 9.0 - 12.9 fL    Neutrophils-Polys 68.80 44.00 - 72.00 %    Lymphocytes 20.40 (L) 22.00 - 41.00 %    Monocytes 6.50 0.00 - 13.40 %    Eosinophils 1.50 0.00 - 6.90 %    Basophils 0.80 0.00 - 1.80 %    Immature Granulocytes 2.00 (H) 0.00 - 0.90 %    Nucleated RBC 0.00 /100 WBC    Neutrophils (Absolute) 4.52 2.00 - 7.15 K/uL    Lymphs (Absolute) 1.34 1.00 - 4.80 K/uL    Monos (Absolute) 0.43 0.00 - 0.85 K/uL    Eos (Absolute) 0.10 0.00 - 0.51 K/uL    Baso (Absolute) 0.05 0.00 - 0.12 K/uL    Immature Granulocytes (abs) 0.13 (H) 0.00 - 0.11 K/uL    NRBC (Absolute) 0.00 K/uL   BASIC METABOLIC PANEL    Collection Time: 10/31/18  4:13 AM   Result Value Ref Range    Sodium 141 135 - 145 mmol/L    Potassium 4.0 3.6 - 5.5 mmol/L    Chloride 109 96 - 112 mmol/L    Co2 26 20 - 33 mmol/L    Glucose 110 (H) 65 - 99 mg/dL    Bun <3 (L) 8 - 22 mg/dL    Creatinine 0.45 (L) 0.50 - 1.40 mg/dL    Calcium 8.9 8.5 - 10.5 mg/dL    Anion Gap 6.0 0.0 - 11.9   ESTIMATED GFR    Collection Time: 10/31/18  4:13 AM   Result Value Ref Range    GFR If African American >60 >60 mL/min/1.73 m 2    GFR If Non African American >60 >60 mL/min/1.73 m 2     Medical Decision Making, by Problem:  1.  Recurrent diverticular abscess, now status drain placement 3 days ago, now with decreasing amount of drainage  2.  Hypertension.  3.  Gastroesophageal reflux disease.  4.  Asthma.  5.  Urinary incontinence  6.  Hypokalemia - improved     Plan:  Continue low residue diet, Lovenox.  Continue oral K regimen, Hep lock IV, check BMP in AM tomorrow.  Continue IV antibiotics  Follow up drain output, consider re-scan tomorrow if output continues to be minimal      Quality Measures:  Quality-Core Measures   Reviewed items::  Labs reviewed and Medications reviewed  Simms catheter::  No Simms  DVT prophylaxis pharmacological::  Enoxaparin (Lovenox)  DVT prophylaxis -  mechanical:  SCDs  Ulcer Prophylaxis::  Yes  Antibiotics:  Treating active infection/contamination beyond 24 hours perioperative coverage and Treating fecal contamination      Discussed patient condition with RN and Patient

## 2018-10-31 NOTE — DOCUMENTATION QUERY
DOCUMENTATION QUERY    PROVIDERS: Please select “Cosign w/ note”to reply to query.    To better represent the severity of illness of your patient, please review the following information and exercise your independent professional judgment in responding to this query.     Bloody stools is documented in the progress notes and nursing notes. Based upon the clinical findings, risk factors, and treatment, can this diagnosis be further specified?    • Hematochezia  • Melena  • Other explanation of clinical findings  • Unable to determine    The medical record reflects the following:   Clinical Findings · ED note: bloody stools is documented.  · On 10/28 & 10/30, per the nursing flow charts, the stool is described as being bloody.   Treatment  CT scan   Risk Factors  Diverticular disease, recurrent abscess   Location within medical record  History and Physical and Progress Notes     Thank you,   Myrna Byrd RN, MSN  Clinical Documentation Improvement  [phone]

## 2018-10-31 NOTE — PROGRESS NOTES
"Pt A&O x 4.     Vitals: /86   Pulse 85   Temp 36.4 °C (97.6 °F)   Resp 17   Ht 1.676 m (5' 6\")   Wt 70.4 kg (155 lb 3.3 oz)   SpO2 95%   Breastfeeding? No   BMI 25.05 kg/m²      Pt rates pain 1 out of 10. Declines additional interventions at this time.     Neuro: ARGUETA. Denies new onset of numbness/ tingling.     Cardiac: Denies new onset of chest pain.     Vascular: Pulses 2+ BUE, BLE. No edema noted.     Respiratory: Lungs sound clear/diminished to auscultation. On room air.  on, satting in 90's. Denies SOB.     GI: Abdomen soft, tender. Normoactive bowel sounds, + flatus, + BM. + nausea/ vomiting.     : Pt voiding adequately.      MSK: Pt up to bathroom with stand by assist, tolerating well.     Integumentary: IR drain to left buttock, small serosanguinous output, dressing CDI.     Labs noted.     Fall precautions in place: Bed locked in lowest position, Upper bed rails up, treaded socks in place, personal belongings within reach, call light within reach, appropriate mobility signs in place, - bed alarm. Pt calls appropriately.      Pt updated on POC.    "

## 2018-11-01 LAB
ANION GAP SERPL CALC-SCNC: 7 MMOL/L (ref 0–11.9)
BUN SERPL-MCNC: <3 MG/DL (ref 8–22)
CALCIUM SERPL-MCNC: 9 MG/DL (ref 8.5–10.5)
CHLORIDE SERPL-SCNC: 107 MMOL/L (ref 96–112)
CO2 SERPL-SCNC: 28 MMOL/L (ref 20–33)
CREAT SERPL-MCNC: 0.51 MG/DL (ref 0.5–1.4)
GLUCOSE SERPL-MCNC: 95 MG/DL (ref 65–99)
POTASSIUM SERPL-SCNC: 3.5 MMOL/L (ref 3.6–5.5)
SODIUM SERPL-SCNC: 142 MMOL/L (ref 135–145)

## 2018-11-01 PROCEDURE — 700111 HCHG RX REV CODE 636 W/ 250 OVERRIDE (IP): Performed by: SURGERY

## 2018-11-01 PROCEDURE — 80048 BASIC METABOLIC PNL TOTAL CA: CPT

## 2018-11-01 PROCEDURE — A9270 NON-COVERED ITEM OR SERVICE: HCPCS | Performed by: SURGERY

## 2018-11-01 PROCEDURE — 700102 HCHG RX REV CODE 250 W/ 637 OVERRIDE(OP): Performed by: SURGERY

## 2018-11-01 PROCEDURE — 700105 HCHG RX REV CODE 258: Performed by: SURGERY

## 2018-11-01 PROCEDURE — 36415 COLL VENOUS BLD VENIPUNCTURE: CPT

## 2018-11-01 PROCEDURE — 770006 HCHG ROOM/CARE - MED/SURG/GYN SEMI*

## 2018-11-01 RX ORDER — POTASSIUM CHLORIDE 20 MEQ/1
20 TABLET, EXTENDED RELEASE ORAL 2 TIMES DAILY
Status: DISCONTINUED | OUTPATIENT
Start: 2018-11-01 | End: 2018-11-06 | Stop reason: HOSPADM

## 2018-11-01 RX ADMIN — AMPICILLIN SODIUM AND SULBACTAM SODIUM 3 G: 2; 1 INJECTION, POWDER, FOR SOLUTION INTRAMUSCULAR; INTRAVENOUS at 17:26

## 2018-11-01 RX ADMIN — ENOXAPARIN SODIUM 30 MG: 100 INJECTION SUBCUTANEOUS at 17:26

## 2018-11-01 RX ADMIN — OXYCODONE AND ACETAMINOPHEN 0.5 TABLET: 5; 325 TABLET ORAL at 11:18

## 2018-11-01 RX ADMIN — AMPICILLIN SODIUM AND SULBACTAM SODIUM 3 G: 2; 1 INJECTION, POWDER, FOR SOLUTION INTRAMUSCULAR; INTRAVENOUS at 00:03

## 2018-11-01 RX ADMIN — AMPICILLIN SODIUM AND SULBACTAM SODIUM 3 G: 2; 1 INJECTION, POWDER, FOR SOLUTION INTRAMUSCULAR; INTRAVENOUS at 11:13

## 2018-11-01 RX ADMIN — AMPICILLIN SODIUM AND SULBACTAM SODIUM 3 G: 2; 1 INJECTION, POWDER, FOR SOLUTION INTRAMUSCULAR; INTRAVENOUS at 05:19

## 2018-11-01 RX ADMIN — OXYCODONE AND ACETAMINOPHEN 0.5 TABLET: 5; 325 TABLET ORAL at 16:39

## 2018-11-01 RX ADMIN — POTASSIUM CHLORIDE 20 MEQ: 1500 TABLET, EXTENDED RELEASE ORAL at 17:26

## 2018-11-01 RX ADMIN — LOSARTAN POTASSIUM 25 MG: 50 TABLET ORAL at 05:19

## 2018-11-01 RX ADMIN — ENOXAPARIN SODIUM 30 MG: 100 INJECTION SUBCUTANEOUS at 05:19

## 2018-11-01 RX ADMIN — OMEPRAZOLE 20 MG: 20 CAPSULE, DELAYED RELEASE ORAL at 05:19

## 2018-11-01 RX ADMIN — OXYCODONE AND ACETAMINOPHEN 1 TABLET: 5; 325 TABLET ORAL at 20:57

## 2018-11-01 RX ADMIN — OXYCODONE AND ACETAMINOPHEN 1 TABLET: 5; 325 TABLET ORAL at 04:27

## 2018-11-01 ASSESSMENT — PAIN SCALES - GENERAL
PAINLEVEL_OUTOF10: 3
PAINLEVEL_OUTOF10: 5
PAINLEVEL_OUTOF10: 4
PAINLEVEL_OUTOF10: 3
PAINLEVEL_OUTOF10: 6
PAINLEVEL_OUTOF10: 7

## 2018-11-01 ASSESSMENT — ENCOUNTER SYMPTOMS
CHILLS: 0
FEVER: 0
NAUSEA: 0
ABDOMINAL PAIN: 1
VOMITING: 0

## 2018-11-01 NOTE — PROGRESS NOTES
Surgical Progress Note    Author: Justus Will Date & Time created: 2018   8:10 AM     Interval Events:  Tolerating low residue diet.  Mostly pain free yesterday, woke up in pain this morning.  Passing flatus, has had BM.  Ambulating in halls.    Review of Systems   Constitutional: Negative for chills and fever.   Gastrointestinal: Positive for abdominal pain. Negative for nausea and vomiting.     Hemodynamics:  Temp (24hrs), Av.7 °C (98.1 °F), Min:36.6 °C (97.8 °F), Max:37.1 °C (98.8 °F)  Temperature: 36.6 °C (97.8 °F)  Pulse  Av.4  Min: 74  Max: 113   Blood Pressure: 131/84     Respiratory:    Respiration: 16, Pulse Oximetry: 96 %        RUL Breath Sounds: Clear, RML Breath Sounds: Clear, RLL Breath Sounds: Clear, PEG Breath Sounds: Clear, LLL Breath Sounds: Clear  Neuro:  GCS = 15       Fluids:    Intake/Output Summary (Last 24 hours) at 18 0810  Last data filed at 18 0003   Gross per 24 hour   Intake             1120 ml   Output               10 ml   Net             1110 ml        Current Diet Order   Procedures   • Diet Order Low Fiber(GI Soft)     Physical Exam   Constitutional: She is oriented to person, place, and time. She appears well-developed and well-nourished. No distress.   HENT:   Head: Normocephalic.   Eyes: Pupils are equal, round, and reactive to light. No scleral icterus.   Cardiovascular: Normal rate, regular rhythm and normal heart sounds.    Pulmonary/Chest: Effort normal and breath sounds normal. No respiratory distress.   Abdominal: Soft. Bowel sounds are normal. She exhibits no distension. There is tenderness (Minimal, LLQ). There is no rebound and no guarding.   Drain output minimally purulent mixed with blood, 20 cc in last 24 hours   Neurological: She is alert and oriented to person, place, and time.   Skin: Skin is warm and dry.   Psychiatric: She has a normal mood and affect. Her behavior is normal.     Labs:  Recent Results (from the past 24 hour(s))    BASIC METABOLIC PANEL    Collection Time: 11/01/18  3:53 AM   Result Value Ref Range    Sodium 142 135 - 145 mmol/L    Potassium 3.5 (L) 3.6 - 5.5 mmol/L    Chloride 107 96 - 112 mmol/L    Co2 28 20 - 33 mmol/L    Glucose 95 65 - 99 mg/dL    Bun <3 (L) 8 - 22 mg/dL    Creatinine 0.51 0.50 - 1.40 mg/dL    Calcium 9.0 8.5 - 10.5 mg/dL    Anion Gap 7.0 0.0 - 11.9   ESTIMATED GFR    Collection Time: 11/01/18  3:53 AM   Result Value Ref Range    GFR If African American >60 >60 mL/min/1.73 m 2    GFR If Non African American >60 >60 mL/min/1.73 m 2     Medical Decision Making, by Problem:  1.  Recurrent diverticular abscess, now status drain placement 4 days ago, now with only a small amount of amount of drainage for 36 hours.  2.  Hypertension.  3.  Gastroesophageal reflux disease.  4.  Asthma.  5.  Urinary incontinence  6.  Hypokalemia       Plan:  Continue low residue diet, Lovenox.  Increase oral K regimen, check BMP in AM tomorrow.  Continue IV antibiotics  Re-scan abdomen pelvis today, remove or replace drain based on results of the CT scan  AM CBC with dif tomorrow.  Home tomorrow if drain out, otherwise doing well.      Quality Measures:  Quality-Core Measures   Reviewed items::  Labs reviewed and Medications reviewed  Simms catheter::  No Simms  DVT prophylaxis pharmacological::  Enoxaparin (Lovenox)  Ulcer Prophylaxis::  Yes  Antibiotics:  Treating active infection/contamination beyond 24 hours perioperative coverage and Treating fecal contamination      Discussed patient condition with Patient

## 2018-11-01 NOTE — PROGRESS NOTES
Bedside report received.  Assessment complete.  A&O x 4. Patient calls appropriately.  Patient mobilizes up self.  Patient has 3/10 pain. Declines interventions.  Denies N&V. Tolerating low fiber gi soft diet.  IR drain incision dressing clean, dry, and intact. Drain putting out minimal serosanguinous fluid.  + void, + flatus  Patient denies SOB.  SCD's off, patient mobilizes frequently.  Review plan with of care with patient. Call light and personal belongings with in reach. Hourly rounding in place. All needs met at this time.

## 2018-11-01 NOTE — CARE PLAN
Problem: Pain Management  Goal: Pain level will decrease to patient's comfort goal  Outcome: PROGRESSING AS EXPECTED  Patient educated on pain medications and alternatives.     Problem: Mobility  Goal: Risk for activity intolerance will decrease  Outcome: PROGRESSING AS EXPECTED  Patient encouraged to ambulate at least 3x a day.

## 2018-11-01 NOTE — PROGRESS NOTES
11/1/18 Patient recieved Lovenox this morning therefore procedure will be for 11/2/18. Per Dr. Oden it will be an abscess o gram with possible drain removal.  TRICIA Ramirez will have patient NPO midnight and Lovenox to be held.

## 2018-11-01 NOTE — CARE PLAN
Problem: Safety  Goal: Will remain free from falls  Outcome: PROGRESSING AS EXPECTED  Will educate pt. On need to utilize call light for assistance and before getting up.   Bed locked and in lowest position, Non-slid slippers on.    Problem: Pain Management  Goal: Pain level will decrease to patient's comfort goal  Outcome: PROGRESSING AS EXPECTED  Will encourage pt to ambulate, Will medicate per MAR, and will provide non-pharmacologic pain relief measures.

## 2018-11-01 NOTE — PROGRESS NOTES
Bedside report received.  Assessment complete.  A&O x 4. Patient calls appropriately.  Patient up self.   Patient has 2/10 pain. Pt declines interventions other than rest.  Denies N&V. Tolerating Low fiber Gi soft diet, started 10/29/2018 at 0850.  IR drain in left buttocks, flushed per order with no resistance. Dressing is clean, dry, and intact.  + void, + flatus  Patient denies SOB.  Patient laying in bed resting.  Review plan with of care with patient. Call light and personal belongings with in reach. Hourly rounding in place. All needs met at this time.

## 2018-11-02 ENCOUNTER — APPOINTMENT (OUTPATIENT)
Dept: RADIOLOGY | Facility: MEDICAL CENTER | Age: 58
DRG: 357 | End: 2018-11-02
Attending: SURGERY
Payer: COMMERCIAL

## 2018-11-02 LAB
ANION GAP SERPL CALC-SCNC: 8 MMOL/L (ref 0–11.9)
BASOPHILS # BLD AUTO: 0.8 % (ref 0–1.8)
BASOPHILS # BLD: 0.05 K/UL (ref 0–0.12)
BUN SERPL-MCNC: 4 MG/DL (ref 8–22)
CALCIUM SERPL-MCNC: 8.6 MG/DL (ref 8.5–10.5)
CHLORIDE SERPL-SCNC: 107 MMOL/L (ref 96–112)
CO2 SERPL-SCNC: 27 MMOL/L (ref 20–33)
CREAT SERPL-MCNC: 0.59 MG/DL (ref 0.5–1.4)
EOSINOPHIL # BLD AUTO: 0.12 K/UL (ref 0–0.51)
EOSINOPHIL NFR BLD: 1.9 % (ref 0–6.9)
ERYTHROCYTE [DISTWIDTH] IN BLOOD BY AUTOMATED COUNT: 46.5 FL (ref 35.9–50)
GLUCOSE SERPL-MCNC: 93 MG/DL (ref 65–99)
HCT VFR BLD AUTO: 34.2 % (ref 37–47)
HGB BLD-MCNC: 11 G/DL (ref 12–16)
IMM GRANULOCYTES # BLD AUTO: 0.05 K/UL (ref 0–0.11)
IMM GRANULOCYTES NFR BLD AUTO: 0.8 % (ref 0–0.9)
LYMPHOCYTES # BLD AUTO: 1.72 K/UL (ref 1–4.8)
LYMPHOCYTES NFR BLD: 26.9 % (ref 22–41)
MCH RBC QN AUTO: 28.1 PG (ref 27–33)
MCHC RBC AUTO-ENTMCNC: 32.2 G/DL (ref 33.6–35)
MCV RBC AUTO: 87.2 FL (ref 81.4–97.8)
MONOCYTES # BLD AUTO: 0.39 K/UL (ref 0–0.85)
MONOCYTES NFR BLD AUTO: 6.1 % (ref 0–13.4)
NEUTROPHILS # BLD AUTO: 4.07 K/UL (ref 2–7.15)
NEUTROPHILS NFR BLD: 63.5 % (ref 44–72)
NRBC # BLD AUTO: 0 K/UL
NRBC BLD-RTO: 0 /100 WBC
PLATELET # BLD AUTO: 368 K/UL (ref 164–446)
PMV BLD AUTO: 9.2 FL (ref 9–12.9)
POTASSIUM SERPL-SCNC: 3.3 MMOL/L (ref 3.6–5.5)
RBC # BLD AUTO: 3.92 M/UL (ref 4.2–5.4)
SODIUM SERPL-SCNC: 142 MMOL/L (ref 135–145)
WBC # BLD AUTO: 6.4 K/UL (ref 4.8–10.8)

## 2018-11-02 PROCEDURE — 700105 HCHG RX REV CODE 258: Performed by: SURGERY

## 2018-11-02 PROCEDURE — 700101 HCHG RX REV CODE 250: Performed by: SURGERY

## 2018-11-02 PROCEDURE — 770006 HCHG ROOM/CARE - MED/SURG/GYN SEMI*

## 2018-11-02 PROCEDURE — A9270 NON-COVERED ITEM OR SERVICE: HCPCS | Performed by: SURGERY

## 2018-11-02 PROCEDURE — 80048 BASIC METABOLIC PNL TOTAL CA: CPT

## 2018-11-02 PROCEDURE — 700111 HCHG RX REV CODE 636 W/ 250 OVERRIDE (IP): Performed by: RADIOLOGY

## 2018-11-02 PROCEDURE — 36415 COLL VENOUS BLD VENIPUNCTURE: CPT

## 2018-11-02 PROCEDURE — 700101 HCHG RX REV CODE 250

## 2018-11-02 PROCEDURE — 0WPJX0Z REMOVAL OF DRAINAGE DEVICE FROM PELVIC CAVITY, EXTERNAL APPROACH: ICD-10-PCS | Performed by: RADIOLOGY

## 2018-11-02 PROCEDURE — 0W9H30Z DRAINAGE OF RETROPERITONEUM WITH DRAINAGE DEVICE, PERCUTANEOUS APPROACH: ICD-10-PCS | Performed by: RADIOLOGY

## 2018-11-02 PROCEDURE — 700102 HCHG RX REV CODE 250 W/ 637 OVERRIDE(OP): Performed by: SURGERY

## 2018-11-02 PROCEDURE — 700111 HCHG RX REV CODE 636 W/ 250 OVERRIDE (IP): Performed by: SURGERY

## 2018-11-02 PROCEDURE — 99153 MOD SED SAME PHYS/QHP EA: CPT

## 2018-11-02 PROCEDURE — 700111 HCHG RX REV CODE 636 W/ 250 OVERRIDE (IP)

## 2018-11-02 PROCEDURE — 85025 COMPLETE CBC W/AUTO DIFF WBC: CPT

## 2018-11-02 RX ORDER — MIDAZOLAM HYDROCHLORIDE 1 MG/ML
.5-2 INJECTION INTRAMUSCULAR; INTRAVENOUS PRN
Status: ACTIVE | OUTPATIENT
Start: 2018-11-02 | End: 2018-11-02

## 2018-11-02 RX ORDER — LIDOCAINE HYDROCHLORIDE 10 MG/ML
INJECTION, SOLUTION EPIDURAL; INFILTRATION; INTRACAUDAL; PERINEURAL
Status: COMPLETED
Start: 2018-11-02 | End: 2018-11-02

## 2018-11-02 RX ORDER — NALOXONE HYDROCHLORIDE 0.4 MG/ML
INJECTION, SOLUTION INTRAMUSCULAR; INTRAVENOUS; SUBCUTANEOUS
Status: COMPLETED
Start: 2018-11-02 | End: 2018-11-02

## 2018-11-02 RX ORDER — ONDANSETRON 2 MG/ML
4 INJECTION INTRAMUSCULAR; INTRAVENOUS PRN
Status: ACTIVE | OUTPATIENT
Start: 2018-11-02 | End: 2018-11-02

## 2018-11-02 RX ORDER — MIDAZOLAM HYDROCHLORIDE 1 MG/ML
INJECTION INTRAMUSCULAR; INTRAVENOUS
Status: COMPLETED
Start: 2018-11-02 | End: 2018-11-02

## 2018-11-02 RX ORDER — DEXTROSE MONOHYDRATE, SODIUM CHLORIDE, AND POTASSIUM CHLORIDE 50; 2.98; 4.5 G/1000ML; G/1000ML; G/1000ML
INJECTION, SOLUTION INTRAVENOUS CONTINUOUS
Status: DISCONTINUED | OUTPATIENT
Start: 2018-11-02 | End: 2018-11-03

## 2018-11-02 RX ORDER — SODIUM CHLORIDE 9 MG/ML
500 INJECTION, SOLUTION INTRAVENOUS
Status: ACTIVE | OUTPATIENT
Start: 2018-11-02 | End: 2018-11-02

## 2018-11-02 RX ORDER — FLUMAZENIL 0.1 MG/ML
INJECTION INTRAVENOUS
Status: COMPLETED
Start: 2018-11-02 | End: 2018-11-02

## 2018-11-02 RX ADMIN — LIDOCAINE HYDROCHLORIDE: 10 INJECTION, SOLUTION EPIDURAL; INFILTRATION; INTRACAUDAL; PERINEURAL at 14:00

## 2018-11-02 RX ADMIN — OXYCODONE AND ACETAMINOPHEN 0.5 TABLET: 5; 325 TABLET ORAL at 06:58

## 2018-11-02 RX ADMIN — MIDAZOLAM 2 MG: 1 INJECTION INTRAMUSCULAR; INTRAVENOUS at 14:34

## 2018-11-02 RX ADMIN — POTASSIUM CHLORIDE, DEXTROSE MONOHYDRATE AND SODIUM CHLORIDE: 300; 5; 450 INJECTION, SOLUTION INTRAVENOUS at 07:04

## 2018-11-02 RX ADMIN — FENTANYL CITRATE 50 MCG: 50 INJECTION, SOLUTION INTRAMUSCULAR; INTRAVENOUS at 14:34

## 2018-11-02 RX ADMIN — OXYCODONE AND ACETAMINOPHEN 0.5 TABLET: 5; 325 TABLET ORAL at 16:01

## 2018-11-02 RX ADMIN — OXYCODONE AND ACETAMINOPHEN 0.5 TABLET: 5; 325 TABLET ORAL at 11:07

## 2018-11-02 RX ADMIN — FENTANYL CITRATE 50 MCG: 50 INJECTION, SOLUTION INTRAMUSCULAR; INTRAVENOUS at 14:40

## 2018-11-02 RX ADMIN — MIDAZOLAM 2 MG: 1 INJECTION INTRAMUSCULAR; INTRAVENOUS at 14:40

## 2018-11-02 RX ADMIN — AMPICILLIN SODIUM AND SULBACTAM SODIUM 3 G: 2; 1 INJECTION, POWDER, FOR SOLUTION INTRAMUSCULAR; INTRAVENOUS at 23:18

## 2018-11-02 RX ADMIN — MIDAZOLAM 2 MG: 1 INJECTION INTRAMUSCULAR; INTRAVENOUS at 14:50

## 2018-11-02 RX ADMIN — STANDARDIZED SENNA CONCENTRATE AND DOCUSATE SODIUM 2 TABLET: 8.6; 5 TABLET, FILM COATED ORAL at 18:16

## 2018-11-02 RX ADMIN — OXYCODONE AND ACETAMINOPHEN 0.5 TABLET: 5; 325 TABLET ORAL at 03:00

## 2018-11-02 RX ADMIN — OXYCODONE AND ACETAMINOPHEN 1 TABLET: 5; 325 TABLET ORAL at 21:01

## 2018-11-02 RX ADMIN — POTASSIUM CHLORIDE, DEXTROSE MONOHYDRATE AND SODIUM CHLORIDE: 300; 5; 450 INJECTION, SOLUTION INTRAVENOUS at 18:21

## 2018-11-02 RX ADMIN — AMPICILLIN SODIUM AND SULBACTAM SODIUM 3 G: 2; 1 INJECTION, POWDER, FOR SOLUTION INTRAMUSCULAR; INTRAVENOUS at 18:16

## 2018-11-02 RX ADMIN — FENTANYL CITRATE 50 MCG: 50 INJECTION, SOLUTION INTRAMUSCULAR; INTRAVENOUS at 14:45

## 2018-11-02 RX ADMIN — FENTANYL CITRATE 50 MCG: 50 INJECTION, SOLUTION INTRAMUSCULAR; INTRAVENOUS at 14:49

## 2018-11-02 RX ADMIN — AMPICILLIN SODIUM AND SULBACTAM SODIUM 3 G: 2; 1 INJECTION, POWDER, FOR SOLUTION INTRAMUSCULAR; INTRAVENOUS at 11:15

## 2018-11-02 RX ADMIN — LOSARTAN POTASSIUM 25 MG: 50 TABLET ORAL at 06:57

## 2018-11-02 RX ADMIN — OMEPRAZOLE 20 MG: 20 CAPSULE, DELAYED RELEASE ORAL at 06:57

## 2018-11-02 RX ADMIN — POTASSIUM CHLORIDE 20 MEQ: 1500 TABLET, EXTENDED RELEASE ORAL at 18:16

## 2018-11-02 RX ADMIN — AMPICILLIN SODIUM AND SULBACTAM SODIUM 3 G: 2; 1 INJECTION, POWDER, FOR SOLUTION INTRAMUSCULAR; INTRAVENOUS at 06:58

## 2018-11-02 RX ADMIN — AMPICILLIN SODIUM AND SULBACTAM SODIUM 3 G: 2; 1 INJECTION, POWDER, FOR SOLUTION INTRAMUSCULAR; INTRAVENOUS at 00:46

## 2018-11-02 ASSESSMENT — PAIN SCALES - GENERAL
PAINLEVEL_OUTOF10: 3
PAINLEVEL_OUTOF10: 4
PAINLEVEL_OUTOF10: 4
PAINLEVEL_OUTOF10: 6
PAINLEVEL_OUTOF10: 5
PAINLEVEL_OUTOF10: 3
PAINLEVEL_OUTOF10: 0

## 2018-11-02 ASSESSMENT — ENCOUNTER SYMPTOMS
FEVER: 0
NAUSEA: 0
CHILLS: 0
VOMITING: 0

## 2018-11-02 NOTE — OR SURGEON
"Immediate Post- Operative Note        PostOp Diagnosis: LEFT PELVIC JOHANA-DIVERTICULAR FLUID COLLECTION WITH SMALL AIR BUBBLE. MOSTLY DRAINED WITH INITIAL 12F CATHETER. RESIDUAL \"COLLECTION\" APPEARS TO BE PHLEGMON WITH NO PUS OBTAINED AFTER PLACEMENT OF NEW 8F LOCKING LOOP CATHETER.       Procedure(s): CT GUIDED CATHETER DRAINAGE WITH PLACEMENT OF 8 [place] LOCKING LOOP CATHETER LEFT TRANSGLUTEAL.    INITIALLY, PROMPT BLEEDING AROUND GUIDEWIRE FROM PUNCTURE SITE C/W VESSEL TRAVERSAL. CATHETER PLACED.  SOME BLOOD RETURN THRU CATHETER, CLOTTING.     THEREFORE, CATHETER WILL NOT BE CONNECTED TO SUCTION BULB OR GRAVITY DRAINAGE BUT RATHER CAPPED AND LEFT IN PLACE TO TAMPONADE THE TRACT.       Estimated Blood Loss: <10CC        Complications: BLEED FROM PUNCTURE SITE C/W VESSEL TRAVERSAL LIKELY A BRANCH OF SUPERIOR GLUTEAL ARTERY.         11/2/2018     2:58 PM     Felice David      "

## 2018-11-02 NOTE — CARE PLAN
Problem: Safety  Goal: Will remain free from injury  Updated about POC. Reinforce call light use. Pt acknowledged understanding    Problem: Pain Management  Goal: Pain level will decrease to patient's comfort goal  Pain controlled with percocet 1 tab prn

## 2018-11-02 NOTE — PROGRESS NOTES
Assumed care at 1845. Pt resting in bed. A&ox 4   Ambulating independently  Tolerating diet. NPO after midnight for procedure in AM  O2 on RA  IR drain to left buttock. Flushed with 10cc NS per order. Tolerated well  Pain controlled  +BM. Refusing stool softeners.  Voiding adequately  Call light within reach. Hourly rounding in place

## 2018-11-02 NOTE — CARE PLAN
Problem: Nutritional:  Goal: Achieve adequate nutritional intake  Patient will consume 50% of meals   Outcome: MET Date Met: 11/02/18  PO >50% for the last five meals.

## 2018-11-02 NOTE — CARE PLAN
Problem: Pain Management  Goal: Pain level will decrease to patient's comfort goal  Outcome: PROGRESSING AS EXPECTED  Pain well controlled with PO percocet. Heat packs offered and in use, effective for pain per pt. Extra pillows in use. Providing rest and quiet environment.     Problem: Mobility  Goal: Risk for activity intolerance will decrease  Outcome: PROGRESSING AS EXPECTED  Mobility assessed. Pt up self, steady gait noted. Rest periods provided. Pain well controlled to help promote ambulation.

## 2018-11-02 NOTE — PROGRESS NOTES
Left buttock drain secured in place. Orders for no flush/suction. Drain to stay capped. No drainage noted at this time.

## 2018-11-02 NOTE — PROGRESS NOTES
Surgical Progress Note    Author: Justus Will Date & Time created: 2018   7:42 AM     Interval Events:  Did not undergo drain repositioning vs. removal yesterday.  Now NPO since prior to midnight last night, Lovenox held yesterday.  Complains of pain at drain exit site only.    Review of Systems   Constitutional: Negative for chills, fever and malaise/fatigue.   Gastrointestinal: Negative for nausea and vomiting.     Hemodynamics:  Temp (24hrs), Av.9 °C (98.4 °F), Min:36.6 °C (97.9 °F), Max:37.1 °C (98.8 °F)  Temperature: 36.9 °C (98.5 °F)  Pulse  Av.8  Min: 69  Max: 113   Blood Pressure: 132/89     Respiratory:    Respiration: 16, Pulse Oximetry: 95 %           Neuro:  GCS = 15       Fluids:    Intake/Output Summary (Last 24 hours) at 18 0742  Last data filed at 18 0728   Gross per 24 hour   Intake             1360 ml   Output              300 ml   Net             1060 ml        Current Diet Order   Procedures   • Diet NPO     Physical Exam   Constitutional: She is oriented to person, place, and time. She appears well-developed and well-nourished. No distress.   HENT:   Head: Normocephalic.   Eyes: Pupils are equal, round, and reactive to light. No scleral icterus.   Cardiovascular: Normal rate, regular rhythm and normal heart sounds.    Pulmonary/Chest: Effort normal and breath sounds normal. No respiratory distress.   Abdominal: Soft. Bowel sounds are normal. She exhibits no distension. There is no tenderness. There is no rebound and no guarding.   Drain with 0 output in last 24 hours   Neurological: She is alert and oriented to person, place, and time.   Skin: Skin is warm and dry.   Psychiatric: She has a normal mood and affect. Her behavior is normal.     Labs:  Recent Results (from the past 24 hour(s))   BASIC METABOLIC PANEL    Collection Time: 18  3:57 AM   Result Value Ref Range    Sodium 142 135 - 145 mmol/L    Potassium 3.3 (L) 3.6 - 5.5 mmol/L    Chloride 107 96 -  112 mmol/L    Co2 27 20 - 33 mmol/L    Glucose 93 65 - 99 mg/dL    Bun 4 (L) 8 - 22 mg/dL    Creatinine 0.59 0.50 - 1.40 mg/dL    Calcium 8.6 8.5 - 10.5 mg/dL    Anion Gap 8.0 0.0 - 11.9   CBC WITH DIFFERENTIAL    Collection Time: 11/02/18  3:57 AM   Result Value Ref Range    WBC 6.4 4.8 - 10.8 K/uL    RBC 3.92 (L) 4.20 - 5.40 M/uL    Hemoglobin 11.0 (L) 12.0 - 16.0 g/dL    Hematocrit 34.2 (L) 37.0 - 47.0 %    MCV 87.2 81.4 - 97.8 fL    MCH 28.1 27.0 - 33.0 pg    MCHC 32.2 (L) 33.6 - 35.0 g/dL    RDW 46.5 35.9 - 50.0 fL    Platelet Count 368 164 - 446 K/uL    MPV 9.2 9.0 - 12.9 fL    Neutrophils-Polys 63.50 44.00 - 72.00 %    Lymphocytes 26.90 22.00 - 41.00 %    Monocytes 6.10 0.00 - 13.40 %    Eosinophils 1.90 0.00 - 6.90 %    Basophils 0.80 0.00 - 1.80 %    Immature Granulocytes 0.80 0.00 - 0.90 %    Nucleated RBC 0.00 /100 WBC    Neutrophils (Absolute) 4.07 2.00 - 7.15 K/uL    Lymphs (Absolute) 1.72 1.00 - 4.80 K/uL    Monos (Absolute) 0.39 0.00 - 0.85 K/uL    Eos (Absolute) 0.12 0.00 - 0.51 K/uL    Baso (Absolute) 0.05 0.00 - 0.12 K/uL    Immature Granulocytes (abs) 0.05 0.00 - 0.11 K/uL    NRBC (Absolute) 0.00 K/uL   ESTIMATED GFR    Collection Time: 11/02/18  3:57 AM   Result Value Ref Range    GFR If African American >60 >60 mL/min/1.73 m 2    GFR If Non African American >60 >60 mL/min/1.73 m 2     Medical Decision Making, by Problem:  1.  Recurrent diverticular abscess, now status drain placement 5 days ago, now with no drainage for 24 hours.  2.  Hypertension.  3.  Gastroesophageal reflux disease.  4.  Asthma.  5.  Urinary incontinence  6.  Hypokalemia        Plan:  NPO, Lovenox held for drain re-placement vs removal today  Continue IV antibiotics  Re-scan abdomen pelvis today, remove or replace drain based on results of the CT scan  AM CBC with dif tomorrow, if still in house  Home tomorrow or possibly this evening if drain out, otherwise doing well      Quality Measures:  Quality-Core Measures   Reviewed  items::  Labs reviewed and Medications reviewed  Simms catheter::  No Simms  DVT prophylaxis pharmacological::  Contraindicated - High bleeding risk  DVT prophylaxis - mechanical:  SCDs  Ulcer Prophylaxis::  Yes  Antibiotics:  Treating active infection/contamination beyond 24 hours perioperative coverage and Treating fecal contamination      Discussed patient condition with Patient

## 2018-11-02 NOTE — PROGRESS NOTES
Spoke with CT scan. Interventional radiology to do CT of pelvis then reposition/remove drain based on CT results.

## 2018-11-02 NOTE — PROGRESS NOTES
CT Procedure Note:    Site Marked and Confirmed with MD, patient and RN pre procedure.  performed RETROPERITONEAL DRAIN EXCHANGE. The pt tolerated the procedure well; ETCo2 baseline 20-39, with consistent waveform during the procedure.  PERCUSTAY AND 1 SUTURE applied to LEFT POSTERIOR BACK, CDI and soft; Pt alert and verbally appropriate post procedure, vital signs stable during procedure and transport, see flow sheet for vital signs.  Report given to TISHA CLARKE.  RN transported pt to T408/1 with Sao2 monitor 94 .      Procedure End Time: 1515      NO SUCTION TO CATHETER PER DR. PETERS, BEDSIDE NURSE STEPHEN UPDATED AT BEDSIDE.

## 2018-11-02 NOTE — PROGRESS NOTES
Pt A&O x 4.  Reporting 3/10 pain. Pt declines interventions at this time. Pt requesting to stay on top of pain regimen, stating it has been extremely effective.  Left buttock IR drain in place. Flushed with 10cc NS per order. 0mL output overnight.  Pt NPO sips with meds for CT scan today.  +void. LBM 11/1/18 per pt. +bowel sounds. Denies n/v.  POC discussed with pt. All needs addressed at this time.

## 2018-11-03 LAB
ANION GAP SERPL CALC-SCNC: 7 MMOL/L (ref 0–11.9)
BASOPHILS # BLD AUTO: 0.5 % (ref 0–1.8)
BASOPHILS # BLD: 0.05 K/UL (ref 0–0.12)
BUN SERPL-MCNC: 5 MG/DL (ref 8–22)
CALCIUM SERPL-MCNC: 8.9 MG/DL (ref 8.5–10.5)
CHLORIDE SERPL-SCNC: 106 MMOL/L (ref 96–112)
CO2 SERPL-SCNC: 27 MMOL/L (ref 20–33)
CREAT SERPL-MCNC: 0.7 MG/DL (ref 0.5–1.4)
EOSINOPHIL # BLD AUTO: 0.15 K/UL (ref 0–0.51)
EOSINOPHIL NFR BLD: 1.6 % (ref 0–6.9)
ERYTHROCYTE [DISTWIDTH] IN BLOOD BY AUTOMATED COUNT: 47.4 FL (ref 35.9–50)
GLUCOSE SERPL-MCNC: 97 MG/DL (ref 65–99)
HCT VFR BLD AUTO: 35 % (ref 37–47)
HGB BLD-MCNC: 11.2 G/DL (ref 12–16)
IMM GRANULOCYTES # BLD AUTO: 0.06 K/UL (ref 0–0.11)
IMM GRANULOCYTES NFR BLD AUTO: 0.6 % (ref 0–0.9)
LYMPHOCYTES # BLD AUTO: 1.53 K/UL (ref 1–4.8)
LYMPHOCYTES NFR BLD: 16.3 % (ref 22–41)
MCH RBC QN AUTO: 27.9 PG (ref 27–33)
MCHC RBC AUTO-ENTMCNC: 32 G/DL (ref 33.6–35)
MCV RBC AUTO: 87.3 FL (ref 81.4–97.8)
MONOCYTES # BLD AUTO: 0.45 K/UL (ref 0–0.85)
MONOCYTES NFR BLD AUTO: 4.8 % (ref 0–13.4)
NEUTROPHILS # BLD AUTO: 7.14 K/UL (ref 2–7.15)
NEUTROPHILS NFR BLD: 76.2 % (ref 44–72)
NRBC # BLD AUTO: 0 K/UL
NRBC BLD-RTO: 0 /100 WBC
PLATELET # BLD AUTO: 382 K/UL (ref 164–446)
PMV BLD AUTO: 9 FL (ref 9–12.9)
POTASSIUM SERPL-SCNC: 4.3 MMOL/L (ref 3.6–5.5)
RBC # BLD AUTO: 4.01 M/UL (ref 4.2–5.4)
SODIUM SERPL-SCNC: 140 MMOL/L (ref 135–145)
WBC # BLD AUTO: 9.4 K/UL (ref 4.8–10.8)

## 2018-11-03 PROCEDURE — 700102 HCHG RX REV CODE 250 W/ 637 OVERRIDE(OP): Performed by: SURGERY

## 2018-11-03 PROCEDURE — 700111 HCHG RX REV CODE 636 W/ 250 OVERRIDE (IP): Performed by: SURGERY

## 2018-11-03 PROCEDURE — 770006 HCHG ROOM/CARE - MED/SURG/GYN SEMI*

## 2018-11-03 PROCEDURE — 700105 HCHG RX REV CODE 258: Performed by: SURGERY

## 2018-11-03 PROCEDURE — 85025 COMPLETE CBC W/AUTO DIFF WBC: CPT

## 2018-11-03 PROCEDURE — 700105 HCHG RX REV CODE 258

## 2018-11-03 PROCEDURE — 36415 COLL VENOUS BLD VENIPUNCTURE: CPT

## 2018-11-03 PROCEDURE — A9270 NON-COVERED ITEM OR SERVICE: HCPCS | Performed by: SURGERY

## 2018-11-03 PROCEDURE — 700101 HCHG RX REV CODE 250: Performed by: SURGERY

## 2018-11-03 PROCEDURE — 80048 BASIC METABOLIC PNL TOTAL CA: CPT

## 2018-11-03 RX ORDER — SODIUM CHLORIDE 9 MG/ML
INJECTION, SOLUTION INTRAVENOUS
Status: COMPLETED
Start: 2018-11-03 | End: 2018-11-03

## 2018-11-03 RX ADMIN — OXYCODONE AND ACETAMINOPHEN 0.5 TABLET: 5; 325 TABLET ORAL at 03:30

## 2018-11-03 RX ADMIN — OXYCODONE AND ACETAMINOPHEN 1 TABLET: 5; 325 TABLET ORAL at 20:06

## 2018-11-03 RX ADMIN — AMPICILLIN SODIUM AND SULBACTAM SODIUM 3 G: 2; 1 INJECTION, POWDER, FOR SOLUTION INTRAMUSCULAR; INTRAVENOUS at 06:03

## 2018-11-03 RX ADMIN — POTASSIUM CHLORIDE, DEXTROSE MONOHYDRATE AND SODIUM CHLORIDE: 300; 5; 450 INJECTION, SOLUTION INTRAVENOUS at 06:03

## 2018-11-03 RX ADMIN — POTASSIUM CHLORIDE 20 MEQ: 1500 TABLET, EXTENDED RELEASE ORAL at 17:51

## 2018-11-03 RX ADMIN — LOSARTAN POTASSIUM 25 MG: 50 TABLET ORAL at 06:03

## 2018-11-03 RX ADMIN — AMPICILLIN SODIUM AND SULBACTAM SODIUM 3 G: 2; 1 INJECTION, POWDER, FOR SOLUTION INTRAMUSCULAR; INTRAVENOUS at 11:59

## 2018-11-03 RX ADMIN — AMPICILLIN SODIUM AND SULBACTAM SODIUM 3 G: 2; 1 INJECTION, POWDER, FOR SOLUTION INTRAMUSCULAR; INTRAVENOUS at 17:50

## 2018-11-03 RX ADMIN — POTASSIUM CHLORIDE 20 MEQ: 1500 TABLET, EXTENDED RELEASE ORAL at 06:04

## 2018-11-03 RX ADMIN — OMEPRAZOLE 20 MG: 20 CAPSULE, DELAYED RELEASE ORAL at 06:04

## 2018-11-03 RX ADMIN — AMPICILLIN SODIUM AND SULBACTAM SODIUM 3 G: 2; 1 INJECTION, POWDER, FOR SOLUTION INTRAMUSCULAR; INTRAVENOUS at 23:44

## 2018-11-03 RX ADMIN — STANDARDIZED SENNA CONCENTRATE AND DOCUSATE SODIUM 2 TABLET: 8.6; 5 TABLET, FILM COATED ORAL at 17:50

## 2018-11-03 RX ADMIN — STANDARDIZED SENNA CONCENTRATE AND DOCUSATE SODIUM 2 TABLET: 8.6; 5 TABLET, FILM COATED ORAL at 06:03

## 2018-11-03 RX ADMIN — SODIUM CHLORIDE 1000 ML: 9 INJECTION, SOLUTION INTRAVENOUS at 12:04

## 2018-11-03 ASSESSMENT — ENCOUNTER SYMPTOMS
GASTROINTESTINAL NEGATIVE: 1
CARDIOVASCULAR NEGATIVE: 1
RESPIRATORY NEGATIVE: 1

## 2018-11-03 ASSESSMENT — PAIN SCALES - GENERAL
PAINLEVEL_OUTOF10: 2
PAINLEVEL_OUTOF10: 4
PAINLEVEL_OUTOF10: 2
PAINLEVEL_OUTOF10: 6
PAINLEVEL_OUTOF10: 3

## 2018-11-03 NOTE — PROGRESS NOTES
Surgical Progress Note    Author: Venkatesh Monroy Date & Time created: 11/3/2018   10:30 AM     Interval Events:    Review of Systems   Respiratory: Negative.    Cardiovascular: Negative.    Gastrointestinal: Negative.      Hemodynamics:  Temp (24hrs), Av.8 °C (98.3 °F), Min:36.7 °C (98 °F), Max:36.9 °C (98.5 °F)  Temperature: 36.9 °C (98.5 °F)  Pulse  Av.2  Min: 61  Max: 113Heart Rate (Monitored): 80  Blood Pressure: 135/83, NIBP: 146/85     Respiratory:    Respiration: 16, Pulse Oximetry: 94 %        RUL Breath Sounds: Clear, RML Breath Sounds: Clear, RLL Breath Sounds: Clear, PEG Breath Sounds: Clear, LLL Breath Sounds: Clear  Neuro:  GCS       Fluids:    Intake/Output Summary (Last 24 hours) at 18 1030  Last data filed at 18 0800   Gross per 24 hour   Intake             2670 ml   Output              600 ml   Net             2070 ml        Current Diet Order   Procedures   • Diet Order Low Fiber(GI Soft)     Physical Exam   Cardiovascular: Normal rate and normal heart sounds.    Pulmonary/Chest: Effort normal and breath sounds normal.   Abdominal: Soft. Bowel sounds are normal. She exhibits no distension. There is no tenderness.   Capped drain in placed, no hematoma     Labs:  Recent Results (from the past 24 hour(s))   CBC WITH DIFFERENTIAL    Collection Time: 18  3:30 AM   Result Value Ref Range    WBC 9.4 4.8 - 10.8 K/uL    RBC 4.01 (L) 4.20 - 5.40 M/uL    Hemoglobin 11.2 (L) 12.0 - 16.0 g/dL    Hematocrit 35.0 (L) 37.0 - 47.0 %    MCV 87.3 81.4 - 97.8 fL    MCH 27.9 27.0 - 33.0 pg    MCHC 32.0 (L) 33.6 - 35.0 g/dL    RDW 47.4 35.9 - 50.0 fL    Platelet Count 382 164 - 446 K/uL    MPV 9.0 9.0 - 12.9 fL    Neutrophils-Polys 76.20 (H) 44.00 - 72.00 %    Lymphocytes 16.30 (L) 22.00 - 41.00 %    Monocytes 4.80 0.00 - 13.40 %    Eosinophils 1.60 0.00 - 6.90 %    Basophils 0.50 0.00 - 1.80 %    Immature Granulocytes 0.60 0.00 - 0.90 %    Nucleated RBC 0.00 /100 WBC    Neutrophils (Absolute)  7.14 2.00 - 7.15 K/uL    Lymphs (Absolute) 1.53 1.00 - 4.80 K/uL    Monos (Absolute) 0.45 0.00 - 0.85 K/uL    Eos (Absolute) 0.15 0.00 - 0.51 K/uL    Baso (Absolute) 0.05 0.00 - 0.12 K/uL    Immature Granulocytes (abs) 0.06 0.00 - 0.11 K/uL    NRBC (Absolute) 0.00 K/uL   BASIC METABOLIC PANEL    Collection Time: 11/03/18  3:30 AM   Result Value Ref Range    Sodium 140 135 - 145 mmol/L    Potassium 4.3 3.6 - 5.5 mmol/L    Chloride 106 96 - 112 mmol/L    Co2 27 20 - 33 mmol/L    Glucose 97 65 - 99 mg/dL    Bun 5 (L) 8 - 22 mg/dL    Creatinine 0.70 0.50 - 1.40 mg/dL    Calcium 8.9 8.5 - 10.5 mg/dL    Anion Gap 7.0 0.0 - 11.9   ESTIMATED GFR    Collection Time: 11/03/18  3:30 AM   Result Value Ref Range    GFR If African American >60 >60 mL/min/1.73 m 2    GFR If Non African American >60 >60 mL/min/1.73 m 2     Medical Decision Making, by Problem:  There are no active hospital problems to display for this patient.    Plan:  Keep drain in place and capped. For radiology to recheck on Monday. Stable.    Quality Measures:  Quality-Core Measures   Simms catheter::  No Simms and Neurogenic Bladder  DVT prophylaxis pharmacological::  Contraindicated - High bleeding risk  Antibiotics:  Treating active infection/contamination beyond 24 hours perioperative coverage      Discussed patient condition with Patient

## 2018-11-03 NOTE — PROGRESS NOTES
Assumed care of patient at 1900    Pt is A&O X 4  Pain 5/10.  Pt medicated per MAR  Pt denies nausea  Tolerating Diet  Lt buttock drain insertion.  Dressing in place, scant amount of drainage.  Drain capped.  Positive Urine Void   Positive Flatus  Positive BM Void  Up self   SCD's refused  Bed in lowest position and locked.    ** Bed alarm not indicated per Reanna Red assessment.  CLIP, hourly rounding in place    Reviewed plan of care with patient, bed in lowest position and locked, pt resting comfortably now, call light within reach, all needs met at this time

## 2018-11-03 NOTE — PROGRESS NOTES
Pt A&O x 4.  Reporting 2/10 pain. Pt declines interventions at this time.   Left buttock drain in place. Capped at this time. Orders for no flush/suction. Drain to stay capped at all times. No drainage noted at this time.  Pt up to restroom self, steady gait noted.   Pt tolerating GI soft diet. LBM 11/2/18 per pt. +flatus.  Plan for CTA possibly Monday.  POC discussed with pt. All needs addressed at this time.

## 2018-11-03 NOTE — CARE PLAN
Problem: Safety  Goal: Will remain free from falls  Outcome: PROGRESSING AS EXPECTED  Pt remains free from fall at this time.  Pt educated on fall risk.  Pt demonstrates appropriate use of call light to call for assistance.  CLIP, hourly rounding in place    Problem: Venous Thromboembolism (VTW)/Deep Vein Thrombosis (DVT) Prevention:  Goal: Patient will participate in Venous Thrombosis (VTE)/Deep Vein Thrombosis (DVT)Prevention Measures  Outcome: PROGRESSING AS EXPECTED   11/02/18 0820 11/02/18 2055   Mechanical/VTE Prophylaxis   Mechanical Prophylaxis  --  SCDs, Sequential Compression Device   SCDs, Sequential Compression Device --  Refused   OTHER   Pharmacologic Prophylaxis Used LMWH: Enoxaparin(Lovenox) --

## 2018-11-04 PROCEDURE — 700111 HCHG RX REV CODE 636 W/ 250 OVERRIDE (IP): Performed by: SURGERY

## 2018-11-04 PROCEDURE — A9270 NON-COVERED ITEM OR SERVICE: HCPCS | Performed by: SURGERY

## 2018-11-04 PROCEDURE — 770006 HCHG ROOM/CARE - MED/SURG/GYN SEMI*

## 2018-11-04 PROCEDURE — 700105 HCHG RX REV CODE 258: Performed by: SURGERY

## 2018-11-04 PROCEDURE — 700105 HCHG RX REV CODE 258

## 2018-11-04 PROCEDURE — 700102 HCHG RX REV CODE 250 W/ 637 OVERRIDE(OP): Performed by: SURGERY

## 2018-11-04 RX ORDER — SODIUM CHLORIDE 9 MG/ML
INJECTION, SOLUTION INTRAVENOUS
Status: COMPLETED
Start: 2018-11-04 | End: 2018-11-04

## 2018-11-04 RX ADMIN — OMEPRAZOLE 20 MG: 20 CAPSULE, DELAYED RELEASE ORAL at 05:39

## 2018-11-04 RX ADMIN — AMPICILLIN SODIUM AND SULBACTAM SODIUM 3 G: 2; 1 INJECTION, POWDER, FOR SOLUTION INTRAMUSCULAR; INTRAVENOUS at 05:39

## 2018-11-04 RX ADMIN — SODIUM CHLORIDE 500 ML: 9 INJECTION, SOLUTION INTRAVENOUS at 05:46

## 2018-11-04 RX ADMIN — LOSARTAN POTASSIUM 25 MG: 50 TABLET ORAL at 05:39

## 2018-11-04 RX ADMIN — OXYCODONE AND ACETAMINOPHEN 1 TABLET: 5; 325 TABLET ORAL at 15:48

## 2018-11-04 RX ADMIN — AMPICILLIN SODIUM AND SULBACTAM SODIUM 3 G: 2; 1 INJECTION, POWDER, FOR SOLUTION INTRAMUSCULAR; INTRAVENOUS at 17:05

## 2018-11-04 RX ADMIN — STANDARDIZED SENNA CONCENTRATE AND DOCUSATE SODIUM 2 TABLET: 8.6; 5 TABLET, FILM COATED ORAL at 05:39

## 2018-11-04 RX ADMIN — OXYCODONE AND ACETAMINOPHEN 0.5 TABLET: 5; 325 TABLET ORAL at 01:43

## 2018-11-04 RX ADMIN — OXYCODONE AND ACETAMINOPHEN 1 TABLET: 5; 325 TABLET ORAL at 21:01

## 2018-11-04 RX ADMIN — AMPICILLIN SODIUM AND SULBACTAM SODIUM 3 G: 2; 1 INJECTION, POWDER, FOR SOLUTION INTRAMUSCULAR; INTRAVENOUS at 12:47

## 2018-11-04 RX ADMIN — OXYCODONE AND ACETAMINOPHEN 0.5 TABLET: 5; 325 TABLET ORAL at 05:45

## 2018-11-04 RX ADMIN — POTASSIUM CHLORIDE 20 MEQ: 1500 TABLET, EXTENDED RELEASE ORAL at 06:00

## 2018-11-04 RX ADMIN — OXYCODONE AND ACETAMINOPHEN 0.5 TABLET: 5; 325 TABLET ORAL at 09:59

## 2018-11-04 RX ADMIN — POTASSIUM CHLORIDE 20 MEQ: 1500 TABLET, EXTENDED RELEASE ORAL at 17:06

## 2018-11-04 ASSESSMENT — ENCOUNTER SYMPTOMS
GASTROINTESTINAL NEGATIVE: 1
CARDIOVASCULAR NEGATIVE: 1
RESPIRATORY NEGATIVE: 1

## 2018-11-04 ASSESSMENT — PAIN SCALES - GENERAL
PAINLEVEL_OUTOF10: 7
PAINLEVEL_OUTOF10: 4
PAINLEVEL_OUTOF10: 6
PAINLEVEL_OUTOF10: 2
PAINLEVEL_OUTOF10: 4
PAINLEVEL_OUTOF10: 2
PAINLEVEL_OUTOF10: 7
PAINLEVEL_OUTOF10: 4

## 2018-11-04 NOTE — PROGRESS NOTES
Surgical Progress Note    Author: Venkatesh Monroy Date & Time created: 2018   8:11 AM     Interval Events:    Review of Systems   Respiratory: Negative.    Cardiovascular: Negative.    Gastrointestinal: Negative.      Hemodynamics:  Temp (24hrs), Av.1 °C (98.8 °F), Min:36.7 °C (98 °F), Max:37.6 °C (99.6 °F)  Temperature: 36.7 °C (98 °F)  Pulse  Av.5  Min: 61  Max: 113   Blood Pressure: 142/78     Respiratory:    Respiration: 16, Pulse Oximetry: 95 %        RUL Breath Sounds: Clear, RML Breath Sounds: Clear, RLL Breath Sounds: Clear, PEG Breath Sounds: Clear, LLL Breath Sounds: Clear  Neuro:  GCS       Fluids:    Intake/Output Summary (Last 24 hours) at 18 0811  Last data filed at 18 0400   Gross per 24 hour   Intake             1740 ml   Output             3100 ml   Net            -1360 ml        Current Diet Order   Procedures   • Diet Order Low Fiber(GI Soft)     Physical Exam   Cardiovascular: Normal rate.    Pulmonary/Chest: Effort normal.   Abdominal: Soft.   No hematoma around drain, or ecchymosis     Labs:  No results found for this or any previous visit (from the past 24 hour(s)).  Medical Decision Making, by Problem:  There are no active hospital problems to display for this patient.    Plan:  For repeat CT tomorrow, stable.    Quality Measures:  Quality-Core Measures    Discussed patient condition with Patient

## 2018-11-04 NOTE — CARE PLAN
Problem: Communication  Goal: The ability to communicate needs accurately and effectively will improve  Outcome: PROGRESSING AS EXPECTED  Patient updated on POC, all questions answered at this time.    Problem: Safety  Goal: Will remain free from injury  Outcome: PROGRESSING AS EXPECTED  Patient has a steady gait, up self. Bed in locked and lowest position, call light within reach.

## 2018-11-04 NOTE — PROGRESS NOTES
"Pt A&O x 4.     Vitals: /78   Pulse 78   Temp 37.3 °C (99.1 °F)   Resp 16   Ht 1.676 m (5' 6\")   Wt 70.4 kg (155 lb 3.3 oz)   SpO2 97%   Breastfeeding? No   BMI 25.05 kg/m²      Pt rates pain 6 out of 10. Medicated per MAR.      Neuro: ARGUETA. Denies new onset of numbness/ tingling.     Cardiac: Denies new onset of chest pain.     Vascular: Pulses 2+ BUE, BLE. No edema noted.     Respiratory: Lungs sound clear to auscultation. On room air.  on, satting in 90's. Denies SOB.     GI: Abdomen soft, non-tender. Normoacitve bowel sounds, + flatus, + BM. Denies nausea/ vomiting.     : Pt voiding adequately.      MSK: Pt up to bathroom up self, tolerating well.     Integumentary: Left buttock IR drain in place, dressing CDI.     Labs noted.     Fall precautions in place: Bed locked in lowest position, Upper bed rails up, treaded socks in place, personal belongings within reach, call light within reach, appropriate mobility signs in place, - bed alarm. Pt calls appropriately.      Pt updated on POC.    "

## 2018-11-04 NOTE — PROGRESS NOTES
Bedside report received.  Assessment complete.  A&O x 4. Patient calls appropriately.  Patient up with no assist. Bed alarm NI.   Patient recently medicated for pain.  Denies N&V. Tolerating low fiber GI soft diet.  Awaiting CT for Monday.  + void, + flatus  Patient denies SOB.  Review plan with of care with patient. Call light and personal belongings with in reach. Hourly rounding in place. All needs met at this time.

## 2018-11-04 NOTE — CARE PLAN
Problem: Safety  Goal: Will remain free from injury  Outcome: PROGRESSING AS EXPECTED      Problem: Bowel/Gastric:  Goal: Normal bowel function is maintained or improved  Outcome: PROGRESSING AS EXPECTED  Pt communicated BM per RN instruction    Problem: Pain Management  Goal: Pain level will decrease to patient's comfort goal  Outcome: PROGRESSING AS EXPECTED  Partial dose given to pt per pt request

## 2018-11-05 ENCOUNTER — APPOINTMENT (OUTPATIENT)
Dept: RADIOLOGY | Facility: MEDICAL CENTER | Age: 58
DRG: 357 | End: 2018-11-05
Attending: RADIOLOGY
Payer: COMMERCIAL

## 2018-11-05 PROCEDURE — 75984 XRAY CONTROL CATHETER CHANGE: CPT

## 2018-11-05 PROCEDURE — 700117 HCHG RX CONTRAST REV CODE 255: Performed by: RADIOLOGY

## 2018-11-05 PROCEDURE — 770006 HCHG ROOM/CARE - MED/SURG/GYN SEMI*

## 2018-11-05 PROCEDURE — 74174 CTA ABD&PLVS W/CONTRAST: CPT

## 2018-11-05 PROCEDURE — 700111 HCHG RX REV CODE 636 W/ 250 OVERRIDE (IP)

## 2018-11-05 PROCEDURE — 700105 HCHG RX REV CODE 258: Performed by: SURGERY

## 2018-11-05 PROCEDURE — 700111 HCHG RX REV CODE 636 W/ 250 OVERRIDE (IP): Performed by: SURGERY

## 2018-11-05 PROCEDURE — 700102 HCHG RX REV CODE 250 W/ 637 OVERRIDE(OP): Performed by: SURGERY

## 2018-11-05 PROCEDURE — A9270 NON-COVERED ITEM OR SERVICE: HCPCS | Performed by: SURGERY

## 2018-11-05 PROCEDURE — 700111 HCHG RX REV CODE 636 W/ 250 OVERRIDE (IP): Performed by: RADIOLOGY

## 2018-11-05 PROCEDURE — B41C1ZZ FLUOROSCOPY OF PELVIC ARTERIES USING LOW OSMOLAR CONTRAST: ICD-10-PCS | Performed by: RADIOLOGY

## 2018-11-05 PROCEDURE — 04LY3DZ OCCLUSION OF LOWER ARTERY WITH INTRALUMINAL DEVICE, PERCUTANEOUS APPROACH: ICD-10-PCS | Performed by: RADIOLOGY

## 2018-11-05 PROCEDURE — 04LE3ZZ OCCLUSION OF RIGHT INTERNAL ILIAC ARTERY, PERCUTANEOUS APPROACH: ICD-10-PCS | Performed by: RADIOLOGY

## 2018-11-05 PROCEDURE — 99153 MOD SED SAME PHYS/QHP EA: CPT

## 2018-11-05 PROCEDURE — 0WPHX0Z REMOVAL OF DRAINAGE DEVICE FROM RETROPERITONEUM, EXTERNAL APPROACH: ICD-10-PCS | Performed by: RADIOLOGY

## 2018-11-05 PROCEDURE — 700101 HCHG RX REV CODE 250

## 2018-11-05 RX ORDER — ONDANSETRON 2 MG/ML
4 INJECTION INTRAMUSCULAR; INTRAVENOUS PRN
Status: ACTIVE | OUTPATIENT
Start: 2018-11-05 | End: 2018-11-05

## 2018-11-05 RX ORDER — SODIUM CHLORIDE 9 MG/ML
INJECTION, SOLUTION INTRAVENOUS
Status: ACTIVE
Start: 2018-11-05 | End: 2018-11-05

## 2018-11-05 RX ORDER — LIDOCAINE HYDROCHLORIDE 20 MG/ML
INJECTION, SOLUTION INFILTRATION; PERINEURAL
Status: COMPLETED
Start: 2018-11-05 | End: 2018-11-05

## 2018-11-05 RX ORDER — LIDOCAINE HYDROCHLORIDE 10 MG/ML
INJECTION, SOLUTION EPIDURAL; INFILTRATION; INTRACAUDAL; PERINEURAL
Status: COMPLETED
Start: 2018-11-05 | End: 2018-11-05

## 2018-11-05 RX ORDER — SODIUM CHLORIDE 9 MG/ML
500 INJECTION, SOLUTION INTRAVENOUS
Status: ACTIVE | OUTPATIENT
Start: 2018-11-05 | End: 2018-11-05

## 2018-11-05 RX ORDER — MIDAZOLAM HYDROCHLORIDE 1 MG/ML
INJECTION INTRAMUSCULAR; INTRAVENOUS
Status: COMPLETED
Start: 2018-11-05 | End: 2018-11-05

## 2018-11-05 RX ORDER — MIDAZOLAM HYDROCHLORIDE 1 MG/ML
.5-2 INJECTION INTRAMUSCULAR; INTRAVENOUS PRN
Status: ACTIVE | OUTPATIENT
Start: 2018-11-05 | End: 2018-11-05

## 2018-11-05 RX ADMIN — AMPICILLIN SODIUM AND SULBACTAM SODIUM 3 G: 2; 1 INJECTION, POWDER, FOR SOLUTION INTRAMUSCULAR; INTRAVENOUS at 00:36

## 2018-11-05 RX ADMIN — OXYCODONE AND ACETAMINOPHEN 1 TABLET: 5; 325 TABLET ORAL at 02:53

## 2018-11-05 RX ADMIN — MIDAZOLAM 1 MG: 1 INJECTION INTRAMUSCULAR; INTRAVENOUS at 18:15

## 2018-11-05 RX ADMIN — MIDAZOLAM 1 MG: 1 INJECTION INTRAMUSCULAR; INTRAVENOUS at 18:30

## 2018-11-05 RX ADMIN — POTASSIUM CHLORIDE 20 MEQ: 1500 TABLET, EXTENDED RELEASE ORAL at 20:16

## 2018-11-05 RX ADMIN — IOHEXOL 100 ML: 350 INJECTION, SOLUTION INTRAVENOUS at 11:46

## 2018-11-05 RX ADMIN — OXYCODONE AND ACETAMINOPHEN 1 TABLET: 5; 325 TABLET ORAL at 20:16

## 2018-11-05 RX ADMIN — MORPHINE SULFATE 4 MG: 4 INJECTION INTRAVENOUS at 21:28

## 2018-11-05 RX ADMIN — AMPICILLIN SODIUM AND SULBACTAM SODIUM 3 G: 2; 1 INJECTION, POWDER, FOR SOLUTION INTRAMUSCULAR; INTRAVENOUS at 20:21

## 2018-11-05 RX ADMIN — FENTANYL CITRATE 50 MCG: 50 INJECTION, SOLUTION INTRAMUSCULAR; INTRAVENOUS at 18:48

## 2018-11-05 RX ADMIN — FENTANYL CITRATE 50 MCG: 50 INJECTION, SOLUTION INTRAMUSCULAR; INTRAVENOUS at 18:14

## 2018-11-05 RX ADMIN — PROCHLORPERAZINE EDISYLATE 10 MG: 5 INJECTION INTRAMUSCULAR; INTRAVENOUS at 21:32

## 2018-11-05 RX ADMIN — POTASSIUM CHLORIDE 20 MEQ: 1500 TABLET, EXTENDED RELEASE ORAL at 05:13

## 2018-11-05 RX ADMIN — FENTANYL CITRATE 50 MCG: 50 INJECTION, SOLUTION INTRAMUSCULAR; INTRAVENOUS at 18:38

## 2018-11-05 RX ADMIN — FENTANYL CITRATE 50 MCG: 50 INJECTION, SOLUTION INTRAMUSCULAR; INTRAVENOUS at 18:23

## 2018-11-05 RX ADMIN — FENTANYL CITRATE 50 MCG: 50 INJECTION, SOLUTION INTRAMUSCULAR; INTRAVENOUS at 18:32

## 2018-11-05 RX ADMIN — LOSARTAN POTASSIUM 25 MG: 50 TABLET ORAL at 05:13

## 2018-11-05 RX ADMIN — OMEPRAZOLE 20 MG: 20 CAPSULE, DELAYED RELEASE ORAL at 05:13

## 2018-11-05 RX ADMIN — LIDOCAINE HYDROCHLORIDE 5 ML: 10 INJECTION, SOLUTION EPIDURAL; INFILTRATION; INTRACAUDAL; PERINEURAL at 18:29

## 2018-11-05 RX ADMIN — AMPICILLIN SODIUM AND SULBACTAM SODIUM 3 G: 2; 1 INJECTION, POWDER, FOR SOLUTION INTRAMUSCULAR; INTRAVENOUS at 05:13

## 2018-11-05 RX ADMIN — AMPICILLIN SODIUM AND SULBACTAM SODIUM 3 G: 2; 1 INJECTION, POWDER, FOR SOLUTION INTRAMUSCULAR; INTRAVENOUS at 12:58

## 2018-11-05 RX ADMIN — LIDOCAINE HYDROCHLORIDE: 20 INJECTION, SOLUTION INFILTRATION; PERINEURAL at 17:45

## 2018-11-05 RX ADMIN — MIDAZOLAM 2 MG: 1 INJECTION INTRAMUSCULAR; INTRAVENOUS at 18:23

## 2018-11-05 RX ADMIN — STANDARDIZED SENNA CONCENTRATE AND DOCUSATE SODIUM 1 TABLET: 8.6; 5 TABLET, FILM COATED ORAL at 05:13

## 2018-11-05 RX ADMIN — IOHEXOL 100 ML: 300 INJECTION, SOLUTION INTRAVENOUS at 19:07

## 2018-11-05 ASSESSMENT — PAIN SCALES - GENERAL
PAINLEVEL_OUTOF10: 3
PAINLEVEL_OUTOF10: 8
PAINLEVEL_OUTOF10: 6
PAINLEVEL_OUTOF10: 0
PAINLEVEL_OUTOF10: 5
PAINLEVEL_OUTOF10: 10
PAINLEVEL_OUTOF10: 0

## 2018-11-05 ASSESSMENT — ENCOUNTER SYMPTOMS
FEVER: 0
VOMITING: 0
NAUSEA: 0
ABDOMINAL PAIN: 0
CHILLS: 0

## 2018-11-05 NOTE — CARE PLAN
Problem: Communication  Goal: The ability to communicate needs accurately and effectively will improve  Outcome: PROGRESSING AS EXPECTED  Frequent updates on poc. Pt anxious about IR drain removal. Touched based with MD.    Problem: Discharge Barriers/Planning  Goal: Patient’s continuum of care needs will be met  Outcome: PROGRESSING AS EXPECTED  Possible DC this PM if drain removal is successful.

## 2018-11-05 NOTE — PROGRESS NOTES
Pt's CTA completed.   Called IR to determine if radiologist can remove IR drain.  Tech to check with MDs and give this RN a call back.

## 2018-11-05 NOTE — PROGRESS NOTES
Report received, poc discussed, assumed care of pt.   Call light in reach, hourly rounding in place.   Pt gets up self.   GI soft diet.  + void. LBM 11/4.   Percocet for pain.  No further needs.    Plan for CT today. Consent signed. Call received that pt is scheduled for 1100.

## 2018-11-05 NOTE — PROGRESS NOTES
"Pt A&O x 4.     Vitals: /81   Pulse 85   Temp 36.3 °C (97.3 °F)   Resp 17   Ht 1.676 m (5' 6\")   Wt 70.4 kg (155 lb 3.3 oz)   SpO2 97%   Breastfeeding? No   BMI 25.05 kg/m²      Pt rates pain 6 out of 10. Medicated per MAR.      Neuro: ARGUETA. Denies new onset of numbness/ tingling.     Cardiac: Denies new onset of chest pain.     Vascular: Pulses 2+ BUE, BLE. No edema noted.     Respiratory: Lungs sound clear to auscultation. On room air.  on, satting in 90's. Denies SOB.     GI: Abdomen soft, non-tender. Normoacitve bowel sounds, + flatus, + BM. Denies nausea/ vomiting.     : Pt voiding adequately.      MSK: Pt up to bathroom up self, tolerating well.     Integumentary: Left buttock IR drain in place, dressing CDI.     Labs noted.     Fall precautions in place: Bed locked in lowest position, Upper bed rails up, treaded socks in place, personal belongings within reach, call light within reach, appropriate mobility signs in place, - bed alarm. Pt calls appropriately.      Pt updated on POC.     "

## 2018-11-06 VITALS
OXYGEN SATURATION: 96 % | SYSTOLIC BLOOD PRESSURE: 116 MMHG | TEMPERATURE: 98.6 F | HEIGHT: 66 IN | BODY MASS INDEX: 24.94 KG/M2 | HEART RATE: 83 BPM | DIASTOLIC BLOOD PRESSURE: 73 MMHG | RESPIRATION RATE: 17 BRPM | WEIGHT: 155.2 LBS

## 2018-11-06 PROCEDURE — A9270 NON-COVERED ITEM OR SERVICE: HCPCS | Performed by: SURGERY

## 2018-11-06 PROCEDURE — 700102 HCHG RX REV CODE 250 W/ 637 OVERRIDE(OP): Performed by: SURGERY

## 2018-11-06 PROCEDURE — 700111 HCHG RX REV CODE 636 W/ 250 OVERRIDE (IP): Performed by: SURGERY

## 2018-11-06 PROCEDURE — 700105 HCHG RX REV CODE 258: Performed by: SURGERY

## 2018-11-06 RX ORDER — POTASSIUM CHLORIDE 20 MEQ/1
20 TABLET, EXTENDED RELEASE ORAL DAILY
Qty: 30 TAB | Refills: 3 | Status: ON HOLD | OUTPATIENT
Start: 2018-11-06 | End: 2018-11-26

## 2018-11-06 RX ORDER — AMOXICILLIN AND CLAVULANATE POTASSIUM 875; 125 MG/1; MG/1
1 TABLET, FILM COATED ORAL 2 TIMES DAILY
Qty: 28 TAB | Refills: 0 | Status: ON HOLD | OUTPATIENT
Start: 2018-11-06 | End: 2018-11-21

## 2018-11-06 RX ORDER — OXYCODONE HYDROCHLORIDE AND ACETAMINOPHEN 5; 325 MG/1; MG/1
1-2 TABLET ORAL EVERY 4 HOURS PRN
Qty: 25 TAB | Refills: 0 | Status: SHIPPED | OUTPATIENT
Start: 2018-11-06 | End: 2018-11-11

## 2018-11-06 RX ORDER — POTASSIUM CHLORIDE 20 MEQ/1
20 TABLET, EXTENDED RELEASE ORAL DAILY
Qty: 30 TAB | Refills: 3 | Status: SHIPPED | OUTPATIENT
Start: 2018-11-06 | End: 2018-11-06

## 2018-11-06 RX ADMIN — POTASSIUM CHLORIDE 20 MEQ: 1500 TABLET, EXTENDED RELEASE ORAL at 05:02

## 2018-11-06 RX ADMIN — OMEPRAZOLE 20 MG: 20 CAPSULE, DELAYED RELEASE ORAL at 05:02

## 2018-11-06 RX ADMIN — AMPICILLIN SODIUM AND SULBACTAM SODIUM 3 G: 2; 1 INJECTION, POWDER, FOR SOLUTION INTRAMUSCULAR; INTRAVENOUS at 00:28

## 2018-11-06 RX ADMIN — OXYCODONE AND ACETAMINOPHEN 2 TABLET: 5; 325 TABLET ORAL at 00:28

## 2018-11-06 RX ADMIN — OXYCODONE AND ACETAMINOPHEN 1 TABLET: 5; 325 TABLET ORAL at 09:42

## 2018-11-06 RX ADMIN — LOSARTAN POTASSIUM 25 MG: 50 TABLET ORAL at 05:02

## 2018-11-06 RX ADMIN — OXYCODONE AND ACETAMINOPHEN 1 TABLET: 5; 325 TABLET ORAL at 13:42

## 2018-11-06 RX ADMIN — AMPICILLIN SODIUM AND SULBACTAM SODIUM 3 G: 2; 1 INJECTION, POWDER, FOR SOLUTION INTRAMUSCULAR; INTRAVENOUS at 05:02

## 2018-11-06 RX ADMIN — OXYCODONE AND ACETAMINOPHEN 2 TABLET: 5; 325 TABLET ORAL at 05:02

## 2018-11-06 ASSESSMENT — ENCOUNTER SYMPTOMS
ABDOMINAL PAIN: 0
NAUSEA: 0
VOMITING: 0

## 2018-11-06 ASSESSMENT — PAIN SCALES - GENERAL
PAINLEVEL_OUTOF10: 5
PAINLEVEL_OUTOF10: 6
PAINLEVEL_OUTOF10: 3
PAINLEVEL_OUTOF10: 5
PAINLEVEL_OUTOF10: 5

## 2018-11-06 NOTE — OR SURGEON
Immediate Post- Operative Note        PostOp Diagnosis: pelvic bleeding    Procedure(s): endovascular repair of bleeding vessel    Estimated Blood Loss: Less than 5 ml        Complications: None            11/5/2018     7:06 PM     Klaus Watts

## 2018-11-06 NOTE — PROGRESS NOTES
Surgical Progress Note    Author: Justus Will Date & Time created: 2018   1:38 PM     Interval Events:  Underwent removal of drain, followed by pelvic embolization of branch of the internal iliac artery, laterality not noted, yesterday.  Significant post procedural pain noted, now resolved.  Tolerating diet.  Ambulating.  Anxious to b discharged home.    Review of Systems   Gastrointestinal: Negative for abdominal pain, nausea and vomiting.     Hemodynamics:  Temp (24hrs), Av.8 °C (98.2 °F), Min:36.4 °C (97.6 °F), Max:37 °C (98.6 °F)  Temperature: 37 °C (98.6 °F)  Pulse  Av  Min: 61  Max: 113Heart Rate (Monitored): 76  Blood Pressure: 116/73, NIBP: 154/92     Respiratory:    Respiration: 17, Pulse Oximetry: 96 %        RUL Breath Sounds: Clear, RML Breath Sounds: Clear, RLL Breath Sounds: Clear, PEG Breath Sounds: Clear, LLL Breath Sounds: Clear  Neuro:  GCS = 15       Fluids:    Intake/Output Summary (Last 24 hours) at 18 1338  Last data filed at 18 0945   Gross per 24 hour   Intake             1090 ml   Output              400 ml   Net              690 ml        Current Diet Order   Procedures   • Diet Order Low Fiber(GI Soft)     Physical Exam   Constitutional: She is oriented to person, place, and time. She appears well-developed and well-nourished. She appears distressed.   Eyes: Pupils are equal, round, and reactive to light. No scleral icterus.   Cardiovascular: Normal rate, regular rhythm and normal heart sounds.    Pulmonary/Chest: Effort normal and breath sounds normal. No respiratory distress.   Abdominal: Soft. Bowel sounds are normal. She exhibits distension. There is tenderness. There is guarding. There is no rebound.   Neurological: She is alert and oriented to person, place, and time.   Skin: Skin is warm and dry.   Dressing in place, right groin   Psychiatric: She has a normal mood and affect. Her behavior is normal.     Labs:  No results found for this or any  previous visit (from the past 24 hour(s)).  Medical Decision Making, by Problem:  1.  Recurrent diverticular abscess, now status drain placement, and replacement that was complicated by bleeding.  She has now undergone  embolization of a branch of the internal iliac artery, laterality not noted, yesterday, after having her drain removed.  She is doing well, and is without evidence of ongoing bleeding.  2.  Hypertension.  3.  Gastroesophageal reflux disease.  4.  Asthma.  5.  Urinary incontinence  6.  Hypokalemia        Plan:  D/C home today, with plan for Augmentin at home for two weeks, and sigmoid colectomy soon.  Follow up with me in one week  Continue low residue diet    Quality Measures:  Quality-Core Measures   Reviewed items::  Labs reviewed, Medications reviewed and Radiology images reviewed  Simms catheter::  No Simms  DVT prophylaxis pharmacological::  Not indicated at this time, ambulatory  DVT prophylaxis - mechanical:  SCDs  Ulcer Prophylaxis::  Yes  Antibiotics:  Treating active infection/contamination beyond 24 hours perioperative coverage and Treating fecal contamination      Discussed patient condition with RN and Patient

## 2018-11-06 NOTE — PROGRESS NOTES
Report received, poc discussed, assumed care of pt.   Call light in reach, hourly rounding in place.   Pt gets up self.   GI soft diet.  + void. LBM 11/4.   Uncontrolled pain after returning from IR drain removal. Morphine given x 1. Percocet for pain.  No further needs.  Sleeping.  Possible DC home today.

## 2018-11-06 NOTE — DISCHARGE INSTRUCTIONS
Discharge Instructions    Discharged to home by car with self. Discharged via walking, hospital escort: Refused.  Special equipment needed: Not Applicable    Be sure to schedule a follow-up appointment with your primary care doctor or any specialists as instructed.     Discharge Plan:   Diet Plan: Discussed  Activity Level: Discussed  Confirmed Follow up Appointment: Patient to Call and Schedule Appointment  Confirmed Symptoms Management: Discussed  Medication Reconciliation Updated: Yes  Influenza Vaccine Indication: Patient Refuses    I understand that a diet low in cholesterol, fat, and sodium is recommended for good health. Unless I have been given specific instructions below for another diet, I accept this instruction as my diet prescription.   Other diet: Low residue diet    Special Instructions:   No lifting restrictions.   Use over the counter laxative of choice if constipated.   Remove dressing(s) and begins showering on 11/7/18.   Follow up with Dr. Will ([facility], [phone]) in one week    · Is patient discharged on Warfarin / Coumadin?   No     Depression / Suicide Risk    As you are discharged from this [facility] facility, it is important to learn how to keep safe from harming yourself.    Recognize the warning signs:  · Abrupt changes in personality, positive or negative- including increase in energy   · Giving away possessions  · Change in eating patterns- significant weight changes-  positive or negative  · Change in sleeping patterns- unable to sleep or sleeping all the time   · Unwillingness or inability to communicate  · Depression  · Unusual sadness, discouragement and loneliness  · Talk of wanting to die  · Neglect of personal appearance   · Rebelliousness- reckless behavior  · Withdrawal from people/activities they love  · Confusion- inability to concentrate     If you or a loved one observes any of these behaviors or has concerns about self-harm, here's what you can  do:  · Talk about it- your feelings and reasons for harming yourself  · Remove any means that you might use to hurt yourself (examples: pills, rope, extension cords, firearm)  · Get professional help from the community (Mental Health, Substance Abuse, psychological counseling)  · Do not be alone:Call your Safe Contact- someone whom you trust who will be there for you.  · Call your local CRISIS HOTLINE 510-3910 or 757-676-0922  · Call your local Children's Mobile Crisis Response Team [place] (892) 420-7238 or [employer]  · Call the toll free National Suicide Prevention Hotlines   · National Suicide Prevention Lifeline 869-952-ZYEU (2821)  · National Hope Line Network 800-SUICIDE (729-4122)    Abscess  Care After  An abscess (also called a boil or furuncle) is an infected area that contains a collection of pus. Signs and symptoms of an abscess include pain, tenderness, redness, or hardness, or you may feel a moveable soft area under your skin. An abscess can occur anywhere in the body. The infection may spread to surrounding tissues causing cellulitis. A cut (incision) by the surgeon was made over your abscess and the pus was drained out. Gauze may have been packed into the space to provide a drain that will allow the cavity to heal from the inside outwards. The boil may be painful for 5 to 7 days. Most people with a boil do not have high fevers. Your abscess, if seen early, may not have localized, and may not have been lanced. If not, another appointment may be required for this if it does not get better on its own or with medications.  HOME CARE INSTRUCTIONS   · Only take over-the-counter or prescription medicines for pain, discomfort, or fever as directed by your caregiver.  · When you bathe, soak and then remove gauze or iodoform packs at least daily or as directed by your caregiver. You may then wash the wound gently with mild soapy water. Repack with gauze or do as your caregiver directs.  SEEK  IMMEDIATE MEDICAL CARE IF:   · You develop increased pain, swelling, redness, drainage, or bleeding in the wound site.  · You develop signs of generalized infection including muscle aches, chills, fever, or a general ill feeling.  · An oral temperature above 102° F (38.9° C) develops, not controlled by medication.  See your caregiver for a recheck if you develop any of the symptoms described above. If medications (antibiotics) were prescribed, take them as directed.  Document Released: 07/06/2006 Document Revised: 03/11/2013 Document Reviewed: 03/02/2009  ExitCare® Patient Information ©2014 [employer].      Incision Care, Adult  An incision is a surgical cut that is made through your skin. Most incisions are closed after surgery. Your incision may be closed with stitches (sutures), staples, skin glue, or adhesive strips. You may need to return to your health care provider to have sutures or staples removed. This may occur several days to several weeks after your surgery. The incision needs to be cared for properly to prevent infection.  How to care for your incision  Incision care   · Follow instructions from your health care provider about how to take care of your incision. Make sure you:  ¨ Wash your hands with soap and water before you change the bandage (dressing). If soap and water are not available, use hand .  ¨ Change your dressing as told by your health care provider.  ¨ Leave sutures, skin glue, or adhesive strips in place. These skin closures may need to stay in place for 2 weeks or longer. If adhesive strip edges start to loosen and curl up, you may trim the loose edges. Do not remove adhesive strips completely unless your health care provider tells you to do that.  · Check your incision area every day for signs of infection. Check for:  ¨ More redness, swelling, or pain.  ¨ More fluid or blood.  ¨ Warmth.  ¨ Pus or a bad smell.  · Ask your health care provider how to clean the incision. This  may include:  ¨ Using mild soap and water.  ¨ Using a clean towel to pat the incision dry after cleaning it.  ¨ Applying a cream or ointment. Do this only as told by your health care provider.  ¨ Covering the incision with a clean dressing.  · Ask your health care provider when you can leave the incision uncovered.  · Do not take baths, swim, or use a hot tub until your health care provider approves. Ask your health care provider if you can take showers. You may only be allowed to take sponge baths for bathing.  Medicines  · If you were prescribed an antibiotic medicine, cream, or ointment, take or apply the antibiotic as told by your health care provider. Do not stop taking or applying the antibiotic even if your condition improves.  · Take over-the-counter and prescription medicines only as told by your health care provider.  General instructions  · Limit movement around your incision to improve healing.  ¨ Avoid straining, lifting, or exercise for the first month, or for as long as told by your health care provider.  ¨ Follow instructions from your health care provider about returning to your normal activities.  ¨ Ask your health care provider what activities are safe.  · Protect your incision from the sun when you are outside for the first 6 months, or for as long as told by your health care provider. Apply sunscreen around the scar or cover it up.  · Keep all follow-up visits as told by your health care provider. This is important.  Contact a health care provider if:  · Your have more redness, swelling, or pain around the incision.  · You have more fluid or blood coming from the incision.  · Your incision feels warm to the touch.  · You have pus or a bad smell coming from the incision.  · You have a fever or shaking chills.  · You are nauseous or you vomit.  · You are dizzy.  · Your sutures or staples come undone.  Get help right away if:  · You have a red streak coming from your incision.  · Your incision  bleeds through the dressing and the bleeding does not stop with gentle pressure.  · The edges of your incision open up and separate.  · You have severe pain.  · You have a rash.  · You are confused.  · You faint.  · You have trouble breathing and a fast heartbeat.  This information is not intended to replace advice given to you by your health care provider. Make sure you discuss any questions you have with your health care provider.  Document Released: 07/07/2006 Document Revised: 08/25/2017 Document Reviewed: 07/05/2017  [employer] Interactive Patient Education © 2017 [employer]

## 2018-11-06 NOTE — PROGRESS NOTES
"Report received at 1900 while pt was down in IR.   Assumed care of Ms Jj  at 1930.   Pt is A&O x 4.  Pain 8/10. Medicated per MAR  Nausea denied   Tolerating Diet  Surgical incision to rt groin. CDI with dressing in place. Scant drainage noted to dressing.  Previous drain site to lt buttock. Dressing CDI. No drainage noted.    2+ bilateral pedal and radial pulses. Pt describes some \"heaviness\" in her RLE.   + Urine output  + BM    + Flatus  Up standby to self with steady gait  SCD's off D/T vascular surgery   Bed in lowest position and locked.  Bed alarm NA per Reanna Red   Pt resting comfortably now.  Review plan of care with patient  Call light within reach  Hourly rounds in place  All needs met at this time  "

## 2018-11-06 NOTE — PROGRESS NOTES
Discharge ordered.   Paperwork completed and explained to pt.   Went over medications. Prescriptions - given to pt by MD.   Follow up information provided.    Pt verbalized understanding of instructions and had no further questions.   Pt left unit self, AOx4, steady.   Pt has all belongings.

## 2018-11-06 NOTE — PROGRESS NOTES
IR RN note:    Site Confirmed with MD, patient and RN pre procedure   Pelvic embolization by MD Watts assisted by RT Declan,Right femoral artery access site;  End Tidal CO2 range 19-27 during procedure   Gel foam placed into Internal iliac artery branch- see MD Report   left gluteus adriana drain removed, wire in place at this time until MD Watts removes    Coils places into Iliac artery branch   BS InterLock 2D 5 mm x 15 cm REF# [id number] LOT# [id number]    BS interLock 2D 5 mm x 15 cm REF# [id number] LOT# [id number]   BS interLock 2D 3 mm x 12 cm REF# [id number] LOT# [id number]    BS interLock 2D 3 mm x 12 cm REF# [id number] LOT# [id number]  Right groin  sealed with AngioSeal    Terumo ANgio-Seal 6 Fr     REF# [id number]   LOT# [id number]    Patient tolerated procedure, hemodynamically stable; pt awake and talking post procedure; report given to RN Ray;   patient transported to room via IR RN monitored then transferred care to report RN

## 2018-11-06 NOTE — DISCHARGE SUMMARY
DIAGNOSIS ON ADMISSION:  10/28/2018    DIAGNOSIS ON DISCHARGE:  11/06/2018    HISTORY AND PHYSICAL:  Please see the dictated history and physical.    HOSPITAL COURSE:  In short, this is a 57-year-old female who had previously   been noted to have a diverticular abscess.  She underwent drainage of this   abscess and was discharged home.  She presented back to the ER and was noted   to have a recurrent diverticular abscess and was admitted on 10/28/2018.  She   underwent IR drainage of a wade-sigmoid abscess on 10/28/2018 and tolerated   this well.  Her white count normalized, her pain became well controlled.  She   was able to tolerate a low residue diet and due to the fact that she had had a   recurrence of her diverticular abscess, she underwent a followup CT scan on   11/02/2018, where she was noted to have a small portion of an undrained   abscess.  On repositioning of the drain, she was noted to have rica bleeding.    The drain was placed with an 8 sheath, which led to tamponade of the   bleeding.  This drain was left in place for 3 days and then she underwent a   CTA to recheck this area.  She did require pelvic embolization of an internal   iliac artery branch, although lateralization was not documented in any of the   reports.  She tolerated this well.  This was performed on 11/05/2018 and on   11/06/2018, her pain was noted to be well controlled, she had no evidence of   ongoing bleeding, she was tolerating her diet, and was deemed ready for   discharge home.  She was discharged home on 11/06/2018.    DISCHARGE MEDICATIONS:  We will resume her home medications and be discharged   home with Percocet 5/325 one to two p.o. q. 4 hours p.r.n. pain and Augmentin   875/125 one p.o. b.i.d. for 14 days.  She was also noted to be hypokalemic   during her hospitalization and will be discharged home with K-Dur 20 mEq   tablets 1 p.o. daily.    DISCHARGE RECOMMENDATIONS:  She will follow up with me in 1 week.        ____________________________________     MD RAÚL Sanford    DD:  11/06/2018 13:55:53  DT:  11/06/2018 14:29:42    D#:  [id number]  Job#:  [id number]

## 2018-11-06 NOTE — CARE PLAN
Problem: Communication  Goal: The ability to communicate needs accurately and effectively will improve  Outcome: PROGRESSING AS EXPECTED  Pt calmer than yesterday. Explained dressings and procedure again.      Problem: Discharge Barriers/Planning  Goal: Patient’s continuum of care needs will be met  Outcome: PROGRESSING AS EXPECTED  To DC home after lunch after MD rounds.

## 2018-11-19 ENCOUNTER — APPOINTMENT (OUTPATIENT)
Dept: ADMISSIONS | Facility: MEDICAL CENTER | Age: 58
DRG: 331 | End: 2018-11-19
Attending: SURGERY
Payer: COMMERCIAL

## 2018-11-21 ENCOUNTER — HOSPITAL ENCOUNTER (INPATIENT)
Facility: MEDICAL CENTER | Age: 58
LOS: 5 days | DRG: 331 | End: 2018-11-26
Attending: SURGERY | Admitting: SURGERY
Payer: COMMERCIAL

## 2018-11-21 DIAGNOSIS — K57.30 DIVERTICULOSIS OF SIGMOID COLON: Primary | ICD-10-CM

## 2018-11-21 DIAGNOSIS — K57.20 COLONIC DIVERTICULAR ABSCESS: ICD-10-CM

## 2018-11-21 PROCEDURE — A9270 NON-COVERED ITEM OR SERVICE: HCPCS | Performed by: ANESTHESIOLOGY

## 2018-11-21 PROCEDURE — 700111 HCHG RX REV CODE 636 W/ 250 OVERRIDE (IP): Performed by: SURGERY

## 2018-11-21 PROCEDURE — 160041 HCHG SURGERY MINUTES - EA ADDL 1 MIN LEVEL 4: Performed by: SURGERY

## 2018-11-21 PROCEDURE — A9270 NON-COVERED ITEM OR SERVICE: HCPCS | Performed by: SURGERY

## 2018-11-21 PROCEDURE — 501445 HCHG STAPLER, SKIN DISP: Performed by: SURGERY

## 2018-11-21 PROCEDURE — 700101 HCHG RX REV CODE 250

## 2018-11-21 PROCEDURE — 700111 HCHG RX REV CODE 636 W/ 250 OVERRIDE (IP)

## 2018-11-21 PROCEDURE — 700102 HCHG RX REV CODE 250 W/ 637 OVERRIDE(OP)

## 2018-11-21 PROCEDURE — 160022 HCHG BLOCK: Performed by: SURGERY

## 2018-11-21 PROCEDURE — 501838 HCHG SUTURE GENERAL: Performed by: SURGERY

## 2018-11-21 PROCEDURE — 700105 HCHG RX REV CODE 258

## 2018-11-21 PROCEDURE — 160002 HCHG RECOVERY MINUTES (STAT): Performed by: SURGERY

## 2018-11-21 PROCEDURE — 700111 HCHG RX REV CODE 636 W/ 250 OVERRIDE (IP): Performed by: ANESTHESIOLOGY

## 2018-11-21 PROCEDURE — A9270 NON-COVERED ITEM OR SERVICE: HCPCS

## 2018-11-21 PROCEDURE — 501433 HCHG STAPLER, GIA MULTIFIRE 60/80: Performed by: SURGERY

## 2018-11-21 PROCEDURE — 160048 HCHG OR STATISTICAL LEVEL 1-5: Performed by: SURGERY

## 2018-11-21 PROCEDURE — 160036 HCHG PACU - EA ADDL 30 MINS PHASE I: Performed by: SURGERY

## 2018-11-21 PROCEDURE — 700102 HCHG RX REV CODE 250 W/ 637 OVERRIDE(OP): Performed by: ANESTHESIOLOGY

## 2018-11-21 PROCEDURE — 160009 HCHG ANES TIME/MIN: Performed by: SURGERY

## 2018-11-21 PROCEDURE — 501428 HCHG STAPLER, CURVED: Performed by: SURGERY

## 2018-11-21 PROCEDURE — 700102 HCHG RX REV CODE 250 W/ 637 OVERRIDE(OP): Performed by: SURGERY

## 2018-11-21 PROCEDURE — A6402 STERILE GAUZE <= 16 SQ IN: HCPCS | Performed by: SURGERY

## 2018-11-21 PROCEDURE — 0D1N0Z4 BYPASS SIGMOID COLON TO CUTANEOUS, OPEN APPROACH: ICD-10-PCS | Performed by: SURGERY

## 2018-11-21 PROCEDURE — 88307 TISSUE EXAM BY PATHOLOGIST: CPT

## 2018-11-21 PROCEDURE — 0DTN0ZZ RESECTION OF SIGMOID COLON, OPEN APPROACH: ICD-10-PCS | Performed by: SURGERY

## 2018-11-21 PROCEDURE — 700105 HCHG RX REV CODE 258: Performed by: SURGERY

## 2018-11-21 PROCEDURE — 160035 HCHG PACU - 1ST 60 MINS PHASE I: Performed by: SURGERY

## 2018-11-21 PROCEDURE — 160029 HCHG SURGERY MINUTES - 1ST 30 MINS LEVEL 4: Performed by: SURGERY

## 2018-11-21 PROCEDURE — 770006 HCHG ROOM/CARE - MED/SURG/GYN SEMI*

## 2018-11-21 PROCEDURE — 501452 HCHG STAPLES, GIA MULTIFIRE 60/80: Performed by: SURGERY

## 2018-11-21 RX ORDER — HYDROMORPHONE HYDROCHLORIDE 1 MG/ML
0.4 INJECTION, SOLUTION INTRAMUSCULAR; INTRAVENOUS; SUBCUTANEOUS
Status: DISCONTINUED | OUTPATIENT
Start: 2018-11-21 | End: 2018-11-21 | Stop reason: HOSPADM

## 2018-11-21 RX ORDER — GABAPENTIN 300 MG/1
CAPSULE ORAL
Status: DISPENSED
Start: 2018-11-21 | End: 2018-11-22

## 2018-11-21 RX ORDER — MIDAZOLAM HYDROCHLORIDE 1 MG/ML
0.5 INJECTION INTRAMUSCULAR; INTRAVENOUS
Status: DISCONTINUED | OUTPATIENT
Start: 2018-11-21 | End: 2018-11-21 | Stop reason: HOSPADM

## 2018-11-21 RX ORDER — ACETAMINOPHEN 500 MG
TABLET ORAL
Status: DISPENSED
Start: 2018-11-21 | End: 2018-11-22

## 2018-11-21 RX ORDER — SODIUM CHLORIDE 9 MG/ML
INJECTION, SOLUTION INTRAVENOUS
Status: COMPLETED
Start: 2018-11-21 | End: 2018-11-21

## 2018-11-21 RX ORDER — LOSARTAN POTASSIUM 25 MG/1
25 TABLET ORAL
Status: DISCONTINUED | OUTPATIENT
Start: 2018-11-22 | End: 2018-11-26 | Stop reason: HOSPADM

## 2018-11-21 RX ORDER — SODIUM CHLORIDE, SODIUM LACTATE, POTASSIUM CHLORIDE, CALCIUM CHLORIDE 600; 310; 30; 20 MG/100ML; MG/100ML; MG/100ML; MG/100ML
INJECTION, SOLUTION INTRAVENOUS CONTINUOUS
Status: DISCONTINUED | OUTPATIENT
Start: 2018-11-21 | End: 2018-11-21 | Stop reason: HOSPADM

## 2018-11-21 RX ORDER — ONDANSETRON 2 MG/ML
4 INJECTION INTRAMUSCULAR; INTRAVENOUS EVERY 4 HOURS PRN
Status: DISCONTINUED | OUTPATIENT
Start: 2018-11-21 | End: 2018-11-26 | Stop reason: HOSPADM

## 2018-11-21 RX ORDER — DEXAMETHASONE SODIUM PHOSPHATE 4 MG/ML
4 INJECTION, SOLUTION INTRA-ARTICULAR; INTRALESIONAL; INTRAMUSCULAR; INTRAVENOUS; SOFT TISSUE
Status: COMPLETED | OUTPATIENT
Start: 2018-11-21 | End: 2018-11-23

## 2018-11-21 RX ORDER — SCOLOPAMINE TRANSDERMAL SYSTEM 1 MG/1
1 PATCH, EXTENDED RELEASE TRANSDERMAL
Status: DISCONTINUED | OUTPATIENT
Start: 2018-11-21 | End: 2018-11-26 | Stop reason: HOSPADM

## 2018-11-21 RX ORDER — MIDAZOLAM HYDROCHLORIDE 1 MG/ML
INJECTION INTRAMUSCULAR; INTRAVENOUS
Status: DISPENSED
Start: 2018-11-21 | End: 2018-11-22

## 2018-11-21 RX ORDER — HYDROMORPHONE HYDROCHLORIDE 1 MG/ML
0.1 INJECTION, SOLUTION INTRAMUSCULAR; INTRAVENOUS; SUBCUTANEOUS
Status: DISCONTINUED | OUTPATIENT
Start: 2018-11-21 | End: 2018-11-21 | Stop reason: HOSPADM

## 2018-11-21 RX ORDER — HYDROMORPHONE HYDROCHLORIDE 1 MG/ML
0.2 INJECTION, SOLUTION INTRAMUSCULAR; INTRAVENOUS; SUBCUTANEOUS
Status: DISCONTINUED | OUTPATIENT
Start: 2018-11-21 | End: 2018-11-21 | Stop reason: HOSPADM

## 2018-11-21 RX ORDER — LOSARTAN POTASSIUM 50 MG/1
25 TABLET ORAL ONCE
Status: COMPLETED | OUTPATIENT
Start: 2018-11-21 | End: 2018-11-21

## 2018-11-21 RX ORDER — OXYCODONE HCL 5 MG/5 ML
10 SOLUTION, ORAL ORAL
Status: COMPLETED | OUTPATIENT
Start: 2018-11-21 | End: 2018-11-21

## 2018-11-21 RX ORDER — ACETAMINOPHEN 500 MG
1000 TABLET ORAL EVERY 6 HOURS
Status: DISCONTINUED | OUTPATIENT
Start: 2018-11-22 | End: 2018-11-26 | Stop reason: HOSPADM

## 2018-11-21 RX ORDER — ONDANSETRON 2 MG/ML
4 INJECTION INTRAMUSCULAR; INTRAVENOUS
Status: COMPLETED | OUTPATIENT
Start: 2018-11-21 | End: 2018-11-21

## 2018-11-21 RX ORDER — CELECOXIB 200 MG/1
CAPSULE ORAL
Status: COMPLETED
Start: 2018-11-21 | End: 2018-11-21

## 2018-11-21 RX ORDER — OMEPRAZOLE 20 MG/1
20 CAPSULE, DELAYED RELEASE ORAL ONCE
Status: COMPLETED | OUTPATIENT
Start: 2018-11-21 | End: 2018-11-21

## 2018-11-21 RX ORDER — OXYCODONE HCL 5 MG/5 ML
5 SOLUTION, ORAL ORAL
Status: COMPLETED | OUTPATIENT
Start: 2018-11-21 | End: 2018-11-21

## 2018-11-21 RX ORDER — AMOXICILLIN AND CLAVULANATE POTASSIUM 875; 125 MG/1; MG/1
1 TABLET, FILM COATED ORAL 2 TIMES DAILY
COMMUNITY
Start: 2018-11-07 | End: 2019-04-22

## 2018-11-21 RX ORDER — GABAPENTIN 300 MG/1
CAPSULE ORAL
Status: COMPLETED
Start: 2018-11-21 | End: 2018-11-21

## 2018-11-21 RX ORDER — HALOPERIDOL 5 MG/ML
1 INJECTION INTRAMUSCULAR EVERY 6 HOURS PRN
Status: DISCONTINUED | OUTPATIENT
Start: 2018-11-21 | End: 2018-11-26 | Stop reason: HOSPADM

## 2018-11-21 RX ORDER — CELECOXIB 200 MG/1
CAPSULE ORAL
Status: DISPENSED
Start: 2018-11-21 | End: 2018-11-22

## 2018-11-21 RX ORDER — DIPHENHYDRAMINE HYDROCHLORIDE 50 MG/ML
25 INJECTION INTRAMUSCULAR; INTRAVENOUS EVERY 6 HOURS PRN
Status: DISCONTINUED | OUTPATIENT
Start: 2018-11-21 | End: 2018-11-26 | Stop reason: HOSPADM

## 2018-11-21 RX ORDER — OXYCODONE HYDROCHLORIDE AND ACETAMINOPHEN 5; 325 MG/1; MG/1
1 TABLET ORAL EVERY 6 HOURS PRN
Status: ON HOLD | COMMUNITY
End: 2018-11-26

## 2018-11-21 RX ORDER — OMEPRAZOLE 20 MG/1
20 CAPSULE, DELAYED RELEASE ORAL DAILY
Status: DISCONTINUED | OUTPATIENT
Start: 2018-11-22 | End: 2018-11-26 | Stop reason: HOSPADM

## 2018-11-21 RX ORDER — OXYCODONE HYDROCHLORIDE 5 MG/1
10 TABLET ORAL
Status: DISCONTINUED | OUTPATIENT
Start: 2018-11-21 | End: 2018-11-21 | Stop reason: HOSPADM

## 2018-11-21 RX ORDER — OXYCODONE HYDROCHLORIDE 5 MG/1
5 TABLET ORAL
Status: DISCONTINUED | OUTPATIENT
Start: 2018-11-21 | End: 2018-11-21 | Stop reason: HOSPADM

## 2018-11-21 RX ORDER — SODIUM CHLORIDE, SODIUM LACTATE, POTASSIUM CHLORIDE, CALCIUM CHLORIDE 600; 310; 30; 20 MG/100ML; MG/100ML; MG/100ML; MG/100ML
INJECTION, SOLUTION INTRAVENOUS ONCE
Status: DISCONTINUED | OUTPATIENT
Start: 2018-11-21 | End: 2018-11-21 | Stop reason: HOSPADM

## 2018-11-21 RX ORDER — HALOPERIDOL 5 MG/ML
1 INJECTION INTRAMUSCULAR
Status: DISCONTINUED | OUTPATIENT
Start: 2018-11-21 | End: 2018-11-21 | Stop reason: HOSPADM

## 2018-11-21 RX ORDER — MEPERIDINE HYDROCHLORIDE 25 MG/ML
25 INJECTION INTRAMUSCULAR; INTRAVENOUS; SUBCUTANEOUS
Status: DISCONTINUED | OUTPATIENT
Start: 2018-11-21 | End: 2018-11-21 | Stop reason: HOSPADM

## 2018-11-21 RX ORDER — ACETAMINOPHEN 500 MG
TABLET ORAL
Status: COMPLETED
Start: 2018-11-21 | End: 2018-11-21

## 2018-11-21 RX ORDER — SODIUM CHLORIDE AND POTASSIUM CHLORIDE 150; 450 MG/100ML; MG/100ML
INJECTION, SOLUTION INTRAVENOUS CONTINUOUS
Status: DISCONTINUED | OUTPATIENT
Start: 2018-11-21 | End: 2018-11-22

## 2018-11-21 RX ADMIN — FENTANYL CITRATE 50 MCG: 50 INJECTION, SOLUTION INTRAMUSCULAR; INTRAVENOUS at 18:15

## 2018-11-21 RX ADMIN — ACETAMINOPHEN 1000 MG: 500 TABLET ORAL at 23:46

## 2018-11-21 RX ADMIN — SODIUM CHLORIDE 1000 ML: 9 INJECTION, SOLUTION INTRAVENOUS at 21:07

## 2018-11-21 RX ADMIN — SODIUM CHLORIDE 3 G: 900 INJECTION INTRAVENOUS at 23:47

## 2018-11-21 RX ADMIN — FENTANYL CITRATE 50 MCG: 50 INJECTION, SOLUTION INTRAMUSCULAR; INTRAVENOUS at 18:51

## 2018-11-21 RX ADMIN — SODIUM CHLORIDE 3 G: 900 INJECTION INTRAVENOUS at 21:02

## 2018-11-21 RX ADMIN — MORPHINE SULFATE: 50 INJECTION, SOLUTION, CONCENTRATE INTRAVENOUS at 18:58

## 2018-11-21 RX ADMIN — ONDANSETRON 4 MG: 2 INJECTION INTRAMUSCULAR; INTRAVENOUS at 19:22

## 2018-11-21 RX ADMIN — SODIUM CHLORIDE, SODIUM LACTATE, POTASSIUM CHLORIDE, CALCIUM CHLORIDE: 600; 310; 30; 20 INJECTION, SOLUTION INTRAVENOUS at 17:20

## 2018-11-21 RX ADMIN — OXYCODONE HYDROCHLORIDE 10 MG: 5 SOLUTION ORAL at 17:55

## 2018-11-21 RX ADMIN — OMEPRAZOLE 20 MG: 20 CAPSULE, DELAYED RELEASE ORAL at 20:57

## 2018-11-21 RX ADMIN — LOSARTAN POTASSIUM 25 MG: 50 TABLET ORAL at 20:56

## 2018-11-21 ASSESSMENT — PATIENT HEALTH QUESTIONNAIRE - PHQ9
2. FEELING DOWN, DEPRESSED, IRRITABLE, OR HOPELESS: SEVERAL DAYS
4. FEELING TIRED OR HAVING LITTLE ENERGY: MORE THAN HALF THE DAYS
7. TROUBLE CONCENTRATING ON THINGS, SUCH AS READING THE NEWSPAPER OR WATCHING TELEVISION: NOT AT ALL
9. THOUGHTS THAT YOU WOULD BE BETTER OFF DEAD, OR OF HURTING YOURSELF: NOT AT ALL
5. POOR APPETITE OR OVEREATING: NEARLY EVERY DAY
8. MOVING OR SPEAKING SO SLOWLY THAT OTHER PEOPLE COULD HAVE NOTICED. OR THE OPPOSITE, BEING SO FIGETY OR RESTLESS THAT YOU HAVE BEEN MOVING AROUND A LOT MORE THAN USUAL: NOT AT ALL
1. LITTLE INTEREST OR PLEASURE IN DOING THINGS: NEARLY EVERY DAY
SUM OF ALL RESPONSES TO PHQ9 QUESTIONS 1 AND 2: 4
3. TROUBLE FALLING OR STAYING ASLEEP OR SLEEPING TOO MUCH: SEVERAL DAYS
6. FEELING BAD ABOUT YOURSELF - OR THAT YOU ARE A FAILURE OR HAVE LET YOURSELF OR YOUR FAMILY DOWN: NOT AL ALL
SUM OF ALL RESPONSES TO PHQ QUESTIONS 1-9: 10

## 2018-11-21 ASSESSMENT — LIFESTYLE VARIABLES
AVERAGE NUMBER OF DAYS PER WEEK YOU HAVE A DRINK CONTAINING ALCOHOL: 2
HAVE YOU EVER FELT YOU SHOULD CUT DOWN ON YOUR DRINKING: NO
CONSUMPTION TOTAL: POSITIVE
TOTAL SCORE: 0
HAVE PEOPLE ANNOYED YOU BY CRITICIZING YOUR DRINKING: NO
TOTAL SCORE: 0
HOW MANY TIMES IN THE PAST YEAR HAVE YOU HAD 5 OR MORE DRINKS IN A DAY: 0
ON A TYPICAL DAY WHEN YOU DRINK ALCOHOL HOW MANY DRINKS DO YOU HAVE: 4
EVER HAD A DRINK FIRST THING IN THE MORNING TO STEADY YOUR NERVES TO GET RID OF A HANGOVER: NO
ALCOHOL_USE: YES
EVER FELT BAD OR GUILTY ABOUT YOUR DRINKING: NO
TOTAL SCORE: 0
EVER_SMOKED: YES

## 2018-11-21 ASSESSMENT — COPD QUESTIONNAIRES
HAVE YOU SMOKED AT LEAST 100 CIGARETTES IN YOUR ENTIRE LIFE: YES
IN THE PAST 12 MONTHS DO YOU DO LESS THAN YOU USED TO BECAUSE OF YOUR BREATHING PROBLEMS: DISAGREE/UNSURE
DURING THE PAST 4 WEEKS HOW MUCH DID YOU FEEL SHORT OF BREATH: NONE/LITTLE OF THE TIME
DO YOU EVER COUGH UP ANY MUCUS OR PHLEGM?: YES, A FEW DAYS A WEEK OR MONTH
COPD SCREENING SCORE: 4

## 2018-11-21 ASSESSMENT — PAIN SCALES - GENERAL
PAINLEVEL_OUTOF10: 4
PAINLEVEL_OUTOF10: 6
PAINLEVEL_OUTOF10: 4

## 2018-11-22 LAB
ALBUMIN SERPL BCP-MCNC: 3 G/DL (ref 3.2–4.9)
ALBUMIN/GLOB SERPL: 1 G/DL
ALP SERPL-CCNC: 76 U/L (ref 30–99)
ALT SERPL-CCNC: 12 U/L (ref 2–50)
ANION GAP SERPL CALC-SCNC: 9 MMOL/L (ref 0–11.9)
AST SERPL-CCNC: 12 U/L (ref 12–45)
BASOPHILS # BLD AUTO: 0.1 % (ref 0–1.8)
BASOPHILS # BLD: 0.01 K/UL (ref 0–0.12)
BILIRUB SERPL-MCNC: 0.4 MG/DL (ref 0.1–1.5)
BUN SERPL-MCNC: 6 MG/DL (ref 8–22)
CALCIUM SERPL-MCNC: 8.6 MG/DL (ref 8.5–10.5)
CHLORIDE SERPL-SCNC: 108 MMOL/L (ref 96–112)
CO2 SERPL-SCNC: 23 MMOL/L (ref 20–33)
CREAT SERPL-MCNC: 0.54 MG/DL (ref 0.5–1.4)
EOSINOPHIL # BLD AUTO: 0 K/UL (ref 0–0.51)
EOSINOPHIL NFR BLD: 0 % (ref 0–6.9)
ERYTHROCYTE [DISTWIDTH] IN BLOOD BY AUTOMATED COUNT: 44.8 FL (ref 35.9–50)
GLOBULIN SER CALC-MCNC: 3 G/DL (ref 1.9–3.5)
GLUCOSE SERPL-MCNC: 127 MG/DL (ref 65–99)
HCT VFR BLD AUTO: 30.1 % (ref 37–47)
HGB BLD-MCNC: 10.1 G/DL (ref 12–16)
IMM GRANULOCYTES # BLD AUTO: 0.06 K/UL (ref 0–0.11)
IMM GRANULOCYTES NFR BLD AUTO: 0.5 % (ref 0–0.9)
LYMPHOCYTES # BLD AUTO: 0.79 K/UL (ref 1–4.8)
LYMPHOCYTES NFR BLD: 6.1 % (ref 22–41)
MCH RBC QN AUTO: 28.9 PG (ref 27–33)
MCHC RBC AUTO-ENTMCNC: 33.6 G/DL (ref 33.6–35)
MCV RBC AUTO: 86.2 FL (ref 81.4–97.8)
MONOCYTES # BLD AUTO: 0.46 K/UL (ref 0–0.85)
MONOCYTES NFR BLD AUTO: 3.5 % (ref 0–13.4)
NEUTROPHILS # BLD AUTO: 11.72 K/UL (ref 2–7.15)
NEUTROPHILS NFR BLD: 89.8 % (ref 44–72)
NRBC # BLD AUTO: 0 K/UL
NRBC BLD-RTO: 0 /100 WBC
PLATELET # BLD AUTO: 217 K/UL (ref 164–446)
PMV BLD AUTO: 9.5 FL (ref 9–12.9)
POTASSIUM SERPL-SCNC: 3.9 MMOL/L (ref 3.6–5.5)
PROT SERPL-MCNC: 6 G/DL (ref 6–8.2)
RBC # BLD AUTO: 3.49 M/UL (ref 4.2–5.4)
SODIUM SERPL-SCNC: 140 MMOL/L (ref 135–145)
WBC # BLD AUTO: 13 K/UL (ref 4.8–10.8)

## 2018-11-22 PROCEDURE — 80053 COMPREHEN METABOLIC PANEL: CPT

## 2018-11-22 PROCEDURE — 770006 HCHG ROOM/CARE - MED/SURG/GYN SEMI*

## 2018-11-22 PROCEDURE — 94760 N-INVAS EAR/PLS OXIMETRY 1: CPT

## 2018-11-22 PROCEDURE — 700105 HCHG RX REV CODE 258: Performed by: SURGERY

## 2018-11-22 PROCEDURE — 36415 COLL VENOUS BLD VENIPUNCTURE: CPT

## 2018-11-22 PROCEDURE — 85025 COMPLETE CBC W/AUTO DIFF WBC: CPT

## 2018-11-22 PROCEDURE — 700102 HCHG RX REV CODE 250 W/ 637 OVERRIDE(OP): Performed by: SURGERY

## 2018-11-22 PROCEDURE — A9270 NON-COVERED ITEM OR SERVICE: HCPCS | Performed by: SURGERY

## 2018-11-22 PROCEDURE — 700101 HCHG RX REV CODE 250: Performed by: SURGERY

## 2018-11-22 PROCEDURE — 700111 HCHG RX REV CODE 636 W/ 250 OVERRIDE (IP): Performed by: SURGERY

## 2018-11-22 RX ORDER — OXYCODONE HYDROCHLORIDE 5 MG/1
5 TABLET ORAL EVERY 4 HOURS PRN
Status: DISCONTINUED | OUTPATIENT
Start: 2018-11-22 | End: 2018-11-26

## 2018-11-22 RX ORDER — OXYCODONE HYDROCHLORIDE 10 MG/1
10 TABLET ORAL EVERY 4 HOURS PRN
Status: DISCONTINUED | OUTPATIENT
Start: 2018-11-22 | End: 2018-11-26

## 2018-11-22 RX ORDER — MORPHINE SULFATE 10 MG/ML
2-4 INJECTION, SOLUTION INTRAMUSCULAR; INTRAVENOUS
Status: DISCONTINUED | OUTPATIENT
Start: 2018-11-22 | End: 2018-11-26

## 2018-11-22 RX ADMIN — ACETAMINOPHEN 1000 MG: 500 TABLET ORAL at 05:56

## 2018-11-22 RX ADMIN — SODIUM CHLORIDE 3 G: 900 INJECTION INTRAVENOUS at 23:44

## 2018-11-22 RX ADMIN — SODIUM CHLORIDE 3 G: 900 INJECTION INTRAVENOUS at 17:52

## 2018-11-22 RX ADMIN — OXYCODONE HYDROCHLORIDE 10 MG: 10 TABLET ORAL at 15:18

## 2018-11-22 RX ADMIN — SODIUM CHLORIDE 3 G: 900 INJECTION INTRAVENOUS at 05:57

## 2018-11-22 RX ADMIN — OXYCODONE HYDROCHLORIDE 5 MG: 5 TABLET ORAL at 11:09

## 2018-11-22 RX ADMIN — POTASSIUM CHLORIDE AND SODIUM CHLORIDE: 450; 150 INJECTION, SOLUTION INTRAVENOUS at 09:12

## 2018-11-22 RX ADMIN — ACETAMINOPHEN 1000 MG: 500 TABLET ORAL at 17:52

## 2018-11-22 RX ADMIN — ENOXAPARIN SODIUM 30 MG: 100 INJECTION SUBCUTANEOUS at 15:18

## 2018-11-22 RX ADMIN — ACETAMINOPHEN 1000 MG: 500 TABLET ORAL at 11:08

## 2018-11-22 RX ADMIN — ONDANSETRON HYDROCHLORIDE 4 MG: 2 INJECTION, SOLUTION INTRAMUSCULAR; INTRAVENOUS at 13:53

## 2018-11-22 RX ADMIN — OXYCODONE HYDROCHLORIDE 10 MG: 10 TABLET ORAL at 19:35

## 2018-11-22 RX ADMIN — POTASSIUM CHLORIDE AND SODIUM CHLORIDE: 450; 150 INJECTION, SOLUTION INTRAVENOUS at 00:50

## 2018-11-22 RX ADMIN — SODIUM CHLORIDE 3 G: 900 INJECTION INTRAVENOUS at 11:41

## 2018-11-22 RX ADMIN — ACETAMINOPHEN 1000 MG: 500 TABLET ORAL at 23:44

## 2018-11-22 RX ADMIN — ONDANSETRON HYDROCHLORIDE 4 MG: 2 INJECTION, SOLUTION INTRAMUSCULAR; INTRAVENOUS at 06:06

## 2018-11-22 RX ADMIN — OMEPRAZOLE 20 MG: 20 CAPSULE, DELAYED RELEASE ORAL at 05:56

## 2018-11-22 RX ADMIN — LOSARTAN POTASSIUM 25 MG: 25 TABLET, FILM COATED ORAL at 06:04

## 2018-11-22 RX ADMIN — OXYCODONE HYDROCHLORIDE 10 MG: 10 TABLET ORAL at 23:44

## 2018-11-22 ASSESSMENT — LIFESTYLE VARIABLES: EVER_SMOKED: YES

## 2018-11-22 ASSESSMENT — COGNITIVE AND FUNCTIONAL STATUS - GENERAL
DAILY ACTIVITIY SCORE: 22
MOBILITY SCORE: 20
DRESSING REGULAR LOWER BODY CLOTHING: A LITTLE
SUGGESTED CMS G CODE MODIFIER DAILY ACTIVITY: CJ
SUGGESTED CMS G CODE MODIFIER MOBILITY: CJ
MOVING TO AND FROM BED TO CHAIR: A LITTLE
MOVING FROM LYING ON BACK TO SITTING ON SIDE OF FLAT BED: A LITTLE
STANDING UP FROM CHAIR USING ARMS: A LITTLE
TURNING FROM BACK TO SIDE WHILE IN FLAT BAD: A LITTLE
DRESSING REGULAR UPPER BODY CLOTHING: A LITTLE

## 2018-11-22 ASSESSMENT — PAIN SCALES - GENERAL
PAINLEVEL_OUTOF10: 6
PAINLEVEL_OUTOF10: 0
PAINLEVEL_OUTOF10: 6
PAINLEVEL_OUTOF10: 4
PAINLEVEL_OUTOF10: 6
PAINLEVEL_OUTOF10: 5
PAINLEVEL_OUTOF10: 2
PAINLEVEL_OUTOF10: 4
PAINLEVEL_OUTOF10: 0

## 2018-11-22 ASSESSMENT — ENCOUNTER SYMPTOMS
FEVER: 0
VOMITING: 0
ABDOMINAL PAIN: 1
NAUSEA: 0
CHILLS: 0

## 2018-11-22 ASSESSMENT — COPD QUESTIONNAIRES
DURING THE PAST 4 WEEKS HOW MUCH DID YOU FEEL SHORT OF BREATH: SOME OF THE TIME
HAVE YOU SMOKED AT LEAST 100 CIGARETTES IN YOUR ENTIRE LIFE: YES
DO YOU EVER COUGH UP ANY MUCUS OR PHLEGM?: NO/ONLY WITH OCCASIONAL COLDS OR INFECTIONS
COPD SCREENING SCORE: 5

## 2018-11-22 NOTE — PROGRESS NOTES
Pt A&O x 4.  Reporting 6/10 pain at this time. Morphine PCA in use. Pt requesting to be taken off PCA and switched to orals Will notify MD during rounds.  Midline incision with dressing. Dressing CDI.   LLQ ostomy in place. +watery yellow/brown output noted.  Simms secured in place to gravity. +clear yellow urine noted.  +bowel sounds upon auscultation. +flatus. Tolerating diet.  POC discussed with pt. All needs addressed at this time.

## 2018-11-22 NOTE — PROGRESS NOTES
2 RN skin check completed with Johnny RN      Skin behind ears intact  All bony prominences intact   Midline incision with dressing, CDI. LLQ ostomy red, non swollen.

## 2018-11-22 NOTE — OP REPORT
DATE OF SERVICE:  11/21/2018    PREOPERATIVE DIAGNOSIS:  Diverticulitis.    POSTOPERATIVE DIAGNOSES:  1.  Diverticulitis.  2.  Diverticular abscess.    INDICATION FOR SURGERY:  This is a 57-year-old female who has been suffering   from recurrent diverticulitis since at least 09/05/2018.  She has undergone   multiple drainages of diverticular abscesses and was finally able to be   discharged home after her second admission and diverticular abscess drainage 2   weeks ago on antibiotics.  She was brought to the operating room today for a   planned sigmoid colectomy, possible reanastomosis and possible colostomy with   the understanding that it was highly likely that she would undergo colostomy   placement.  She was brought early due to the fact that she was unable to be   off antibiotics for any time without having immediate recurrence of her   diverticulitis and diverticular abscess.    PROCEDURES:  1.  Exploratory laparotomy.  2.  Sigmoid colectomy.  3.  Colostomy placement.    SURGEON:  Justus Will MD    ASSISTANT:  Laura Watson MD    ANESTHESIOLOGIST:  Que Mike MD    ANESTHESIA:  General endotracheal anesthesia.    FINDINGS:  Extensive inflammation of the sigmoid colon and pericolonic tissue.    Small diverticular abscess posterior to the sigmoid colon.    PROCEDURE NARRATIVE:  Signed informed consent was on the chart at the time of   the procedure.  The patient was brought to the operating room and placed in   the supine position.  General endotracheal anesthesia was induced and the skin   over the abdomen was prepped and draped in a sterile fashion.  The patient   was noted to be on scheduled antibiotics and was given 2 g of cefotetan IV   prior to surgery.  A Simms catheter was placed.  Her position was modified to   a low lithotomy position and the skin over her abdomen and perianal region was   prepped and draped in a sterile fashion.  A timeout was performed.  Using a   10 blade scalpel, a  midline incision was made from superior to the pubic bone   to superior to the umbilicus.  The underlying soft tissue was dissected   through using careful Bovie cautery until the midline fascia was reached.  The   midline fascia was divided for the length of the skin incision using careful   Bovie cautery.  The underlying soft tissue was dissected through bluntly until   the peritoneum was reached.  This was lifted and divided using Metzenbaum   scissors.  The peritoneum was then divided for the length of the skin incision   over surgeon's fingers using the Bovie cautery.  The patient was placed into   a gentle Trendelenburg position and multiple adhesions to the anterior   abdominal wall were taken down using a combination of sharp dissection, blunt   dissection and careful Bovie cautery.  The omentum, which had been adhered   down into the right lower quadrant was freed up, as were some adhesions   between the small bowel and omentum and small bowel and anterior abdominal   wall where the omentum had been adhered.  Once these adhesions were cleared,   the small bowel was packed into the right upper quadrant with 3 moist laps and   the sigmoid colon was visualized.  This was noted to be quite edematous and   inflamed with a significant amount of inflamed tissue around it.  The sigmoid   colon was carefully dissected off of the bladder using mostly sharp dissection   and gentle blunt dissection.  Eventually, we were able to dissect the sigmoid   colon off of the bladder without obvious injury to the bladder.  The sigmoid   colon was then dissected away from the surrounding soft tissue, again staying   as close to the sigmoid colon as possible and using a combination of sharp   dissection, careful Bovie cauterization and the Harmonic scalpel.  Once it was   clear that no structure that needed to remain would be in the tissue to be   divided using the Harmonic scalpel, we were able to dissect down and take down   the  hemorrhoidal arteries and veins as well as the inferior mesenteric artery   and vein, which were taken in a clamp, cut and tie technique.  Eventually, I   was able to get down to sigmoid colon near the rectosigmoid junction, which   was still somewhat edematous, but was not obviously infected and inflamed.    Dissection proximally did lead to a site on the proximal sigmoid colon without   obvious inflammation at that site.  The sigmoid colon was divided by first   making a window in the mesentery adjacent to the sigmoid colon and then using   a CORI stapler to divide and staple the colon.  The blood supply to the colon   that remained was taken down using the Harmonic scalpel, and eventually, I was   able to free up the colon posteriorly enough to place a Contour stapler   across the distal sigmoid colon near the rectosigmoid junction.  This was   fired and the colon was able to be removed.  The staple line was inspected on   the rectosigmoid junction and there was some minimal bleeding there.  This was   first addressed with careful Bovie cauterization and then a piece of Gelfoam,   which had been soaked in thrombin.  This did lead to resolution of the   bleeding.  The 2 corners of the Ralph's pouch at the edge of the staple   line were marked with 2-0 Prolene sutures, each approximately 4 cm in length.    The pelvis was irrigated and dried.  Hemostasis was noted to be excellent.    The white line of Toldt was taken down using careful Bovie cautery until the   dissection reached the splenic ligament.  This did not require takedown of   this ligament as the colon had adequate length for placement of a colostomy.    The laps were removed and the lap, needle and instrument counts were noted to   be correct.  The peritoneal space was irrigated with 2 liters of warm normal   saline.  The irrigant returned clear.  A disk of skin was taken at the site of   the planned colostomy and a column of tissue was taken down to  the fascia.    Initially, this did track to the patient's midline and this was corrected.  A   cruciate incision was made on the anterior fascia.  The underlying muscle was   spread and a cruciate incision was made on the posterior fascia.  The tunnel   through the muscle was enlarged by placing 2 fingers through the tunnel.  The   colostomy was then brought up through the defect in the anterior abdominal   wall.  There was noted to be adequate length of the colostomy.  The colostomy   was then secured to the posterior fascia at 3 points around the periphery of   the colon using 3 separately placed 2-0 Vicryl sutures.  The fat between the   colostomy tunnel and the midline was then closed with 2 layers of interrupted   3-0 Vicryl sutures, closing the defect that had been created at our first   tunnel.  The fascia was then closed with a series of #1 Vicryl interrupted   sutures.  The subcutaneous tissue was irrigated and dried.  Edward's fascia   was closed with a series of 3-0 Vicryl interrupted sutures and the skin was   closed using a series of skin staples.  The midline incision was then excluded   by folding a series of 4x4 in two-thirds lengthwise, placing them over the   staples and then covering these with clear Tegaderm dressings.  The staple   line on the colostomy was then removed using careful Bovie cautery and the   colostomy was matured in the usual fashion using a series of 3-0 Vicryl   sutures peripherally.  Colostomy apparatus was then fashioned to fit the   colostomy and was placed with the bag being placed over this.    FLUIDS:  2000 mL of crystalloid.    ESTIMATED BLOOD LOSS:  200 mL.    DRAINS:  None.    SPECIMENS:  Sigmoid colon to pathology.    COMPLICATIONS:  None.    DISPOSITION:  Patient was in stable condition to postanesthesia care unit.       ____________________________________     MD RAÚL Sanford / VELMA    DD:  11/21/2018 17:37:18  DT:  11/21/2018 18:36:44    D#:  [id number]   Job#:  [id number]

## 2018-11-22 NOTE — PROGRESS NOTES
Report received from PACU    Patient arrived to floor at 2000    Assessment complete.  A&O x 4. Patient calls appropriately.  Patient up with SBA assist.    Patient has 4/10 pain. PCA in place   Denies N&V. Tolerating full diet.  Midline incision with dressing, CDI. LLQ ostomy pink, non swollen.   + void, - flatus  Patient denies SOB.  SCD's on.    Review plan with of care with patient. Call light and personal belongings with in reach. Hourly rounding in place. All needs met at this time.

## 2018-11-22 NOTE — PROGRESS NOTES
Ostomy leaking. Ostomy appliance changed. Midline incision well approximated with staples. Stool over incision. Incision cleaned. No drainage noted. Incision left YVAN.

## 2018-11-22 NOTE — OR SURGEON
Immediate Post OP Note    PreOp Diagnosis: Diverticulitis    PostOp Diagnosis: 1.  Diverticulitis  2.  Diverticular abscess    Procedure(s):  1.  SIGMOID COLECTOMY - Wound Class:  Contaminated  2.  Colostomy placement    Surgeon(s):  ARTEMIO Goodman M.D.    Anesthesiologist/Type of Anesthesia:  Anesthesiologist: Que Mike M.D./General    Surgical Staff:  Circulator: Chauncey Welch R.N.; Pam Samson R.N.  Relief Circulator: Rakan Díaz R.N.  Relief Scrub: Kathryn Galdamez  Scrub Person: Samm Meyer [place]: Glory Sarah    Specimens removed if any:  ID Type Source Tests Collected by Time Destination   A : A. sigmoid colon Tissue Colon - Sigmoid PATHOLOGY SPECIMEN Justus Will M.D. 11/21/2018  4:03 PM        Estimated Blood Loss: 200 ml    Findings: Extensive inflammation of sigmoid colon and pericolonic tissue.  Small diverticular abscess posterior to sigmoid colon.      Complications: None        11/21/2018 5:07 PM Justus Will M.D.

## 2018-11-22 NOTE — PROGRESS NOTES
Progress Note               Author: Cici VEGA Magdaleno Date & Time created: 2018  10:30 AM     Interval History:  Doing well. Pain well controlled with PCA, but has a history of nausea with morphine. Tolerating full liquid diet. Has been ambulating. Having trouble with colostomy bag seal.     Review of Systems:  Review of Systems   Constitutional: Negative for chills and fever.   Gastrointestinal: Positive for abdominal pain. Negative for nausea and vomiting.       Physical Exam:  Physical Exam   Constitutional: She is oriented to person, place, and time. She appears well-developed and well-nourished. No distress.   HENT:   Head: Normocephalic and atraumatic.   Eyes: Conjunctivae are normal.   Neck: Normal range of motion.   Cardiovascular: Normal rate.    Pulmonary/Chest: Effort normal.   Abdominal: Soft. She exhibits distension. There is tenderness.   Minimal distention, moderate appropriate incisional tenderness. Air and small amount of liquid stool in ostomy bag.    Neurological: She is alert and oriented to person, place, and time.   Skin: Skin is warm and dry. She is not diaphoretic.   Psychiatric: She has a normal mood and affect. Her behavior is normal.       Labs:          Recent Labs      18   SODIUM  140   POTASSIUM  3.9   CHLORIDE  108   CO2  23   BUN  6*   CREATININE  0.54   CALCIUM  8.6     Recent Labs      18   ALTSGPT  12   ASTSGOT  12   ALKPHOSPHAT  76   TBILIRUBIN  0.4   GLUCOSE  127*     Recent Labs      18   RBC  3.49*   HEMOGLOBIN  10.1*   HEMATOCRIT  30.1*   PLATELETCT  217     Recent Labs      18   WBC  13.0*   NEUTSPOLYS  89.80*   LYMPHOCYTES  6.10*   MONOCYTES  3.50   EOSINOPHILS  0.00   BASOPHILS  0.10   ASTSGOT  12   ALTSGPT  12   ALKPHOSPHAT  76   TBILIRUBIN  0.4     Hemodynamics:  Temp (24hrs), Av.9 °C (98.4 °F), Min:36.2 °C (97.1 °F), Max:37.4 °C (99.3 °F)  Temperature: 36.8 °C (98.2 °F)  Pulse  Av.2  Min: 61  Max:  113Heart Rate (Monitored): 72  Blood Pressure: 107/61, NIBP: 116/65     Respiratory:    Respiration: 16, Pulse Oximetry: 99 %        RUL Breath Sounds: Diminished;Clear, RML Breath Sounds: Diminished;Clear, RLL Breath Sounds: Clear;Diminished, PEG Breath Sounds: Clear;Diminished, LLL Breath Sounds: Clear;Diminished  Fluids:    Intake/Output Summary (Last 24 hours) at 11/22/18 1030  Last data filed at 11/22/18 0713   Gross per 24 hour   Intake          4776.67 ml   Output             1250 ml   Net          3526.67 ml     Weight: 66.6 kg (146 lb 13.2 oz)  GI/Nutrition:  Orders Placed This Encounter   Procedures   • Diet Order Full Liquid     Standing Status:   Standing     Number of Occurrences:   1     Order Specific Question:   Diet:     Answer:   Full Liquid [11]     Medical Decision Making, by Problem:  There are no active hospital problems to display for this patient.      Plan:  D/c PCA, intermittent morphine bolus now available. PO oxycodone for pain.   Encouraged ambulation.   Advance diet to GI soft as tolerated.   Ostomy care teaching.   On unasyn for diverticular abscess, will need to complete a 7 day course (give Augmentin if d/c'ing home).   D/c duggan tomorrow (POD 2) due to significant dissection around bladder.     Quality-Core Measures   Reviewed items::  Labs reviewed  Duggan catheter::  One or Two Days Post Surgery (Day of Surgery being Day 0)  DVT prophylaxis pharmacological::  Enoxaparin (Lovenox)  DVT prophylaxis - mechanical:  SCDs  Ulcer Prophylaxis::  Yes

## 2018-11-22 NOTE — CARE PLAN
Problem: Pain Management  Goal: Pain level will decrease to patient's comfort goal  Outcome: PROGRESSING AS EXPECTED  Assess pain Q 2 hours, monitor sedation, encourage patient to voice pain, educate patient on using the bolus button of PCA for pain relief.     Problem: Knowledge Deficit  Goal: Knowledge of disease process/condition, treatment plan, diagnostic tests, and medications will improve  Outcome: PROGRESSING AS EXPECTED  Review plan of care with patient, explain medications, transfusions, and procedures; encourage patient to ask questions

## 2018-11-23 LAB
ANION GAP SERPL CALC-SCNC: 6 MMOL/L (ref 0–11.9)
BASOPHILS # BLD AUTO: 0.4 % (ref 0–1.8)
BASOPHILS # BLD: 0.04 K/UL (ref 0–0.12)
BUN SERPL-MCNC: 7 MG/DL (ref 8–22)
CALCIUM SERPL-MCNC: 8.2 MG/DL (ref 8.5–10.5)
CHLORIDE SERPL-SCNC: 110 MMOL/L (ref 96–112)
CO2 SERPL-SCNC: 24 MMOL/L (ref 20–33)
CREAT SERPL-MCNC: 0.5 MG/DL (ref 0.5–1.4)
EOSINOPHIL # BLD AUTO: 0.24 K/UL (ref 0–0.51)
EOSINOPHIL NFR BLD: 2.2 % (ref 0–6.9)
ERYTHROCYTE [DISTWIDTH] IN BLOOD BY AUTOMATED COUNT: 49.1 FL (ref 35.9–50)
GLUCOSE SERPL-MCNC: 90 MG/DL (ref 65–99)
HCT VFR BLD AUTO: 28.8 % (ref 37–47)
HGB BLD-MCNC: 9.3 G/DL (ref 12–16)
IMM GRANULOCYTES # BLD AUTO: 0.04 K/UL (ref 0–0.11)
IMM GRANULOCYTES NFR BLD AUTO: 0.4 % (ref 0–0.9)
LYMPHOCYTES # BLD AUTO: 1.55 K/UL (ref 1–4.8)
LYMPHOCYTES NFR BLD: 14 % (ref 22–41)
MCH RBC QN AUTO: 29.2 PG (ref 27–33)
MCHC RBC AUTO-ENTMCNC: 32.3 G/DL (ref 33.6–35)
MCV RBC AUTO: 90.3 FL (ref 81.4–97.8)
MONOCYTES # BLD AUTO: 0.58 K/UL (ref 0–0.85)
MONOCYTES NFR BLD AUTO: 5.2 % (ref 0–13.4)
NEUTROPHILS # BLD AUTO: 8.64 K/UL (ref 2–7.15)
NEUTROPHILS NFR BLD: 77.8 % (ref 44–72)
NRBC # BLD AUTO: 0 K/UL
NRBC BLD-RTO: 0 /100 WBC
PATHOLOGY CONSULT NOTE: NORMAL
PLATELET # BLD AUTO: 209 K/UL (ref 164–446)
PMV BLD AUTO: 10 FL (ref 9–12.9)
POTASSIUM SERPL-SCNC: 3.9 MMOL/L (ref 3.6–5.5)
RBC # BLD AUTO: 3.19 M/UL (ref 4.2–5.4)
SODIUM SERPL-SCNC: 140 MMOL/L (ref 135–145)
WBC # BLD AUTO: 11.1 K/UL (ref 4.8–10.8)

## 2018-11-23 PROCEDURE — 36415 COLL VENOUS BLD VENIPUNCTURE: CPT

## 2018-11-23 PROCEDURE — A9270 NON-COVERED ITEM OR SERVICE: HCPCS | Performed by: SURGERY

## 2018-11-23 PROCEDURE — 700105 HCHG RX REV CODE 258: Performed by: SURGERY

## 2018-11-23 PROCEDURE — 302111 WAFER OST 2.25IN N IMG RD 2 PC (BARRIER): Performed by: SURGERY

## 2018-11-23 PROCEDURE — 700111 HCHG RX REV CODE 636 W/ 250 OVERRIDE (IP): Performed by: SURGERY

## 2018-11-23 PROCEDURE — 700102 HCHG RX REV CODE 250 W/ 637 OVERRIDE(OP): Performed by: SURGERY

## 2018-11-23 PROCEDURE — 770006 HCHG ROOM/CARE - MED/SURG/GYN SEMI*

## 2018-11-23 PROCEDURE — 302098 PASTE RING (FLAT): Performed by: SURGERY

## 2018-11-23 PROCEDURE — 85025 COMPLETE CBC W/AUTO DIFF WBC: CPT

## 2018-11-23 PROCEDURE — 80048 BASIC METABOLIC PNL TOTAL CA: CPT

## 2018-11-23 RX ADMIN — SODIUM CHLORIDE 3 G: 900 INJECTION INTRAVENOUS at 05:59

## 2018-11-23 RX ADMIN — ACETAMINOPHEN 1000 MG: 500 TABLET ORAL at 17:35

## 2018-11-23 RX ADMIN — SODIUM CHLORIDE 3 G: 900 INJECTION INTRAVENOUS at 17:37

## 2018-11-23 RX ADMIN — ONDANSETRON HYDROCHLORIDE 4 MG: 2 INJECTION, SOLUTION INTRAMUSCULAR; INTRAVENOUS at 04:41

## 2018-11-23 RX ADMIN — SODIUM CHLORIDE 3 G: 900 INJECTION INTRAVENOUS at 11:44

## 2018-11-23 RX ADMIN — DEXAMETHASONE SODIUM PHOSPHATE 4 MG: 4 INJECTION, SOLUTION INTRAMUSCULAR; INTRAVENOUS at 05:56

## 2018-11-23 RX ADMIN — OXYCODONE HYDROCHLORIDE 10 MG: 10 TABLET ORAL at 14:16

## 2018-11-23 RX ADMIN — HALOPERIDOL LACTATE 1 MG: 5 INJECTION, SOLUTION INTRAMUSCULAR at 08:59

## 2018-11-23 RX ADMIN — ENOXAPARIN SODIUM 30 MG: 100 INJECTION SUBCUTANEOUS at 17:35

## 2018-11-23 RX ADMIN — OXYCODONE HYDROCHLORIDE 10 MG: 10 TABLET ORAL at 09:10

## 2018-11-23 RX ADMIN — ACETAMINOPHEN 1000 MG: 500 TABLET ORAL at 08:59

## 2018-11-23 RX ADMIN — OXYCODONE HYDROCHLORIDE 10 MG: 10 TABLET ORAL at 18:32

## 2018-11-23 RX ADMIN — ENOXAPARIN SODIUM 30 MG: 100 INJECTION SUBCUTANEOUS at 06:11

## 2018-11-23 RX ADMIN — OMEPRAZOLE 20 MG: 20 CAPSULE, DELAYED RELEASE ORAL at 08:59

## 2018-11-23 RX ADMIN — OXYCODONE HYDROCHLORIDE 10 MG: 10 TABLET ORAL at 22:34

## 2018-11-23 ASSESSMENT — PAIN SCALES - GENERAL
PAINLEVEL_OUTOF10: 3
PAINLEVEL_OUTOF10: 5
PAINLEVEL_OUTOF10: 7

## 2018-11-23 ASSESSMENT — ENCOUNTER SYMPTOMS
NAUSEA: 1
CHILLS: 0
VOMITING: 0
ABDOMINAL PAIN: 1
FEVER: 0

## 2018-11-23 NOTE — PROGRESS NOTES
Pt A&O x 4.  Pt sitting on edge of bed.   Reporting 7/10 pain. Pt medicated for pain per MAR.  Pt reporting nausea at this time. Haldol administered.  Midline incision well approximated with staples. YVAN.  LLQ ostomy in place. +yellow/brown output. Hypoactive bowel sounds in place.   Simms d/c at 0730. PVR to be checked at 1330.  Encouraging ambulation. Pt up self, steady gait noted.  POC discussed with pt. All needs addressed at this time.

## 2018-11-23 NOTE — PROGRESS NOTES
Bedside report received.  Assessment complete.  A&O x 4. Patient calls appropriately.  Patient up with SB assist.   Patient has 6/10 pain. Medication given (see MAR)  Denies N&V. Tolerating GI soft diet.  Midline incision with staples YVAN. LLQ ostomy red, patent, no swelling.   + void, + flatus  Patient denies SOB.  SCD's off.    Review plan with of care with patient. Call light and personal belongings with in reach. Hourly rounding in place. All needs met at this time.

## 2018-11-23 NOTE — CARE PLAN
Problem: Pain Management  Goal: Pain level will decrease to patient's comfort goal  Outcome: PROGRESSING AS EXPECTED  Pain well controlled with PO oxy. Ice pack provided and in use. Extra pillows in use. Providing assistance when getting out of bed.    Problem: Bowel/Gastric:  Goal: Normal bowel function is maintained or improved  Outcome: PROGRESSING AS EXPECTED  Pt medicated for anit emetic. +output from ostomy. Wound care following pt for ostomy care.

## 2018-11-23 NOTE — DIETARY
Nutrition Services: Day 2 of admit.  Ambar Jj is a 57 y.o. female with admitting DX of DIVERTICULOSIS OF LARGE INTESTINE WITHOUT PERFORATION OR ABSCESS WITHOUT BLEEDING, Diverticulosis of sigmoid colon, Personal history of digestive disease  Consult received for Poor PO & Wt Loss on Nutrition Admit Screen     Assessment:  Ht: 170.2 cm, Wt 11/21: 66.6 kg via stand up scale, BMI: 23  Diet/Intake: Low Fiber (GI Soft) - Per chart pt PO < %     Evaluation:   1. Attempted to visit pt was sleeping.   2. Pt is receiving Boost Plus TID with meals.   3. Procedures 11/21: Sigmoid colectomy, colostomy placement  4. Per chart review pt's wt on 10/22: 71.4 kg. Wt loss percent is 6.7% in a month, which is severe.   5. Labs: BUN 7  6. Meds: lovenox, cozaar, prilosec, haldol (prn), roxicodone (prn)    Recommendations/Plan:  1. Encourage intake of meals  2. Document intake of all meals as % taken in ADL's to provide interdisciplinary communication across all shifts.   3. Monitor weight.  4. Nutrition rep will continue to see patient for ongoing meal and snack preferences.    RD following

## 2018-11-23 NOTE — PROGRESS NOTES
Patient requesting inhaler. Dr. Dolan updated, received orders to place respiratory protocol with respiratory therapy.

## 2018-11-23 NOTE — CARE PLAN
Problem: Communication  Goal: The ability to communicate needs accurately and effectively will improve  Outcome: PROGRESSING AS EXPECTED  Review plan of care with patient, encourage patient to voice questions and concerns     Problem: Bowel/Gastric:  Goal: Will not experience complications related to bowel motility  Outcome: PROGRESSING AS EXPECTED  Assess bowel sounds and abdomen, monitor for nausea/vomiting, administer appropriate anti-emetic medications, assess ostomy and function

## 2018-11-24 LAB
ANION GAP SERPL CALC-SCNC: 5 MMOL/L (ref 0–11.9)
BASOPHILS # BLD AUTO: 0.6 % (ref 0–1.8)
BASOPHILS # BLD: 0.05 K/UL (ref 0–0.12)
BUN SERPL-MCNC: 6 MG/DL (ref 8–22)
CALCIUM SERPL-MCNC: 8.3 MG/DL (ref 8.5–10.5)
CHLORIDE SERPL-SCNC: 107 MMOL/L (ref 96–112)
CO2 SERPL-SCNC: 28 MMOL/L (ref 20–33)
CREAT SERPL-MCNC: 0.46 MG/DL (ref 0.5–1.4)
EOSINOPHIL # BLD AUTO: 0.34 K/UL (ref 0–0.51)
EOSINOPHIL NFR BLD: 4.3 % (ref 0–6.9)
ERYTHROCYTE [DISTWIDTH] IN BLOOD BY AUTOMATED COUNT: 47.7 FL (ref 35.9–50)
GLUCOSE SERPL-MCNC: 89 MG/DL (ref 65–99)
HCT VFR BLD AUTO: 29.3 % (ref 37–47)
HGB BLD-MCNC: 9.2 G/DL (ref 12–16)
IMM GRANULOCYTES # BLD AUTO: 0.04 K/UL (ref 0–0.11)
IMM GRANULOCYTES NFR BLD AUTO: 0.5 % (ref 0–0.9)
LYMPHOCYTES # BLD AUTO: 1.93 K/UL (ref 1–4.8)
LYMPHOCYTES NFR BLD: 24.5 % (ref 22–41)
MCH RBC QN AUTO: 28 PG (ref 27–33)
MCHC RBC AUTO-ENTMCNC: 31.4 G/DL (ref 33.6–35)
MCV RBC AUTO: 89.3 FL (ref 81.4–97.8)
MONOCYTES # BLD AUTO: 0.41 K/UL (ref 0–0.85)
MONOCYTES NFR BLD AUTO: 5.2 % (ref 0–13.4)
NEUTROPHILS # BLD AUTO: 5.12 K/UL (ref 2–7.15)
NEUTROPHILS NFR BLD: 64.9 % (ref 44–72)
NRBC # BLD AUTO: 0 K/UL
NRBC BLD-RTO: 0 /100 WBC
PLATELET # BLD AUTO: 215 K/UL (ref 164–446)
PMV BLD AUTO: 9.6 FL (ref 9–12.9)
POTASSIUM SERPL-SCNC: 3.5 MMOL/L (ref 3.6–5.5)
RBC # BLD AUTO: 3.28 M/UL (ref 4.2–5.4)
SODIUM SERPL-SCNC: 140 MMOL/L (ref 135–145)
WBC # BLD AUTO: 7.9 K/UL (ref 4.8–10.8)

## 2018-11-24 PROCEDURE — 85025 COMPLETE CBC W/AUTO DIFF WBC: CPT

## 2018-11-24 PROCEDURE — 770006 HCHG ROOM/CARE - MED/SURG/GYN SEMI*

## 2018-11-24 PROCEDURE — 700102 HCHG RX REV CODE 250 W/ 637 OVERRIDE(OP): Performed by: SURGERY

## 2018-11-24 PROCEDURE — 700111 HCHG RX REV CODE 636 W/ 250 OVERRIDE (IP): Performed by: SURGERY

## 2018-11-24 PROCEDURE — 36415 COLL VENOUS BLD VENIPUNCTURE: CPT

## 2018-11-24 PROCEDURE — 700105 HCHG RX REV CODE 258: Performed by: SURGERY

## 2018-11-24 PROCEDURE — A9270 NON-COVERED ITEM OR SERVICE: HCPCS | Performed by: SURGERY

## 2018-11-24 PROCEDURE — 80048 BASIC METABOLIC PNL TOTAL CA: CPT

## 2018-11-24 RX ADMIN — OXYCODONE HYDROCHLORIDE 10 MG: 10 TABLET ORAL at 06:47

## 2018-11-24 RX ADMIN — OXYCODONE HYDROCHLORIDE 10 MG: 10 TABLET ORAL at 15:11

## 2018-11-24 RX ADMIN — OXYCODONE HYDROCHLORIDE 10 MG: 10 TABLET ORAL at 10:50

## 2018-11-24 RX ADMIN — ONDANSETRON HYDROCHLORIDE 4 MG: 2 INJECTION, SOLUTION INTRAMUSCULAR; INTRAVENOUS at 15:10

## 2018-11-24 RX ADMIN — ENOXAPARIN SODIUM 30 MG: 100 INJECTION SUBCUTANEOUS at 18:32

## 2018-11-24 RX ADMIN — OMEPRAZOLE 20 MG: 20 CAPSULE, DELAYED RELEASE ORAL at 06:47

## 2018-11-24 RX ADMIN — SODIUM CHLORIDE 3 G: 900 INJECTION INTRAVENOUS at 00:27

## 2018-11-24 RX ADMIN — SODIUM CHLORIDE 3 G: 900 INJECTION INTRAVENOUS at 06:10

## 2018-11-24 RX ADMIN — ACETAMINOPHEN 1000 MG: 500 TABLET ORAL at 15:10

## 2018-11-24 RX ADMIN — SODIUM CHLORIDE 3 G: 900 INJECTION INTRAVENOUS at 11:35

## 2018-11-24 RX ADMIN — ACETAMINOPHEN 1000 MG: 500 TABLET ORAL at 00:27

## 2018-11-24 RX ADMIN — ENOXAPARIN SODIUM 30 MG: 100 INJECTION SUBCUTANEOUS at 06:17

## 2018-11-24 RX ADMIN — LOSARTAN POTASSIUM 25 MG: 25 TABLET, FILM COATED ORAL at 06:48

## 2018-11-24 RX ADMIN — SODIUM CHLORIDE 3 G: 900 INJECTION INTRAVENOUS at 18:31

## 2018-11-24 RX ADMIN — OXYCODONE HYDROCHLORIDE 10 MG: 10 TABLET ORAL at 19:26

## 2018-11-24 ASSESSMENT — PAIN SCALES - GENERAL
PAINLEVEL_OUTOF10: 7
PAINLEVEL_OUTOF10: 3
PAINLEVEL_OUTOF10: 4
PAINLEVEL_OUTOF10: 7
PAINLEVEL_OUTOF10: 7
PAINLEVEL_OUTOF10: 5
PAINLEVEL_OUTOF10: 2
PAINLEVEL_OUTOF10: 5
PAINLEVEL_OUTOF10: 3

## 2018-11-24 NOTE — PROGRESS NOTES
Progress Note    Author:  Laura Watson MD    Date & Time:   11/24/2018   1:02 PM          Patient ID:             Name:             Ambar Jj   YOB: 1960  Age:                 57 y.o.  female   MRN:               [id number]    ________________________________________________________________________             Exam:       SpO2  Min: 92 %  Max: 100 %  O2 (LPM)  Min: 0  Max: 10  NIBP  Min: 107/60  Max: 133/58  Heart Rate (Monitored)  Min: 70  Max: 103  Temp  Min: 36.2 °C (97.1 °F)  Max: 37.4 °C (99.3 °F)    Intake/Output Summary (Last 24 hours) at 11/24/18 1302  Last data filed at 11/24/18 0610   Gross per 24 hour   Intake              900 ml   Output              950 ml   Net              -50 ml       DIET ORDERS (Through next 24h)    Start     Ordered    11/22/18 1752  Diet Order Low Fiber(GI Soft)  ALL MEALS     Question:  Diet:  Answer:  Low Fiber(GI Soft)    11/22/18 1752 11/21/18 2110  Boost on tray with each meal  ALL MEALS     Question:  Which Supplement  Answer:  Per RD    11/21/18 2109                    Recent Labs      11/22/18 0356 11/23/18 0418  11/24/18   0549   SODIUM  140  140  140   POTASSIUM  3.9  3.9  3.5*   CHLORIDE  108  110  107   CO2  23  24  28   BUN  6*  7*  6*   CREATININE  0.54  0.50  0.46*   CALCIUM  8.6  8.2*  8.3*       Recent Labs      11/22/18   0356  11/23/18 0418  11/24/18   0549   ALTSGPT  12   --    --    ASTSGOT  12   --    --    ALKPHOSPHAT  76   --    --    TBILIRUBIN  0.4   --    --    GLUCOSE  127*  90  89       Recent Labs      11/22/18   0356  11/23/18 0418  11/24/18   0549   RBC  3.49*  3.19*  3.28*   HEMOGLOBIN  10.1*  9.3*  9.2*   HEMATOCRIT  30.1*  28.8*  29.3*   PLATELETCT  217  209  215       Recent Labs      11/22/18   0356  11/23/18   0418  11/24/18   0549   WBC  13.0*  11.1*  7.9   NEUTSPOLYS  89.80*  77.80*  64.90   LYMPHOCYTES  6.10*  14.00*  24.50   MONOCYTES  3.50  5.20  5.20   EOSINOPHILS  0.00  2.20  4.30   BASOPHILS  0.10   0.40  0.60   ASTSGOT  12   --    --    ALTSGPT  12   --    --    ALKPHOSPHAT  76   --    --    TBILIRUBIN  0.4   --    --          ________________________________________________________________________      Patient Active Problem List   Diagnosis   • Hypertension   • Reactive airway disease   • GERD (gastroesophageal reflux disease)   • Vitamin D deficiency disease   • Urinary incontinence   • Family history of breast cancer in mother   • History of tobacco use   • History of hysterectomy for benign disease   • Hypertriglyceridemia   • Diverticulosis of sigmoid colon   • Chronic bilateral lower abdominal pain   • Change in stool   • Proteinuria   • Colonic diverticular abscess   • Hypokalemia   • Sepsis (HCC)       Plan:  Referral for outpatient ostomy care placed.  CPM

## 2018-11-24 NOTE — CARE PLAN
Problem: Safety  Goal: Will remain free from falls  Outcome: PROGRESSING AS EXPECTED  Bed locked and in lowest position, side rails up x2, call light within reach, non-slip footwear in place     Problem: Bowel/Gastric:  Goal: Will not experience complications related to bowel motility  Outcome: PROGRESSING AS EXPECTED  Assess bowel sounds and abdomen, assess ostomy and output, monitor for nausea and vomiting, administer anti-emetic medication when indicated. Ostomy patent and putting out stool

## 2018-11-24 NOTE — PROGRESS NOTES
Pt A&O x 4.  Reporting 3/10 pain at this time. Declines interventions.   Midline incision well approximated with staples. YVAN.  LLQ ostomy in place. + soft brown output noted.  +Void. Pt tolerating diet. Denies n/v.  Pt up self, steady gait noted.  POC discussed with pt. All needs addressed at this time.

## 2018-11-24 NOTE — WOUND TEAM
"[facility]  Inpatient Services  New Ostomy Management & Teaching    HPI:  Reviewed  PMH: Reviewed   SH: Reviewed    Subjective: \"I've been so sick\"    Objective:  Appliance intact; patient seems motivated to learn and looking at paperwork left earlier in the day     Ostomy type: colostomy   Stoma location:  LLQ   Stoma assessment:    Appearance:  Red, edematous    Size:  1 3/8\"    Protrusion:  Well budded    Output: small loose brown    MC jxn  intact    Peristomal skin:  clear     Ostomy Appliance (type and size):  2 1/4\" two piece with paste ring    Interventions:  Met with patient and reviewed paperwork and questions answered.  Secure Start obtained.  She observed removing of old appliance and measuring stoma with cardboard guide.  She observed cutting the barrier and applying paste ring to barrier opening and applying to peristomal skin and snapping on pouch which she closed.  Catalog marked with current supplies and order placed for outpatient ostomy follow up.     Pt education: Questions and concerns addressed; see above    Evaluation: clean appearing stoma that is starting to function and patient motivated to learn    Plan: Ostomy nurses to continue to follow for ostomy needs and teaching daily as patient possible discharge Sunday or Monday.    Anticipated discharge needs:  Supplies and outpatient ostomy clinic   "

## 2018-11-24 NOTE — PROGRESS NOTES
Bedside report received.  Assessment complete.  A&O x 4. Patient calls appropriately.  Patient up with no assist.    Patient has 4/10 pain. Medication not available at this time  Denies N&V. Tolerating GI soft diet.  Surgical midline incision with staples YVAN. LLQ ostomy in place, patent, red and non swollen   + void, + flatus  Patient denies SOB.  SCD's off.    Review plan with of care with patient. Call light and personal belongings with in reach. Hourly rounding in place. All needs met at this time.

## 2018-11-25 PROCEDURE — 302111 WAFER OST 2.25IN N IMG RD 2 PC (BARRIER): Performed by: SURGERY

## 2018-11-25 PROCEDURE — 700105 HCHG RX REV CODE 258: Performed by: SURGERY

## 2018-11-25 PROCEDURE — A9270 NON-COVERED ITEM OR SERVICE: HCPCS | Performed by: SURGERY

## 2018-11-25 PROCEDURE — 700102 HCHG RX REV CODE 250 W/ 637 OVERRIDE(OP): Performed by: SURGERY

## 2018-11-25 PROCEDURE — 302098 PASTE RING (FLAT): Performed by: SURGERY

## 2018-11-25 PROCEDURE — 700111 HCHG RX REV CODE 636 W/ 250 OVERRIDE (IP): Performed by: SURGERY

## 2018-11-25 PROCEDURE — 770006 HCHG ROOM/CARE - MED/SURG/GYN SEMI*

## 2018-11-25 RX ORDER — PROMETHAZINE HYDROCHLORIDE 25 MG/1
25 TABLET ORAL EVERY 6 HOURS PRN
Status: DISCONTINUED | OUTPATIENT
Start: 2018-11-25 | End: 2018-11-26 | Stop reason: HOSPADM

## 2018-11-25 RX ADMIN — OXYCODONE HYDROCHLORIDE 10 MG: 10 TABLET ORAL at 00:06

## 2018-11-25 RX ADMIN — SODIUM CHLORIDE 3 G: 900 INJECTION INTRAVENOUS at 17:54

## 2018-11-25 RX ADMIN — OMEPRAZOLE 20 MG: 20 CAPSULE, DELAYED RELEASE ORAL at 05:17

## 2018-11-25 RX ADMIN — ONDANSETRON HYDROCHLORIDE 4 MG: 2 INJECTION, SOLUTION INTRAMUSCULAR; INTRAVENOUS at 09:44

## 2018-11-25 RX ADMIN — ACETAMINOPHEN 1000 MG: 500 TABLET ORAL at 05:17

## 2018-11-25 RX ADMIN — ONDANSETRON HYDROCHLORIDE 4 MG: 2 INJECTION, SOLUTION INTRAMUSCULAR; INTRAVENOUS at 00:16

## 2018-11-25 RX ADMIN — SODIUM CHLORIDE 3 G: 900 INJECTION INTRAVENOUS at 05:19

## 2018-11-25 RX ADMIN — OXYCODONE HYDROCHLORIDE 10 MG: 10 TABLET ORAL at 21:01

## 2018-11-25 RX ADMIN — ACETAMINOPHEN 1000 MG: 500 TABLET ORAL at 14:08

## 2018-11-25 RX ADMIN — LOSARTAN POTASSIUM 25 MG: 25 TABLET, FILM COATED ORAL at 05:34

## 2018-11-25 RX ADMIN — SODIUM CHLORIDE 3 G: 900 INJECTION INTRAVENOUS at 14:08

## 2018-11-25 RX ADMIN — SODIUM CHLORIDE 3 G: 900 INJECTION INTRAVENOUS at 00:07

## 2018-11-25 RX ADMIN — ENOXAPARIN SODIUM 30 MG: 100 INJECTION SUBCUTANEOUS at 05:19

## 2018-11-25 RX ADMIN — ENOXAPARIN SODIUM 30 MG: 100 INJECTION SUBCUTANEOUS at 17:54

## 2018-11-25 RX ADMIN — ACETAMINOPHEN 1000 MG: 500 TABLET ORAL at 00:06

## 2018-11-25 RX ADMIN — ACETAMINOPHEN 1000 MG: 500 TABLET ORAL at 17:54

## 2018-11-25 RX ADMIN — OXYCODONE HYDROCHLORIDE 5 MG: 5 TABLET ORAL at 05:17

## 2018-11-25 RX ADMIN — ONDANSETRON HYDROCHLORIDE 4 MG: 2 INJECTION, SOLUTION INTRAMUSCULAR; INTRAVENOUS at 05:28

## 2018-11-25 ASSESSMENT — PAIN SCALES - GENERAL
PAINLEVEL_OUTOF10: 3
PAINLEVEL_OUTOF10: 7
PAINLEVEL_OUTOF10: 5
PAINLEVEL_OUTOF10: 5
PAINLEVEL_OUTOF10: 3

## 2018-11-25 NOTE — CARE PLAN
Problem: Discharge Barriers/Planning  Goal: Patient's continuum of care needs will be met    Intervention: Assess potential discharge barriers on admission and throughout hospital stay  Patient emptied own ostomy pouch with sba of staff

## 2018-11-25 NOTE — PROGRESS NOTES
Progress Note    Author:  Laura Watson MD    Date & Time:   11/25/2018   12:19 PM          Patient ID:             Name:             Ambar Jj   YOB: 1960  Age:                 57 y.o.  female   MRN:               [id number]    ________________________________________________________________________      Interval Events:         Patient c/o nausea, poor PO intake  Reports she has not been able to change her ostomy device herself and the nurse plans for further teaching later today or in am           Exam:       SpO2  Min: 92 %  Max: 100 %  O2 (LPM)  Min: 0  Max: 10  NIBP  Min: 107/60  Max: 133/58  Heart Rate (Monitored)  Min: 70  Max: 103  Temp  Min: 36 °C (96.8 °F)  Max: 37.4 °C (99.3 °F)    Intake/Output Summary (Last 24 hours) at 11/25/18 1219  Last data filed at 11/25/18 0519   Gross per 24 hour   Intake              840 ml   Output             1150 ml   Net             -310 ml       DIET ORDERS (Through next 24h)    Start     Ordered    11/22/18 1752  Diet Order Low Fiber(GI Soft)  ALL MEALS     Question:  Diet:  Answer:  Low Fiber(GI Soft)    11/22/18 1752 11/21/18 2110  Boost on tray with each meal  ALL MEALS     Question:  Which Supplement  Answer:  Per RD    11/21/18 2109            Physical Exam  GENERAL:  Alert and oriented x 3. NAD, normal respiratory effort  CV: Regular rate and rhythm, no murmurs. Extremities warm no edema.   Lungs: Clear to auscultation bilaterally.    Abd: Soft, non-distended. Colostomy with gas and stool in bag      Recent Labs      11/23/18   0418  11/24/18   0549   SODIUM  140  140   POTASSIUM  3.9  3.5*   CHLORIDE  110  107   CO2  24  28   BUN  7*  6*   CREATININE  0.50  0.46*   CALCIUM  8.2*  8.3*       Recent Labs      11/23/18   0418  11/24/18   0549   GLUCOSE  90  89       Recent Labs      11/23/18 0418  11/24/18   0549   RBC  3.19*  3.28*   HEMOGLOBIN  9.3*  9.2*   HEMATOCRIT  28.8*  29.3*   PLATELETCT  209  215       Recent Labs      11/23/18    0418  11/24/18   0549   WBC  11.1*  7.9   NEUTSPOLYS  77.80*  64.90   LYMPHOCYTES  14.00*  24.50   MONOCYTES  5.20  5.20   EOSINOPHILS  2.20  4.30   BASOPHILS  0.40  0.60         ________________________________________________________________________      Patient Active Problem List   Diagnosis   • Hypertension   • Reactive airway disease   • GERD (gastroesophageal reflux disease)   • Vitamin D deficiency disease   • Urinary incontinence   • Family history of breast cancer in mother   • History of tobacco use   • History of hysterectomy for benign disease   • Hypertriglyceridemia   • Diverticulosis of sigmoid colon   • Chronic bilateral lower abdominal pain   • Change in stool   • Proteinuria   • Colonic diverticular abscess   • Hypokalemia   • Sepsis (HCC)       Plan:  Okay to change to PO phenergan  Diet as tolerated  Colostomy teaching, referral place for outpatient ostomy/wound clinic.

## 2018-11-25 NOTE — PROGRESS NOTES
Report received frOm NOC shift RN.   A/O X 4. Room air.   VSS.   Last labs 11/25/2018.   Denies pain.   Pt reporting continued nausea. Will change zofran to phenergan.   BS normoactive X 4.   RLQ ostomy producing brown loose stool.   +void  PIV on right arm running TKO.   Pt ambulating independently.   Call light at bedside.

## 2018-11-25 NOTE — CARE PLAN
Problem: Respiratory:  Goal: Respiratory status will improve    Intervention: Educate and encourage incentive spirometry usage  IS given and instructed on use, achieves 1500, good effort and self motivated.

## 2018-11-25 NOTE — WOUND TEAM
"[facility]  Inpatient Services  New Ostomy Management & Teaching     HPI:  Reviewed  PMH: Reviewed   SH: Reviewed     Subjective: reports being very nauseous, and will ask RN for nausea medication     Objective:                  Ostomy type:  colostomy              Stoma location: LLQ              Stoma assessment:                          Appearance: Red, edematous                          Size:  1 3/8\"                          Protrusion: Well budded                          Output: moderate loose brown                          MC jxn  NA                          Peristomal skin: NA                 Ostomy Appliance (type and size):  2 1/4\" two piece with paste ring     Interventions: Had patient empty pouch into container, use water bottle to clean inside pouch, clean end and close pouch. Had patient trace barrier from template in supply bag, and cut out barrier. Demonstrated to patient how to apply flat paste ring.                  Pt education: Questions and concerns addressed     Evaluation: patient unwilling to remove appliance at this time do to nausea. Reports she feels better about the appliance today, after doing hands on to cut the barrier, and having some of her questions answered. States she will change the entire appliance next visit.     Plan: Ostomy nurses to continue to follow for ostomy needs and teaching daily as patient possible discharge Sunday or Monday.     Anticipated discharge needs:  Supplies and outpatient ostomy clinic   "

## 2018-11-25 NOTE — PROGRESS NOTES
AO x 4, resting in bed after zofran given for nausea and pain medication given for abdomen pain 7/10.    Midline incision well approximated, staples. Ostomy with + stool output and pink stoma.  Up to restroom to void, steady gait.  Plan of care discussed, questions answered, verbalized understanding.

## 2018-11-26 ENCOUNTER — PATIENT OUTREACH (OUTPATIENT)
Dept: HEALTH INFORMATION MANAGEMENT | Facility: OTHER | Age: 58
End: 2018-11-26

## 2018-11-26 VITALS
HEIGHT: 67 IN | HEART RATE: 87 BPM | TEMPERATURE: 98.4 F | OXYGEN SATURATION: 98 % | SYSTOLIC BLOOD PRESSURE: 127 MMHG | WEIGHT: 146.83 LBS | BODY MASS INDEX: 23.04 KG/M2 | DIASTOLIC BLOOD PRESSURE: 86 MMHG | RESPIRATION RATE: 16 BRPM

## 2018-11-26 PROCEDURE — 700102 HCHG RX REV CODE 250 W/ 637 OVERRIDE(OP): Performed by: SURGERY

## 2018-11-26 PROCEDURE — 700105 HCHG RX REV CODE 258: Performed by: SURGERY

## 2018-11-26 PROCEDURE — A9270 NON-COVERED ITEM OR SERVICE: HCPCS | Performed by: SURGERY

## 2018-11-26 PROCEDURE — 700111 HCHG RX REV CODE 636 W/ 250 OVERRIDE (IP): Performed by: SURGERY

## 2018-11-26 RX ORDER — SCOLOPAMINE TRANSDERMAL SYSTEM 1 MG/1
1 PATCH, EXTENDED RELEASE TRANSDERMAL
Qty: 4 PATCH | Refills: 0 | Status: SHIPPED | OUTPATIENT
Start: 2018-11-26 | End: 2019-04-22

## 2018-11-26 RX ORDER — KETOROLAC TROMETHAMINE 30 MG/ML
30 INJECTION, SOLUTION INTRAMUSCULAR; INTRAVENOUS EVERY 6 HOURS
Status: DISCONTINUED | OUTPATIENT
Start: 2018-11-26 | End: 2018-11-26 | Stop reason: HOSPADM

## 2018-11-26 RX ORDER — KETOROLAC TROMETHAMINE 10 MG/1
10 TABLET, FILM COATED ORAL EVERY 4 HOURS PRN
Qty: 30 TAB | Refills: 0 | Status: SHIPPED | OUTPATIENT
Start: 2018-11-26 | End: 2019-04-22

## 2018-11-26 RX ADMIN — PROMETHAZINE HYDROCHLORIDE 25 MG: 25 TABLET ORAL at 08:45

## 2018-11-26 RX ADMIN — SODIUM CHLORIDE 3 G: 900 INJECTION INTRAVENOUS at 05:23

## 2018-11-26 RX ADMIN — ENOXAPARIN SODIUM 30 MG: 100 INJECTION SUBCUTANEOUS at 05:23

## 2018-11-26 RX ADMIN — OXYCODONE HYDROCHLORIDE 5 MG: 5 TABLET ORAL at 05:23

## 2018-11-26 RX ADMIN — KETOROLAC TROMETHAMINE 30 MG: 30 INJECTION, SOLUTION INTRAMUSCULAR at 12:35

## 2018-11-26 RX ADMIN — SCOPALAMINE 1 PATCH: 1 PATCH, EXTENDED RELEASE TRANSDERMAL at 13:34

## 2018-11-26 RX ADMIN — ACETAMINOPHEN 1000 MG: 500 TABLET ORAL at 05:23

## 2018-11-26 RX ADMIN — SODIUM CHLORIDE 3 G: 900 INJECTION INTRAVENOUS at 13:37

## 2018-11-26 RX ADMIN — ACETAMINOPHEN 1000 MG: 500 TABLET ORAL at 00:16

## 2018-11-26 RX ADMIN — LOSARTAN POTASSIUM 25 MG: 25 TABLET, FILM COATED ORAL at 05:23

## 2018-11-26 RX ADMIN — SODIUM CHLORIDE 3 G: 900 INJECTION INTRAVENOUS at 00:16

## 2018-11-26 RX ADMIN — OMEPRAZOLE 20 MG: 20 CAPSULE, DELAYED RELEASE ORAL at 05:23

## 2018-11-26 ASSESSMENT — PAIN SCALES - GENERAL
PAINLEVEL_OUTOF10: 2
PAINLEVEL_OUTOF10: 3
PAINLEVEL_OUTOF10: 6

## 2018-11-26 ASSESSMENT — ENCOUNTER SYMPTOMS
ABDOMINAL PAIN: 1
NAUSEA: 1
VOMITING: 1

## 2018-11-26 NOTE — DISCHARGE PLANNING
Anticipated Discharge Disposition: Home with Outpatient Wound Care.    Action: Herminia Paulson Wound RN informed LSW, the discharge home is postpone until tomorrow.  Lorene states patient's appointment needs to be rescheduled for Thursday or Friday this week.      LSW spoke with Star at the [facility].  Per Star, their next available appointment is Tuesday December 4th. Star states, tomorrow's appointment at 1300 is available and will not be cancel.     LSW consulted with Marylee, Wound RN.  Per Marylee, canceling tomorrow's appointment and scheduling for next week is appropriate.      LSW spoke with Ramya at the [facility].  Per Ramya, the appointment on December 4th is no longer available.  Ramya confirmed patient's appointment for Thursday December 6, 2018 at 3:30pm, patient notified.    Barriers to Discharge: None.    Plan: Home with Outpatient Wound Care, pending medical clearance.

## 2018-11-26 NOTE — DIETARY
Nutrition Services: PO intake Follow up. Pt reports PO intake was doing well but very poor yesterday d/t N/V. PT on Zofran and Phenergan PRN. Diet= GI soft, Low Fiber with Vanilla Boost at all meals. Discussed tips for optimizing intake/tolerance. Per ADL, pt ate >50% of five out of eight last documented meals. Pt wishes to continue with same routine and will try to drink more boost to help with intake; drank ~ 1 carton of Boost yesterday. Pt with no further questions/concerns. No new wts to assess.     Plan/Rec:   1) Will continue with current diet regimen.   2) Encourage PO Intake.   3) Document all PO intake as % in doc flow sheet.    4) Monitor wts.   5) Nutrition Representative to see pt daily for snacks/meal preferences as appropriate with diet order.   6) Continue antiemetics PRN.     RD to monitor for consistently adequate intake.

## 2018-11-26 NOTE — CARE PLAN
Problem: Safety  Goal: Will remain free from falls  Outcome: PROGRESSING AS EXPECTED    Intervention: Assess risk factors for falls   11/25/18 2349   OTHER   Fall Risk Risk to Fall - 0 - 1 point   Risk for Injury-Any positive answers results in the pt being at high risk for fall related injury Surgery: Thoracic or Abdominal Surgery or Lower Limb Amputation   Mobility Status Assessment 0-Ambulates & Transfers Independently. No Assistance Required   History of fall 0   Pt Calls for Assistance Yes;No assistance required     Intervention: Implement fall precautions   11/25/18 2100 11/25/18 2349   OTHER   Environmental Precautions Treaded Slipper Socks on Patient;Personal Belongings, Wastebasket, Call Bell etc. in Easy Reach;Transferred to Stronger Side;Report Given to Other Health Care Providers Regarding Fall Risk;Bed in Low Position;Communication Sign for Patients & Families;Mobility Assessed & Appropriate Sign Placed --    IV Pole on Same Side of Bed as Bathroom --  Yes   Bedrails --  Bedrails Closest to Bathroom Down         Problem: Venous Thromboembolism (VTW)/Deep Vein Thrombosis (DVT) Prevention:  Goal: Patient will participate in Venous Thrombosis (VTE)/Deep Vein Thrombosis (DVT)Prevention Measures  Outcome: PROGRESSING AS EXPECTED   11/25/18 2100   OTHER   Risk Assessment Score 2   VTE RISK Moderate   Pharmacologic Prophylaxis Used LMWH: Enoxaparin(Lovenox)       Problem: Bowel/Gastric:  Goal: Normal bowel function is maintained or improved  Outcome: PROGRESSING AS EXPECTED  + output into colostomy appliance, + flatus.

## 2018-11-26 NOTE — PROGRESS NOTES
Update received from NOC shift RN.   A/O X 4. Room air.   VSS. Labs reviewed.   BS normoactive X 4. Loose brown stool present in RLQ ostomy.   No pain reported.   No nausea reported.   Pt tolerating diet.   Pt ambulating independently.  PIV on right FA running TKO.   Call light at bedside.

## 2018-11-26 NOTE — PROGRESS NOTES
Surgical Progress Note    Author: Justus Will Date & Time created: 2018   10:47 AM     Interval Events:  2 episodes of nausea and vomiting, both after using narcotic pain medication.  Ambulating.  Pain well controlled.  Taking care of ostomy appropriately.    Review of Systems   Gastrointestinal: Positive for abdominal pain, nausea and vomiting.     Hemodynamics:  Temp (24hrs), Av.7 °C (98 °F), Min:36 °C (96.8 °F), Max:37.1 °C (98.7 °F)  Temperature: 36.9 °C (98.4 °F)  Pulse  Av.1  Min: 61  Max: 113   Blood Pressure: 135/86     Respiratory:    Respiration: 16, Pulse Oximetry: 97 %     Work Of Breathing / Effort: Mild  RUL Breath Sounds: Clear, RML Breath Sounds: Clear, RLL Breath Sounds: Clear, PEG Breath Sounds: Clear, LLL Breath Sounds: Clear  Neuro:  GCS = 15       Fluids:    Intake/Output Summary (Last 24 hours) at 18 1047  Last data filed at 18 0820   Gross per 24 hour   Intake              780 ml   Output              250 ml   Net              530 ml        Current Diet Order   Procedures   • Diet Order Low Fiber(GI Soft)     Physical Exam   Constitutional: She is oriented to person, place, and time. She appears well-developed and well-nourished. No distress.   Eyes: Pupils are equal, round, and reactive to light.   Cardiovascular: Normal rate, regular rhythm and normal heart sounds.    Pulmonary/Chest: Effort normal and breath sounds normal. No respiratory distress.   Abdominal: Soft. Bowel sounds are normal. She exhibits no distension. There is tenderness. There is no rebound and no guarding.   Midline incision well approximated, and without evidence of infection.  Colostomy intact, healthy appearing, and productive of liquid stool.   Neurological: She is alert and oriented to person, place, and time.   Skin: Skin is warm and dry.   Psychiatric: She has a normal mood and affect. Her behavior is normal.     Labs:  No results found for this or any previous visit (from the  past 24 hour(s)).  Medical Decision Making, by Problem:  Diverticulitis, status post sigmoid colectomy and colostomy placement.  Hypertension  Urinary incontinence  Nausea and vomitting    Plan:  Give Haldol, as previously ordered, and scopolamine patch, as previously ordered.  Start Toradol for pain control.  Hold narcotic pain medication now.  Home tomorrow, if nausea resolves.  Ostomy nurse attempting to change home appointment scheduled for tomorrow to Wednesday.    Quality Measures:  Quality-Core Measures   Reviewed items::  Labs reviewed and Medications reviewed  Simms catheter::  No Simms  DVT prophylaxis pharmacological::  Enoxaparin (Lovenox)  Ulcer Prophylaxis::  Yes  Antibiotics:  Treating active infection/contamination beyond 24 hours perioperative coverage      Discussed patient condition with RN and Patient

## 2018-11-26 NOTE — WOUND TEAM
"[facility]  Inpatient Services  New Ostomy Management & Teaching    HPI:  Reviewed  PMH: Reviewed   SH: Reviewed    Subjective: \"I feel better now but I'm so tired cause I can't sleep here\"    Objective:  Appliance intact     Ostomy type: colostomy   Stoma location:  LLQ   Stoma assessment:    Appearance:  Red, edematous    Size:  1 1/4\"    Protrusion:  Well budded    Output: small loose brown    MC jxn  intact    Peristomal skin:  clear     Ostomy Appliance (type and size):  2 1/4\" two piece with paste ring    Interventions:  Met with patient and she traced and cut barrier and applied paste ring with some prompting.  She removed old appliance and cleansed peristomal skin.  She applied barrier with some assistance and she snapped on pouch and closed end.  She reports that she has been emptying pouch and is getting more comfortable with care.  She would like another opportunity for hands on before leaving tomorrow AM.  Instructed her to also ask about outpatient ostomy appointment before leaving.  Discharge supplies ordered and patient anxious to get home to rest.     Pt education: Questions and concerns addressed; see above    Evaluation: patient is becoming more comfortable with care; functioning ostomy    Plan: Ostomy nurses to continue to follow for ostomy needs and teaching tomorrow    Anticipated discharge needs:  Has everything for discharge with follow up with outpatient ostomy clinic appointment  "

## 2018-11-26 NOTE — PROGRESS NOTES
"Pt A&O x4. Fatigue, sleepy, easy to arouse.     Vitals: /75   Pulse 84   Temp 36.7 °C (98.1 °F) (Temporal)   Resp 17   Ht 1.702 m (5' 7\")   Wt 66.6 kg (146 lb 13.2 oz)   SpO2 96%   Breastfeeding? No   BMI 23.00 kg/m²     Pt rates pain 5 out of 10. Medicated for pain. Pt has heating packs available at bedside.     Neuro: ARGUETA. Denies new onset of numbness/ tingling.    Cardiac: Denies new onset of chest pain.    Vascular: Pulses 2+ BUE, BLE. No edema noted.    Respiratory: Lungs sound clear to auscultation. Pulling 2500 on IS, effective, strong effort. On RA. Denies SOB.    GI: Abdomen soft, tender. + bowel sounds in upper quadrants, + flatus, + BM output in colostomy, small/ loose/ brown. On low fiber, GI soft diet, pt has decreased appetite, pt ate less than 50% of diner. Pt states her nausea is improving, reporting none at this time.     : Pt voiding adequately. Baseline incontinence, wade pad in place.      MSK: Pt up to bathroom self, no assistance required, tolerating well. Pt states she ambulated halls 5x today.     Integumentary: Midline abdominal incision CDI, approximated, staples, YVAN, no drainage noted. LLQ  colostomy appliance CDI, no leaks noted, pt emptying ostomy bag self. Wound care provided teaching today.     Labs noted.    Fall precautions in place: Bed locked in lowest position, Upper bed rails up, treaded socks in place, personal belongings within reach, call light within reach, appropriate mobility signs in place, - bed alarm. Pt calls appropriately.     Pt updated on POC.   "

## 2018-11-27 ENCOUNTER — APPOINTMENT (OUTPATIENT)
Dept: WOUND CARE | Facility: MEDICAL CENTER | Age: 58
End: 2018-11-27
Payer: COMMERCIAL

## 2018-11-27 NOTE — DISCHARGE INSTRUCTIONS
Discharge Instructions    Discharged to home by car with relative. Discharged via wheelchair, hospital escort: Yes.  Special equipment needed: Not Applicable    Be sure to schedule a follow-up appointment with your primary care doctor or any specialists as instructed.     Discharge Plan:     No lifting greater than 20 pounds for 4 weeks.   Use over the counter laxative of choice if constipated.   Okay to shower, but no baths, hot tubs, or swimming until follow up appointment.   Follow up with Dr. Will ([facility], [phone]) as previously appointed, or in 10-14 days.   Colostomy care as per teaching.     Follow up with colostomy nurse as previously scheduled.    Diet Plan: Discussed  Activity Level: Discussed  Confirmed Follow up Appointment: Patient to Call and Schedule Appointment  Confirmed Symptoms Management: Discussed  Influenza Vaccine Indication: Patient Refuses    I understand that a diet low in cholesterol, fat, and sodium is recommended for good health. Unless I have been given specific instructions below for another diet, I accept this instruction as my diet prescription.   Other diet: Regular    Special Instructions: None    · Is patient discharged on Warfarin / Coumadin?   No     Open Colectomy, Care After  Refer to this sheet in the next few weeks. These instructions provide you with information on caring for yourself after your procedure. Your health care provider may also give you more specific instructions. Your treatment has been planned according to current medical practices, but problems sometimes occur. Call your health care provider if you have any problems or questions after your procedure.  WHAT TO EXPECT AFTER THE PROCEDURE  After your procedure, it is typical to have the following:  · Pain in your abdomen, especially along your incision. You will be given medicines to control the pain.  · Tiredness. This is a normal part of the recovery process. Your energy level will  return to normal over the next several weeks.  · Constipation. You may be given a stool softener to prevent this.  HOME CARE INSTRUCTIONS  · Only take over-the-counter or prescription medicines as directed by your health care provider.  · Ask your health care provider whether you may take a shower when you go home.  · You may resume a normal diet and activities as directed. Eat plenty of fruits and vegetables to help prevent constipation.  · Drink enough fluids to keep your urine clear or pale yellow. This also helps prevent constipation.  · Take rest breaks during the day as needed.  · Avoid lifting anything heavier than 25 pounds (11.3 kg) or driving for 4 weeks or until your health care provider says it is okay.  · Follow up with your health care provider as directed. Ask your health care provider when you need to return to have your stitches or staples removed.  SEEK MEDICAL CARE IF:  · You have redness, swelling, or increasing pain in the incision area.  · You see pus coming from the incision area.  · You have a fever.  SEEK IMMEDIATE MEDICAL CARE IF:   · You have chest pain or shortness of breath.  · You have pain or swelling in your legs.  · You have persistent nausea and vomiting.  · Your wound breaks open after stitches or staples have been removed.  · You have increasing abdominal pain that is not relieved with medicine.     This information is not intended to replace advice given to you by your health care provider. Make sure you discuss any questions you have with your health care provider.     Document Released: 07/10/2012 Document Revised: 10/08/2014 Document Reviewed: 07/30/2014  [employer] Patient Education ©2016 [employer]      Colostomy, Adult  Introduction  A colostomy is a surgical procedure that involves attaching part of the colon to the front of the abdomen (abdominal wall). The colon is the last part of the digestive tract. It is where water is absorbed from digested food to form  stool (feces). A colostomy is done to redirect stool through an opening (stoma) in the abdominal wall.  You may need this surgery if you have a medical condition that prevents stool from leaving your body through the usual opening (rectum). A bag will be attached to the stoma on the outside of your body. This bag will collect the stool and waste that is redirected through the stoma. A colostomy may be temporary or permanent.  Tell a health care provider about:  · Any allergies you have.  · All medicines you are taking, including vitamins, herbs, eye drops, creams, and over-the-counter medicines.  · Any problems you or family members have had with anesthetic medicines.  · Any blood disorders you have.  · Any surgeries you have had.  · Any medical conditions you have.  · Whether you are pregnant or may be pregnant.  What are the risks?  Generally, this is a safe procedure. However, problems may occur, including:  · Infection.  · Bleeding.  · Allergic reactions to medicines.  · Damage to other structures or organs.  · Leaking of stool inside the abdomen.  · Formation of scar tissue that causes a blockage.  What happens before the procedure?  · Follow instructions from your health care provider about eating or drinking restrictions.  · Ask your health care provider about:  ¨ Changing or stopping your regular medicines. This is especially important if you are taking diabetes medicines or blood thinners.  ¨ Taking medicines such as aspirin and ibuprofen. These medicines can thin your blood. Do not take these medicines before your procedure if your health care provider instructs you not to.  · Do not use any tobacco products, such as cigarettes, chewing tobacco, and e-cigarettes. If you need help quitting, ask your health care provider.  · Plan to have someone take you home after the procedure.  · You may have an exam or testing.  · Ask your health care provider how your surgical site will be marked or identified.  · You  may be given antibiotic medicine to help prevent infection.  What happens during the procedure?  · To reduce your risk of infection:  ¨ Your health care team will wash or sanitize their hands.  ¨ Your skin will be washed with soap.  · An IV tube will be inserted into one of your veins.  · A drainage tube may be passed through your nose and into your stomach (NG tube, or nasogastric tube).  · You may be given a medicine to help you relax (sedative).  · You will be given a medicine to make you fall asleep (general anesthetic).  · An incision will be made in the front of your abdomen.  · The muscles under the skin will be divided or .  · The next steps will vary depending on the type of colostomy. There are two main types:  ¨ End colostomy. Part of the colon will be removed, or the colon will be divided into two separate parts. The end of the colon that is attached to the upper part of the digestive tract will be attached to the wall of the abdomen, creating a stoma. The other end will be closed off.  ¨ Loop colostomy. A part of the colon will be pulled through the incision in the abdomen. Two openings will be made in the side of the colon. The colon will be attached to the skin at these openings, creating the stoma.  · Stitches (sutures) will be used to create the stoma and close the incision.  · A colostomy bag will be placed over the stoma to collect stool and mucus.  The procedure may vary among health care providers and hospitals.  What happens after the procedure?  · Your blood pressure, heart rate, breathing rate, and blood oxygen level will be monitored often until the medicines you were given have worn off.  · You will be given pain medicine as needed.  · You will receive fluids and nutrition through an IV tube.  · As soon as you are eating well and passing stool through the colostomy, the nasogastric tube and the IV tube may be removed.  · Do not drive for 24 hours if you received a sedative.  This  information is not intended to replace advice given to you by your health care provider. Make sure you discuss any questions you have with your health care provider.  Document Released: 05/09/2012 Document Revised: 05/25/2017 Document Reviewed: 08/30/2016  © 2017 [employer]    Ostomy Support Information  An ostomy is an opening in the belly (abdominal wall) made by surgery. Ostomates are people who have had this procedure. The opening (stoma) allows the kidney or bowel to discharge waste. An external pouch covers the stoma to collect waste. Pouches are are a simple bag and are odor free. Different companies have disposable or reusable pouches to fit one's lifestyle. An ostomy can either be temporary or permanent.   THERE ARE THREE MAIN TYPES OF OSTOMIES  · Colostomy. A colostomy is a surgically created opening in the large intestine (colon).   · Ileostomy. An ileostomy is a surgically created opening in the small intestine.   · Urostomy. A urostomy is a surgically created opening to divert urine away from the bladder.   FREQUENTLY ASKED QUESTIONS  Will I need to be on a special diet?  Most people return to their normal diet when they have recovered from surgery. Be sure to chew your food well, eat a well-balanced diet and drink plenty of fluids. If you experience problems with a certain food, wait a couple of weeks and try it again.  Will there be odor and noises?  Pouching systems are designed to be odor-proof or odor-resistant. There are deodorants that can be used in the pouch. Medications are also available to help reduce odor. Limit gas-producing foods and carbonated beverages. You will experience less gas and fewer noises as you heal from surgery.  How much time will it take to care for my ostomy?  At first, you may spend a lot of time learning about your ostomy and how to take care of it. As you become more comfortable and skilled at changing the pouching system, it will take very little time to care for it.    Will I be able to return to work?  People with ostomies can perform most jobs. As soon as you have healed from surgery, you should be able to return to work. Heavy lifting (more than 10 pounds) may be discouraged.   What about intimacy?  Sexual relationships and intimacy are important and fulfilling aspects of your life. They should continue after ostomy surgery. Intimacy-related concerns should be discussed openly between you and your partner.   Can I wear regular clothing?  You do not need to wear special clothing. Ostomy pouches are fairly flat and barely noticeable. Elastic undergarments will not hurt the stoma or prevent the ostomy from functioning.   Can I participate in sports?  An ostomy should not limit your involvement in sports. Many people with ostomies are runners, skiers, swimmers or participate in other active lifestyles. Talk with your caregiver first before doing heavy physical activity.  PROFESSIONAL HELP   Resources are available if you need help or have questions about your ostomy.   · Specially trained nurses called Wound, Ostomy Continence Nurses (WOCN) are available for consultation in most major medical centers.   · The [employer] Ostomy Association (UOA) is a group made up of many local chapters throughout the [place]. These local groups hold meetings and provide support to prospective and existing ostomates. They sponsor educational events and have qualified visitors to make personal or telephone visits. Contact the UOA for the chapter nearest you and for other educational publications.   · More detailed information can be found in Colostomy Guide, a publication of the [employer] Ostomy Association (UOA). Contact UOA at 1-208.387.8615 or visit their web site at www.uoaa.org. The website contains links to other sites, suppliers and resources.   Document Released: 12/20/2004 Document Revised: 03/11/2013 Document Reviewed: 04/21/2010  [employer]® Patient Information ©2013 [employer]  LL[employer].  Depression / Suicide Risk    As you are discharged from this [facility] facility, it is important to learn how to keep safe from harming yourself.    Recognize the warning signs:  · Abrupt changes in personality, positive or negative- including increase in energy   · Giving away possessions  · Change in eating patterns- significant weight changes-  positive or negative  · Change in sleeping patterns- unable to sleep or sleeping all the time   · Unwillingness or inability to communicate  · Depression  · Unusual sadness, discouragement and loneliness  · Talk of wanting to die  · Neglect of personal appearance   · Rebelliousness- reckless behavior  · Withdrawal from people/activities they love  · Confusion- inability to concentrate     If you or a loved one observes any of these behaviors or has concerns about self-harm, here's what you can do:  · Talk about it- your feelings and reasons for harming yourself  · Remove any means that you might use to hurt yourself (examples: pills, rope, extension cords, firearm)  · Get professional help from the community (Mental Health, Substance Abuse, psychological counseling)  · Do not be alone:Call your Safe Contact- someone whom you trust who will be there for you.  · Call your local CRISIS HOTLINE 537-4824 or 778-649-8549  · Call your local Children's Mobile Crisis Response Team [place] (182) 313-6434 or www.Information Gateway  · Call the toll free National Suicide Prevention Hotlines   · National Suicide Prevention Lifeline 724-201-BEDV (5661)  · National Hope Line Network 800-SUICIDE (463-6083)

## 2018-11-27 NOTE — DISCHARGE SUMMARY
DATE OF ADMISSION:  11/21/2018    DATE OF DISCHARGE:  11/26/2018    DIAGNOSES ON ADMISSION:  1.  Diverticulitis.  2.  Hypertension.  3.  Gastroesophageal reflux disease.  4.  Urinary incontinence.    DISCHARGE DIAGNOSES:  1.  Diverticulitis.  2.  Hypertension.  3.  Gastroesophageal reflux disease.  4.  Urinary incontinence.    HOSPITAL COURSE:  Patient was admitted to my service and underwent a sigmoid   colectomy and colostomy placement on 11/21/2018.  She tolerated this well and   she remained hemodynamically stable for the remainder of her hospital course.    She was able to be quickly advanced to a low residue diet, but had   difficulties with nausea after administration of narcotic pain medication.    She was taken off of her narcotic pain medication on 11/26/2018 and treated   with Toradol, which she tolerated well.  She also had a scopolamine patch   placed at that time.  She was noted to be passing stool into her colostomy   apparatus.  She was able to take care of her colostomy apparatus and was set   up for outpatient home health colostomy teaching and was deemed ready for   discharge home on 11/26/2018.    DISCHARGE MEDICATIONS:  She will be discharged home with Toradol 10 mg tablets   1 p.o. q. 4 hours p.r.n. pain and scopolamine transdermal patch.  She will   resume her home medications and will finish her antibiotics of which she has 2   additional days of Augmentin 875/125 one p.o. b.i.d.  She will resume her   antihypertensive and gastroesophageal reflux disease medications as well.    DISCHARGE RECOMMENDATIONS:  She is to follow up with the home health care   ostomy nurse as previously scheduled.  She is to follow up with me in 7-10   days.  She is to shower, but is to not sit in a bathtub, hot tub, or go   swimming until her followup appointment.  She is to refrain from lifting   greater than 20 pounds for a full 4 weeks after surgery.  These   recommendations have been discussed with the patient  and states she   understands and agrees.       ____________________________________     MD RAÚL Sanford / VELMA    DD:  11/26/2018 15:37:18  DT:  11/26/2018 16:12:59    D#:  [id number]  Job#:  [id number]

## 2018-11-27 NOTE — WOUND TEAM
"[facility]  Inpatient Services  New Ostomy Management & Teaching    HPI:  Reviewed  PMH: Reviewed   SH: Reviewed    Subjective: \"I'm feeling better\"    Objective:  Appliance intact     Ostomy type: colostomy   Stoma location:  LLQ   Stoma assessment:    Appearance:  Red, edematous    Size:  1 1/4\"    Protrusion:  Well budded    Output: small loose brown    MC jxn  intact    Peristomal skin:  Clear with red bump at 6 o'clock; ? Folliculitis      Ostomy Appliance (type and size):  2 1/4\" two piece with paste ring    Interventions:  Met with patient and she traced and cut barrier and applied paste ring with less prompting than yesterday.  She removed old appliance and cleansed peristomal skin.  I crusted peristomal skin distally due to reddened area which seems more folliculitis than excoriation.  She applied barrier with some assistance and she snapped on pouch and closed end.  She reports that is feeling more confident.  Reviewed how to order supplies and discharge supplies given.  Instructed her to change appliance every other day and she will follow up at outpatient ostomy clinic next week.     Pt education: Questions and concerns addressed; see above    Evaluation: patient is becoming more comfortable with care; functioning ostomy    Plan: patient to discharge this afternoon and follow up at outpatient clinic next week    Anticipated discharge needs:  Has everything for discharge with follow up with outpatient ostomy clinic appointment  "

## 2018-12-06 ENCOUNTER — NON-PROVIDER VISIT (OUTPATIENT)
Dept: WOUND CARE | Facility: MEDICAL CENTER | Age: 58
End: 2018-12-06
Attending: SURGERY
Payer: COMMERCIAL

## 2018-12-06 PROCEDURE — 99211 OFF/OP EST MAY X REQ PHY/QHP: CPT

## 2018-12-06 PROCEDURE — 99201 HCHG LEVEL I NEW PATIENT: CPT

## 2018-12-06 NOTE — CERTIFICATION
"Advanced Wound Care  [facility]  [address]  [place] [address]  [phone] Fax#    Initial Ostomy Evaluation  For 90 Day Certification Period:      Referring Provider:  Dr. Cici Dolan MD  Primary Provider:Devon MARIANO  Consulting Providers:  Surgeon:Dr. Gamez       Start of Care:12/06/2018  Reason for referral:Post hospital teaching, support, product selection      SUBJECTIVE:    HPI:INDICATION FOR SURGERY:  This is a 57-year-old female who has been suffering   from recurrent diverticulitis since at least 09/05/2018.  She has undergone   multiple drainages of diverticular abscesses and was finally able to be   discharged home after her second admission and diverticular abscess drainage 2   weeks ago on antibiotics.  She was brought to the operating room today for a   planned sigmoid colectomy, possible reanastomosis and possible colostomy with   the understanding that it was highly likely that she would undergo colostomy   placement.  She was brought early due to the fact that she was unable to be   off antibiotics for any time without having immediate recurrence of her   diverticulitis and diverticular abscess.     PROCEDURES:  1.  Exploratory laparotomy.  2.  Sigmoid colectomy.  3.  Colostomy placement.     SURGEON:  Justus Will MD    Past Medical Hx:  Past Medical History:   Diagnosis Date   • Anemia    • Anesthesia     PONV   • Bowel habit changes 11/19/2018    \"Constipation/diarrhea\".   • Bronchitis 2010   • Diverticulitis 11/2017   • Female bladder prolapse 11/19/2018   • GERD (gastroesophageal reflux disease)    • Heart burn    • HTN     resolved with wt loss and lifestyle changes   • HTN (hypertension) 11/19/2018    Takes Losartan   • Indigestion    • Other specified symptom associated with female genital organs    • Pap smear 01/29/2009    Normal   • Psychiatric problem     anxiety   • Reactive airway disease 11/19/2018 " "   Inhaler use PRN   • Unspecified urinary incontinence 11/19/2018    Wears Pads   • Urinary bladder disorder     prolapse      Surgical Hx:  Past Surgical History:   Procedure Laterality Date   • SIGMOID COLECTOMY N/A 11/21/2018    Procedure: SIGMOID COLECTOMY;  Surgeon: Justus Will M.D.;  Location: SURGERY [facility];  Service: General   • BLADDER SUSPENSION N/A 5/6/2015    Procedure: BLADDER SUSPENSION [57.89] TOT;  Surgeon: Yazmin Armando M.D.;  Location: SURGERY SAME DAY [facility]S;  Service:    • CYSTOSCOPY N/A 5/6/2015    Procedure: CYSTOSCOPY;  Surgeon: Yazmin Armando M.D.;  Location: SURGERY SAME DAY [facility]S;  Service:    • ANTERIOR AND POSTERIOR REPAIR N/A 5/6/2015    Procedure: ANTERIOR AND POSTERIOR REPAIR [70.53];  Surgeon: Yazmin Armando M.D.;  Location: SURGERY SAME DAY [facility] ORS;  Service:    • ABDOMINAL HYSTERECTOMY TOTAL      Ovaries intact   • BLADDER SUSPENSION     • CHOLECYSTECTOMY     • ENDOSCOPY     • ENDOSCOPY PROCEDURE      dilation due to stricture   • OTHER      \"Abdominal abcess drained twice 2018\".   • TONSILLECTOMY AND ADENOIDECTOMY       Medications:  Current Outpatient Prescriptions:   •  scopolamine (TRANSDERM-SCOP) 1 mg/72hr PATCH 72 HR, Apply 1 Patch to skin as directed every 72 hours as needed (Nausea/Vomiting if ondansetron, dexamethasone, diphenhydramine, and/or haloperidol ineffective or not ordered). Each patch is programmed to deliver 1 mg over 3 days transdermally., Disp: 4 Patch, Rfl: 0  •  ketorolac (TORADOL) 10 MG Tab, Take 1 Tab by mouth every four hours as needed. Minimum days until refill = 5, Disp: 30 Tab, Rfl: 0  •  amoxicillin-clavulanate (AUGMENTIN) 875-125 MG Tab, Take 1 Tab by mouth 2 times a day. Pt started 11/7/2018 for 14 day course., Disp: , Rfl:   •  Multiple Vitamins-Minerals (EMERGEN-C VITAMIN C PO), Take 1 Package by mouth every day., Disp: , Rfl:   •  omeprazole (PRILOSEC) 20 MG delayed-release capsule, Take 20 mg by " "mouth every bedtime., Disp: , Rfl:   •  losartan (COZAAR) 25 MG Tab, TAKE 1 TABLET BY MOUTH EVERY DAY, Disp: 90 Tab, Rfl: 3  •  PROAIR  (90 Base) MCG/ACT Aero Soln inhalation aerosol, INHALE 2 PUFFS BY MOUTH EVERY 6 HOURS AS NEEDED FOR SHORTNESS OF BREATH, Disp: 8.5 g, Rfl: 0  Allergies:Codeine    Social Hx:  Social History     Social History   • Marital status:      Spouse name: N/A   • Number of children: N/A   • Years of education: N/A     Occupational History   • Not on file.     Social History Main Topics   • Smoking status: Former Smoker     Packs/day: 0.00     Years: 34.00     Types: Cigarettes     Quit date: 1/1/2010   • Smokeless tobacco: Never Used      Comment: Jan 2010   • Alcohol use 7.2 oz/week     6 Cans of beer, 6 Standard drinks or equivalent per week      Comment: 4/week   • Drug use: Yes     Types: Inhaled      Comment: marijuana occasionally    • Sexual activity: Yes     Partners: Male     Other Topics Concern   • Not on file     Social History Narrative   • No narrative on file         OBJECTIVE:     STOMA ASSESSMENT:   Type:  ____Ileostomy     __x__Colostomy    ____Urostomy    ____Other     Location:   LLQ   Size:1 1/2\" oval 2.5x3.5cm)   Shape: oval   Color:red   Protrudes:      ___ >1 inch               _x__ <1 inch   [place]:  Central, tips slightly to 6 o'clock when pt sits up; patent   Mucocutaneous Junction: intact circumferentially with sutures    Skin indentations, creases or scar tissue: depression at 3 o'clock when pt sits up   Ale-stomal Skin Problems: slight irritation beneath stoma from 5-7 but skin intact, and very mild contact dermatitis to flange   Effluent / Flatus:formed/pastelike, brown, appropriate for colostomy   Photo  _x___ Yes ____ No    Pouching Procedure:   Old appliance removed.    Peristomal skin cleansed with:warm water washcloth   Peristomal skin treated with: na   Ostomy appliance used:  [employer] cera plus light convex 2 piece 57mm with slim cera plus " paste ring      Patient Education:   Verbal/demonstration instructions of above pouching procedure provided to patient/family.      Response: pt feels confident she can perform appliance change.       PLAN: Pt to return weekly, will see which flange works best, flat or convex. Pt has ordered supplies from [employer], but may need the cera plus products as she has a very mild contact dermatitis under flange    Supplies Needed:   Appliance type:    [place] as above             Other:      Accessories:         Supplier:[employer]    Frequency:  1 x week      Professional Collaboration:  Evaluation notes forwarded to referring provider.      At the time of each visit, a thorough assessment of the patient is completed to assure appropriateness of our plan of care.  The plan of care may need to be adapted from the original plan of care to address any significant changes in patient status.    Clinician Signature:  _________________________________  Date:  ____________    Physician Signature:  ________________________________  Date:  ____________

## 2018-12-06 NOTE — PATIENT INSTRUCTIONS
Change colostomies every 5-7 days. Change appliance immediately if it is leaking or peristomal skin feels irritated, has itching, or  burning.   To change the appliance, remove previous appliance, cleanse peristomal skin with warm water/washcloth, pat dry, make an ostomy template or use cardboard measuring guide and trace ostomy shape onto back of barrier, cut out barrier, apply a paste ring around barrier opening and apply appliance. Empty pouches when no more than ½ full. Check contents every 2 hours or as needed. Do not leave soap residue on tissue and do not use baby wipes or skin prep wipes.     Should you experience any significant changes in your condition, such as infection (redness, swelling, localized heat, increased pain, fever > 101 F, chills) or have any questions regarding your home care instructions, please contact the wound center at [phone]. If after hours, contact your primary care physician or go to the hospital emergency room.

## 2018-12-14 ENCOUNTER — NON-PROVIDER VISIT (OUTPATIENT)
Dept: WOUND CARE | Facility: MEDICAL CENTER | Age: 58
End: 2018-12-14
Attending: SURGERY
Payer: COMMERCIAL

## 2018-12-14 PROCEDURE — 99211 OFF/OP EST MAY X REQ PHY/QHP: CPT

## 2018-12-14 NOTE — WOUND TEAM
"Advanced Wound Care  [facility]  [address]  [place] [address]  [phone] Fax#    Ostomy Evaluation  For 90 Day Certification Period: 12/6/18-3/6/19     Referring Provider:  Dr. Cici Dolan MD  Primary Provider:Devon MARIANO  Consulting Providers:  Surgeon:Dr. Gamez       Start of Care:12/06/2018  Reason for referral:Post hospital teaching, support, product selection      SUBJECTIVE:    HPI:INDICATION FOR SURGERY:  This is a 57-year-old female who has been suffering   from recurrent diverticulitis since at least 09/05/2018.  She has undergone   multiple drainages of diverticular abscesses and was finally able to be   discharged home after her second admission and diverticular abscess drainage 2   weeks ago on antibiotics.  She was brought to the operating room for a   planned sigmoid colectomy, possible reanastomosis and possible colostomy with   the understanding that it was highly likely that she would undergo colostomy   placement.  She was brought early due to the fact that she was unable to be   off antibiotics for any time without having immediate recurrence of her   diverticulitis and diverticular abscess.     PROCEDURES:  1.  Exploratory laparotomy.  2.  Sigmoid colectomy.  3.  Colostomy placement.     SURGEON:  Justus Will MD    Past Medical Hx:  Past Medical History:   Diagnosis Date   • Anemia    • Anesthesia     PONV   • Bowel habit changes 11/19/2018    \"Constipation/diarrhea\".   • Bronchitis 2010   • Diverticulitis 11/2017   • Female bladder prolapse 11/19/2018   • GERD (gastroesophageal reflux disease)    • Heart burn    • HTN     resolved with wt loss and lifestyle changes   • HTN (hypertension) 11/19/2018    Takes Losartan   • Indigestion    • Other specified symptom associated with female genital organs    • Pap smear 01/29/2009    Normal   • Psychiatric problem     anxiety   • Reactive airway disease 11/19/2018 " "   Inhaler use PRN   • Unspecified urinary incontinence 11/19/2018    Wears Pads   • Urinary bladder disorder     prolapse      Surgical Hx:  Past Surgical History:   Procedure Laterality Date   • SIGMOID COLECTOMY N/A 11/21/2018    Procedure: SIGMOID COLECTOMY;  Surgeon: Justus Will M.D.;  Location: SURGERY [facility];  Service: General   • BLADDER SUSPENSION N/A 5/6/2015    Procedure: BLADDER SUSPENSION [57.89] TOT;  Surgeon: Yazmin Armando M.D.;  Location: SURGERY SAME DAY [facility]S;  Service:    • CYSTOSCOPY N/A 5/6/2015    Procedure: CYSTOSCOPY;  Surgeon: Yazmin Armando M.D.;  Location: SURGERY SAME DAY [facility];  Service:    • ANTERIOR AND POSTERIOR REPAIR N/A 5/6/2015    Procedure: ANTERIOR AND POSTERIOR REPAIR [70.53];  Surgeon: Yazmin Armando M.D.;  Location: SURGERY SAME DAY [facility];  Service:    • ABDOMINAL HYSTERECTOMY TOTAL      Ovaries intact   • BLADDER SUSPENSION     • CHOLECYSTECTOMY     • ENDOSCOPY     • ENDOSCOPY PROCEDURE      dilation due to stricture   • OTHER      \"Abdominal abcess drained twice 2018\".   • TONSILLECTOMY AND ADENOIDECTOMY       Medications:  Current Outpatient Prescriptions:   •  scopolamine (TRANSDERM-SCOP) 1 mg/72hr PATCH 72 HR, Apply 1 Patch to skin as directed every 72 hours as needed (Nausea/Vomiting if ondansetron, dexamethasone, diphenhydramine, and/or haloperidol ineffective or not ordered). Each patch is programmed to deliver 1 mg over 3 days transdermally., Disp: 4 Patch, Rfl: 0  •  ketorolac (TORADOL) 10 MG Tab, Take 1 Tab by mouth every four hours as needed. Minimum days until refill = 5, Disp: 30 Tab, Rfl: 0  •  amoxicillin-clavulanate (AUGMENTIN) 875-125 MG Tab, Take 1 Tab by mouth 2 times a day. Pt started 11/7/2018 for 14 day course., Disp: , Rfl:   •  Multiple Vitamins-Minerals (EMERGEN-C VITAMIN C PO), Take 1 Package by mouth every day., Disp: , Rfl:   •  omeprazole (PRILOSEC) 20 MG delayed-release capsule, Take 20 mg by " "mouth every bedtime., Disp: , Rfl:   •  losartan (COZAAR) 25 MG Tab, TAKE 1 TABLET BY MOUTH EVERY DAY, Disp: 90 Tab, Rfl: 3  •  PROAIR  (90 Base) MCG/ACT Aero Soln inhalation aerosol, INHALE 2 PUFFS BY MOUTH EVERY 6 HOURS AS NEEDED FOR SHORTNESS OF BREATH, Disp: 8.5 g, Rfl: 0  Allergies:Codeine    Social Hx:  Social History     Social History   • Marital status:      Spouse name: N/A   • Number of children: N/A   • Years of education: N/A     Occupational History   • Not on file.     Social History Main Topics   • Smoking status: Former Smoker     Packs/day: 0.00     Years: 34.00     Types: Cigarettes     Quit date: 1/1/2010   • Smokeless tobacco: Never Used      Comment: Jan 2010   • Alcohol use 7.2 oz/week     6 Cans of beer, 6 Standard drinks or equivalent per week      Comment: 4/week   • Drug use: Yes     Types: Inhaled      Comment: marijuana occasionally    • Sexual activity: Yes     Partners: Male     Other Topics Concern   • Not on file     Social History Narrative   • No narrative on file         OBJECTIVE:     STOMA ASSESSMENT:   Type:  ____Ileostomy     __x__Colostomy    ____Urostomy    ____Other     Location:   LLQ   Size: 1 1/2\" oval 2.5x3.5cm   Shape: oval   Color:red   Protrudes:      ___ >1 inch               _x__ <1 inch   [place]:  Central, tips slightly to 6 o'clock when pt sits up   Mucocutaneous Junction: intact circumferentially with sutures    Skin indentations, creases or scar tissue: depression at 3 o'clock when pt sits up   Ale-stomal Skin Problems: circumferential peristomal irritation but skin intact   Effluent / Flatus:formed/pastelike, brown, appropriate for colostomy   Photo  _x___ Yes ____ No            Pouching Procedure:   Old appliance removed with adhesive remover.   Peristomal skin cleansed with:warm water washcloth   Peristomal skin treated with: stoma powder & no sting skin prep to crust   Ostomy appliance used:  [employer] cera plus light convex 2 piece 57mm " ([id number] & matching pouch) with cera plus paste ring ([id number])      Patient Education: Provided handouts & discussion regarding living with ostomy, working, eating; [employer] flyer.  Verbal/demonstration instructions of above pouching procedure provided to patient/family.      Response: pt feels confident she can perform appliance change.       PLAN: Pt to return weekly to see if convex & ring work (patient reports received over 5 day wear time with previous convex appliance application; however she changed it with a flat and found only two days wear time as well as peristomal irritation that is exhibited at this appointment). Pt has ordered supplies from [employer], will change to cera plus products as she had a very mild contact dermatitis under flange; provided patient information on [employer] as well for supplies as she has not received [employer] yet and is frustrated, plans to call again today.    Supplies Needed:   Appliance type:    [place] as above             Other:      Accessories:         Supplier:[employer]    Frequency:  1 x week      Professional Collaboration:  none      At the time of each visit, a thorough assessment of the patient is completed to assure appropriateness of our plan of care.  The plan of care may need to be adapted from the original plan of care to address any significant changes in patient status.    Clinician Signature:  _________________________________  Date:  ____________    Physician Signature:  ________________________________  Date:  ____________

## 2018-12-20 ENCOUNTER — NON-PROVIDER VISIT (OUTPATIENT)
Dept: WOUND CARE | Facility: MEDICAL CENTER | Age: 58
End: 2018-12-20
Attending: SURGERY
Payer: COMMERCIAL

## 2018-12-20 PROCEDURE — 99211 OFF/OP EST MAY X REQ PHY/QHP: CPT

## 2018-12-20 NOTE — WOUND TEAM
"Advanced Wound Care  [facility]  [address]  [place] [address]  [phone] Fax#    Ostomy Evaluation  For 90 Day Certification Period: 12/6/18-3/6/19     Referring Provider:  Dr. Cici Dolan MD  Primary Provider:Devon MARIANO  Consulting Providers:  Surgeon:Dr. Gamez       Start of Care:12/06/2018  Reason for referral:Post hospital teaching, support, product selection      SUBJECTIVE:    HPI:INDICATION FOR SURGERY:  This is a 57-year-old female who has been suffering   from recurrent diverticulitis since at least 09/05/2018.  She has undergone   multiple drainages of diverticular abscesses and was finally able to be   discharged home after her second admission and diverticular abscess drainage 2   weeks ago on antibiotics.  She was brought to the operating room for a   planned sigmoid colectomy, possible reanastomosis and possible colostomy with   the understanding that it was highly likely that she would undergo colostomy   placement.  She was brought early due to the fact that she was unable to be   off antibiotics for any time without having immediate recurrence of her   diverticulitis and diverticular abscess.     PROCEDURES:  1.  Exploratory laparotomy.  2.  Sigmoid colectomy.  3.  Colostomy placement.     SURGEON:  Justus Will MD    Past Medical Hx:  Past Medical History:   Diagnosis Date   • Anemia    • Anesthesia     PONV   • Bowel habit changes 11/19/2018    \"Constipation/diarrhea\".   • Bronchitis 2010   • Diverticulitis 11/2017   • Female bladder prolapse 11/19/2018   • GERD (gastroesophageal reflux disease)    • Heart burn    • HTN     resolved with wt loss and lifestyle changes   • HTN (hypertension) 11/19/2018    Takes Losartan   • Indigestion    • Other specified symptom associated with female genital organs    • Pap smear 01/29/2009    Normal   • Psychiatric problem     anxiety   • Reactive airway disease 11/19/2018 " "   Inhaler use PRN   • Unspecified urinary incontinence 11/19/2018    Wears Pads   • Urinary bladder disorder     prolapse      Surgical Hx:  Past Surgical History:   Procedure Laterality Date   • SIGMOID COLECTOMY N/A 11/21/2018    Procedure: SIGMOID COLECTOMY;  Surgeon: Justus Will M.D.;  Location: SURGERY [facility];  Service: General   • BLADDER SUSPENSION N/A 5/6/2015    Procedure: BLADDER SUSPENSION [57.89] TOT;  Surgeon: Yzamin Armando M.D.;  Location: SURGERY SAME DAY [facility];  Service:    • CYSTOSCOPY N/A 5/6/2015    Procedure: CYSTOSCOPY;  Surgeon: Yazmin Armando M.D.;  Location: SURGERY SAME DAY [facility];  Service:    • ANTERIOR AND POSTERIOR REPAIR N/A 5/6/2015    Procedure: ANTERIOR AND POSTERIOR REPAIR [70.53];  Surgeon: Yazmin Armando M.D.;  Location: SURGERY SAME DAY [facility];  Service:    • ABDOMINAL HYSTERECTOMY TOTAL      Ovaries intact   • BLADDER SUSPENSION     • CHOLECYSTECTOMY     • ENDOSCOPY     • ENDOSCOPY PROCEDURE      dilation due to stricture   • OTHER      \"Abdominal abcess drained twice 2018\".   • TONSILLECTOMY AND ADENOIDECTOMY       Medications:  Current Outpatient Prescriptions:   •  scopolamine (TRANSDERM-SCOP) 1 mg/72hr PATCH 72 HR, Apply 1 Patch to skin as directed every 72 hours as needed (Nausea/Vomiting if ondansetron, dexamethasone, diphenhydramine, and/or haloperidol ineffective or not ordered). Each patch is programmed to deliver 1 mg over 3 days transdermally., Disp: 4 Patch, Rfl: 0  •  ketorolac (TORADOL) 10 MG Tab, Take 1 Tab by mouth every four hours as needed. Minimum days until refill = 5, Disp: 30 Tab, Rfl: 0  •  amoxicillin-clavulanate (AUGMENTIN) 875-125 MG Tab, Take 1 Tab by mouth 2 times a day. Pt started 11/7/2018 for 14 day course., Disp: , Rfl:   •  Multiple Vitamins-Minerals (EMERGEN-C VITAMIN C PO), Take 1 Package by mouth every day., Disp: , Rfl:   •  omeprazole (PRILOSEC) 20 MG delayed-release capsule, Take 20 mg by " "mouth every bedtime., Disp: , Rfl:   •  losartan (COZAAR) 25 MG Tab, TAKE 1 TABLET BY MOUTH EVERY DAY, Disp: 90 Tab, Rfl: 3  •  PROAIR  (90 Base) MCG/ACT Aero Soln inhalation aerosol, INHALE 2 PUFFS BY MOUTH EVERY 6 HOURS AS NEEDED FOR SHORTNESS OF BREATH, Disp: 8.5 g, Rfl: 0  Allergies:Codeine    Social Hx:  Social History     Social History   • Marital status:      Spouse name: N/A   • Number of children: N/A   • Years of education: N/A     Occupational History   • Not on file.     Social History Main Topics   • Smoking status: Former Smoker     Packs/day: 0.00     Years: 34.00     Types: Cigarettes     Quit date: 1/1/2010   • Smokeless tobacco: Never Used      Comment: Jan 2010   • Alcohol use 7.2 oz/week     6 Cans of beer, 6 Standard drinks or equivalent per week      Comment: 4/week   • Drug use: Yes     Types: Inhaled      Comment: marijuana occasionally    • Sexual activity: Yes     Partners: Male     Other Topics Concern   • Not on file     Social History Narrative   • No narrative on file         OBJECTIVE:     STOMA ASSESSMENT:   Type:  ____Ileostomy     __x__Colostomy    ____Urostomy    ____Other     Location:   LLQ   Size: 1 3/8\" oval   Shape: oval   Color:red   Protrudes:      ___ >1 inch               _x__ <1 inch   [place]:  Central, tips slightly to 6 o'clock when pt sits up   Mucocutaneous Junction: intact circumferentially with sutures    Skin indentations, creases or scar tissue: depression at 3 o'clock when pt sits up   Ale-stomal Skin Problems: peristomal skin irritation resolving with minor irritation noted on inferior aspect of stoma   Effluent / Flatus:formed/pastelike, brown, appropriate for colostomy   Photo  _x___ Yes ____ No            Pouching Procedure:   Old appliance removed with adhesive remover.   Peristomal skin cleansed with:warm water washcloth   Peristomal skin treated with: stoma powder & no sting skin prep to crust   Ostomy appliance used:  [employer] cera plus " light convex 2 piece 57mm ([id number] & matching pouch) with cera plus paste ring ([id number])      Patient Education: Reviewed POC, s/s of complications/infection, when to notify MD/go to ER.  Pt able to demonstrate appliance change with minimal assitance along with crusting.  Pt verbalized understanding to all.    Last note:  Provided handouts & discussion regarding living with ostomy, working, eating; [employer] flyer.  Verbal/demonstration instructions of above pouching procedure provided to patient/family.      Response: pt feels confident she can perform appliance change.       PLAN: Pt to return weekly until comfortable doing changes on her own and irritation resolved.    Last note:  Pt to return weekly to see if convex & ring work (patient reports received over 5 day wear time with previous convex appliance application; however she changed it with a flat and found only two days wear time as well as peristomal irritation that is exhibited at this appointment). Pt has ordered supplies from [employer], will change to cera plus products as she had a very mild contact dermatitis under flange; provided patient information on [employer] as well for supplies as she has not received [employer] yet and is frustrated, plans to call again today.    Supplies Needed:   Appliance type:    [place] as above             Other:      Accessories:         Supplier:[employer]    Frequency:  1 x week      Professional Collaboration:  none      At the time of each visit, a thorough assessment of the patient is completed to assure appropriateness of our plan of care.  The plan of care may need to be adapted from the original plan of care to address any significant changes in patient status.    Clinician Signature:  _________________________________  Date:  ____________    Physician Signature:  ________________________________  Date:  ____________

## 2018-12-20 NOTE — PATIENT INSTRUCTIONS
Reviewed POC, s/s of complications/infection, when to notify MD/go to ER.  Pt verbalized understanding to all.

## 2018-12-26 ENCOUNTER — NON-PROVIDER VISIT (OUTPATIENT)
Dept: WOUND CARE | Facility: MEDICAL CENTER | Age: 58
End: 2018-12-26
Attending: SURGERY
Payer: COMMERCIAL

## 2018-12-26 PROCEDURE — 99211 OFF/OP EST MAY X REQ PHY/QHP: CPT

## 2018-12-26 NOTE — WOUND TEAM
"Advanced Wound Care  [facility]  [address]  [place] [address]  [phone] Fax#    Ostomy Evaluation  For 90 Day Certification Period: 12/6/18-3/6/19     Referring Provider:  Dr. Cici Dolan MD  Primary Provider:Devon MARIANO  Consulting Providers:  Surgeon:Dr. Gamez       Start of Care:12/06/2018  Reason for referral:Post hospital teaching, support, product selection      SUBJECTIVE:    HPI:INDICATION FOR SURGERY:  This is a 57-year-old female who has been suffering   from recurrent diverticulitis since at least 09/05/2018.  She has undergone   multiple drainages of diverticular abscesses and was finally able to be   discharged home after her second admission and diverticular abscess drainage 2   weeks ago on antibiotics.  She was brought to the operating room for a   planned sigmoid colectomy, possible reanastomosis and possible colostomy with   the understanding that it was highly likely that she would undergo colostomy   placement.  She was brought early due to the fact that she was unable to be   off antibiotics for any time without having immediate recurrence of her   diverticulitis and diverticular abscess.     PROCEDURES:  1.  Exploratory laparotomy.  2.  Sigmoid colectomy.  3.  Colostomy placement.     SURGEON:  Justus Will MD    Past Medical Hx:  Past Medical History:   Diagnosis Date   • Anemia    • Anesthesia     PONV   • Bowel habit changes 11/19/2018    \"Constipation/diarrhea\".   • Bronchitis 2010   • Diverticulitis 11/2017   • Female bladder prolapse 11/19/2018   • GERD (gastroesophageal reflux disease)    • Heart burn    • HTN     resolved with wt loss and lifestyle changes   • HTN (hypertension) 11/19/2018    Takes Losartan   • Indigestion    • Other specified symptom associated with female genital organs    • Pap smear 01/29/2009    Normal   • Psychiatric problem     anxiety   • Reactive airway disease 11/19/2018 " "   Inhaler use PRN   • Unspecified urinary incontinence 11/19/2018    Wears Pads   • Urinary bladder disorder     prolapse      Surgical Hx:  Past Surgical History:   Procedure Laterality Date   • SIGMOID COLECTOMY N/A 11/21/2018    Procedure: SIGMOID COLECTOMY;  Surgeon: Justus Will M.D.;  Location: SURGERY [facility];  Service: General   • BLADDER SUSPENSION N/A 5/6/2015    Procedure: BLADDER SUSPENSION [57.89] TOT;  Surgeon: Yazmin Armando M.D.;  Location: SURGERY SAME DAY [facility];  Service:    • CYSTOSCOPY N/A 5/6/2015    Procedure: CYSTOSCOPY;  Surgeon: Yazmin Armando M.D.;  Location: SURGERY SAME DAY [facility];  Service:    • ANTERIOR AND POSTERIOR REPAIR N/A 5/6/2015    Procedure: ANTERIOR AND POSTERIOR REPAIR [70.53];  Surgeon: Yazmin Armando M.D.;  Location: SURGERY SAME DAY [facility];  Service:    • ABDOMINAL HYSTERECTOMY TOTAL      Ovaries intact   • BLADDER SUSPENSION     • CHOLECYSTECTOMY     • ENDOSCOPY     • ENDOSCOPY PROCEDURE      dilation due to stricture   • OTHER      \"Abdominal abcess drained twice 2018\".   • TONSILLECTOMY AND ADENOIDECTOMY       Medications:  Current Outpatient Prescriptions:   •  scopolamine (TRANSDERM-SCOP) 1 mg/72hr PATCH 72 HR, Apply 1 Patch to skin as directed every 72 hours as needed (Nausea/Vomiting if ondansetron, dexamethasone, diphenhydramine, and/or haloperidol ineffective or not ordered). Each patch is programmed to deliver 1 mg over 3 days transdermally., Disp: 4 Patch, Rfl: 0  •  ketorolac (TORADOL) 10 MG Tab, Take 1 Tab by mouth every four hours as needed. Minimum days until refill = 5, Disp: 30 Tab, Rfl: 0  •  amoxicillin-clavulanate (AUGMENTIN) 875-125 MG Tab, Take 1 Tab by mouth 2 times a day. Pt started 11/7/2018 for 14 day course., Disp: , Rfl:   •  Multiple Vitamins-Minerals (EMERGEN-C VITAMIN C PO), Take 1 Package by mouth every day., Disp: , Rfl:   •  omeprazole (PRILOSEC) 20 MG delayed-release capsule, Take 20 mg by " "mouth every bedtime., Disp: , Rfl:   •  losartan (COZAAR) 25 MG Tab, TAKE 1 TABLET BY MOUTH EVERY DAY, Disp: 90 Tab, Rfl: 3  •  PROAIR  (90 Base) MCG/ACT Aero Soln inhalation aerosol, INHALE 2 PUFFS BY MOUTH EVERY 6 HOURS AS NEEDED FOR SHORTNESS OF BREATH, Disp: 8.5 g, Rfl: 0  Allergies:Codeine    Social Hx:  Social History     Social History   • Marital status:      Spouse name: N/A   • Number of children: N/A   • Years of education: N/A     Occupational History   • Not on file.     Social History Main Topics   • Smoking status: Former Smoker     Packs/day: 0.00     Years: 34.00     Types: Cigarettes     Quit date: 1/1/2010   • Smokeless tobacco: Never Used      Comment: Jan 2010   • Alcohol use 7.2 oz/week     6 Cans of beer, 6 Standard drinks or equivalent per week      Comment: 4/week   • Drug use: Yes     Types: Inhaled      Comment: marijuana occasionally    • Sexual activity: Yes     Partners: Male     Other Topics Concern   • Not on file     Social History Narrative   • No narrative on file         OBJECTIVE:     STOMA ASSESSMENT:   Type:  ____Ileostomy     __x__Colostomy    ____Urostomy    ____Other     Location:   LLQ   Size: 1 1/4\" x 1 3/4\" oval   Shape: oval   Color:red   Protrudes:      ___ >1 inch               _x__ <1 inch   [place]:  Central, tips slightly to 6 o'clock when pt sits up   Mucocutaneous Junction: intact circumferentially with sutures    Skin indentations, creases or scar tissue: depression at 3 o'clock when pt sits up; midline incision well approximated & healed   Ale-stomal Skin Problems: peristomal skin reoccurring at inferior aspect of stoma as patient had to use a flat wafer appliance   Effluent / Flatus:formed/pastelike, brown, appropriate for colostomy   Photo  _x___ Yes ____ No          Pouching Procedure:   Old appliance removed with adhesive remover.   Peristomal skin cleansed with:warm water washcloth   Peristomal skin treated with: stoma powder & no sting skin " prep to crust   Ostomy appliance used:  Ida cera plus light convex 2 piece 70mm (32543 & matching pouch) with cera plus paste ring (8805) as previous 35581 (51 mm CTF) was too tight on edges of stoma causing possible trauma      Patient Education: Reviewed POC, s/s of complications/infection, when to notify MD/go to ER.  Pt able to demonstrate appliance change with minimal assitance along with crusting.  Provided patient 22x38 convex paste ring (52953) to use with the 20 flat pouches she ordered previously from [employer]. Patient will try to order from [employer] again once size is better determined after this week; she will try the 70 mm instead of the 51 mm. Patient was sent home with a 51424 51 CTF convex appliance as well to use if needed. Pt verbalized understanding to all.    Previous note:  Provided handouts & discussion regarding living with ostomy, working, eating; [employer] [place] flyer.  Verbal/demonstration instructions of above pouching procedure provided to patient/family.      Response: pt feels confident she can perform appliance change.       PLAN: Pt to return weekly until comfortable doing changes on her own and irritation resolved.    Previous note:  Pt to return weekly to see if convex & ring work (patient reports received over 5 day wear time with previous convex appliance application; however she changed it with a flat and found only two days wear time as well as peristomal irritation that is exhibited at this appointment). Pt has ordered supplies from [employer], will change to cera plus products as she had a very mild contact dermatitis under flange; provided patient information on [employer] as well for supplies as she has not received [employer] yet and is frustrated, plans to call again today.    Supplies Needed:   Appliance type:    Ida as above             Other:      Accessories:         Supplier:[employer]    Frequency:  1 x week      Professional Collaboration:  none      At the time  of each visit, a thorough assessment of the patient is completed to assure appropriateness of our plan of care.  The plan of care may need to be adapted from the original plan of care to address any significant changes in patient status.    Clinician Signature:  _________________________________  Date:  ____________    Physician Signature:  ________________________________  Date:  ____________

## 2018-12-26 NOTE — PATIENT INSTRUCTIONS
Change urostomies and ileostomies every 3-5 days. Change colostomies every 5-7 days. Change appliance immediately if it is leaking or peristomal skin feels irritated, has itching, or  burning.   To change the appliance, remove previous appliance, cleanse peristomal skin with warm water/washcloth, pat dry, make an ostomy template or use cardboard measuring guide and trace ostomy shape onto back of barrier, cut out barrier, apply a paste ring around barrier opening and apply appliance. Empty pouches when no more than ½ full. Check contents every 2 hours or as needed. Do not leave soap residue on tissue and do not use baby wipes or skin prep wipes.     Should you experience any significant changes in your condition, such as infection (redness, swelling, localized heat, increased pain, fever > 101 F, chills) or have any questions regarding your home care instructions, please contact the wound center at [phone]. If after hours, contact your primary care physician or go to the hospital emergency room.

## 2019-01-04 ENCOUNTER — NON-PROVIDER VISIT (OUTPATIENT)
Dept: WOUND CARE | Facility: MEDICAL CENTER | Age: 59
End: 2019-01-04
Attending: SURGERY
Payer: COMMERCIAL

## 2019-01-04 PROCEDURE — 99211 OFF/OP EST MAY X REQ PHY/QHP: CPT

## 2019-01-04 NOTE — WOUND TEAM
"Advanced Wound Care  [facility]  [address]  [place] [address]  [phone] Fax#    Ostomy Evaluation  For 90 Day Certification Period: 12/6/18-3/6/19     Referring Provider:  Dr. Cici Dolan MD  Primary Provider:Devon MARIANO  Consulting Providers:  Surgeon:Dr. Will       Start of Care:12/06/2018  Reason for referral:Post hospital teaching, support, product selection      SUBJECTIVE:    HPI:INDICATION FOR SURGERY:  This is a 57-year-old female who has been suffering   from recurrent diverticulitis since at least 09/05/2018.  She has undergone   multiple drainages of diverticular abscesses and was finally able to be   discharged home after her second admission and diverticular abscess drainage 2   weeks ago on antibiotics.  She was brought to the operating room for a   planned sigmoid colectomy, possible reanastomosis and possible colostomy with   the understanding that it was highly likely that she would undergo colostomy   placement.  She was brought early due to the fact that she was unable to be   off antibiotics for any time without having immediate recurrence of her   diverticulitis and diverticular abscess.     PROCEDURES:  1.  Exploratory laparotomy.  2.  Sigmoid colectomy.  3.  Colostomy placement.     SURGEON:  Justus Will MD    Past Medical Hx:  Past Medical History:   Diagnosis Date   • Anemia    • Anesthesia     PONV   • Bowel habit changes 11/19/2018    \"Constipation/diarrhea\".   • Bronchitis 2010   • Diverticulitis 11/2017   • Female bladder prolapse 11/19/2018   • GERD (gastroesophageal reflux disease)    • Heart burn    • HTN     resolved with wt loss and lifestyle changes   • HTN (hypertension) 11/19/2018    Takes Losartan   • Indigestion    • Other specified symptom associated with female genital organs    • Pap smear 01/29/2009    Normal   • Psychiatric problem     anxiety   • Reactive airway disease 11/19/2018 " "   Inhaler use PRN   • Unspecified urinary incontinence 11/19/2018    Wears Pads   • Urinary bladder disorder     prolapse      Surgical Hx:  Past Surgical History:   Procedure Laterality Date   • SIGMOID COLECTOMY N/A 11/21/2018    Procedure: SIGMOID COLECTOMY;  Surgeon: Justus Will M.D.;  Location: SURGERY [facility];  Service: General   • BLADDER SUSPENSION N/A 5/6/2015    Procedure: BLADDER SUSPENSION [57.89] TOT;  Surgeon: Yazmin Armando M.D.;  Location: SURGERY SAME DAY [facility];  Service:    • CYSTOSCOPY N/A 5/6/2015    Procedure: CYSTOSCOPY;  Surgeon: Yazmin Armando M.D.;  Location: SURGERY SAME DAY [facility];  Service:    • ANTERIOR AND POSTERIOR REPAIR N/A 5/6/2015    Procedure: ANTERIOR AND POSTERIOR REPAIR [70.53];  Surgeon: Yazmin Armando M.D.;  Location: SURGERY SAME DAY [facility];  Service:    • ABDOMINAL HYSTERECTOMY TOTAL      Ovaries intact   • BLADDER SUSPENSION     • CHOLECYSTECTOMY     • ENDOSCOPY     • ENDOSCOPY PROCEDURE      dilation due to stricture   • OTHER      \"Abdominal abcess drained twice 2018\".   • TONSILLECTOMY AND ADENOIDECTOMY       Medications:  Current Outpatient Prescriptions:   •  scopolamine (TRANSDERM-SCOP) 1 mg/72hr PATCH 72 HR, Apply 1 Patch to skin as directed every 72 hours as needed (Nausea/Vomiting if ondansetron, dexamethasone, diphenhydramine, and/or haloperidol ineffective or not ordered). Each patch is programmed to deliver 1 mg over 3 days transdermally., Disp: 4 Patch, Rfl: 0  •  ketorolac (TORADOL) 10 MG Tab, Take 1 Tab by mouth every four hours as needed. Minimum days until refill = 5, Disp: 30 Tab, Rfl: 0  •  amoxicillin-clavulanate (AUGMENTIN) 875-125 MG Tab, Take 1 Tab by mouth 2 times a day. Pt started 11/7/2018 for 14 day course., Disp: , Rfl:   •  Multiple Vitamins-Minerals (EMERGEN-C VITAMIN C PO), Take 1 Package by mouth every day., Disp: , Rfl:   •  omeprazole (PRILOSEC) 20 MG delayed-release capsule, Take 20 mg by " "mouth every bedtime., Disp: , Rfl:   •  losartan (COZAAR) 25 MG Tab, TAKE 1 TABLET BY MOUTH EVERY DAY, Disp: 90 Tab, Rfl: 3  •  PROAIR  (90 Base) MCG/ACT Aero Soln inhalation aerosol, INHALE 2 PUFFS BY MOUTH EVERY 6 HOURS AS NEEDED FOR SHORTNESS OF BREATH, Disp: 8.5 g, Rfl: 0  Allergies:Codeine      OBJECTIVE:     STOMA ASSESSMENT:   Type:  ____Ileostomy     __x__Colostomy    ____Urostomy    ____Other     Location:   LLQ   Size: 1 1/4\" x 1 3/4\" oval   Shape: oval   Color:red   Protrudes:      ___ >1 inch               _x__ <1 inch   [place]:  Central, tips slightly to 6 o'clock when pt sits up   Mucocutaneous Junction: intact circumferentially with sutures    Skin indentations, creases or scar tissue: depression at 3 o'clock when pt sits up; midline incision well approximated & healed   Ale-stomal Skin Problems: peristomal skin reoccurring at inferior aspect of stoma as patient had to use a flat wafer appliance   Effluent / Flatus:formed/pastelike, brown, appropriate for colostomy   Photo  _x___ Yes ____ No              Pouching Procedure:   Old appliance removed with adhesive remover.   Peristomal skin cleansed with:warm water washcloth   Peristomal skin treated with: stoma powder & no sting skin prep to crust slight irritation at 6 o'clock   Ostomy appliance used:  [employer] 96772 2 1/4 57 mm CTF flat flange with matching bag, per pt r      Patient Education: Pt stated she has only been using flat flange and bag and skin is much improved with no leakage. Since she has 20 flat appliance sets, she would like to see if crusting as necessary and flat flange with bag will work. Discussed transit time and gas; that there are no food restrictions with colostomy; necessity of drinking plenty of fluid and eating fiber; to return next week to see if she tolerates flat appliance, if so, may return in a few weeks. Discussed YANNICK.   Previous appointment: Reviewed POC, s/s of complications/infection, when to notify MD/go to " ER.  Pt able to demonstrate appliance change with minimal assitance along with crusting.  Provided patient 22x38 convex paste ring (12469) to use with the 20 flat pouches she ordered previously from [email or web address] Patient will try to order from [email or web address] again once size is better determined after this week; she will try the 70 mm instead of the 51 mm. Patient was sent home with a 49133 51 CTF convex appliance as well to use if needed. Pt verbalized understanding to all.    Previous note:  Provided handouts & discussion regarding living with ostomy, working, eating; [employer] [place] flyer.  Verbal/demonstration instructions of above pouching procedure provided to patient/family.      Response: pt feels confident she can perform appliance change.       PLAN: Pt to return weekly until comfortable doing changes on her own and to see flat system works as she hopes it will. .    Supplies Needed:   Appliance type:    [place] as above             Other:      Accessories:         Supplier:[email or web address]    Frequency:  1 x week      Professional Collaboration:  none      At the time of each visit, a thorough assessment of the patient is completed to assure appropriateness of our plan of care.  The plan of care may need to be adapted from the original plan of care to address any significant changes in patient status.    Clinician Signature:  _________________________________  Date:  ____________    Physician Signature:  ________________________________  Date:  ____________

## 2019-01-05 NOTE — PATIENT INSTRUCTIONS
Measure stoma and cut pouch,that stoma size will change over 6-8 weeks and need to measure each week and adjust pouch size as necessary; crust only when skin is irritated;  drink plenty of water and eat high fiber foods to avoid constipation.

## 2019-01-11 ENCOUNTER — NON-PROVIDER VISIT (OUTPATIENT)
Dept: WOUND CARE | Facility: MEDICAL CENTER | Age: 59
End: 2019-01-11
Attending: SURGERY
Payer: COMMERCIAL

## 2019-01-11 PROCEDURE — 99211 OFF/OP EST MAY X REQ PHY/QHP: CPT

## 2019-01-11 NOTE — CERTIFICATION
"[facility]  [facility]  [address]  [place] [address]  [phone] Fax#    Ostomy Evaluation  For 90 Day Certification Period: 1/11/19-4/11/19     Referring Provider:  Dr. Cici Dolan MD  Primary Provider:Devon MARIANO  Consulting Providers:  Surgeon:Dr. Will       Start of Care:12/06/2018  Reason for referral:Post hospital teaching, support, product selection      SUBJECTIVE:  \"I haven't felt any itching or burning or anything. I thought this was going to be the worst thing that happened to me, but it really has been the best.\"  HPI:INDICATION FOR SURGERY:  This is a 57-year-old female who has been suffering   from recurrent diverticulitis since at least 09/05/2018.  She has undergone   multiple drainages of diverticular abscesses and was finally able to be   discharged home after her second admission and diverticular abscess drainage 2   weeks ago on antibiotics.  She was brought to the operating room for a   planned sigmoid colectomy, possible reanastomosis and possible colostomy with   the understanding that it was highly likely that she would undergo colostomy   placement.  She was brought early due to the fact that she was unable to be   off antibiotics for any time without having immediate recurrence of her   diverticulitis and diverticular abscess.     PROCEDURES:  1.  Exploratory laparotomy.  2.  Sigmoid colectomy.  3.  Colostomy placement.     SURGEON:  Justus Will MD    Past Medical Hx:  Past Medical History:   Diagnosis Date   • Anemia    • Anesthesia     PONV   • Bowel habit changes 11/19/2018    \"Constipation/diarrhea\".   • Bronchitis 2010   • Diverticulitis 11/2017   • Female bladder prolapse 11/19/2018   • GERD (gastroesophageal reflux disease)    • Heart burn    • HTN     resolved with wt loss and lifestyle changes   • HTN (hypertension) 11/19/2018    Takes Losartan   • Indigestion    • Other specified " "symptom associated with female genital organs    • Pap smear 01/29/2009    Normal   • Psychiatric problem     anxiety   • Reactive airway disease 11/19/2018    Inhaler use PRN   • Unspecified urinary incontinence 11/19/2018    Wears Pads   • Urinary bladder disorder     prolapse      Surgical Hx:  Past Surgical History:   Procedure Laterality Date   • SIGMOID COLECTOMY N/A 11/21/2018    Procedure: SIGMOID COLECTOMY;  Surgeon: Justus Will M.D.;  Location: SURGERY [facility];  Service: General   • BLADDER SUSPENSION N/A 5/6/2015    Procedure: BLADDER SUSPENSION [57.89] TOT;  Surgeon: Yazmin Armando M.D.;  Location: SURGERY SAME DAY [facility];  Service:    • CYSTOSCOPY N/A 5/6/2015    Procedure: CYSTOSCOPY;  Surgeon: Yazmin Armando M.D.;  Location: SURGERY SAME DAY [facility];  Service:    • ANTERIOR AND POSTERIOR REPAIR N/A 5/6/2015    Procedure: ANTERIOR AND POSTERIOR REPAIR [70.53];  Surgeon: Yazmin Armando M.D.;  Location: SURGERY SAME DAY [facility];  Service:    • ABDOMINAL HYSTERECTOMY TOTAL      Ovaries intact   • BLADDER SUSPENSION     • CHOLECYSTECTOMY     • ENDOSCOPY     • ENDOSCOPY PROCEDURE      dilation due to stricture   • OTHER      \"Abdominal abcess drained twice 2018\".   • TONSILLECTOMY AND ADENOIDECTOMY       Medications:  Current Outpatient Prescriptions:   •  scopolamine (TRANSDERM-SCOP) 1 mg/72hr PATCH 72 HR, Apply 1 Patch to skin as directed every 72 hours as needed (Nausea/Vomiting if ondansetron, dexamethasone, diphenhydramine, and/or haloperidol ineffective or not ordered). Each patch is programmed to deliver 1 mg over 3 days transdermally., Disp: 4 Patch, Rfl: 0  •  ketorolac (TORADOL) 10 MG Tab, Take 1 Tab by mouth every four hours as needed. Minimum days until refill = 5, Disp: 30 Tab, Rfl: 0  •  amoxicillin-clavulanate (AUGMENTIN) 875-125 MG Tab, Take 1 Tab by mouth 2 times a day. Pt started 11/7/2018 for 14 day course., Disp: , Rfl:   •  Multiple " "Vitamins-Minerals (EMERGEN-C VITAMIN C PO), Take 1 Package by mouth every day., Disp: , Rfl:   •  omeprazole (PRILOSEC) 20 MG delayed-release capsule, Take 20 mg by mouth every bedtime., Disp: , Rfl:   •  losartan (COZAAR) 25 MG Tab, TAKE 1 TABLET BY MOUTH EVERY DAY, Disp: 90 Tab, Rfl: 3  •  PROAIR  (90 Base) MCG/ACT Aero Soln inhalation aerosol, INHALE 2 PUFFS BY MOUTH EVERY 6 HOURS AS NEEDED FOR SHORTNESS OF BREATH, Disp: 8.5 g, Rfl: 0  Allergies:Codeine      OBJECTIVE:     STOMA ASSESSMENT:   Type:  ____Ileostomy     __x__Colostomy    ____Urostomy    ____Other     Location:   LLQ   Size: 1 1/4\" x 1 3/4\" oval   Shape: oval   Color: red   Protrudes:      ___ >1 inch               _x__ <1 inch   Hustle:  Central, tips slightly to 6 o'clock when pt sits up   Mucocutaneous Junction: intact circumferentially with sutures    Skin indentations, creases or scar tissue: depression at 3 o'clock when pt sits up; midline incision well approximated & healed   Ale-stomal Skin Problems: none, resolved   Effluent / Flatus:formed/pastelike, brown, appropriate for colostomy   Photo  _x___ Yes ____ No          Pouching Procedure:   Old appliance removed with adhesive remover.   Peristomal skin cleansed with:warm water washcloth   Peristomal skin treated with: none   Ostomy appliance used:  [employer] adapt paste ring; [place] [id number] 2 1/4 57 mm CTF flat flange with matching pouch with filter      Patient Education: Plans to apply lotion and massage into midline abdominal well approximated healed scar tissue of incision line, she states it has been itchy there. She can demonstrate complete pouching change on her own, no need of assistance. No leaks or peristomal skin breakdown noted. Patient has supplies, all questions addressed at this time. Discussed food options and importance of hydration.    Previous note:  Provided handouts & discussion regarding living with ostomy, working, eating; [employer] [place] flyer.  Verbal/demonstration " instructions of above pouching procedure provided to patient/family.      Response: pt feels confident she can perform appliance change and demonstrated she is able to.       PLAN: Pt to return if problems develop.    Supplies Needed: (previously tried the soft convex; however she has found the flat with a paste ring works without leakage and that is what she had ordered from [employer])   Appliance type:    Ida as above             Other: stoma powder & no sting skin prep as needed for crusting if irritation returns      Accessories:         Supplier:[employer]    Frequency: PRN      Professional Collaboration: certification sent to referring provider      At the time of each visit, a thorough assessment of the patient is completed to assure appropriateness of our plan of care.  The plan of care may need to be adapted from the original plan of care to address any significant changes in patient status.    Clinician Signature:  _________________________________  Date:  ____________    Physician Signature:  ________________________________  Date:  ____________

## 2019-01-11 NOTE — PATIENT INSTRUCTIONS
Change urostomies and ileostomies every 3-5 days. Change colostomies every 5-7 days. Change appliance immediately if it is leaking or peristomal skin feels irritated, has itching, or  burning.   To change the appliance, remove previous appliance, cleanse peristomal skin with warm water/washcloth, pat dry, make an ostomy template or use cardboard measuring guide and trace ostomy shape onto back of barrier, cut out barrier, apply a paste ring around barrier opening and apply appliance. Empty pouches when no more than ½ full. Check contents every 2 hours or as needed. Do not leave soap residue on tissue and do not use baby wipes or skin prep wipes.     Should you experience any significant changes in your condition, such as infection (redness, swelling, localized heat, increased pain, fever > 101 F, chills) or have any questions regarding your home care instructions, please contact the wound center at [phone]. If after hours, contact your primary care physician or go to the hospital emergency room.

## 2019-01-17 ENCOUNTER — APPOINTMENT (OUTPATIENT)
Dept: WOUND CARE | Facility: MEDICAL CENTER | Age: 59
End: 2019-01-17
Attending: SURGERY
Payer: COMMERCIAL

## 2019-02-23 ENCOUNTER — HOSPITAL ENCOUNTER (EMERGENCY)
Facility: MEDICAL CENTER | Age: 59
End: 2019-02-23
Payer: COMMERCIAL

## 2019-02-23 ENCOUNTER — HOSPITAL ENCOUNTER (EMERGENCY)
Facility: MEDICAL CENTER | Age: 59
End: 2019-02-23
Attending: EMERGENCY MEDICINE
Payer: COMMERCIAL

## 2019-02-23 VITALS
SYSTOLIC BLOOD PRESSURE: 139 MMHG | BODY MASS INDEX: 24.98 KG/M2 | RESPIRATION RATE: 15 BRPM | TEMPERATURE: 96.8 F | OXYGEN SATURATION: 97 % | DIASTOLIC BLOOD PRESSURE: 73 MMHG | HEART RATE: 76 BPM | HEIGHT: 67 IN | WEIGHT: 159.17 LBS

## 2019-02-23 DIAGNOSIS — K92.2 GASTROINTESTINAL HEMORRHAGE, UNSPECIFIED GASTROINTESTINAL HEMORRHAGE TYPE: ICD-10-CM

## 2019-02-23 LAB
ALBUMIN SERPL BCP-MCNC: 4.6 G/DL (ref 3.2–4.9)
ALBUMIN/GLOB SERPL: 1.6 G/DL
ALP SERPL-CCNC: 90 U/L (ref 30–99)
ALT SERPL-CCNC: 17 U/L (ref 2–50)
ANION GAP SERPL CALC-SCNC: 8 MMOL/L (ref 0–11.9)
AST SERPL-CCNC: 20 U/L (ref 12–45)
BASOPHILS # BLD AUTO: 1 % (ref 0–1.8)
BASOPHILS # BLD: 0.04 K/UL (ref 0–0.12)
BILIRUB SERPL-MCNC: 0.5 MG/DL (ref 0.1–1.5)
BUN SERPL-MCNC: 10 MG/DL (ref 8–22)
CALCIUM SERPL-MCNC: 10 MG/DL (ref 8.5–10.5)
CHLORIDE SERPL-SCNC: 107 MMOL/L (ref 96–112)
CO2 SERPL-SCNC: 20 MMOL/L (ref 20–33)
CREAT SERPL-MCNC: 0.69 MG/DL (ref 0.5–1.4)
EOSINOPHIL # BLD AUTO: 0.08 K/UL (ref 0–0.51)
EOSINOPHIL NFR BLD: 2.1 % (ref 0–6.9)
ERYTHROCYTE [DISTWIDTH] IN BLOOD BY AUTOMATED COUNT: 41.5 FL (ref 35.9–50)
GLOBULIN SER CALC-MCNC: 2.9 G/DL (ref 1.9–3.5)
GLUCOSE SERPL-MCNC: 93 MG/DL (ref 65–99)
HCT VFR BLD AUTO: 39.4 % (ref 37–47)
HGB BLD-MCNC: 13 G/DL (ref 12–16)
IMM GRANULOCYTES # BLD AUTO: 0.01 K/UL (ref 0–0.11)
IMM GRANULOCYTES NFR BLD AUTO: 0.3 % (ref 0–0.9)
LYMPHOCYTES # BLD AUTO: 1.16 K/UL (ref 1–4.8)
LYMPHOCYTES NFR BLD: 30.2 % (ref 22–41)
MCH RBC QN AUTO: 28.6 PG (ref 27–33)
MCHC RBC AUTO-ENTMCNC: 33 G/DL (ref 33.6–35)
MCV RBC AUTO: 86.6 FL (ref 81.4–97.8)
MONOCYTES # BLD AUTO: 0.35 K/UL (ref 0–0.85)
MONOCYTES NFR BLD AUTO: 9.1 % (ref 0–13.4)
NEUTROPHILS # BLD AUTO: 2.2 K/UL (ref 2–7.15)
NEUTROPHILS NFR BLD: 57.3 % (ref 44–72)
NRBC # BLD AUTO: 0 K/UL
NRBC BLD-RTO: 0 /100 WBC
PLATELET # BLD AUTO: 184 K/UL (ref 164–446)
PMV BLD AUTO: 10.4 FL (ref 9–12.9)
POTASSIUM SERPL-SCNC: 3.8 MMOL/L (ref 3.6–5.5)
PROT SERPL-MCNC: 7.5 G/DL (ref 6–8.2)
RBC # BLD AUTO: 4.55 M/UL (ref 4.2–5.4)
SODIUM SERPL-SCNC: 135 MMOL/L (ref 135–145)
WBC # BLD AUTO: 3.8 K/UL (ref 4.8–10.8)

## 2019-02-23 PROCEDURE — 36415 COLL VENOUS BLD VENIPUNCTURE: CPT

## 2019-02-23 PROCEDURE — 85025 COMPLETE CBC W/AUTO DIFF WBC: CPT

## 2019-02-23 PROCEDURE — 99284 EMERGENCY DEPT VISIT MOD MDM: CPT

## 2019-02-23 PROCEDURE — 80053 COMPREHEN METABOLIC PANEL: CPT

## 2019-02-23 NOTE — ED TRIAGE NOTES
Pt ambulatory to triage c/o bright red blood in stool x 2 in past 2 days. Pt has colostomy to left abd. Pt reports diarrhea yesterday. VSS. Educated on triage process, encouraged to inform staff of any changes.

## 2019-02-23 NOTE — DISCHARGE INSTRUCTIONS
Return at once if you have heavy bleeding or fever or abdominal pain or otherwise feel that you are having new or worsening symptoms.  On Monday morning call both your surgeon and your GI office and confirm office follow-up for further evaluation during the week.

## 2019-02-23 NOTE — ED PROVIDER NOTES
"ED Provider Note    CHIEF COMPLAINT  Chief Complaint   Patient presents with   • Bloody Stools       HPI  Ambar Jj is a 58 y.o. female who presents to the emergency department complaining of bloody bowel movements.  The patient had a colectomy done 3 months ago because of complications related to diverticulitis and she currently has a colostomy bag.  A couple of days ago she felt ill and had several days of diarrhea and not feeling very well.  Yesterday she had some bright red blood come out of her ostomy and then had a recurrent episode this morning although it now seems to have resolved.  Also in general she is feeling much better than she she does not recall any exacerbating or alleviating factors or precipitating events she has no history of GI bleeding.  No abdominal pain or fever.    REVIEW OF SYSTEMS all other systems negative    PAST MEDICAL HISTORY  Past Medical History:   Diagnosis Date   • Anemia    • Anesthesia     PONV   • Bowel habit changes 11/19/2018    \"Constipation/diarrhea\".   • Bronchitis 2010   • Diverticulitis 11/2017   • Female bladder prolapse 11/19/2018   • GERD (gastroesophageal reflux disease)    • Heart burn    • HTN     resolved with wt loss and lifestyle changes   • HTN (hypertension) 11/19/2018    Takes Losartan   • Indigestion    • Other specified symptom associated with female genital organs    • Pap smear 01/29/2009    Normal   • Psychiatric problem     anxiety   • Reactive airway disease 11/19/2018    Inhaler use PRN   • Unspecified urinary incontinence 11/19/2018    Wears Pads   • Urinary bladder disorder     prolapse        FAMILY HISTORY  Family History   Problem Relation Age of Onset   • Cancer Mother         Breast   • Lung Disease Mother         COPD   • Heart Disease Mother    • Diabetes Mother    • Lung Disease Father         COPD   • Heart Disease Father    • Alcohol/Drug Sister    • Lung Disease Sister         sister 2 Asthma   • Heart Disease Sister         " "sister 1 Heart murmur   • Alcohol/Drug Brother    • Other Son         IBS; working on his diet       SOCIAL HISTORY  Social History     Social History   • Marital status:      Spouse name: N/A   • Number of children: N/A   • Years of education: N/A     Social History Main Topics   • Smoking status: Former Smoker     Packs/day: 0.00     Years: 34.00     Types: Cigarettes     Quit date: 1/1/2010   • Smokeless tobacco: Never Used      Comment: Jan 2010   • Alcohol use 7.2 oz/week     6 Cans of beer, 6 Standard drinks or equivalent per week      Comment: 4/week   • Drug use: Yes     Types: Inhaled      Comment: marijuana occasionally    • Sexual activity: Yes     Partners: Male     Other Topics Concern   • Not on file     Social History Narrative   • No narrative on file       SURGICAL HISTORY  Past Surgical History:   Procedure Laterality Date   • SIGMOID COLECTOMY N/A 11/21/2018    Procedure: SIGMOID COLECTOMY;  Surgeon: Justus Will M.D.;  Location: SURGERY [facility];  Service: General   • BLADDER SUSPENSION N/A 5/6/2015    Procedure: BLADDER SUSPENSION [57.89] TOT;  Surgeon: Yazmin Armando M.D.;  Location: SURGERY SAME DAY [facility];  Service:    • CYSTOSCOPY N/A 5/6/2015    Procedure: CYSTOSCOPY;  Surgeon: Yazmin Armando M.D.;  Location: SURGERY SAME DAY [facility];  Service:    • ANTERIOR AND POSTERIOR REPAIR N/A 5/6/2015    Procedure: ANTERIOR AND POSTERIOR REPAIR [70.53];  Surgeon: Yazmin Armando M.D.;  Location: SURGERY SAME DAY [facility];  Service:    • ABDOMINAL HYSTERECTOMY TOTAL      Ovaries intact   • BLADDER SUSPENSION     • CHOLECYSTECTOMY     • ENDOSCOPY     • ENDOSCOPY PROCEDURE      dilation due to stricture   • OTHER      \"Abdominal abcess drained twice 2018\".   • TONSILLECTOMY AND ADENOIDECTOMY         CURRENT MEDICATIONS  Home Medications     Reviewed by Madison Alonzo R.N. (Registered Nurse) on 02/23/19 at 0807  Med List Status: Partial   Medication Last " "Dose Status   amoxicillin-clavulanate (AUGMENTIN) 875-125 MG Tab  Active   ketorolac (TORADOL) 10 MG Tab  Active   losartan (COZAAR) 25 MG Tab 2/23/2019 Active   Multiple Vitamins-Minerals (EMERGEN-C VITAMIN C PO)  Active   omeprazole (PRILOSEC) 20 MG delayed-release capsule 2/22/2019 Active   PROAIR  (90 Base) MCG/ACT Aero Soln inhalation aerosol  Active   scopolamine (TRANSDERM-SCOP) 1 mg/72hr PATCH 72 HR  Active                ALLERGIES  Allergies   Allergen Reactions   • Codeine Vomiting       PHYSICAL EXAM  VITAL SIGNS: /73   Pulse 76   Temp 36 °C (96.8 °F) (Temporal)   Resp 15   Ht 1.702 m (5' 7\")   Wt 72.2 kg (159 lb 2.8 oz)   SpO2 97%   BMI 24.93 kg/m²    Oxygen saturation is interpreted as adequate  Constitutional: Awake verbal nontoxic-appearing  HENT: Mucous membranes are moist  Eyes: No erythema discharge or jaundice  Neck: Trachea midline no JVD  Cardiovascular: Regular rate and rhythm  Lungs: Clear and equal bilaterally  Abdomen/Back: Soft nondistended nontender bowel sounds are present.  There is a colostomy bag in the left abdomen the stool is basically brown there is very minimal blood streaks I do not see a definitive site of bleeding at the ostomy.  Skin: Warm and dry  Musculoskeletal: No acute bony deformity  Neurologic: Awake verbal moving all extremities    Laboratory  CBC shows white blood cell count of 3.8 with hemoglobin of 13 and complete metabolic panel is unremarkable    MEDICAL DECISION MAKING and DISPOSITION  I reviewed the findings with the patient, I have also reviewed the case with Dr. Olea and her group will see the patient during the week for recheck.  In addition I reviewed the case with Dr. Smith and his group will see the patient during the week for GI evaluation.  I have reviewed all this with the patient and I have advised her that if she has heavy bleeding from her ostomy or she has fever or abdominal pain or otherwise feels she is having new or " worsening symptoms she is to otherwise she is to call the doctor's offices first thing Monday morning and confirm her office follow-up appointments during the week    IMPRESSION  1.  GI bleeding    Electronically signed by: Jacob Moore, 2/23/2019 12:45 PM

## 2019-03-12 DIAGNOSIS — J45.902: ICD-10-CM

## 2019-03-13 RX ORDER — ALBUTEROL SULFATE 90 UG/1
2 AEROSOL, METERED RESPIRATORY (INHALATION) EVERY 6 HOURS
Qty: 8.5 G | Refills: 0 | Status: SHIPPED | OUTPATIENT
Start: 2019-03-13 | End: 2019-08-23 | Stop reason: SDUPTHER

## 2019-03-13 NOTE — TELEPHONE ENCOUNTER
From: Ambar Jj  Sent: 3/12/2019 3:37 PM PDT  Subject: Medication Renewal Request    Ambar Jj would like a refill of the following medications:     PROAIR  (90 Base) MCG/ACT Aero Soln inhalation aerosol [Augusto Levi M.D.]    Preferred pharmacy: [facility] [address] [place] [address], [place] - [id number] AT [facility] [address]

## 2019-03-28 ENCOUNTER — HOSPITAL ENCOUNTER (OUTPATIENT)
Dept: RADIOLOGY | Facility: MEDICAL CENTER | Age: 59
End: 2019-03-28
Attending: SURGERY
Payer: COMMERCIAL

## 2019-03-28 DIAGNOSIS — Z93.3 COLOSTOMY STATUS (HCC): ICD-10-CM

## 2019-03-28 PROCEDURE — 700117 HCHG RX CONTRAST REV CODE 255: Performed by: SURGERY

## 2019-03-28 PROCEDURE — 74270 X-RAY XM COLON 1CNTRST STD: CPT

## 2019-03-28 RX ADMIN — IOHEXOL 200 ML: 300 INJECTION, SOLUTION INTRAVENOUS at 09:00

## 2019-04-22 DIAGNOSIS — Z01.812 PRE-OPERATIVE LABORATORY EXAMINATION: ICD-10-CM

## 2019-04-22 DIAGNOSIS — Z01.810 PRE-OPERATIVE CARDIOVASCULAR EXAMINATION: ICD-10-CM

## 2019-04-22 LAB
ANION GAP SERPL CALC-SCNC: 9 MMOL/L (ref 0–11.9)
BUN SERPL-MCNC: 10 MG/DL (ref 8–22)
CALCIUM SERPL-MCNC: 9.3 MG/DL (ref 8.5–10.5)
CHLORIDE SERPL-SCNC: 109 MMOL/L (ref 96–112)
CO2 SERPL-SCNC: 22 MMOL/L (ref 20–33)
CREAT SERPL-MCNC: 0.79 MG/DL (ref 0.5–1.4)
EKG IMPRESSION: NORMAL
ERYTHROCYTE [DISTWIDTH] IN BLOOD BY AUTOMATED COUNT: 46.3 FL (ref 35.9–50)
GLUCOSE SERPL-MCNC: 92 MG/DL (ref 65–99)
HCT VFR BLD AUTO: 41 % (ref 37–47)
HGB BLD-MCNC: 13.5 G/DL (ref 12–16)
MCH RBC QN AUTO: 29 PG (ref 27–33)
MCHC RBC AUTO-ENTMCNC: 32.9 G/DL (ref 33.6–35)
MCV RBC AUTO: 88 FL (ref 81.4–97.8)
PLATELET # BLD AUTO: 205 K/UL (ref 164–446)
PMV BLD AUTO: 10.3 FL (ref 9–12.9)
POTASSIUM SERPL-SCNC: 3.8 MMOL/L (ref 3.6–5.5)
RBC # BLD AUTO: 4.66 M/UL (ref 4.2–5.4)
SODIUM SERPL-SCNC: 140 MMOL/L (ref 135–145)
WBC # BLD AUTO: 6.9 K/UL (ref 4.8–10.8)

## 2019-04-22 PROCEDURE — 93010 ELECTROCARDIOGRAM REPORT: CPT | Performed by: INTERNAL MEDICINE

## 2019-04-22 PROCEDURE — 36415 COLL VENOUS BLD VENIPUNCTURE: CPT

## 2019-04-22 PROCEDURE — 80048 BASIC METABOLIC PNL TOTAL CA: CPT

## 2019-04-22 PROCEDURE — 85027 COMPLETE CBC AUTOMATED: CPT

## 2019-04-22 PROCEDURE — 93005 ELECTROCARDIOGRAM TRACING: CPT

## 2019-05-06 ENCOUNTER — ANESTHESIA EVENT (OUTPATIENT)
Dept: SURGERY | Facility: MEDICAL CENTER | Age: 59
DRG: 331 | End: 2019-05-06
Payer: COMMERCIAL

## 2019-05-06 ENCOUNTER — HOSPITAL ENCOUNTER (INPATIENT)
Facility: MEDICAL CENTER | Age: 59
LOS: 4 days | DRG: 331 | End: 2019-05-10
Attending: SURGERY | Admitting: SURGERY
Payer: COMMERCIAL

## 2019-05-06 ENCOUNTER — ANESTHESIA (OUTPATIENT)
Dept: SURGERY | Facility: MEDICAL CENTER | Age: 59
DRG: 331 | End: 2019-05-06
Payer: COMMERCIAL

## 2019-05-06 DIAGNOSIS — K57.30 DIVERTICULOSIS OF SIGMOID COLON: ICD-10-CM

## 2019-05-06 LAB — PATHOLOGY CONSULT NOTE: NORMAL

## 2019-05-06 PROCEDURE — 700102 HCHG RX REV CODE 250 W/ 637 OVERRIDE(OP): Performed by: ANESTHESIOLOGY

## 2019-05-06 PROCEDURE — 160029 HCHG SURGERY MINUTES - 1ST 30 MINS LEVEL 4: Performed by: SURGERY

## 2019-05-06 PROCEDURE — 160036 HCHG PACU - EA ADDL 30 MINS PHASE I: Performed by: SURGERY

## 2019-05-06 PROCEDURE — 160048 HCHG OR STATISTICAL LEVEL 1-5: Performed by: SURGERY

## 2019-05-06 PROCEDURE — 700101 HCHG RX REV CODE 250: Performed by: ANESTHESIOLOGY

## 2019-05-06 PROCEDURE — 501436 HCHG STAPLER, PCEEA PREMIUM: Performed by: SURGERY

## 2019-05-06 PROCEDURE — A9270 NON-COVERED ITEM OR SERVICE: HCPCS | Performed by: ANESTHESIOLOGY

## 2019-05-06 PROCEDURE — 0DTJ0ZZ RESECTION OF APPENDIX, OPEN APPROACH: ICD-10-PCS | Performed by: SURGERY

## 2019-05-06 PROCEDURE — 160041 HCHG SURGERY MINUTES - EA ADDL 1 MIN LEVEL 4: Performed by: SURGERY

## 2019-05-06 PROCEDURE — 501838 HCHG SUTURE GENERAL: Performed by: SURGERY

## 2019-05-06 PROCEDURE — 160022 HCHG BLOCK: Performed by: SURGERY

## 2019-05-06 PROCEDURE — 700102 HCHG RX REV CODE 250 W/ 637 OVERRIDE(OP): Performed by: SURGERY

## 2019-05-06 PROCEDURE — 700105 HCHG RX REV CODE 258: Performed by: ANESTHESIOLOGY

## 2019-05-06 PROCEDURE — 0DNM0ZZ RELEASE DESCENDING COLON, OPEN APPROACH: ICD-10-PCS | Performed by: SURGERY

## 2019-05-06 PROCEDURE — 501445 HCHG STAPLER, SKIN DISP: Performed by: SURGERY

## 2019-05-06 PROCEDURE — 700111 HCHG RX REV CODE 636 W/ 250 OVERRIDE (IP): Performed by: ANESTHESIOLOGY

## 2019-05-06 PROCEDURE — 700105 HCHG RX REV CODE 258: Performed by: SURGERY

## 2019-05-06 PROCEDURE — 0DN80ZZ RELEASE SMALL INTESTINE, OPEN APPROACH: ICD-10-PCS | Performed by: SURGERY

## 2019-05-06 PROCEDURE — 501459: Performed by: SURGERY

## 2019-05-06 PROCEDURE — 770006 HCHG ROOM/CARE - MED/SURG/GYN SEMI*

## 2019-05-06 PROCEDURE — 160002 HCHG RECOVERY MINUTES (STAT): Performed by: SURGERY

## 2019-05-06 PROCEDURE — A4314 CATH W/DRAINAGE 2-WAY LATEX: HCPCS | Performed by: SURGERY

## 2019-05-06 PROCEDURE — A9270 NON-COVERED ITEM OR SERVICE: HCPCS | Performed by: SURGERY

## 2019-05-06 PROCEDURE — 160035 HCHG PACU - 1ST 60 MINS PHASE I: Performed by: SURGERY

## 2019-05-06 PROCEDURE — 501428 HCHG STAPLER, CURVED: Performed by: SURGERY

## 2019-05-06 PROCEDURE — 0DBM0ZZ EXCISION OF DESCENDING COLON, OPEN APPROACH: ICD-10-PCS | Performed by: SURGERY

## 2019-05-06 PROCEDURE — 160009 HCHG ANES TIME/MIN: Performed by: SURGERY

## 2019-05-06 PROCEDURE — 700101 HCHG RX REV CODE 250: Performed by: SURGERY

## 2019-05-06 PROCEDURE — 88304 TISSUE EXAM BY PATHOLOGIST: CPT

## 2019-05-06 PROCEDURE — 0DNL0ZZ RELEASE TRANSVERSE COLON, OPEN APPROACH: ICD-10-PCS | Performed by: SURGERY

## 2019-05-06 PROCEDURE — 0HB7XZZ EXCISION OF ABDOMEN SKIN, EXTERNAL APPROACH: ICD-10-PCS | Performed by: SURGERY

## 2019-05-06 PROCEDURE — 501433 HCHG STAPLER, GIA MULTIFIRE 60/80: Performed by: SURGERY

## 2019-05-06 RX ORDER — SODIUM CHLORIDE, SODIUM LACTATE, POTASSIUM CHLORIDE, AND CALCIUM CHLORIDE .6; .31; .03; .02 G/100ML; G/100ML; G/100ML; G/100ML
250 INJECTION, SOLUTION INTRAVENOUS PRN
Status: DISCONTINUED | OUTPATIENT
Start: 2019-05-06 | End: 2019-05-10 | Stop reason: HOSPADM

## 2019-05-06 RX ORDER — HYDROMORPHONE HYDROCHLORIDE 1 MG/ML
1 INJECTION, SOLUTION INTRAMUSCULAR; INTRAVENOUS; SUBCUTANEOUS
Status: DISCONTINUED | OUTPATIENT
Start: 2019-05-06 | End: 2019-05-06 | Stop reason: HOSPADM

## 2019-05-06 RX ORDER — DIPHENHYDRAMINE HYDROCHLORIDE 50 MG/ML
25 INJECTION INTRAMUSCULAR; INTRAVENOUS EVERY 6 HOURS PRN
Status: DISCONTINUED | OUTPATIENT
Start: 2019-05-06 | End: 2019-05-10 | Stop reason: HOSPADM

## 2019-05-06 RX ORDER — ONDANSETRON 2 MG/ML
4 INJECTION INTRAMUSCULAR; INTRAVENOUS EVERY 4 HOURS PRN
Status: DISCONTINUED | OUTPATIENT
Start: 2019-05-06 | End: 2019-05-06

## 2019-05-06 RX ORDER — HYDROMORPHONE HYDROCHLORIDE 1 MG/ML
0.4 INJECTION, SOLUTION INTRAMUSCULAR; INTRAVENOUS; SUBCUTANEOUS
Status: DISCONTINUED | OUTPATIENT
Start: 2019-05-06 | End: 2019-05-06 | Stop reason: HOSPADM

## 2019-05-06 RX ORDER — IPRATROPIUM BROMIDE AND ALBUTEROL SULFATE 2.5; .5 MG/3ML; MG/3ML
3 SOLUTION RESPIRATORY (INHALATION)
Status: DISCONTINUED | OUTPATIENT
Start: 2019-05-06 | End: 2019-05-06 | Stop reason: HOSPADM

## 2019-05-06 RX ORDER — DEXAMETHASONE SODIUM PHOSPHATE 4 MG/ML
4 INJECTION, SOLUTION INTRA-ARTICULAR; INTRALESIONAL; INTRAMUSCULAR; INTRAVENOUS; SOFT TISSUE
Status: DISCONTINUED | OUTPATIENT
Start: 2019-05-06 | End: 2019-05-10 | Stop reason: HOSPADM

## 2019-05-06 RX ORDER — OMEPRAZOLE 20 MG/1
20 CAPSULE, DELAYED RELEASE ORAL DAILY
Status: DISCONTINUED | OUTPATIENT
Start: 2019-05-06 | End: 2019-05-10 | Stop reason: HOSPADM

## 2019-05-06 RX ORDER — HALOPERIDOL 5 MG/ML
1 INJECTION INTRAMUSCULAR
Status: DISCONTINUED | OUTPATIENT
Start: 2019-05-06 | End: 2019-05-06 | Stop reason: HOSPADM

## 2019-05-06 RX ORDER — DIPHENHYDRAMINE HYDROCHLORIDE 50 MG/ML
12.5 INJECTION INTRAMUSCULAR; INTRAVENOUS
Status: DISCONTINUED | OUTPATIENT
Start: 2019-05-06 | End: 2019-05-06 | Stop reason: HOSPADM

## 2019-05-06 RX ORDER — BUPIVACAINE HYDROCHLORIDE 2.5 MG/ML
INJECTION, SOLUTION EPIDURAL; INFILTRATION; INTRACAUDAL PRN
Status: DISCONTINUED | OUTPATIENT
Start: 2019-05-06 | End: 2019-05-06 | Stop reason: SURG

## 2019-05-06 RX ORDER — SCOLOPAMINE TRANSDERMAL SYSTEM 1 MG/1
1 PATCH, EXTENDED RELEASE TRANSDERMAL
Status: DISCONTINUED | OUTPATIENT
Start: 2019-05-06 | End: 2019-05-06 | Stop reason: HOSPADM

## 2019-05-06 RX ORDER — MAGNESIUM HYDROXIDE 1200 MG/15ML
LIQUID ORAL
Status: COMPLETED | OUTPATIENT
Start: 2019-05-06 | End: 2019-05-06

## 2019-05-06 RX ORDER — HALOPERIDOL 5 MG/ML
1 INJECTION INTRAMUSCULAR EVERY 6 HOURS PRN
Status: DISCONTINUED | OUTPATIENT
Start: 2019-05-06 | End: 2019-05-06

## 2019-05-06 RX ORDER — OXYCODONE HCL 5 MG/5 ML
10 SOLUTION, ORAL ORAL
Status: DISCONTINUED | OUTPATIENT
Start: 2019-05-06 | End: 2019-05-06 | Stop reason: HOSPADM

## 2019-05-06 RX ORDER — KETOROLAC TROMETHAMINE 30 MG/ML
INJECTION, SOLUTION INTRAMUSCULAR; INTRAVENOUS PRN
Status: DISCONTINUED | OUTPATIENT
Start: 2019-05-06 | End: 2019-05-06 | Stop reason: SURG

## 2019-05-06 RX ORDER — HYDROCODONE BITARTRATE AND ACETAMINOPHEN 5; 325 MG/1; MG/1
1 TABLET ORAL EVERY 8 HOURS PRN
Status: ON HOLD | COMMUNITY
End: 2019-05-10

## 2019-05-06 RX ORDER — SODIUM CHLORIDE, SODIUM LACTATE, POTASSIUM CHLORIDE, CALCIUM CHLORIDE 600; 310; 30; 20 MG/100ML; MG/100ML; MG/100ML; MG/100ML
INJECTION, SOLUTION INTRAVENOUS CONTINUOUS
Status: ACTIVE | OUTPATIENT
Start: 2019-05-06 | End: 2019-05-06

## 2019-05-06 RX ORDER — SCOLOPAMINE TRANSDERMAL SYSTEM 1 MG/1
1 PATCH, EXTENDED RELEASE TRANSDERMAL
Status: DISCONTINUED | OUTPATIENT
Start: 2019-05-06 | End: 2019-05-10 | Stop reason: HOSPADM

## 2019-05-06 RX ORDER — HETASTARCH 6 G/100ML
INJECTION, SOLUTION INTRAVENOUS PRN
Status: DISCONTINUED | OUTPATIENT
Start: 2019-05-06 | End: 2019-05-06 | Stop reason: SURG

## 2019-05-06 RX ORDER — CALCIUM CHLORIDE 100 MG/ML
INJECTION INTRAVENOUS; INTRAVENTRICULAR PRN
Status: DISCONTINUED | OUTPATIENT
Start: 2019-05-06 | End: 2019-05-06 | Stop reason: SURG

## 2019-05-06 RX ORDER — DIPHENHYDRAMINE HYDROCHLORIDE 50 MG/ML
12.5 INJECTION INTRAMUSCULAR; INTRAVENOUS EVERY 6 HOURS PRN
Status: DISCONTINUED | OUTPATIENT
Start: 2019-05-06 | End: 2019-05-10 | Stop reason: HOSPADM

## 2019-05-06 RX ORDER — DEXAMETHASONE SODIUM PHOSPHATE 4 MG/ML
4 INJECTION, SOLUTION INTRA-ARTICULAR; INTRALESIONAL; INTRAMUSCULAR; INTRAVENOUS; SOFT TISSUE
Status: DISCONTINUED | OUTPATIENT
Start: 2019-05-06 | End: 2019-05-06

## 2019-05-06 RX ORDER — ONDANSETRON 2 MG/ML
4 INJECTION INTRAMUSCULAR; INTRAVENOUS EVERY 4 HOURS PRN
Status: DISCONTINUED | OUTPATIENT
Start: 2019-05-06 | End: 2019-05-10 | Stop reason: HOSPADM

## 2019-05-06 RX ORDER — SODIUM CHLORIDE, SODIUM GLUCONATE, SODIUM ACETATE, POTASSIUM CHLORIDE AND MAGNESIUM CHLORIDE 526; 502; 368; 37; 30 MG/100ML; MG/100ML; MG/100ML; MG/100ML; MG/100ML
INJECTION, SOLUTION INTRAVENOUS
Status: DISCONTINUED | OUTPATIENT
Start: 2019-05-06 | End: 2019-05-06 | Stop reason: SURG

## 2019-05-06 RX ORDER — ACETAMINOPHEN 650 MG/1
975 SUPPOSITORY RECTAL EVERY 6 HOURS
Status: DISCONTINUED | OUTPATIENT
Start: 2019-05-06 | End: 2019-05-10 | Stop reason: HOSPADM

## 2019-05-06 RX ORDER — LOSARTAN POTASSIUM 25 MG/1
25 TABLET ORAL
Status: DISCONTINUED | OUTPATIENT
Start: 2019-05-06 | End: 2019-05-10 | Stop reason: HOSPADM

## 2019-05-06 RX ORDER — SODIUM CHLORIDE AND POTASSIUM CHLORIDE 150; 900 MG/100ML; MG/100ML
INJECTION, SOLUTION INTRAVENOUS CONTINUOUS
Status: DISCONTINUED | OUTPATIENT
Start: 2019-05-06 | End: 2019-05-08

## 2019-05-06 RX ORDER — MIDAZOLAM HYDROCHLORIDE 1 MG/ML
1 INJECTION INTRAMUSCULAR; INTRAVENOUS
Status: DISCONTINUED | OUTPATIENT
Start: 2019-05-06 | End: 2019-05-06 | Stop reason: HOSPADM

## 2019-05-06 RX ORDER — OXYCODONE HCL 5 MG/5 ML
5 SOLUTION, ORAL ORAL
Status: DISCONTINUED | OUTPATIENT
Start: 2019-05-06 | End: 2019-05-06 | Stop reason: HOSPADM

## 2019-05-06 RX ORDER — MEPERIDINE HYDROCHLORIDE 25 MG/ML
12.5 INJECTION INTRAMUSCULAR; INTRAVENOUS; SUBCUTANEOUS
Status: DISCONTINUED | OUTPATIENT
Start: 2019-05-06 | End: 2019-05-06 | Stop reason: HOSPADM

## 2019-05-06 RX ORDER — ACETAMINOPHEN 500 MG
1000 TABLET ORAL EVERY 6 HOURS
Status: DISCONTINUED | OUTPATIENT
Start: 2019-05-06 | End: 2019-05-10 | Stop reason: HOSPADM

## 2019-05-06 RX ORDER — SCOLOPAMINE TRANSDERMAL SYSTEM 1 MG/1
1 PATCH, EXTENDED RELEASE TRANSDERMAL
Status: DISCONTINUED | OUTPATIENT
Start: 2019-05-06 | End: 2019-05-06

## 2019-05-06 RX ORDER — SODIUM CHLORIDE, SODIUM GLUCONATE, SODIUM ACETATE, POTASSIUM CHLORIDE AND MAGNESIUM CHLORIDE 526; 502; 368; 37; 30 MG/100ML; MG/100ML; MG/100ML; MG/100ML; MG/100ML
500 INJECTION, SOLUTION INTRAVENOUS CONTINUOUS
Status: DISCONTINUED | OUTPATIENT
Start: 2019-05-06 | End: 2019-05-06 | Stop reason: HOSPADM

## 2019-05-06 RX ORDER — DIPHENHYDRAMINE HYDROCHLORIDE 50 MG/ML
25 INJECTION INTRAMUSCULAR; INTRAVENOUS EVERY 6 HOURS PRN
Status: DISCONTINUED | OUTPATIENT
Start: 2019-05-06 | End: 2019-05-06

## 2019-05-06 RX ORDER — PHENYLEPHRINE HYDROCHLORIDE 10 MG/ML
INJECTION, SOLUTION INTRAMUSCULAR; INTRAVENOUS; SUBCUTANEOUS PRN
Status: DISCONTINUED | OUTPATIENT
Start: 2019-05-06 | End: 2019-05-06 | Stop reason: SURG

## 2019-05-06 RX ORDER — ONDANSETRON 2 MG/ML
INJECTION INTRAMUSCULAR; INTRAVENOUS PRN
Status: DISCONTINUED | OUTPATIENT
Start: 2019-05-06 | End: 2019-05-06 | Stop reason: SURG

## 2019-05-06 RX ORDER — ONDANSETRON 2 MG/ML
4 INJECTION INTRAMUSCULAR; INTRAVENOUS
Status: DISCONTINUED | OUTPATIENT
Start: 2019-05-06 | End: 2019-05-06 | Stop reason: HOSPADM

## 2019-05-06 RX ORDER — HYDROMORPHONE HYDROCHLORIDE 1 MG/ML
0.2 INJECTION, SOLUTION INTRAMUSCULAR; INTRAVENOUS; SUBCUTANEOUS
Status: DISCONTINUED | OUTPATIENT
Start: 2019-05-06 | End: 2019-05-06 | Stop reason: HOSPADM

## 2019-05-06 RX ORDER — DIPHENHYDRAMINE HCL 25 MG
12.5 TABLET ORAL EVERY 6 HOURS PRN
Status: DISCONTINUED | OUTPATIENT
Start: 2019-05-06 | End: 2019-05-10 | Stop reason: HOSPADM

## 2019-05-06 RX ORDER — HALOPERIDOL 5 MG/ML
1 INJECTION INTRAMUSCULAR EVERY 6 HOURS PRN
Status: DISCONTINUED | OUTPATIENT
Start: 2019-05-06 | End: 2019-05-10 | Stop reason: HOSPADM

## 2019-05-06 RX ORDER — KETOROLAC TROMETHAMINE 30 MG/ML
30 INJECTION, SOLUTION INTRAMUSCULAR; INTRAVENOUS EVERY 6 HOURS
Status: DISPENSED | OUTPATIENT
Start: 2019-05-06 | End: 2019-05-09

## 2019-05-06 RX ORDER — CEFOTETAN DISODIUM 2 G/20ML
INJECTION, POWDER, FOR SOLUTION INTRAMUSCULAR; INTRAVENOUS PRN
Status: DISCONTINUED | OUTPATIENT
Start: 2019-05-06 | End: 2019-05-06 | Stop reason: SURG

## 2019-05-06 RX ADMIN — CALCIUM CHLORIDE 500 MG: 100 INJECTION, SOLUTION INTRAVENOUS at 14:27

## 2019-05-06 RX ADMIN — FENTANYL CITRATE 50 MCG: 50 INJECTION, SOLUTION INTRAMUSCULAR; INTRAVENOUS at 12:17

## 2019-05-06 RX ADMIN — LOSARTAN POTASSIUM 25 MG: 25 TABLET ORAL at 19:33

## 2019-05-06 RX ADMIN — PROPOFOL 180 MCG/KG/MIN: 10 INJECTION, EMULSION INTRAVENOUS at 12:35

## 2019-05-06 RX ADMIN — ACETAMINOPHEN 1000 MG: 500 TABLET ORAL at 19:33

## 2019-05-06 RX ADMIN — ACETAMINOPHEN 1000 MG: 500 TABLET ORAL at 23:37

## 2019-05-06 RX ADMIN — EPHEDRINE SULFATE 10 MG: 50 INJECTION INTRAMUSCULAR; INTRAVENOUS; SUBCUTANEOUS at 13:12

## 2019-05-06 RX ADMIN — FENTANYL CITRATE 200 MCG: 50 INJECTION, SOLUTION INTRAMUSCULAR; INTRAVENOUS at 13:19

## 2019-05-06 RX ADMIN — PHENYLEPHRINE HYDROCHLORIDE 100 MCG: 10 INJECTION INTRAVENOUS at 13:25

## 2019-05-06 RX ADMIN — KETOROLAC TROMETHAMINE 30 MG: 30 INJECTION, SOLUTION INTRAMUSCULAR at 23:37

## 2019-05-06 RX ADMIN — CALCIUM CHLORIDE 500 MG: 100 INJECTION, SOLUTION INTRAVENOUS at 13:45

## 2019-05-06 RX ADMIN — FENTANYL CITRATE 100 MCG: 50 INJECTION, SOLUTION INTRAMUSCULAR; INTRAVENOUS at 12:46

## 2019-05-06 RX ADMIN — ROPIVACAINE HYDROCHLORIDE: 10 INJECTION, SOLUTION EPIDURAL at 16:23

## 2019-05-06 RX ADMIN — SODIUM CHLORIDE, POTASSIUM CHLORIDE, SODIUM LACTATE AND CALCIUM CHLORIDE: 600; 310; 30; 20 INJECTION, SOLUTION INTRAVENOUS at 11:55

## 2019-05-06 RX ADMIN — PHENYLEPHRINE HYDROCHLORIDE 100 MCG: 10 INJECTION INTRAVENOUS at 13:34

## 2019-05-06 RX ADMIN — LIDOCAINE HYDROCHLORIDE 40 MG: 20 INJECTION, SOLUTION INFILTRATION; PERINEURAL at 12:29

## 2019-05-06 RX ADMIN — ROCURONIUM BROMIDE 10 MG: 10 INJECTION, SOLUTION INTRAVENOUS at 14:06

## 2019-05-06 RX ADMIN — PROPOFOL 50 MG: 10 INJECTION, EMULSION INTRAVENOUS at 13:18

## 2019-05-06 RX ADMIN — ROCURONIUM BROMIDE 50 MG: 10 INJECTION, SOLUTION INTRAVENOUS at 12:29

## 2019-05-06 RX ADMIN — PROPOFOL 150 MG: 10 INJECTION, EMULSION INTRAVENOUS at 12:29

## 2019-05-06 RX ADMIN — ROCURONIUM BROMIDE 20 MG: 10 INJECTION, SOLUTION INTRAVENOUS at 13:18

## 2019-05-06 RX ADMIN — HETASTARCH IN SODIUM CHLORIDE 500 ML: 6; .9 INJECTION, SOLUTION INTRAVENOUS at 13:10

## 2019-05-06 RX ADMIN — CEFOTETAN DISODIUM 2 G: 2 INJECTION, POWDER, FOR SOLUTION INTRAMUSCULAR; INTRAVENOUS at 12:30

## 2019-05-06 RX ADMIN — SCOPALAMINE 1 PATCH: 1 PATCH, EXTENDED RELEASE TRANSDERMAL at 11:50

## 2019-05-06 RX ADMIN — ONDANSETRON 4 MG: 2 INJECTION INTRAMUSCULAR; INTRAVENOUS at 15:20

## 2019-05-06 RX ADMIN — KETOROLAC TROMETHAMINE 30 MG: 30 INJECTION, SOLUTION INTRAMUSCULAR at 19:34

## 2019-05-06 RX ADMIN — SODIUM CHLORIDE, POTASSIUM CHLORIDE, SODIUM LACTATE AND CALCIUM CHLORIDE: 600; 310; 30; 20 INJECTION, SOLUTION INTRAVENOUS at 12:15

## 2019-05-06 RX ADMIN — MIDAZOLAM 5 MG: 1 INJECTION INTRAMUSCULAR; INTRAVENOUS at 12:12

## 2019-05-06 RX ADMIN — EPHEDRINE SULFATE 50 MG: 50 INJECTION INTRAMUSCULAR; INTRAVENOUS; SUBCUTANEOUS at 15:30

## 2019-05-06 RX ADMIN — POTASSIUM CHLORIDE AND SODIUM CHLORIDE: 900; 150 INJECTION, SOLUTION INTRAVENOUS at 19:34

## 2019-05-06 RX ADMIN — OMEPRAZOLE 20 MG: 20 CAPSULE, DELAYED RELEASE ORAL at 19:34

## 2019-05-06 RX ADMIN — GLYCOPYRROLATE 0.2 MG: 0.2 INJECTION INTRAMUSCULAR; INTRAVENOUS at 13:10

## 2019-05-06 RX ADMIN — KETOROLAC TROMETHAMINE 30 MG: 30 INJECTION, SOLUTION INTRAMUSCULAR at 15:20

## 2019-05-06 RX ADMIN — PHENYLEPHRINE HYDROCHLORIDE 100 MCG: 10 INJECTION INTRAVENOUS at 14:50

## 2019-05-06 RX ADMIN — SUGAMMADEX 200 MG: 100 INJECTION, SOLUTION INTRAVENOUS at 15:20

## 2019-05-06 RX ADMIN — PHENYLEPHRINE HYDROCHLORIDE 100 MCG: 10 INJECTION INTRAVENOUS at 14:45

## 2019-05-06 RX ADMIN — BUPIVACAINE HYDROCHLORIDE 10 ML: 2.5 INJECTION, SOLUTION EPIDURAL; INFILTRATION; INTRACAUDAL; PERINEURAL at 12:46

## 2019-05-06 RX ADMIN — ROCURONIUM BROMIDE 10 MG: 10 INJECTION, SOLUTION INTRAVENOUS at 14:51

## 2019-05-06 RX ADMIN — SODIUM CHLORIDE, SODIUM GLUCONATE, SODIUM ACETATE, POTASSIUM CHLORIDE AND MAGNESIUM CHLORIDE: 526; 502; 368; 37; 30 INJECTION, SOLUTION INTRAVENOUS at 13:36

## 2019-05-06 RX ADMIN — BUPIVACAINE HYDROCHLORIDE 8 ML: 2.5 INJECTION, SOLUTION EPIDURAL; INFILTRATION; INTRACAUDAL; PERINEURAL at 15:21

## 2019-05-06 ASSESSMENT — LIFESTYLE VARIABLES
AVERAGE NUMBER OF DAYS PER WEEK YOU HAVE A DRINK CONTAINING ALCOHOL: 2
HAVE YOU EVER FELT YOU SHOULD CUT DOWN ON YOUR DRINKING: NO
ON A TYPICAL DAY WHEN YOU DRINK ALCOHOL HOW MANY DRINKS DO YOU HAVE: 3
HAVE PEOPLE ANNOYED YOU BY CRITICIZING YOUR DRINKING: NO
EVER FELT BAD OR GUILTY ABOUT YOUR DRINKING: NO
EVER HAD A DRINK FIRST THING IN THE MORNING TO STEADY YOUR NERVES TO GET RID OF A HANGOVER: NO
TOTAL SCORE: 0
CONSUMPTION TOTAL: POSITIVE
TOTAL SCORE: 0
EVER_SMOKED: NEVER
TOTAL SCORE: 0
ALCOHOL_USE: YES
HOW MANY TIMES IN THE PAST YEAR HAVE YOU HAD 5 OR MORE DRINKS IN A DAY: 2

## 2019-05-06 ASSESSMENT — PATIENT HEALTH QUESTIONNAIRE - PHQ9
SUM OF ALL RESPONSES TO PHQ9 QUESTIONS 1 AND 2: 0
2. FEELING DOWN, DEPRESSED, IRRITABLE, OR HOPELESS: NOT AT ALL
1. LITTLE INTEREST OR PLEASURE IN DOING THINGS: NOT AT ALL

## 2019-05-06 ASSESSMENT — COGNITIVE AND FUNCTIONAL STATUS - GENERAL
DAILY ACTIVITIY SCORE: 22
SUGGESTED CMS G CODE MODIFIER DAILY ACTIVITY: CJ
MOVING FROM LYING ON BACK TO SITTING ON SIDE OF FLAT BED: A LITTLE
DRESSING REGULAR UPPER BODY CLOTHING: A LITTLE
MOBILITY SCORE: 21
SUGGESTED CMS G CODE MODIFIER MOBILITY: CJ
DRESSING REGULAR LOWER BODY CLOTHING: A LITTLE
STANDING UP FROM CHAIR USING ARMS: A LITTLE
MOVING TO AND FROM BED TO CHAIR: A LITTLE

## 2019-05-06 ASSESSMENT — PAIN SCALES - GENERAL: PAIN_LEVEL: 0

## 2019-05-06 NOTE — OR SURGEON
Immediate Post OP Note    PreOp Diagnosis: Colostomy status    PostOp Diagnosis: Colostomy status    Procedure(s):  COLOSTOMY TAKEDOWN - Wound Class: Clean Contaminated  APPENDECTOMY - Wound Class: Clean Contaminated    Surgeon(s):  Justus Will M.D.    Anesthesiologist/Type of Anesthesia:  Anesthesiologist: Portillo Magdaleno III, M.D./General    Surgical Staff:  Assistant: AJAY Neil  Circulator: Eli Wade, Student; Deysi Bosch RPraneethNPraneeth  Relief Circulator: [place] R.NPraneeth  Relief Scrub: Lucila Rivas R.NPraneeth  Scrub Person: Jim Correa    Specimens removed if any:  ID Type Source Tests Collected by Time Destination   A : Ostomy Other Other PATHOLOGY SPECIMEN Justus Will M.D. 5/6/2019  1:55 PM    B : Anastomosis donuts Other Other PATHOLOGY SPECIMEN Justus Will M.D. 5/6/2019  2:34 PM    C : Appendix Other Other PATHOLOGY SPECIMEN Justus Will M.D. 5/6/2019  2:53 PM        Estimated Blood Loss: 50 ml    Findings: Moderate intra-abdominal adhesions.  No leak on test of anastomosis.    Complications: None        5/6/2019 3:32 PM Justus Will M.D.

## 2019-05-06 NOTE — ANESTHESIA PROCEDURE NOTES
Airway  Date/Time: 5/6/2019 12:31 PM  Performed by: NA STEELE  Authorized by: NA STEELE     Location:  OR  Urgency:  Elective  Difficult Airway: No    Indications for Airway Management:  Anesthesia  Spontaneous Ventilation: absent    Sedation Level:  Deep  Preoxygenated: Yes    Patient Position:  Sniffing  Final Airway Type:  Endotracheal airway  Final Endotracheal Airway:  ETT  Cuffed: Yes    Technique Used for Successful ETT Placement:  Video laryngoscopy  Insertion Site:  Oral  Blade Type:  Bhavin  Laryngoscope Blade/Videolaryngoscope Blade Size:  3  ETT Size (mm):  7.5  Measured from:  Gums  ETT to Gums (cm):  22  Placement Verified by: auscultation and capnometry    Cormack-Lehane Classification:  Grade IV - neither glottis nor epiglottis seen  Number of Attempts at Approach:  1

## 2019-05-06 NOTE — ANESTHESIA QCDR
2019 [facility] Clinical Data Registry (for Quality Improvement)     Postoperative nausea/vomiting risk protocol (Adult = 18 yrs and Pediatric 3-17 yrs)- (430 and 463)  General inhalation anesthetic (NOT TIVA) with PONV risk factors: Yes  Provision of anti-emetic therapy with at least 2 different classes of agents: Yes   Patient DID NOT receive anti-emetic therapy and reason is documented in Medical Record:  N/A    Multimodal Pain Management- (AQI59)  Patient undergoing Elective Surgery (i.e. Outpatient, or ASC, or Prescheduled Surgery prior to Hospital Admission): Yes  Use of Multimodal Pain Management, two or more drugs and/or interventions, NOT including systemic opioids: Yes   Exception: Documented allergy to multiple classes of analgesics:  N/A    PACU assessment of acute postoperative pain prior to Anesthesia Care End- Applies to Patients Age = 18- (ABG7)  Initial PACU pain score is which of the following: < 7/10  Patient unable to report pain score: N/A    Post-anesthetic transfer of care checklist/protocol to PACU/ICU- (426 and 427)  Upon conclusion of case, patient transferred to which of the following locations: PACU/Non-ICU  Use of transfer checklist/protocol: Yes  Exclusion: Service Performed in Patient Hospital Room (and thus did not require transfer): N/A    PACU Reintubation- (AQI31)  General anesthesia requiring endotracheal intubation (ETT) along with subsequent extubation in OR or PACU: No  Required reintubation in the PACU: N/A  Extubation was a planned trial documented in the medical record prior to removal of the original airway device: N/A    Unplanned admission to ICU related to anesthesia service up through end of PACU care- (MD51)  Unplanned admission to ICU (not initially anticipated at anesthesia start time): No

## 2019-05-06 NOTE — ANESTHESIA PROCEDURE NOTES
Epidural Block  Performed by: NA STEELE  Authorized by: NA STEELE     Patient Location:  OR  Start Time:  5/6/2019 12:20 PM  End Time:  5/6/2019 12:25 PM  Reason for Block: at surgeon's request and post-op pain management    patient identified, IV checked, site marked, risks and benefits discussed, surgical consent, monitors and equipment checked, pre-op evaluation and timeout performed    Patient Position:  Sitting  Prep: ChloraPrep, patient draped and sterile technique    Monitoring:  Blood pressure, continuous pulse oximetry and heart rate  Location:  T9-T10  Injection Technique:  MADELEINE air  Skin infiltration:  Lidocaine  Strength:  1%  Dose:  3ml  Needle Type:  Tuohy  Needle Gauge:  17 G  Needle Length:  3.5 in  Loss of resistance::  66  Catheter Size:  19 G  Catheter at Skin Depth:  12  Test Dose:  Lidocaine 1.5% with epinephrine 1-to-200,000  Test Dose Result:  Negative

## 2019-05-06 NOTE — ANESTHESIA POSTPROCEDURE EVALUATION
Patient: Ambar Jj    Procedure Summary     Date:  05/06/19 Room / Location:  [facility] 10 / SURGERY [facility] ORS    Anesthesia Start:  1212 Anesthesia Stop:  1550    Procedures:       COLOSTOMY TAKEDOWN (Abdomen)      APPENDECTOMY (Abdomen) Diagnosis:  (DIVERTICULITIS)    Surgeon:  Justus Will M.D. Responsible Provider:  Portillo Magdaleno III, M.D.    Anesthesia Type:  general ASA Status:  2          Final Anesthesia Type: general  Last vitals  BP   Blood Pressure: 137/74, NIBP: 114/59    Temp   36.1 °C (97 °F)    Pulse   Pulse: (!) 107, Heart Rate (Monitored): (!) 108   Resp   13    SpO2   99 %      Anesthesia Post Evaluation    Patient location during evaluation: PACU  Patient participation: complete - patient participated  Level of consciousness: awake and alert  Pain score: 0    Airway patency: patent  Anesthetic complications: no  Cardiovascular status: hemodynamically stable  Respiratory status: acceptable  Hydration status: euvolemic    PONV: none           Nurse Pain Score: 0 (NPRS)

## 2019-05-06 NOTE — ANESTHESIA PROCEDURE NOTES
Peripheral IV  Date/Time: 5/6/2019 12:40 PM  Performed by: NA STEELE  Authorized by: NA STEELE     Size:  18 G  Laterality:  Left (HAND)  Site Prep:  Chlorhexidine  Technique:  Direct puncture  Attempts:  1

## 2019-05-06 NOTE — ANESTHESIA PREPROCEDURE EVALUATION
Relevant Problems   (+) GERD (gastroesophageal reflux disease)   (+) Hypertension       Physical Exam    Airway   Mallampati: II  TM distance: >3 FB  Neck ROM: full       Cardiovascular - normal exam  Rhythm: regular  Rate: normal  (-) murmur     Dental - normal exam         Pulmonary - normal exam  Breath sounds clear to auscultation     Abdominal    Neurological - normal exam         Other findings: colostomy            Anesthesia Plan    ASA 2       Plan - general       Airway plan will be ETT  (Post op intrathecal injection for post op pain control)      Induction: intravenous    Postoperative Plan: Postoperative administration of opioids is intended.    Pertinent diagnostic labs and testing reviewed    Informed Consent:    Anesthetic plan and risks discussed with patient.    Use of blood products discussed with: patient whom consented to blood products.

## 2019-05-06 NOTE — OP REPORT
DATE OF SERVICE:  05/06/2019    PREOPERATIVE DIAGNOSIS:  Colostomy status.    POSTOPERATIVE DIAGNOSIS:  Colostomy status.    INDICATION FOR SURGERY:  This is a 58-year-old female with a colostomy in   place after undergoing a sigmoid colon resection and colostomy placement   approximately 6 months ago.  She was appropriately worked up preoperatively   and taken to the operating room today for a planned colostomy takedown.    PROCEDURES PERFORMED:  1.  Colostomy takedown.  2.  Incidental appendectomy.  3.  Exploratory laparotomy.  4.  Lysis of adhesions.  5.  Takedown of splenic flexure.    SURGEON:  Justus Will MD    ASSISTANT:  KATHERINE Valentine    ANESTHESIOLOGIST:  Kyle Magdaleno MD    ANESTHESIA:  General endotracheal anesthesia.    FINDINGS:  Moderate anterior abdominal adhesions.  No leak on test of the   anastomosis.    PROCEDURE NARRATIVE:  Signed informed consent was on the chart at the time of   the procedure.  The patient was brought to the operating room and an epidural   catheter was placed.  General endotracheal anesthesia was induced.  A Simms   catheter was placed.  The skin over the abdomen and pelvis was prepped and   draped in a sterile fashion after first closing the ostomy with a 0 silk   suture.  The patient was administered 2 grams of cefotetan IV preoperatively.    A timeout was performed.  Using a 10 blade scalpel, the previous midline scar   was excised.  The underlying soft tissue was dissected through using careful   Bovie cautery until the midline fascia was reached.  This was divided for a   length of approximately 3 cm and I was able to get my fingers underneath the   fascia and bluntly dissected through the tissue between the fascia and the   peritoneum.  The peritoneum was lifted and incised using Metzenbaum scissors.    I then placed my finger under the fascia and the peritoneum and divided for   the length of the skin incision over my finger.  There were some adhesions to    the anterior abdominal wall, which were taken down sharply prior to dividing   the anterior abdominal wall.  Eventually, I did not need to extend the   incision superiorly to just under the xiphoid process in order to take down   the splenic flexure, so the open incision extended from just above the xiphoid   process to just above the pubic symphysis.  Once the fascia and peritoneum   were opened for the length of the skin incision, the adhesions to the anterior   abdominal wall were taken down.  These were taken down using mostly careful   Bovie cautery with occasional need for sharp dissection.  Once the anterior   abdominal wall was cleared, the adhesions between the loops of bowel were   taken down.  These were noted to be minimal.  There were adhesions down in the   pelvis holding the small bowel down in there and these were taken down using   almost exclusively sharp dissection.  Eventually, I was able to completely   release the small bowel off of the rectosigmoid colon and the posterior   pelvis.  The rectosigmoid colon remnant i.e. the Ralph's pouch was able to   be visualized.  Using a 10 blade scalpel, an elliptical incision was made   around the colostomy.  The colostomy was  from the surrounding soft   tissue and this dissection continued down to the fascia.  The adhesions to the   colostomy on the external fascia were taken down sharply.  The fascia was   then grasped with Kochers and lifted on the patient's left side and the   adhesions to the colostomy on the internal fascia were taken down sharply and   the colostomy was delivered.  The colostomy was noted to be somewhat beat up   on its distal aspect and a defect was made on the mesentery approximately 4 cm   from the end of the colostomy.  A CORI stapler was used to divide the bowel at   that point and the mesentery was taken down using careful Bovie cautery.  It   did appear that we had adequate length to make to the Ralph's  pouch.  Some   adhesions to Ralph's pouch were taken down and Wendi Loredo then moved down   to the anus.  She was able to pass a dilator up the anus, but was not able to   make it to the staple line.  She rescrubbed in and multiple adhesions to the   Ralph's pouch were taken down.  Eventually, I was able to free up some   folds in the Ralph's pouch making this a straight shot.  The proximal   Ralph's pouch was noted to have a single diverticulum and so this was   resected by using the contour CORI stapler to take approximately 3 cm off of   the Ralph's pouch.  The stapled end of the descending colon was then opened   and the staple line was removed and the sizer was used to size the colon.    She was noted to accept a 28 mm sizer.  A 28 mm anvil was then placed into the   descending colon and secured in place with a 2-0 Prolene pursestring suture.    The other part of the CORI was inserted through the anus and up to the staple   line.  The spike was extended through the bowel just above the staple line and   connected to the anvil.  It was made sure that there were no twists in the   bowel and the EEA was then tightened and fired.  Examination of the donuts   revealed 2 complete donuts.  The pelvis was filled with fluid and the bowel   was occluded upstream of the staple line using the sigmoidoscope.  Air was   infused into the area of the anastomosis.  This was noted to dilate the bowel   with no air leak.  The fluid was removed.  It was noted that the bowel was   under some tension and the adhesions between the splenic flexure of the colon   and the spleen itself were taken down using a combination of sharp dissection   and careful Bovie cautery.  The adhesions of the distal transverse colon to   the omentum were also taken down and the mesentery itself was split   longitudinally, allowing the colon to sit down into the pelvis without undue   tension at the end of the case.  The appendix was noted to  be somewhat   elongated and scarred in and it was decided to take this as well.  The   appendix was taken by making a window in the base of the mesoappendix and then   stapling across the appendix with a CORI stapler.  The mesoappendix was then   taken down using the Harmonic scalpel.  This was sent to pathology.  The   peritoneal cavity was irrigated with 4 liters of warm normal saline and the   irrigant returned clear.  The lap, needle, and instrument counts were noted to   be correct.  The fascia at the colostomy site on the internal surface was   closed with 4 separately placed 0 Ethibond sutures.  Additional 3-0 Ethibond   suture was used to close the anterior muscular fascia at that site.  The   midline fascia was closed after it was confirmed that the lap, needle and   instrument counts were correct.  The midline fascia was closed with a series   of #1 Vicryl interrupted sutures.  Both of the incisions were then irrigated   and dried and both had Edward's fascia such as remained was closed with a   series of interrupted 2-0 Vicryl sutures.  Both the incisions were then closed   at the skin with skin staples.  Both incisions were dressed with 4x4s and   held in place with tape.    FLUIDS:  2000 mL crystalloid and 500 mL of Hespan.    ESTIMATED BLOOD LOSS:  50 mL    DRAINS:  None.    SPECIMENS:  1.  Colostomy.  2.  Rectosigmoid colon.  3.  Appendix.    COMPLICATIONS:  None.    DISPOSITION:  The patient was in stable condition to postanesthesia care unit.       ____________________________________     MD RAÚL Sanford / VELMA    DD:  05/06/2019 16:05:07  DT:  05/06/2019 16:33:40    D#:  [id number]  Job#:  [id number]

## 2019-05-06 NOTE — ANESTHESIA TIME REPORT
Anesthesia Start and Stop Event Times     Date Time Event    5/6/2019 1212 Anesthesia Start     1550 Anesthesia Stop        Responsible Staff  05/06/19    Name Role Begin End    Portillo Magdaleno III, M.D. Anesth 1212 1550        Preop Diagnosis (Free Text):  Pre-op Diagnosis     DIVERTICULITIS        Preop Diagnosis (Codes):  Diagnosis Information     Diagnosis Code(s):         Post op Diagnosis  Diverticulitis  colostomy    Premium Reason  A. 3PM - 7AM    Comments:

## 2019-05-07 ENCOUNTER — ANESTHESIA EVENT (OUTPATIENT)
Dept: MEDSURG UNIT | Facility: MEDICAL CENTER | Age: 59
End: 2019-05-07

## 2019-05-07 ENCOUNTER — ANESTHESIA (OUTPATIENT)
Dept: MEDSURG UNIT | Facility: MEDICAL CENTER | Age: 59
End: 2019-05-07

## 2019-05-07 LAB
ANION GAP SERPL CALC-SCNC: 6 MMOL/L (ref 0–11.9)
BASOPHILS # BLD AUTO: 0.4 % (ref 0–1.8)
BASOPHILS # BLD: 0.05 K/UL (ref 0–0.12)
BUN SERPL-MCNC: 12 MG/DL (ref 8–22)
CALCIUM SERPL-MCNC: 8.3 MG/DL (ref 8.5–10.5)
CHLORIDE SERPL-SCNC: 110 MMOL/L (ref 96–112)
CO2 SERPL-SCNC: 21 MMOL/L (ref 20–33)
CREAT SERPL-MCNC: 0.71 MG/DL (ref 0.5–1.4)
EOSINOPHIL # BLD AUTO: 0 K/UL (ref 0–0.51)
EOSINOPHIL NFR BLD: 0 % (ref 0–6.9)
ERYTHROCYTE [DISTWIDTH] IN BLOOD BY AUTOMATED COUNT: 45.6 FL (ref 35.9–50)
GLUCOSE SERPL-MCNC: 142 MG/DL (ref 65–99)
HCT VFR BLD AUTO: 35.6 % (ref 37–47)
HGB BLD-MCNC: 11.2 G/DL (ref 12–16)
IMM GRANULOCYTES # BLD AUTO: 0.05 K/UL (ref 0–0.11)
IMM GRANULOCYTES NFR BLD AUTO: 0.4 % (ref 0–0.9)
LYMPHOCYTES # BLD AUTO: 0.63 K/UL (ref 1–4.8)
LYMPHOCYTES NFR BLD: 5.2 % (ref 22–41)
MCH RBC QN AUTO: 28.3 PG (ref 27–33)
MCHC RBC AUTO-ENTMCNC: 31.5 G/DL (ref 33.6–35)
MCV RBC AUTO: 89.9 FL (ref 81.4–97.8)
MONOCYTES # BLD AUTO: 0.61 K/UL (ref 0–0.85)
MONOCYTES NFR BLD AUTO: 5.1 % (ref 0–13.4)
NEUTROPHILS # BLD AUTO: 10.69 K/UL (ref 2–7.15)
NEUTROPHILS NFR BLD: 88.9 % (ref 44–72)
NRBC # BLD AUTO: 0 K/UL
NRBC BLD-RTO: 0 /100 WBC
PLATELET # BLD AUTO: 224 K/UL (ref 164–446)
PMV BLD AUTO: 10.2 FL (ref 9–12.9)
POTASSIUM SERPL-SCNC: 4.7 MMOL/L (ref 3.6–5.5)
RBC # BLD AUTO: 3.96 M/UL (ref 4.2–5.4)
SODIUM SERPL-SCNC: 137 MMOL/L (ref 135–145)
WBC # BLD AUTO: 12 K/UL (ref 4.8–10.8)

## 2019-05-07 PROCEDURE — 700102 HCHG RX REV CODE 250 W/ 637 OVERRIDE(OP): Performed by: ANESTHESIOLOGY

## 2019-05-07 PROCEDURE — 700102 HCHG RX REV CODE 250 W/ 637 OVERRIDE(OP): Performed by: SURGERY

## 2019-05-07 PROCEDURE — 85025 COMPLETE CBC W/AUTO DIFF WBC: CPT

## 2019-05-07 PROCEDURE — 700111 HCHG RX REV CODE 636 W/ 250 OVERRIDE (IP): Performed by: SURGERY

## 2019-05-07 PROCEDURE — 36415 COLL VENOUS BLD VENIPUNCTURE: CPT

## 2019-05-07 PROCEDURE — A9270 NON-COVERED ITEM OR SERVICE: HCPCS | Performed by: ANESTHESIOLOGY

## 2019-05-07 PROCEDURE — 700111 HCHG RX REV CODE 636 W/ 250 OVERRIDE (IP): Performed by: ANESTHESIOLOGY

## 2019-05-07 PROCEDURE — 80048 BASIC METABOLIC PNL TOTAL CA: CPT

## 2019-05-07 PROCEDURE — 700101 HCHG RX REV CODE 250: Performed by: SURGERY

## 2019-05-07 PROCEDURE — 770006 HCHG ROOM/CARE - MED/SURG/GYN SEMI*

## 2019-05-07 PROCEDURE — A9270 NON-COVERED ITEM OR SERVICE: HCPCS | Performed by: SURGERY

## 2019-05-07 RX ADMIN — DIPHENHYDRAMINE HYDROCHLORIDE 12.5 MG: 50 INJECTION INTRAMUSCULAR; INTRAVENOUS at 21:31

## 2019-05-07 RX ADMIN — KETOROLAC TROMETHAMINE 30 MG: 30 INJECTION, SOLUTION INTRAMUSCULAR at 13:55

## 2019-05-07 RX ADMIN — DIPHENHYDRAMINE HYDROCHLORIDE 12.5 MG: 50 INJECTION INTRAMUSCULAR; INTRAVENOUS at 19:11

## 2019-05-07 RX ADMIN — ACETAMINOPHEN 1000 MG: 500 TABLET ORAL at 11:47

## 2019-05-07 RX ADMIN — POTASSIUM CHLORIDE AND SODIUM CHLORIDE: 900; 150 INJECTION, SOLUTION INTRAVENOUS at 04:41

## 2019-05-07 RX ADMIN — KETOROLAC TROMETHAMINE 30 MG: 30 INJECTION, SOLUTION INTRAMUSCULAR at 19:11

## 2019-05-07 RX ADMIN — KETOROLAC TROMETHAMINE 30 MG: 30 INJECTION, SOLUTION INTRAMUSCULAR at 04:42

## 2019-05-07 RX ADMIN — OMEPRAZOLE 20 MG: 20 CAPSULE, DELAYED RELEASE ORAL at 04:41

## 2019-05-07 RX ADMIN — POTASSIUM CHLORIDE AND SODIUM CHLORIDE: 900; 150 INJECTION, SOLUTION INTRAVENOUS at 15:39

## 2019-05-07 RX ADMIN — ENOXAPARIN SODIUM 30 MG: 100 INJECTION SUBCUTANEOUS at 13:55

## 2019-05-07 RX ADMIN — ACETAMINOPHEN 1000 MG: 500 TABLET ORAL at 04:41

## 2019-05-07 ASSESSMENT — ENCOUNTER SYMPTOMS
ABDOMINAL PAIN: 0
DIARRHEA: 0
NAUSEA: 0
VOMITING: 0

## 2019-05-07 NOTE — PROGRESS NOTES
· 2 RN skin check complete.  · Devices in place: pillows in use to support head and elbows.  · Skin assessed under devices and all bony prominences.  · Only significant findings are expected surgical incisions to midline abd and LLQ. All other skin is CDI.

## 2019-05-07 NOTE — CARE PLAN
Problem: Safety  Goal: Will remain free from falls  Outcome: PROGRESSING AS EXPECTED  Pt was educated on fall risk and need to call nursing staff prior to mobilization. Pt verbalizes understanding of all teaching. Pt is A&O x 4 and demonstrates appropriate use of call light.  CLIP. Hourly rounding in place.    Problem: Knowledge Deficit  Goal: Knowledge of disease process/condition, treatment plan, diagnostic tests, and medications will improve  Outcome: PROGRESSING AS EXPECTED  Pt was educated on POC, MAR, shift routine and nursing education R/T Dx. Questions were encouraged and answered. Pt verbalized understanding of all teaching.

## 2019-05-07 NOTE — DIETARY
"Nutrition services: Day 1 of admit.  Ambar Jj is a 58 y.o. female with admitting DX of diverticulitis, GERD, hypertension, urinary incontinence.     Consult received for unexplained wt loss on admit screen.  Spoke with pt at bedside.  Pt appeared well nourished.  Pt reports wt loss following colostomy last fall.  Pt stated she has since regained all of the weight and denies any recent wt loss.    Assessment:  Height: 170.2 cm (5' 7\")  Weight: 75.9 kg (167 lb 5.3 oz)  Body mass index is 26.21 kg/m².  Diet/Intake: Clear liquid; PO 50-75% for most recent meal    Evaluation:   1. Colostomy 10/19.  2. Colostomy takedown and appendectomy 5/6.    Malnutrition Risk: No risk identified at this time.    Recommendations/Plan:  1. Advance diet as tolerated per MD.  2. Encourage intake of meals.  3. Document intake of all meals as % taken in ADLs to provide interdisciplinary communication across all shifts.   4. Monitor weight.  5. Nutrition rep will continue to see patient for ongoing meal and snack preferences.             "

## 2019-05-07 NOTE — PROGRESS NOTES
Bedside Report received   Assumed care of patient at 1900.    Pt is A&O x 4.  Pain reported at 0/10. Epidural running Hydromorphone and ropivacaine per MAR. Bolus button available.   Nausea denied  Tolerating Diet   Surgical incision to midline abd and old ostomy site incision to LLQ.  Both are CDI with dressings in place.   + Urine output via duggan catheter  - BM    - Flatus  SCD's in place and on   Bed in lowest position and locked.  Bed alarm NA per Reanna Red   Pt resting comfortably now.  Review plan of care with patient  Call light within reach  Hourly rounds in place  All needs met at this time

## 2019-05-07 NOTE — PROGRESS NOTES
Bedside report received.  Assessment complete.  A&O x 4. Patient calls appropriately.  Patient up with x1 assist.    Patient has 0/10 pain. Dilaudid epidural in place. Scant drainage present at site, dressing CDI.   Denies N&V. Tolerating clear liquid diet.  Midline incision with dressing, with scant old drainage.   + void via duggan, - flatus, - BM.  Patient denies SOB.  SCD's off-need machine.    Review plan with of care with patient. Call light and personal belongings with in reach. Hourly rounding in place. All needs met at this time.

## 2019-05-07 NOTE — PROGRESS NOTES
Surgical Progress Note    Author: Justus Will Date & Time created: 2019   10:20 AM     Interval Events:  Low BP this morning, feels dizzy at times.       Anesthesia notified by nurse.  Denies nausea.  Denies abdominal pain.  Not yet out of bed.    Review of Systems   Constitutional: Negative for malaise/fatigue.   Gastrointestinal: Negative for abdominal pain, diarrhea, nausea and vomiting.     Hemodynamics:  Temp (24hrs), Av.7 °C (98 °F), Min:36.1 °C (97 °F), Max:37.7 °C (99.9 °F)  Temperature: 37.7 °C (99.9 °F)  Pulse  Av  Min: 71  Max: 107Heart Rate (Monitored): 84  Blood Pressure: (!) 88/57 (RN notified), NIBP: 105/59     Respiratory:    Respiration: 18, Pulse Oximetry: 97 %           Neuro:  GCS = 15       Fluids:    Intake/Output Summary (Last 24 hours) at 19 1020  Last data filed at 19 0747   Gross per 24 hour   Intake          4134.63 ml   Output             1325 ml   Net          2809.63 ml     Weight: 75.9 kg (167 lb 5.3 oz)  Current Diet Order   Procedures   • Diet Order Clear Liquid     Physical Exam   Constitutional: She is oriented to person, place, and time. She appears well-developed and well-nourished. No distress.   HENT:   Head: Normocephalic.   Eyes: Pupils are equal, round, and reactive to light. No scleral icterus.   Cardiovascular: Normal rate, regular rhythm and normal heart sounds.    Pulmonary/Chest: Effort normal and breath sounds normal. No respiratory distress.   Abdominal: Soft. She exhibits no distension. There is no tenderness. There is no rebound and no guarding.   Midline incision dressed, dressing with old blood at inferior portion, not saturating the dressing.     Neurological: She is alert and oriented to person, place, and time.   Skin: Skin is warm and dry.   Psychiatric: She has a normal mood and affect. Her behavior is normal.     Labs:  Recent Results (from the past 24 hour(s))   Histology Request    Collection Time: 19  4:40 PM    Result Value Ref Range    [facility]    Basic Metabolic Panel (BPM)    Collection Time: 05/07/19  3:07 AM   Result Value Ref Range    Sodium 137 135 - 145 mmol/L    Potassium 4.7 3.6 - 5.5 mmol/L    Chloride 110 96 - 112 mmol/L    Co2 21 20 - 33 mmol/L    Glucose 142 (H) 65 - 99 mg/dL    Bun 12 8 - 22 mg/dL    Creatinine 0.71 0.50 - 1.40 mg/dL    Calcium 8.3 (L) 8.5 - 10.5 mg/dL    Anion Gap 6.0 0.0 - 11.9   ESTIMATED GFR    Collection Time: 05/07/19  3:07 AM   Result Value Ref Range    GFR If African American >60 >60 mL/min/1.73 m 2    GFR If Non African American >60 >60 mL/min/1.73 m 2   CBC with Differential    Collection Time: 05/07/19  3:10 AM   Result Value Ref Range    WBC 12.0 (H) 4.8 - 10.8 K/uL    RBC 3.96 (L) 4.20 - 5.40 M/uL    Hemoglobin 11.2 (L) 12.0 - 16.0 g/dL    Hematocrit 35.6 (L) 37.0 - 47.0 %    MCV 89.9 81.4 - 97.8 fL    MCH 28.3 27.0 - 33.0 pg    MCHC 31.5 (L) 33.6 - 35.0 g/dL    RDW 45.6 35.9 - 50.0 fL    Platelet Count 224 164 - 446 K/uL    MPV 10.2 9.0 - 12.9 fL    Neutrophils-Polys 88.90 (H) 44.00 - 72.00 %    Lymphocytes 5.20 (L) 22.00 - 41.00 %    Monocytes 5.10 0.00 - 13.40 %    Eosinophils 0.00 0.00 - 6.90 %    Basophils 0.40 0.00 - 1.80 %    Immature Granulocytes 0.40 0.00 - 0.90 %    Nucleated RBC 0.00 /100 WBC    Neutrophils (Absolute) 10.69 (H) 2.00 - 7.15 K/uL    Lymphs (Absolute) 0.63 (L) 1.00 - 4.80 K/uL    Monos (Absolute) 0.61 0.00 - 0.85 K/uL    Eos (Absolute) 0.00 0.00 - 0.51 K/uL    Baso (Absolute) 0.05 0.00 - 0.12 K/uL    Immature Granulocytes (abs) 0.05 0.00 - 0.11 K/uL    NRBC (Absolute) 0.00 K/uL     Medical Decision Making, by Problem:  Active Hospital Problems    Diagnosis   • Colonic diverticular abscess [K57.20]     Priority: High   • Urinary incontinence [R32]     ICD-10 transition     • GERD (gastroesophageal reflux disease) [K21.9]   • Hypertension [I10]     Plan:  Pain control now per anesthesia.  Out of bed, if able, at least to  chair.  Continue clear liquid diet.  Will start Lovenox today, will plan on holding it 12 hours prior to removal of epidural.  Decrease IVF rate to 70 ml/hour.    Quality Measures:  Quality-Core Measures   Reviewed items::  Labs reviewed and Medications reviewed  Simms catheter::  One or Two Days Post Surgery (Day of Surgery being Day 0)  DVT prophylaxis pharmacological::  Enoxaparin (Lovenox)  DVT prophylaxis - mechanical:  SCDs  Ulcer Prophylaxis::  Yes      Discussed patient condition with RN and Patient

## 2019-05-07 NOTE — PROGRESS NOTES
Patient arrived to unit via patient transport. Bed locked and in lowest position, side rails up x2, call light within reach. Patient declines needs at this time.

## 2019-05-07 NOTE — ANESTHESIA POST-OP FOLLOW-UP NOTE
"Anesthesia Pain Service Note    Type:  T9-10 thoracic epidural for post operative pain control    Patient: Ambar Villalta is a 59 y/o female with a h/o diverticulitis who is POD #1 s/p ostomy takedown.    BP (!) 88/57 Comment: RN notified  Pulse 71   Temp 37.7 °C (99.9 °F) (Temporal)   Resp 18   Ht 1.702 m (5' 7\")   Wt 75.9 kg (167 lb 5.3 oz)   SpO2 97%   Breastfeeding? No   BMI 26.21 kg/m²     Complaints:  Some pruritis, some dizziness and low blood pressure, no pain, no nausea or vomiting.    Pain:   0/10    LOC:   T9-10 epidural with Ropivacaine 0.1% with dilaudid 20 mcg/ml     Rate:  6 ml/hr    Exam:  Alert and oriented, sitting up in bed appearing comfortable, in no apparent distress. Catheter site clean dry and intact with small amount of dried blood under the dressing. Bilateral LE's with good strength.    Impression:  59 y/o female POD #1 s/p ostomy takedown with excellent pain control with thoracic epidural. She reports she has an intolerance to opioids with nausea and vomiting, so she appreciates the pain control with the epidural. She has had some dizziness and low BP's which I explained can happen with a thoracic epidural.    Assessment & Plan:         1. Decrease epidural rate to 4 ml/hr due to dizziness. Can increase rate if pain increases with good blood pressures.  2. Encourage out of bed to chair and ambulation with assistance as it appears she has good strength in her LE's.  3. Continue epidural infusion x 1-2 more days  4. Benadryl prn itching.    Eli Lindsey  5/7/2019  12:07 PM  "

## 2019-05-07 NOTE — CARE PLAN
Problem: Safety  Goal: Will remain free from falls  Outcome: PROGRESSING AS EXPECTED  Bed locked and in lowest position, side rails up x2, call light within reach, patient calls appropriately, ambulates halls with staff     Problem: Venous Thromboembolism (VTW)/Deep Vein Thrombosis (DVT) Prevention:  Goal: Patient will participate in Venous Thrombosis (VTE)/Deep Vein Thrombosis (DVT)Prevention Measures  Outcome: PROGRESSING AS EXPECTED  Patient receiving Lovenox, SCD's while in bed, encourage ambulation

## 2019-05-08 ENCOUNTER — ANESTHESIA EVENT (OUTPATIENT)
Dept: MEDSURG UNIT | Facility: MEDICAL CENTER | Age: 59
End: 2019-05-08

## 2019-05-08 ENCOUNTER — ANESTHESIA (OUTPATIENT)
Dept: MEDSURG UNIT | Facility: MEDICAL CENTER | Age: 59
End: 2019-05-08

## 2019-05-08 PROCEDURE — 700105 HCHG RX REV CODE 258: Performed by: ANESTHESIOLOGY

## 2019-05-08 PROCEDURE — 700101 HCHG RX REV CODE 250: Performed by: SURGERY

## 2019-05-08 PROCEDURE — A9270 NON-COVERED ITEM OR SERVICE: HCPCS | Performed by: ANESTHESIOLOGY

## 2019-05-08 PROCEDURE — 700102 HCHG RX REV CODE 250 W/ 637 OVERRIDE(OP): Performed by: SURGERY

## 2019-05-08 PROCEDURE — 700111 HCHG RX REV CODE 636 W/ 250 OVERRIDE (IP): Performed by: ANESTHESIOLOGY

## 2019-05-08 PROCEDURE — 700111 HCHG RX REV CODE 636 W/ 250 OVERRIDE (IP): Performed by: SURGERY

## 2019-05-08 PROCEDURE — 770006 HCHG ROOM/CARE - MED/SURG/GYN SEMI*

## 2019-05-08 PROCEDURE — A9270 NON-COVERED ITEM OR SERVICE: HCPCS | Performed by: SURGERY

## 2019-05-08 PROCEDURE — 700102 HCHG RX REV CODE 250 W/ 637 OVERRIDE(OP): Performed by: ANESTHESIOLOGY

## 2019-05-08 RX ADMIN — DIPHENHYDRAMINE HYDROCHLORIDE 12.5 MG: 50 INJECTION INTRAMUSCULAR; INTRAVENOUS at 23:28

## 2019-05-08 RX ADMIN — DIPHENHYDRAMINE HYDROCHLORIDE 12.5 MG: 50 INJECTION INTRAMUSCULAR; INTRAVENOUS at 05:03

## 2019-05-08 RX ADMIN — ACETAMINOPHEN 1000 MG: 500 TABLET ORAL at 17:30

## 2019-05-08 RX ADMIN — ACETAMINOPHEN 1000 MG: 500 TABLET ORAL at 05:02

## 2019-05-08 RX ADMIN — KETOROLAC TROMETHAMINE 30 MG: 30 INJECTION, SOLUTION INTRAMUSCULAR at 17:30

## 2019-05-08 RX ADMIN — ROPIVACAINE HYDROCHLORIDE: 10 INJECTION, SOLUTION EPIDURAL at 20:31

## 2019-05-08 RX ADMIN — ACETAMINOPHEN 1000 MG: 500 TABLET ORAL at 23:30

## 2019-05-08 RX ADMIN — DIPHENHYDRAMINE HYDROCHLORIDE 12.5 MG: 50 INJECTION INTRAMUSCULAR; INTRAVENOUS at 17:30

## 2019-05-08 RX ADMIN — KETOROLAC TROMETHAMINE 30 MG: 30 INJECTION, SOLUTION INTRAMUSCULAR at 23:30

## 2019-05-08 RX ADMIN — OMEPRAZOLE 20 MG: 20 CAPSULE, DELAYED RELEASE ORAL at 05:02

## 2019-05-08 RX ADMIN — KETOROLAC TROMETHAMINE 30 MG: 30 INJECTION, SOLUTION INTRAMUSCULAR at 05:02

## 2019-05-08 RX ADMIN — ACETAMINOPHEN 1000 MG: 500 TABLET ORAL at 13:00

## 2019-05-08 RX ADMIN — ENOXAPARIN SODIUM 30 MG: 100 INJECTION SUBCUTANEOUS at 05:02

## 2019-05-08 RX ADMIN — KETOROLAC TROMETHAMINE 30 MG: 30 INJECTION, SOLUTION INTRAMUSCULAR at 13:00

## 2019-05-08 RX ADMIN — POTASSIUM CHLORIDE AND SODIUM CHLORIDE: 900; 150 INJECTION, SOLUTION INTRAVENOUS at 05:03

## 2019-05-08 ASSESSMENT — ENCOUNTER SYMPTOMS
VOMITING: 0
NAUSEA: 0
ABDOMINAL PAIN: 0

## 2019-05-08 NOTE — PROGRESS NOTES
Surgical Progress Note    Author: Justus Will Date & Time created: 2019   11:17 AM     Interval Events:  Tolerating clear liquid diet.  Denies abdominal pain, denies nausea.  Able to ambulate in [place].  Sitting in chair at exam.  + loose stool, + flatus.    Review of Systems   Gastrointestinal: Negative for abdominal pain, nausea and vomiting.     Hemodynamics:  Temp (24hrs), Av.9 °C (98.4 °F), Min:36.4 °C (97.5 °F), Max:37.6 °C (99.6 °F)  Temperature: 36.4 °C (97.5 °F)  Pulse  Av.4  Min: 64  Max: 107   Blood Pressure: 101/57     Respiratory:    Respiration: 14, Pulse Oximetry: 93 %        RUL Breath Sounds: Clear, RML Breath Sounds: Clear, RLL Breath Sounds: Clear, PEG Breath Sounds: Clear, LLL Breath Sounds: Clear  Neuro:  GCS       Fluids:    Intake/Output Summary (Last 24 hours) at 19 1117  Last data filed at 19 0806   Gross per 24 hour   Intake           2722.7 ml   Output             2750 ml   Net            -27.3 ml        Current Diet Order   Procedures   • Diet Order Full Liquid     Physical Exam   Constitutional: She is oriented to person, place, and time. She appears well-developed and well-nourished. No distress.   HENT:   Head: Normocephalic.   Eyes: Pupils are equal, round, and reactive to light. No scleral icterus.   Cardiovascular: Normal rate, regular rhythm and normal heart sounds.    Pulmonary/Chest: Effort normal and breath sounds normal. No respiratory distress.   Abdominal: Soft. She exhibits no distension. There is no tenderness. There is no rebound and no guarding.   Minimal bowel sounds present on exam.   Neurological: She is alert and oriented to person, place, and time.   Skin: Skin is warm and dry.   Psychiatric: She has a normal mood and affect.     Labs:  No results found for this or any previous visit (from the past 24 hour(s)).  Medical Decision Making, by Problem:  Active Hospital Problems    Diagnosis   • Colonic diverticular abscess [K57.20]      Priority: High   • Urinary incontinence [R32]     ICD-10 transition     • GERD (gastroesophageal reflux disease) [K21.9]   • Hypertension [I10]     Plan:  Remove Simms catheter.  Hep lock IV  Advance to full liquid diet.  Possible home tomorrow afternoon vs. Friday  Transition to oral pain medication when epidural out.    Quality Measures:  Quality-Core Measures   Reviewed items::  Medications reviewed  Simms catheter::  One or Two Days Post Surgery (Day of Surgery being Day 0) (To be removed today)  DVT prophylaxis pharmacological::  Enoxaparin (Lovenox)  Ulcer Prophylaxis::  Yes      Discussed patient condition with Patient

## 2019-05-08 NOTE — PROGRESS NOTES
Bedside Report received   Assumed care of patient at 1900.    Pt is A&O x 4.  Pain reported at 1/10. Epidural running Hydromorphone and ropivacaine per MAR. Bolus button available.   Pt denies any numbness or tingling. All neuro and CMS checks are WDL.  Nausea denied  Tolerating clear liquid Diet   Surgical incision to midline abd and old ostomy site incision to LLQ.  Both are CDI with dressings in place. Small amount of dried, serosanguinous drainage noted to lower portion of dressing.  + Urine output via duggan catheter  - BM    + Flatus  Bed in lowest position and locked.  Bed alarm NA per Reanna Red   Pt resting comfortably now.  Review plan of care with patient  Call light within reach  Hourly rounds in place  All needs met at this time

## 2019-05-08 NOTE — ANESTHESIA POST-OP FOLLOW-UP NOTE
"Anesthesia Pain Service Note    Type:  Epidural catheter T9-10    Patient: Ambar Jj    Patient seen and examined. and Patient chart reviewed.     /57   Pulse 77   Temp 36.4 °C (97.5 °F) (Oral)   Resp 14   Ht 1.702 m (5' 7\")   Wt 75.9 kg (167 lb 5.3 oz)   SpO2 93%   Breastfeeding? No   BMI 26.21 kg/m²     Complaints:  No complaints.    Pain:   0/10    LOC:   Awake arousable    Rate:  4 cc/hr, ropi with dilaudid    Exam:  Vital signs stable, Afebrile and Site clean, dry, intact without tenderness or erythema    Impression:  Adequate analgesia    Assessment & Plan:  Acute post-procedural pain (G89.18)  1.  Patient doing well. No change today with epidural rate.  2.  Plan to hold lovenox today and tomorrow.  Pull epidural catheter tomorrow.  3.  BID lovenox dosing not advised with indwelling epidural.  Advised nurse to call surgeon to explain the hold of lovenox (for pulling catheter tomorrow) as well as changing of lovenox to Qday dosing instead of BID.   4. Plan discussed with patient and nurse at bedside.       Caio Hernandez M.D.  5/8/2019  12:48 PM  "

## 2019-05-08 NOTE — CARE PLAN
Problem: Safety  Goal: Will remain free from falls  Outcome: PROGRESSING AS EXPECTED  Pt was educated on fall risk and need to call nursing staff prior to mobilization especially with epidural in place. Pt verbalizes understanding of all teaching. Pt is A&O x 4 and demonstrates appropriate use of call light.  CLIP. Hourly rounding in place.     Problem: Pain Management  Goal: Pain level will decrease to patient's comfort goal  Outcome: PROGRESSING AS EXPECTED  Pt was educated on 0-10 pain scale, epidural/bolus button use, and non-pharm methods of pain relief. Pt demonstrates understanding by rating pain as a 1 on 0-10 pain scale and stating that her pain is well controlled. Pt states that she is experiencing a lot of itching throughout her body especially around the epidural site. Pt was medicated per MAR prior to shift change. Pt was educated on MAR and medication available. Pt states that she will update this RN if she needs more medication.

## 2019-05-08 NOTE — PROGRESS NOTES
Bedside report received.  Assessment complete.  A&O x 4. Patient calls appropriately.  Patient up with no assist.    Patient has 0/10 pain. Dilaudid epidural in place. Insertion site with scant drainage, dressing CDI.   Denies N&V. Tolerating clear liquid diet.  Abdominal midline incision with dressing, old drainage present.   + void, + flatus, + BM.  Patient denies SOB.  SCD's off-patient sitting in chair .  Review plan with of care with patient. Call light and personal belongings with in reach. Hourly rounding in place. All needs met at this time.

## 2019-05-09 ENCOUNTER — ANESTHESIA (OUTPATIENT)
Dept: MEDSURG UNIT | Facility: MEDICAL CENTER | Age: 59
End: 2019-05-09

## 2019-05-09 ENCOUNTER — ANESTHESIA EVENT (OUTPATIENT)
Dept: MEDSURG UNIT | Facility: MEDICAL CENTER | Age: 59
End: 2019-05-09

## 2019-05-09 PROCEDURE — A9270 NON-COVERED ITEM OR SERVICE: HCPCS | Performed by: SURGERY

## 2019-05-09 PROCEDURE — 700102 HCHG RX REV CODE 250 W/ 637 OVERRIDE(OP): Performed by: ANESTHESIOLOGY

## 2019-05-09 PROCEDURE — 700111 HCHG RX REV CODE 636 W/ 250 OVERRIDE (IP): Performed by: ANESTHESIOLOGY

## 2019-05-09 PROCEDURE — 700102 HCHG RX REV CODE 250 W/ 637 OVERRIDE(OP): Performed by: SURGERY

## 2019-05-09 PROCEDURE — 770006 HCHG ROOM/CARE - MED/SURG/GYN SEMI*

## 2019-05-09 PROCEDURE — A9270 NON-COVERED ITEM OR SERVICE: HCPCS | Performed by: ANESTHESIOLOGY

## 2019-05-09 RX ADMIN — ACETAMINOPHEN 1000 MG: 500 TABLET ORAL at 12:21

## 2019-05-09 RX ADMIN — OMEPRAZOLE 20 MG: 20 CAPSULE, DELAYED RELEASE ORAL at 06:36

## 2019-05-09 RX ADMIN — ACETAMINOPHEN 1000 MG: 500 TABLET ORAL at 06:36

## 2019-05-09 RX ADMIN — ACETAMINOPHEN 1000 MG: 500 TABLET ORAL at 17:00

## 2019-05-09 ASSESSMENT — ENCOUNTER SYMPTOMS
NAUSEA: 0
ABDOMINAL PAIN: 0
VOMITING: 0

## 2019-05-09 NOTE — PROGRESS NOTES
Assumed care of pt for the remainder of shift.  AAOx4.  Resting comfortably at this time.  Pt educated to call for assistance as needed.

## 2019-05-09 NOTE — PROGRESS NOTES
Surgical Progress Note    Author: Justus Will Date & Time created: 2019   7:49 AM     Interval Events:  Lovenox held this morning in anticipation of D/C of epidural today.  Denies abdominal pain.  Tolerating full liquid diet.  Passing flatus and stool.    Review of Systems   Gastrointestinal: Negative for abdominal pain, nausea and vomiting.     Hemodynamics:  Temp (24hrs), Av.5 °C (97.7 °F), Min:35.9 °C (96.7 °F), Max:36.8 °C (98.3 °F)  Temperature: 36.8 °C (98.3 °F)  Pulse  Av.5  Min: 64  Max: 107   Blood Pressure: 109/62     Respiratory:    Respiration: 16, Pulse Oximetry: 92 %        RUL Breath Sounds: Diminished, RML Breath Sounds: Diminished, RLL Breath Sounds: Diminished, PEG Breath Sounds: Diminished, LLL Breath Sounds: Diminished  Neuro:  GCS = 15       Fluids:    Intake/Output Summary (Last 24 hours) at 19 0749  Last data filed at 19 0635   Gross per 24 hour   Intake           1480.9 ml   Output             1400 ml   Net             80.9 ml        Current Diet Order   Procedures   • Diet Order Full Liquid     Physical Exam   Constitutional: She is oriented to person, place, and time. She appears well-developed and well-nourished. No distress.   HENT:   Head: Normocephalic.   Eyes: Pupils are equal, round, and reactive to light. No scleral icterus.   Cardiovascular: Normal rate, regular rhythm and normal heart sounds.    Pulmonary/Chest: Effort normal and breath sounds normal. No respiratory distress.   Abdominal: Soft. Bowel sounds are normal. She exhibits no distension. There is no tenderness. There is no rebound and no guarding.   Incision intact, stapled.   Neurological: She is alert and oriented to person, place, and time.   Skin: Skin is warm and dry.   Psychiatric: She has a normal mood and affect. Her behavior is normal.     Labs:  No results found for this or any previous visit (from the past 24 hour(s)).  Medical Decision Making, by Problem:  Active Hospital  Problems    Diagnosis   • Colonic diverticular abscess [K57.20]     Priority: High   • Urinary incontinence [R32]     ICD-10 transition     • GERD (gastroesophageal reflux disease) [K21.9]   • Hypertension [I10]     Plan:  Start low residue diet today.  D/C epidural, per anesthesiology plan.  Possible discharge home today, if tolerates low residue diet.   Percocet for pain.  Okay to shower.    Quality Measures:  Quality-Core Measures   Reviewed items::  Medications reviewed  Simms catheter::  No Simms  DVT prophylaxis pharmacological::  Enoxaparin (Lovenox)  DVT prophylaxis - mechanical:  SCDs  Ulcer Prophylaxis::  Yes      Discussed patient condition with RN and Patient

## 2019-05-09 NOTE — CARE PLAN
Problem: Venous Thromboembolism (VTW)/Deep Vein Thrombosis (DVT) Prevention:  Goal: Patient will participate in Venous Thrombosis (VTE)/Deep Vein Thrombosis (DVT)Prevention Measures  Outcome: PROGRESSING AS EXPECTED  Patient ambulates frequently, drinking adequate PO liquids, Lovenox held last two doses for epidural removal today

## 2019-05-09 NOTE — CARE PLAN
Problem: Urinary Elimination:  Goal: Ability to reestablish a normal urinary elimination pattern will improve  Outcome: PROGRESSING AS EXPECTED  Simms removed yesterday afternoon, patient voiding adequately without difficulty

## 2019-05-09 NOTE — PROGRESS NOTES
Assumed care of pt at 1900, report given.  A+O x 4, VSS, and RA.   Pt has midline abd surgical incision as well as old LQ ostomy site. All covered with dry gauze and tape. Old drainage noted and outlined.   Pt has epidural in place; tolerating well and reporting little to no pain. Proper protocol in place.   BLE 1+ dependant swelling noted. Relieved with elevation.   Pt reporting mild to moderate itching; medicated per MAR; tolerating well.   Pt tolerating full liquid diet; denies N/V at this time.   +void  +BM (5/8/19)  Pt ambulating self up to restroom and down halls; tolerating well.  Pt updated on POC and all questions answered at this time.  Bed in lowest position, call light within reach, and no current needs.

## 2019-05-09 NOTE — CARE PLAN
Problem: Safety  Goal: Will remain free from falls  Outcome: PROGRESSING AS EXPECTED  Bed locked and in lowest position, side rails up x2, call light within reach, patient steady on feet     Problem: Mobility  Goal: Risk for activity intolerance will decrease  Outcome: PROGRESSING AS EXPECTED  Patient ambulating unit frequently, eager to ambulate, steady on feet and ambulates independently

## 2019-05-09 NOTE — CARE PLAN
Problem: Communication  Goal: The ability to communicate needs accurately and effectively will improve  Outcome: PROGRESSING AS EXPECTED  Pt educated on POC and all questions answered at this time. Hourly rounding in place.     Problem: Safety  Goal: Will remain free from injury  Outcome: PROGRESSING AS EXPECTED  Proper fall precautions in place. Call light within reach and encouraged to use. Hourly rounding in practice.

## 2019-05-09 NOTE — PROGRESS NOTES
Bedside report received.  Assessment complete.  A&O x 4. Patient calls appropriately.  Patient up with no assist.    Patient has 0/10 pain. Dilaudid epidural in place. Epidural site with slight old drainage, dressing CDI.   Denies N&V. Tolerating full liquid diet. GI soft for breakfast   Abdominal incision with staples, YVAN. Approximated, CDI.   + void, + flatus, - BM.  Patient denies SOB.  SCD's off.  Review plan with of care with patient. Call light and personal belongings with in reach. Hourly rounding in place. All needs met at this time.

## 2019-05-10 VITALS
BODY MASS INDEX: 26.26 KG/M2 | HEIGHT: 67 IN | WEIGHT: 167.33 LBS | OXYGEN SATURATION: 93 % | HEART RATE: 86 BPM | SYSTOLIC BLOOD PRESSURE: 136 MMHG | TEMPERATURE: 98 F | DIASTOLIC BLOOD PRESSURE: 83 MMHG | RESPIRATION RATE: 16 BRPM

## 2019-05-10 PROCEDURE — A9270 NON-COVERED ITEM OR SERVICE: HCPCS | Performed by: ANESTHESIOLOGY

## 2019-05-10 PROCEDURE — 700102 HCHG RX REV CODE 250 W/ 637 OVERRIDE(OP): Performed by: ANESTHESIOLOGY

## 2019-05-10 PROCEDURE — A9270 NON-COVERED ITEM OR SERVICE: HCPCS | Performed by: SURGERY

## 2019-05-10 PROCEDURE — 700102 HCHG RX REV CODE 250 W/ 637 OVERRIDE(OP): Performed by: SURGERY

## 2019-05-10 RX ORDER — OXYCODONE HYDROCHLORIDE AND ACETAMINOPHEN 5; 325 MG/1; MG/1
1-2 TABLET ORAL EVERY 4 HOURS PRN
Status: DISCONTINUED | OUTPATIENT
Start: 2019-05-10 | End: 2019-05-10 | Stop reason: HOSPADM

## 2019-05-10 RX ORDER — OXYCODONE HYDROCHLORIDE AND ACETAMINOPHEN 5; 325 MG/1; MG/1
1-2 TABLET ORAL EVERY 4 HOURS PRN
Qty: 35 TAB | Refills: 0 | Status: SHIPPED | OUTPATIENT
Start: 2019-05-10 | End: 2019-05-16

## 2019-05-10 RX ADMIN — ACETAMINOPHEN 1000 MG: 500 TABLET ORAL at 09:13

## 2019-05-10 RX ADMIN — LOSARTAN POTASSIUM 25 MG: 25 TABLET ORAL at 09:13

## 2019-05-10 RX ADMIN — OMEPRAZOLE 20 MG: 20 CAPSULE, DELAYED RELEASE ORAL at 09:13

## 2019-05-10 RX ADMIN — ACETAMINOPHEN 1000 MG: 500 TABLET ORAL at 00:22

## 2019-05-10 RX ADMIN — OXYCODONE HYDROCHLORIDE AND ACETAMINOPHEN 1 TABLET: 5; 325 TABLET ORAL at 13:54

## 2019-05-10 ASSESSMENT — ENCOUNTER SYMPTOMS
NAUSEA: 0
FEVER: 0
CHILLS: 0
VOMITING: 0
ABDOMINAL PAIN: 0

## 2019-05-10 NOTE — PROGRESS NOTES
"..Anesthesia Pain Service Note    Type:   Epidural catheter    Patient:  Ambar Jj    Patient seen and examined.  and Patient chart reviewed.    /76   Pulse 92   Temp 36.2 °C (97.1 °F) (Temporal)   Resp 16   Ht 1.702 m (5' 7\")   Wt 75.9 kg (167 lb 5.3 oz)   SpO2 93%   Breastfeeding? No   BMI 26.21 kg/m²     Complaints:  No complaints.    Pain:   0/10    LOC:   Awake arousable    Exam:  Vital signs stable, Afebrile and Site clean, dry, intact without tenderness or erythema    Impression:  Adequate analgesia    Assessment & Plan: Acute post-procedural pain (G89.18)     D/C by MD (Tip intact, no apparent complications)    Alfonso Ferrer M.D.  5/10/2019  7:50 AM  "

## 2019-05-10 NOTE — CARE PLAN
Problem: Pain Management  Goal: Pain level will decrease to patient's comfort goal  Outcome: PROGRESSING AS EXPECTED  Patient's epidural removed per MD.  RN to assess for pain.      Problem: Mobility  Goal: Risk for activity intolerance will decrease  Outcome: PROGRESSING AS EXPECTED  Patient ambulating halls frequently with a steady gait.  Tolerating well.

## 2019-05-10 NOTE — PROGRESS NOTES
Patient given discharge instructions and prescription.  Patient demonstrated understanding.  PIV removed.  Patient to follow up with Dr. Will at scheduled appt.  Patient escorted out by RN and discharged home with family.

## 2019-05-10 NOTE — PROGRESS NOTES
Surgical Progress Note    Author: Justus Will Date & Time created: 5/10/2019   6:53 AM     Interval Events:  Pain well controlled.  Tolerating low residue diet.  Epidural not removed yesterday.  Ready for discharge home once epidural removed.   Pain control without epidural to be determined prior to discharge home.    Review of Systems   Constitutional: Negative for chills and fever.   Gastrointestinal: Negative for abdominal pain, nausea and vomiting.     Hemodynamics:  Temp (24hrs), Av.1 °C (98.7 °F), Min:36.2 °C (97.1 °F), Max:37.4 °C (99.4 °F)  Temperature: 36.2 °C (97.1 °F)  Pulse  Av.1  Min: 64  Max: 107   Blood Pressure: 135/76     Respiratory:    Respiration: 16, Pulse Oximetry: 93 %        RUL Breath Sounds: Clear, RML Breath Sounds: Diminished, RLL Breath Sounds: Diminished, PEG Breath Sounds: Clear, LLL Breath Sounds: Diminished  Neuro:  GCS = 15       Fluids:    Intake/Output Summary (Last 24 hours) at 05/10/19 0653  Last data filed at 05/10/19 0400   Gross per 24 hour   Intake           2977.4 ml   Output             1175 ml   Net           1802.4 ml        Current Diet Order   Procedures   • Diet Order Low Fiber(GI Soft)     Physical Exam   Constitutional: She is oriented to person, place, and time. She appears well-developed and well-nourished. No distress.   HENT:   Head: Normocephalic.   Eyes: Pupils are equal, round, and reactive to light. No scleral icterus.   Cardiovascular: Normal rate, regular rhythm and normal heart sounds.    Pulmonary/Chest: Effort normal and breath sounds normal. No respiratory distress.   Abdominal: Soft. She exhibits no distension. There is no tenderness. There is no rebound and no guarding.   Incisions intact, stapled.   Neurological: She is alert and oriented to person, place, and time.   Skin: Skin is warm and dry.   Psychiatric: She has a normal mood and affect. Her behavior is normal.     Labs:  No results found for this or any previous visit (from  the past 24 hour(s)).  Medical Decision Making, by Problem:  Active Hospital Problems    Diagnosis   • Colonic diverticular abscess [K57.20]     Priority: High   • Urinary incontinence [R32]     ICD-10 transition     • GERD (gastroesophageal reflux disease) [K21.9]   • Hypertension [I10]     Plan:  Remove epidural today.  Home today if pain well enough controlled without epidural.    Quality Measures:  Quality-Core Measures   Reviewed items::  Labs reviewed and Medications reviewed  Simms catheter::  No Simms  DVT prophylaxis pharmacological::  Enoxaparin (Lovenox) (Held for removal of epidural today)  Ulcer Prophylaxis::  Yes      Discussed patient condition with Patient

## 2019-05-10 NOTE — DISCHARGE SUMMARY
DATE OF ADMISSION:  05/06/2019    DATE OF DISCHARGE:  05/10/2019    DIAGNOSES ON ADMISSION:  1.  History of complicated diverticulitis.  2.  Colostomy status.  3.  Hypertension.  4.  Gastroesophageal reflux disease.  5.  Urinary incontinence.    DIAGNOSES ON DISCHARGE:  1.  History of complicated diverticulitis.  2.  Colostomy status.  3.  Hypertension.  4.  Gastroesophageal reflux disease.  5.  Urinary incontinence.    HISTORY AND PHYSICAL:  Please see the dictated history and physical.    HOSPITAL COURSE:  Patient was admitted to my service on 05/06/2019 and   underwent a colostomy takedown and appendectomy on that same date.  Please see   the dictated operative report for details of that procedure.  She did well   postoperatively and was able to be started on clear liquids on postop day #1,   she did have an epidural placed and her pain control was excellent throughout   her hospital course, she remained hemodynamically stable.  Her diet was   advanced to a full liquid diet on postop day #2, she began passing flatus and   having bowel movements on that date.  She was advanced to a low residue diet   on postop day #3 and consideration was given to sending her home on that day   and unfortunately she did not have her epidural removed and so she stayed 1   additional day.  On postop day #4, she did have her epidural removed.  Pain   control was excellent without an epidural.  She continues to tolerate a low   residue diet, pass flatus and have bowel movements, her abdominal exam was   benign and she was deemed ready for discharge home.  She was discharged home   on that date.      DISCHARGE RECOMMENDATIONS:  She is to refrain from lifting greater than 20   pounds for a full 4 weeks after her surgery.  She is to follow up with me as   previously appointed.  On 05/21, she is to shower, but not to sit in bathtub,   hot tub, or swimming until her followup appointment.  She is to use an   over-the-counter laxative if  constipated.  These recommendations have been   discussed and the patient states she understands and agrees.      DISCHARGE MEDICATIONS:  She will resume her home medications.  She will be   discharged home with oxycodone/acetaminophen 5/325 tablets 1-2 p.o. q. 4 hours   p.r.n. pain.       ____________________________________     MD RAÚL Sanford / VELMA    DD:  05/10/2019 12:41:07  DT:  05/10/2019 14:09:16    D#:  [id number]  Job#:  [id number]

## 2019-05-10 NOTE — PROGRESS NOTES
A/Ox 4.  VSS.  Reports minimal abdominal pain 1 /10, epidural in place.    Abdominal midline and transverse incisions, stapled, YVAN, CDI.  Abdomen soft, tender, small distention, no discoloration.  +normoactive BSx4, denies flatus, +BM this shift.  Tolerated GI soft diet well.  Old drainage at epidural site.    + ARGUETA, denies numbness and tingling.  RA, satting >90%, denies SOB or difficulty breathing, IS refused.  + void, QS.  Up self, tolerates activity well.  Ambulated unit, repositions self frequently.  PIV S/L, PO intake encouraged.    Call light within reach, calls appropriately.  Bed in lowest locked position.

## 2019-05-10 NOTE — DISCHARGE INSTRUCTIONS
Discharge Instructions    Discharged to home by car with relative. Discharged via wheelchair, hospital escort: Yes.  Special equipment needed: Not Applicable    Be sure to schedule a follow-up appointment with your primary care doctor or any specialists as instructed.     Discharge Plan:   Diet Plan: Discussed  Activity Level: Discussed  Confirmed Follow up Appointment: Patient to Call and Schedule Appointment  Confirmed Symptoms Management: Discussed  Medication Reconciliation Updated: Yes  Influenza Vaccine Indication: Patient Refuses    I understand that a diet low in cholesterol, fat, and sodium is recommended for good health. Unless I have been given specific instructions below for another diet, I accept this instruction as my diet prescription.   Other diet: Low fiber     Special Instructions: None    · Is patient discharged on Warfarin / Coumadin?   No     End Colostomy Reversal, Care After  Refer to this sheet in the next few weeks. These instructions provide you with information on caring for yourself after your procedure. Your health care provider may also give you more specific instructions. Your treatment has been planned according to current medical practices, but problems sometimes occur. Call your health care provider if you have any problems or questions after your procedure.  HOME CARE INSTRUCTIONS  · Change your bandages (dressings) as directed by your health care provider.  · Keep the wound clean and dry. The wound may be washed gently with soap and water. Do not rub the wound. Gently pat the wound dry with a clean towel.  · Do not take baths, swim, or use hot tubs for 10 days, or as directed by your health care provider.  · Only take over-the-counter or prescription medicines for pain, discomfort, or fever as directed by your health care provider.  · You may resume a normal diet as directed by your health care provider.  · Do not lift more than 10 pounds (4.5 kg) or play contact sports for 4  weeks, or as directed by your health care provider.  · Use a stool softener if recommended by your health care provider, especially with pain medicine.  SEEK MEDICAL CARE IF:  · You have redness, swelling, or increasing pain in the wound.  · You notice pus coming from the wound.  · You have drainage from the wound lasting longer than 1 day.  · You notice a bad smell coming from the wound or dressing.  · Your wound breaks open.  · You have persistent nausea or vomiting.  · You cannot have a bowel movement.  · You have pain that is not controlled with medicine.  SEEK IMMEDIATE MEDICAL CARE IF:  · You have a fever.  · You have a rash.  · You have difficulty breathing.  · You have any reaction or side effects to your medicines.     This information is not intended to replace advice given to you by your health care provider. Make sure you discuss any questions you have with your health care provider.     Document Released: 03/11/2013 Document Revised: 05/03/2016 Document Reviewed: 03/11/2013  [employer] Interactive Patient Education ©2016 [employer]      Depression / Suicide Risk    As you are discharged from this [facility] facility, it is important to learn how to keep safe from harming yourself.    Recognize the warning signs:  · Abrupt changes in personality, positive or negative- including increase in energy   · Giving away possessions  · Change in eating patterns- significant weight changes-  positive or negative  · Change in sleeping patterns- unable to sleep or sleeping all the time   · Unwillingness or inability to communicate  · Depression  · Unusual sadness, discouragement and loneliness  · Talk of wanting to die  · Neglect of personal appearance   · Rebelliousness- reckless behavior  · Withdrawal from people/activities they love  · Confusion- inability to concentrate     If you or a loved one observes any of these behaviors or has concerns about self-harm, here's what you can do:  · Talk about it- your  feelings and reasons for harming yourself  · Remove any means that you might use to hurt yourself (examples: pills, rope, extension cords, firearm)  · Get professional help from the community (Mental Health, Substance Abuse, psychological counseling)  · Do not be alone:Call your Safe Contact- someone whom you trust who will be there for you.  · Call your local CRISIS HOTLINE 380-3115 or 213-288-6617  · Call your local Children's Mobile Crisis Response Team [place] (049) 724-6093 or www.[employer]  · Call the toll free National Suicide Prevention Hotlines   · National Suicide Prevention Lifeline 485-153-JZCX (1172)  · National Hope Line Network 800-SUICIDE (599-4458)

## 2019-05-10 NOTE — PROGRESS NOTES
Received report from evening RN.  Assumed care of patient.  Patient A&Ox4.  C/O pain 2/10.  Anesthesiologist at bedside, removed epidural.  NO complaints of numbness/tingling.  Slight nausea, no vomiting.  Requested to take pills after breakfast his AM.  Ambulating with a steady gait.  +void.  +flatus, +BM.  Abdominal incisions approximated with staples.  No redness or drainage noted  Plan of care discussed.  Call light within reach.  Bed in lowest position.

## 2019-07-08 ENCOUNTER — HOSPITAL ENCOUNTER (OUTPATIENT)
Dept: RADIOLOGY | Facility: MEDICAL CENTER | Age: 59
End: 2019-07-08
Attending: NURSE PRACTITIONER
Payer: COMMERCIAL

## 2019-07-08 DIAGNOSIS — Z12.31 VISIT FOR SCREENING MAMMOGRAM: ICD-10-CM

## 2019-07-08 PROCEDURE — 77063 BREAST TOMOSYNTHESIS BI: CPT

## 2019-07-15 ENCOUNTER — OFFICE VISIT (OUTPATIENT)
Dept: MEDICAL GROUP | Facility: PHYSICIAN GROUP | Age: 59
End: 2019-07-15
Payer: COMMERCIAL

## 2019-07-15 VITALS
DIASTOLIC BLOOD PRESSURE: 70 MMHG | HEART RATE: 93 BPM | SYSTOLIC BLOOD PRESSURE: 130 MMHG | TEMPERATURE: 97.7 F | BODY MASS INDEX: 26.53 KG/M2 | OXYGEN SATURATION: 95 % | HEIGHT: 67 IN | WEIGHT: 169 LBS | RESPIRATION RATE: 14 BRPM

## 2019-07-15 DIAGNOSIS — K21.9 GASTROESOPHAGEAL REFLUX DISEASE, ESOPHAGITIS PRESENCE NOT SPECIFIED: ICD-10-CM

## 2019-07-15 DIAGNOSIS — I10 ESSENTIAL HYPERTENSION: ICD-10-CM

## 2019-07-15 DIAGNOSIS — Z11.59 NEED FOR HEPATITIS C SCREENING TEST: ICD-10-CM

## 2019-07-15 DIAGNOSIS — Z90.49 STATUS POST PARTIAL COLECTOMY: ICD-10-CM

## 2019-07-15 DIAGNOSIS — F40.243 FLYING PHOBIA: ICD-10-CM

## 2019-07-15 DIAGNOSIS — J45.909 REACTIVE AIRWAY DISEASE WITHOUT COMPLICATION, UNSPECIFIED ASTHMA SEVERITY, UNSPECIFIED WHETHER PERSISTENT: ICD-10-CM

## 2019-07-15 DIAGNOSIS — F41.8 SITUATIONAL ANXIETY: ICD-10-CM

## 2019-07-15 DIAGNOSIS — E78.1 HYPERTRIGLYCERIDEMIA: ICD-10-CM

## 2019-07-15 PROBLEM — A41.9 SEPSIS (HCC): Status: RESOLVED | Noted: 2018-09-06 | Resolved: 2019-07-15

## 2019-07-15 PROBLEM — R19.5 CHANGE IN STOOL: Status: RESOLVED | Noted: 2017-04-17 | Resolved: 2019-07-15

## 2019-07-15 PROBLEM — G89.29 CHRONIC BILATERAL LOWER ABDOMINAL PAIN: Status: RESOLVED | Noted: 2017-04-17 | Resolved: 2019-07-15

## 2019-07-15 PROBLEM — R10.32 CHRONIC BILATERAL LOWER ABDOMINAL PAIN: Status: RESOLVED | Noted: 2017-04-17 | Resolved: 2019-07-15

## 2019-07-15 PROBLEM — K57.20 COLONIC DIVERTICULAR ABSCESS: Status: RESOLVED | Noted: 2018-09-06 | Resolved: 2019-07-15

## 2019-07-15 PROBLEM — R10.31 CHRONIC BILATERAL LOWER ABDOMINAL PAIN: Status: RESOLVED | Noted: 2017-04-17 | Resolved: 2019-07-15

## 2019-07-15 PROCEDURE — 99214 OFFICE O/P EST MOD 30 MIN: CPT | Performed by: NURSE PRACTITIONER

## 2019-07-15 RX ORDER — LORAZEPAM 0.5 MG/1
0.5 TABLET ORAL EVERY 8 HOURS PRN
Qty: 15 TAB | Refills: 0 | Status: SHIPPED | OUTPATIENT
Start: 2019-07-15 | End: 2021-09-27 | Stop reason: SDUPTHER

## 2019-07-15 ASSESSMENT — PATIENT HEALTH QUESTIONNAIRE - PHQ9: CLINICAL INTERPRETATION OF PHQ2 SCORE: 0

## 2019-07-15 NOTE — LETTER
[facility]  PABLO GramajoRTROY  [address]  [place] [address]  Fax: [phone]   Authorization for Release/Disclosure of   Protected Health Information   Name: SIMEON KAUR : 1960 SSN: [id number]   Address: [address] [place] [address]  [place] [address] Phone:    [phone] (home) [phone] (work)   I authorize the entity listed below to release/disclose the PHI below to:   [facility]/PABLO GramajoRTROY and AVINASH Gramajo   Provider or Entity Name:  [facility] Dr. Juliette Lucia    Address   [facility], [facility], [facility]   Phone:      Fax:     Reason for request: continuity of care   Information to be released:    [  ] LAST COLONOSCOPY,  including any PATH REPORT and follow-up  [  ] LAST FIT/COLOGUARD RESULT [  ] LAST DEXA  [  ] LAST MAMMOGRAM  [  ] LAST PAP  [  ] LAST LABS [  ] RETINA EXAM REPORT  [  ] IMMUNIZATION RECORDS  [XX  ] Release all info      [  ] Check here and initial the line next to each item to release ALL health information INCLUDING  _____ Care and treatment for drug and / or alcohol abuse  _____ HIV testing, infection status, or AIDS  _____ Genetic Testing    DATES OF SERVICE OR TIME PERIOD TO BE DISCLOSED: _____________  I understand and acknowledge that:  * This Authorization may be revoked at any time by you in writing, except if your health information has already been used or disclosed.  * Your health information that will be used or disclosed as a result of you signing this authorization could be re-disclosed by the recipient. If this occurs, your re-disclosed health information may no longer be protected by State or Federal laws.  * You may refuse to sign this Authorization. Your refusal will not affect your ability to obtain treatment.  * This Authorization becomes effective upon signing and will  on (date) __________.      If no date is indicated, this Authorization will  one (1) year from the signature date.      Name: Ambar Jj    Signature:   Date:     7/15/2019       PLEASE FAX REQUESTED RECORDS BACK TO: [phone]

## 2019-07-15 NOTE — PROGRESS NOTES
Chief Complaint   Patient presents with   • Anxiety     for air plane        HISTORY OF THE PRESENT ILLNESS: This is a 58 y.o. female established patient who presents today for medication refill and for follow up of her medical problems.    Essential hypertension  Chronic. Patient is on losartan which she has been doing well on. Blood pressure in clinic today was 130/70. No reports of medication side effects or associated symptoms regarding hypertension.    Reactive airway disease without complication, unspecified asthma severity, unspecified whether persistent  Chronic. Patient has an albuterol inhaler which she uses every now and then. No reports of medication side effects or associated symptoms regarding this condition.    Gastroesophageal reflux disease, esophagitis presence not specified  Chronic. Patient is on omeprazole which she has been doing well on. No reports of medication side effects or associated symptoms regarding GERD.    Status post partial colectomy  Patient would like to update us regarding history of diverticulitis. States since she was last seen here, she had two surgeries including colostomy. States a large part of her colon was removed. States she only gets occasional pain from the surgical site. She notes putting spandex to the area when she is working around the house. States the surgical area is otherwise healing well without significant pain.    Flying phobia  Situational anxiety  Chronic. Patient is on Ativan. She notes having a flight on the 27th of this month. States she does not like flying and is requesting for some Ativan to use for the flight. No reports of any other associated symptoms regarding anxiety.     Hypertriglyceridemia  Chronic and mild. Patient would like to get updated labs ordered. No reports of any active associated symptoms.    Need for hepatitis C screening test  Patient would like to get hepatitis screening testing ordered.    Past Medical History:   Diagnosis  "Date   • Anemia    • Anemia    • Anesthesia     PONV   • Bowel habit changes 11/19/2018    \"Constipation/diarrhea\".   • Bronchitis 2010   • Colostomy present (HCC)    • Diverticulitis 11/2017   • Female bladder prolapse 11/19/2018   • GERD (gastroesophageal reflux disease)    • Heart burn    • HTN     resolved with wt loss and lifestyle changes   • HTN (hypertension) 11/19/2018    Takes Losartan   • Indigestion    • Other specified symptom associated with female genital organs    • Pap smear 01/29/2009    Normal   • PONV (postoperative nausea and vomiting)     x2 days post op   • Psychiatric problem     anxiety   • Reactive airway disease 11/19/2018    Inhaler use PRN   • Unspecified urinary incontinence 11/19/2018    Wears Pads   • Urinary bladder disorder     prolapse        Past Surgical History:   Procedure Laterality Date   • COLOSTOMY TAKEDOWN  5/6/2019    Procedure: COLOSTOMY TAKEDOWN;  Surgeon: Justus Will M.D.;  Location: [facility];  Service: General   • APPENDECTOMY  5/6/2019    Procedure: APPENDECTOMY;  Surgeon: Justus Will M.D.;  Location: SURGERY [facility];  Service: General   • SIGMOID COLECTOMY N/A 11/21/2018    Procedure: SIGMOID COLECTOMY;  Surgeon: Justus Will M.D.;  Location: SURGERY [facility];  Service: General   • BLADDER SUSPENSION N/A 5/6/2015    Procedure: BLADDER SUSPENSION [57.89] TOT;  Surgeon: Yazmin Armando M.D.;  Location: SURGERY SAME DAY [facility];  Service:    • CYSTOSCOPY N/A 5/6/2015    Procedure: CYSTOSCOPY;  Surgeon: Yazmin Armando M.D.;  Location: SURGERY SAME DAY [facility];  Service:    • ANTERIOR AND POSTERIOR REPAIR N/A 5/6/2015    Procedure: ANTERIOR AND POSTERIOR REPAIR [70.53];  Surgeon: Yazmin Armando M.D.;  Location: SURGERY SAME DAY [facility];  Service:    • ABDOMINAL HYSTERECTOMY TOTAL      Ovaries intact   • BLADDER SUSPENSION     • CHOLECYSTECTOMY     • ENDOSCOPY     • ENDOSCOPY PROCEDURE      " "dilation due to stricture   • OTHER      \"Abdominal abcess drained twice 2018\".   • TONSILLECTOMY AND ADENOIDECTOMY         Family Status   Relation Status   • Mo    • Fa    • Sis Alive   • Bro    • Son Alive     Family History   Problem Relation Age of Onset   • Cancer Mother         Breast   • Lung Disease Mother         COPD   • Heart Disease Mother    • Diabetes Mother    • Lung Disease Father         COPD   • Heart Disease Father    • Alcohol/Drug Sister    • Lung Disease Sister         sister 2 Asthma   • Heart Disease Sister         sister 1 Heart murmur   • Alcohol/Drug Brother    • Other Son         IBS; working on his diet       Social History   Substance Use Topics   • Smoking status: Former Smoker     Packs/day: 0.00     Years: 34.00     Types: Cigarettes     Quit date: 2010   • Smokeless tobacco: Never Used      Comment: 2010   • Alcohol use 7.2 oz/week     6 Cans of beer, 6 Standard drinks or equivalent per week      Comment: 6/week       Allergies: Codeine    Current Outpatient Prescriptions Ordered in [facility]   Medication Sig Dispense Refill   • LORazepam (ATIVAN) 0.5 MG Tab Take 1 Tab by mouth every 8 hours as needed for Anxiety for up to 5 days. 15 Tab 0   • albuterol (PROAIR HFA) 108 (90 Base) MCG/ACT Aero Soln inhalation aerosol Inhale 2 Puffs by mouth every 6 hours. INHALE 2 PUFFS BY MOUTH EVERY 6 HOURS AS NEEDED FOR SHORTNESS OF BREATH. 8.5 g 0   • omeprazole (PRILOSEC) 20 MG delayed-release capsule Take 20 mg by mouth every bedtime.     • losartan (COZAAR) 25 MG Tab TAKE 1 TABLET BY MOUTH EVERY DAY 90 Tab 3     No current Epic-ordered facility-administered medications on file.        Review of Systems   Constitutional: Negative for fever, chills, weight loss and malaise/fatigue.   Respiratory: Negative for cough, sputum production, shortness of breath and wheezing.    Cardiovascular: Negative for chest pain, palpitations, orthopnea and leg swelling. " "  Gastrointestinal: Chronic GERD (controlled on medication). Negative for nausea, vomiting and abdominal pain.   Skin: Occasional mild discomfort from colostomy surgical site. Negative for rash and itching.   Psychiatric/Behavioral: Negative for depression, active anxiety, or memory loss.     All other systems reviewed and are negative except as in HPI.    Exam: /70 (BP Location: Left arm, Patient Position: Sitting, BP Cuff Size: Adult)   Pulse 93   Temp 36.5 °C (97.7 °F) (Temporal)   Resp 14   Ht 1.702 m (5' 7\")   Wt 76.7 kg (169 lb)   SpO2 95%   General:  Normal appearing. No distress.  Pulmonary:  Clear to ausculation.  Normal effort. No rales, ronchi, or wheezing.  Cardiovascular:  Regular rate and rhythm without murmur. Carotid and radial pulses are intact and equal bilaterally.  Abdomen: Soft, nontender, nondistended. Normal bowel sounds. Liver and spleen are not palpable. Large red scar from pubic bone to above belly button.  Neurologic:  Grossly nonfocal  Skin:  Warm and dry.  No obvious lesions.  Musculoskeletal:  Normal gait. No extremity cyanosis, clubbing, or edema.  Psych:  Normal mood and affect. Alert and oriented x3. Judgment and insight is normal.    PLAN:    1. Essential hypertension  - Well-controlled on current regimen. Continue current plan of care and medications.     2. Reactive airway disease without complication, unspecified asthma severity, unspecified whether persistent  - Well-controlled on current regimen. Continue current plan of care and medications.    3. Gastroesophageal reflux disease, esophagitis presence not specified  - Well-controlled on current regimen. Continue current plan of care and medications.     4. Status post partial colectomy  - Stable. Patient states the surgical site is healing well overall. Denies any significant pain. She has upcoming follow up with Dr. Gil (GI). Continue current plan of care.    5. Flying phobia  6. Situational anxiety  - Stable " overall. Patient states she has a flight on the 27th of this month and is requesting for refill of her Ativan for the flight. States she does not like flying.  - I reviewed prescription monitoring program before prescribing a scheduled drug.The patient will not drink alcohol nor drive with prescribed medications.  - LORazepam (ATIVAN) 0.5 MG Tab; Take 1 Tab by mouth every 8 hours as needed for Anxiety for up to 5 days.  Dispense: 15 Tab; Refill: 0    7. Hypertriglyceridemia  - Patient would like to get updated labs.  - Lipid Profile; Future    8. Need for hepatitis C screening test  - Patient would like to get hepatitis C screening.  - HEP C VIRUS ANTIBODY; Future     Follow-up in 1 year or sooner as needed. Patient is encouraged to be seen in the emergency room for chest pain, palpitations, shortness of breath, dizziness, severe abdominal pain or other concerning symptoms.     Please note that this dictation was created using voice recognition software. I have made every reasonable attempt to correct obvious errors, but I expect that there are errors of grammar and possibly content that I did not discover before finalizing the note.      Assessment/Plan  1. Essential hypertension     2. Reactive airway disease without complication, unspecified asthma severity, unspecified whether persistent     3. Gastroesophageal reflux disease, esophagitis presence not specified     4. Status post partial colectomy     5. Flying phobia  LORazepam (ATIVAN) 0.5 MG Tab   6. Situational anxiety  LORazepam (ATIVAN) 0.5 MG Tab   7. Hypertriglyceridemia  Lipid Profile   8. Need for hepatitis C screening test  HEP C VIRUS ANTIBODY         I have placed the below orders and discussed them with an approved delegating provider. The MA is performing the below orders under the direction of Dr. Wells.       Jagjit PACHECO (Scribe), am scribing for, and in the presence of, MONTSERRAT Fraire    Electronically signed by:  Jagjit Sanches (Scribe), 7/15/2019    Devon PACHECO APRN personally performed the services described in this documentation, as scribed by Jagjit Sanches in my presence, and it is both accurate and complete.

## 2019-07-15 NOTE — LETTER
[facility]  PABLO GramajoRTROY  [address]  [place] [address]  Fax: [phone]   Authorization for Release/Disclosure of   Protected Health Information   Name: SIMEON KAUR : 1960 SSN: [id number]   Address: [address] [place] [address] [place] [address] Phone:    [phone] (home) [phone] (work)   I authorize the entity listed below to release/disclose the PHI below to:   [facility]/AVINASH Gramajo and AVINASH Gramajo   Provider or Entity Name:  [facility]     Address   [facility], [facility], [facility]   Phone:      Fax:     Reason for request: continuity of care   Information to be released:    [  ] LAST COLONOSCOPY,  including any PATH REPORT and follow-up  [  ] LAST FIT/COLOGUARD RESULT [  ] LAST DEXA  [  ] LAST MAMMOGRAM  [  ] LAST PAP  [  ] LAST LABS [  ] RETINA EXAM REPORT  [  ] IMMUNIZATION RECORDS  [XX  ] Release all info      [  ] Check here and initial the line next to each item to release ALL health information INCLUDING  _____ Care and treatment for drug and / or alcohol abuse  _____ HIV testing, infection status, or AIDS  _____ Genetic Testing    DATES OF SERVICE OR TIME PERIOD TO BE DISCLOSED: _____________  I understand and acknowledge that:  * This Authorization may be revoked at any time by you in writing, except if your health information has already been used or disclosed.  * Your health information that will be used or disclosed as a result of you signing this authorization could be re-disclosed by the recipient. If this occurs, your re-disclosed health information may no longer be protected by State or Federal laws.  * You may refuse to sign this Authorization. Your refusal will not affect your ability to obtain treatment.  * This Authorization becomes effective upon signing and will  on (date) __________.      If no date is indicated, this Authorization will  one (1) year from the signature date.       Name: Ambar Jj    Signature:   Date:     7/15/2019       PLEASE FAX REQUESTED RECORDS BACK TO: [phone]

## 2019-08-03 ENCOUNTER — APPOINTMENT (OUTPATIENT)
Dept: RADIOLOGY | Facility: IMAGING CENTER | Age: 59
End: 2019-08-03
Attending: PHYSICIAN ASSISTANT
Payer: COMMERCIAL

## 2019-08-03 ENCOUNTER — OFFICE VISIT (OUTPATIENT)
Dept: URGENT CARE | Facility: CLINIC | Age: 59
End: 2019-08-03
Payer: COMMERCIAL

## 2019-08-03 VITALS
OXYGEN SATURATION: 95 % | HEIGHT: 67 IN | SYSTOLIC BLOOD PRESSURE: 126 MMHG | BODY MASS INDEX: 27 KG/M2 | WEIGHT: 172 LBS | DIASTOLIC BLOOD PRESSURE: 72 MMHG | RESPIRATION RATE: 14 BRPM | HEART RATE: 110 BPM | TEMPERATURE: 100.6 F

## 2019-08-03 DIAGNOSIS — R05.9 COUGH: ICD-10-CM

## 2019-08-03 DIAGNOSIS — J06.9 UPPER RESPIRATORY TRACT INFECTION, UNSPECIFIED TYPE: ICD-10-CM

## 2019-08-03 LAB
INT CON NEG: NORMAL
INT CON POS: NORMAL
S PYO AG THROAT QL: NEGATIVE

## 2019-08-03 PROCEDURE — 87880 STREP A ASSAY W/OPTIC: CPT | Performed by: PHYSICIAN ASSISTANT

## 2019-08-03 PROCEDURE — 71046 X-RAY EXAM CHEST 2 VIEWS: CPT | Mod: TC | Performed by: PHYSICIAN ASSISTANT

## 2019-08-03 PROCEDURE — 99214 OFFICE O/P EST MOD 30 MIN: CPT | Performed by: PHYSICIAN ASSISTANT

## 2019-08-03 RX ORDER — AZITHROMYCIN 250 MG/1
TABLET, FILM COATED ORAL
Qty: 6 TAB | Refills: 0 | Status: SHIPPED | OUTPATIENT
Start: 2019-08-03 | End: 2019-10-14

## 2019-08-03 ASSESSMENT — ENCOUNTER SYMPTOMS
WHEEZING: 1
DIARRHEA: 0
SPUTUM PRODUCTION: 1
HEMOPTYSIS: 0
COUGH: 1
SHORTNESS OF BREATH: 0
DIZZINESS: 0
RHINORRHEA: 0
ABDOMINAL PAIN: 0
NAUSEA: 0
CHILLS: 1
SORE THROAT: 1
FEVER: 1
MYALGIAS: 0
MUSCULOSKELETAL NEGATIVE: 1
VOMITING: 0

## 2019-08-03 NOTE — PROGRESS NOTES
"Subjective:      Ambar Jj is a 58 y.o. female who presents with Cough (cough, sore throat, fever x 1 week)            Cough   This is a new problem. The current episode started in the past 7 days (6 days). The problem has been gradually worsening. The problem occurs constantly. The cough is productive of sputum. Associated symptoms include chills, a fever, nasal congestion, postnasal drip, a sore throat and wheezing. Pertinent negatives include no chest pain, ear congestion, ear pain, hemoptysis, myalgias, rash, rhinorrhea or shortness of breath. Nothing aggravates the symptoms. She has tried OTC cough suppressant for the symptoms. The treatment provided mild relief. Her past medical history is significant for asthma. There is no history of bronchitis or pneumonia.     Patient presents to urgent care reporting a 1 week history of productive cough and sore throat. She recently developed fevers of 100-101 F yesterday. No chest pain, SOB, palpitations, ear pain, or sinus congestion. PMH is significant for asthma, she has been using her inhaler more frequently recently. No known sick contacts or recent use of antibiotics.     Review of Systems   Constitutional: Positive for chills and fever.   HENT: Positive for postnasal drip and sore throat. Negative for congestion, ear pain and rhinorrhea.    Respiratory: Positive for cough, sputum production and wheezing. Negative for hemoptysis and shortness of breath.    Cardiovascular: Negative for chest pain.   Gastrointestinal: Negative for abdominal pain, diarrhea, nausea and vomiting.   Genitourinary: Negative.    Musculoskeletal: Negative.  Negative for myalgias.   Skin: Negative for rash.   Neurological: Negative for dizziness.        Objective:     /72 (BP Location: Left arm, BP Cuff Size: Adult)   Pulse (!) 110   Temp (!) 38.1 °C (100.6 °F) (Temporal)   Resp 14   Ht 1.702 m (5' 7\")   Wt 78 kg (172 lb)   SpO2 95%   BMI 26.94 kg/m²      Physical Exam "   Constitutional: She is oriented to person, place, and time. She appears well-developed and well-nourished. No distress.   HENT:   Head: Normocephalic and atraumatic.   Right Ear: Hearing, tympanic membrane, external ear and ear canal normal.   Left Ear: Hearing, tympanic membrane, external ear and ear canal normal.   Mouth/Throat: Posterior oropharyngeal erythema present. No oropharyngeal exudate or posterior oropharyngeal edema.   Mild posterior oropharyngeal erythema, no exudates noted. Tonsils absent.    Eyes: Pupils are equal, round, and reactive to light. Conjunctivae are normal. Right eye exhibits no discharge. Left eye exhibits no discharge.   Neck: Normal range of motion.   Cardiovascular: Normal rate, regular rhythm and normal heart sounds.   No murmur heard.  Pulmonary/Chest: Effort normal and breath sounds normal. No stridor. No respiratory distress. She has no wheezes.   Musculoskeletal: Normal range of motion.   Lymphadenopathy:     She has no cervical adenopathy.   Neurological: She is alert and oriented to person, place, and time.   Skin: Skin is warm and dry. She is not diaphoretic.   Psychiatric: She has a normal mood and affect. Her behavior is normal.   Nursing note and vitals reviewed.         PMH:  has a past medical history of Anemia, Anemia, Anesthesia, Bowel habit changes (11/19/2018), Bronchitis (2010), Colostomy present (HCC), Diverticulitis (11/2017), Female bladder prolapse (11/19/2018), GERD (gastroesophageal reflux disease), Heart burn, HTN, HTN (hypertension) (11/19/2018), Indigestion, Other specified symptom associated with female genital organs, Pap smear (01/29/2009), PONV (postoperative nausea and vomiting), Psychiatric problem, Reactive airway disease (11/19/2018), Unspecified urinary incontinence (11/19/2018), and Urinary bladder disorder.  MEDS:   Current Outpatient Medications:   •  azithromycin (ZITHROMAX) 250 MG Tab, Take 2 tablets on day one, then 1 tablet on days two  "through five, Disp: 6 Tab, Rfl: 0  •  albuterol (PROAIR HFA) 108 (90 Base) MCG/ACT Aero Soln inhalation aerosol, Inhale 2 Puffs by mouth every 6 hours. INHALE 2 PUFFS BY MOUTH EVERY 6 HOURS AS NEEDED FOR SHORTNESS OF BREATH., Disp: 8.5 g, Rfl: 0  •  omeprazole (PRILOSEC) 20 MG delayed-release capsule, Take 20 mg by mouth every bedtime., Disp: , Rfl:   •  losartan (COZAAR) 25 MG Tab, TAKE 1 TABLET BY MOUTH EVERY DAY, Disp: 90 Tab, Rfl: 3  ALLERGIES:   Allergies   Allergen Reactions   • Codeine Vomiting     SURGHX:   Past Surgical History:   Procedure Laterality Date   • COLOSTOMY TAKEDOWN  5/6/2019    Procedure: COLOSTOMY TAKEDOWN;  Surgeon: Justus Will M.D.;  Location: SURGERY [facility];  Service: General   • APPENDECTOMY  5/6/2019    Procedure: APPENDECTOMY;  Surgeon: Justus Will M.D.;  Location: SURGERY [facility];  Service: General   • SIGMOID COLECTOMY N/A 11/21/2018    Procedure: SIGMOID COLECTOMY;  Surgeon: Justus Will M.D.;  Location: SURGERY [facility];  Service: General   • BLADDER SUSPENSION N/A 5/6/2015    Procedure: BLADDER SUSPENSION [57.89] TOT;  Surgeon: Yazmin Armando M.D.;  Location: SURGERY SAME DAY [facility];  Service:    • CYSTOSCOPY N/A 5/6/2015    Procedure: CYSTOSCOPY;  Surgeon: Yazmin Armando M.D.;  Location: SURGERY SAME DAY [facility];  Service:    • ANTERIOR AND POSTERIOR REPAIR N/A 5/6/2015    Procedure: ANTERIOR AND POSTERIOR REPAIR [70.53];  Surgeon: Yazmin Armando M.D.;  Location: SURGERY SAME DAY [facility];  Service:    • ABDOMINAL HYSTERECTOMY TOTAL      Ovaries intact   • BLADDER SUSPENSION     • CHOLECYSTECTOMY     • ENDOSCOPY     • ENDOSCOPY PROCEDURE      dilation due to stricture   • OTHER      \"Abdominal abcess drained twice 2018\".   • TONSILLECTOMY AND ADENOIDECTOMY       SOCHX:  reports that she quit smoking about 9 years ago. Her smoking use included cigarettes. She smoked 0.00 packs per day for 34.00 years. She has " never used smokeless tobacco. She reports that she drinks about 7.2 oz of alcohol per week. She reports that she has current or past drug history. Drug: Inhaled.  FH: family history includes Alcohol/Drug in her brother and sister; Cancer in her mother; Diabetes in her mother; Heart Disease in her father, mother, and sister; Lung Disease in her father, mother, and sister; Other in her son.    Assessment/Plan:     1. Upper respiratory tract infection, unspecified type  - POCT Rapid Strep A: NEGATIVE  - DX-CHEST-2 VIEWS; Future  Impression         1. No active cardiopulmonary abnormalities are identified.     report per radiology.  I have reviewed the films and agree with the reading    - azithromycin (ZITHROMAX) 250 MG Tab; Take 2 tablets on day one, then 1 tablet on days two through five  Dispense: 6 Tab; Refill: 0   - Complete full course of antibiotics as prescribed     Call or return to office if symptoms persist or worsen. The patient demonstrated a good understanding and agreed with the treatment plan.

## 2019-08-23 DIAGNOSIS — J45.902: ICD-10-CM

## 2019-08-26 RX ORDER — ALBUTEROL SULFATE 90 UG/1
2 AEROSOL, METERED RESPIRATORY (INHALATION) EVERY 6 HOURS
Qty: 8.5 G | Refills: 0 | Status: SHIPPED | OUTPATIENT
Start: 2019-08-26 | End: 2019-12-23 | Stop reason: SDUPTHER

## 2019-09-11 ENCOUNTER — HOSPITAL ENCOUNTER (OUTPATIENT)
Dept: RADIOLOGY | Facility: MEDICAL CENTER | Age: 59
End: 2019-09-11
Attending: SURGERY
Payer: COMMERCIAL

## 2019-09-11 DIAGNOSIS — R19.05 ABDOMINAL WALL MASS OF PERIUMBILICAL REGION: ICD-10-CM

## 2019-09-11 PROCEDURE — 76705 ECHO EXAM OF ABDOMEN: CPT

## 2019-09-28 DIAGNOSIS — K21.9 GASTROESOPHAGEAL REFLUX DISEASE, ESOPHAGITIS PRESENCE NOT SPECIFIED: ICD-10-CM

## 2019-09-30 RX ORDER — OMEPRAZOLE 20 MG/1
CAPSULE, DELAYED RELEASE ORAL
Qty: 90 CAP | Refills: 0 | Status: SHIPPED | OUTPATIENT
Start: 2019-09-30 | End: 2020-01-27

## 2019-10-14 DIAGNOSIS — Z01.812 PRE-OPERATIVE LABORATORY EXAMINATION: ICD-10-CM

## 2019-10-14 LAB
ANION GAP SERPL CALC-SCNC: 12 MMOL/L (ref 0–11.9)
BUN SERPL-MCNC: 10 MG/DL (ref 8–22)
CALCIUM SERPL-MCNC: 9.7 MG/DL (ref 8.5–10.5)
CHLORIDE SERPL-SCNC: 109 MMOL/L (ref 96–112)
CO2 SERPL-SCNC: 21 MMOL/L (ref 20–33)
CREAT SERPL-MCNC: 0.67 MG/DL (ref 0.5–1.4)
ERYTHROCYTE [DISTWIDTH] IN BLOOD BY AUTOMATED COUNT: 48.4 FL (ref 35.9–50)
GLUCOSE SERPL-MCNC: 91 MG/DL (ref 65–99)
HCT VFR BLD AUTO: 39.8 % (ref 37–47)
HGB BLD-MCNC: 12.7 G/DL (ref 12–16)
MCH RBC QN AUTO: 28.1 PG (ref 27–33)
MCHC RBC AUTO-ENTMCNC: 31.9 G/DL (ref 33.6–35)
MCV RBC AUTO: 88.1 FL (ref 81.4–97.8)
PLATELET # BLD AUTO: 203 K/UL (ref 164–446)
PMV BLD AUTO: 10.2 FL (ref 9–12.9)
POTASSIUM SERPL-SCNC: 3.9 MMOL/L (ref 3.6–5.5)
RBC # BLD AUTO: 4.52 M/UL (ref 4.2–5.4)
SODIUM SERPL-SCNC: 142 MMOL/L (ref 135–145)
WBC # BLD AUTO: 6.4 K/UL (ref 4.8–10.8)

## 2019-10-14 PROCEDURE — 36415 COLL VENOUS BLD VENIPUNCTURE: CPT

## 2019-10-14 PROCEDURE — 80048 BASIC METABOLIC PNL TOTAL CA: CPT

## 2019-10-14 PROCEDURE — 85027 COMPLETE CBC AUTOMATED: CPT

## 2019-10-24 ENCOUNTER — HOSPITAL ENCOUNTER (INPATIENT)
Facility: MEDICAL CENTER | Age: 59
LOS: 2 days | DRG: 355 | End: 2019-10-26
Attending: SURGERY | Admitting: SURGERY
Payer: COMMERCIAL

## 2019-10-24 ENCOUNTER — ANESTHESIA EVENT (OUTPATIENT)
Dept: SURGERY | Facility: MEDICAL CENTER | Age: 59
DRG: 355 | End: 2019-10-24
Payer: COMMERCIAL

## 2019-10-24 ENCOUNTER — ANESTHESIA (OUTPATIENT)
Dept: SURGERY | Facility: MEDICAL CENTER | Age: 59
DRG: 355 | End: 2019-10-24
Payer: COMMERCIAL

## 2019-10-24 DIAGNOSIS — G89.18 POSTOPERATIVE PAIN: ICD-10-CM

## 2019-10-24 DIAGNOSIS — K43.2 INCISIONAL HERNIA, WITHOUT OBSTRUCTION OR GANGRENE: ICD-10-CM

## 2019-10-24 PROCEDURE — 502714 HCHG ROBOTIC SURGERY SERVICES: Performed by: SURGERY

## 2019-10-24 PROCEDURE — 700111 HCHG RX REV CODE 636 W/ 250 OVERRIDE (IP): Performed by: SURGERY

## 2019-10-24 PROCEDURE — 700101 HCHG RX REV CODE 250: Performed by: ANESTHESIOLOGY

## 2019-10-24 PROCEDURE — 160036 HCHG PACU - EA ADDL 30 MINS PHASE I: Performed by: SURGERY

## 2019-10-24 PROCEDURE — 160041 HCHG SURGERY MINUTES - EA ADDL 1 MIN LEVEL 4: Performed by: SURGERY

## 2019-10-24 PROCEDURE — 503366 HCHG TROCAR, 12X150 KII FIOS Z THR: Performed by: SURGERY

## 2019-10-24 PROCEDURE — 700105 HCHG RX REV CODE 258: Performed by: SURGERY

## 2019-10-24 PROCEDURE — 0WUF0JZ SUPPLEMENT ABDOMINAL WALL WITH SYNTHETIC SUBSTITUTE, OPEN APPROACH: ICD-10-PCS | Performed by: SURGERY

## 2019-10-24 PROCEDURE — 700102 HCHG RX REV CODE 250 W/ 637 OVERRIDE(OP): Performed by: ANESTHESIOLOGY

## 2019-10-24 PROCEDURE — 160035 HCHG PACU - 1ST 60 MINS PHASE I: Performed by: SURGERY

## 2019-10-24 PROCEDURE — 160002 HCHG RECOVERY MINUTES (STAT): Performed by: SURGERY

## 2019-10-24 PROCEDURE — 160048 HCHG OR STATISTICAL LEVEL 1-5: Performed by: SURGERY

## 2019-10-24 PROCEDURE — A9270 NON-COVERED ITEM OR SERVICE: HCPCS | Performed by: ANESTHESIOLOGY

## 2019-10-24 PROCEDURE — 770006 HCHG ROOM/CARE - MED/SURG/GYN SEMI*

## 2019-10-24 PROCEDURE — 160009 HCHG ANES TIME/MIN: Performed by: SURGERY

## 2019-10-24 PROCEDURE — A9270 NON-COVERED ITEM OR SERVICE: HCPCS

## 2019-10-24 PROCEDURE — 700101 HCHG RX REV CODE 250: Performed by: SURGERY

## 2019-10-24 PROCEDURE — A9270 NON-COVERED ITEM OR SERVICE: HCPCS | Performed by: SURGERY

## 2019-10-24 PROCEDURE — 700111 HCHG RX REV CODE 636 W/ 250 OVERRIDE (IP): Performed by: ANESTHESIOLOGY

## 2019-10-24 PROCEDURE — 700102 HCHG RX REV CODE 250 W/ 637 OVERRIDE(OP)

## 2019-10-24 PROCEDURE — C1781 MESH (IMPLANTABLE): HCPCS | Performed by: SURGERY

## 2019-10-24 PROCEDURE — 8E0W4CZ ROBOTIC ASSISTED PROCEDURE OF TRUNK REGION, PERCUTANEOUS ENDOSCOPIC APPROACH: ICD-10-PCS | Performed by: SURGERY

## 2019-10-24 PROCEDURE — 501570 HCHG TROCAR, SEPARATOR: Performed by: SURGERY

## 2019-10-24 PROCEDURE — A6223 GAUZE >16<=48 NO W/SAL W/O B: HCPCS | Performed by: SURGERY

## 2019-10-24 PROCEDURE — 501838 HCHG SUTURE GENERAL: Performed by: SURGERY

## 2019-10-24 PROCEDURE — 500868 HCHG NEEDLE, SURGI(VARES): Performed by: SURGERY

## 2019-10-24 PROCEDURE — A6402 STERILE GAUZE <= 16 SQ IN: HCPCS | Performed by: SURGERY

## 2019-10-24 PROCEDURE — 700102 HCHG RX REV CODE 250 W/ 637 OVERRIDE(OP): Performed by: SURGERY

## 2019-10-24 PROCEDURE — 160029 HCHG SURGERY MINUTES - 1ST 30 MINS LEVEL 4: Performed by: SURGERY

## 2019-10-24 DEVICE — MESH VENTRALIGHT ST 20X25CM (8X10) 1EA/CA: Type: IMPLANTABLE DEVICE | Site: ABDOMEN | Status: FUNCTIONAL

## 2019-10-24 RX ORDER — HYDROMORPHONE HYDROCHLORIDE 1 MG/ML
0.4 INJECTION, SOLUTION INTRAMUSCULAR; INTRAVENOUS; SUBCUTANEOUS
Status: DISCONTINUED | OUTPATIENT
Start: 2019-10-24 | End: 2019-10-24 | Stop reason: HOSPADM

## 2019-10-24 RX ORDER — SODIUM CHLORIDE AND POTASSIUM CHLORIDE 150; 450 MG/100ML; MG/100ML
INJECTION, SOLUTION INTRAVENOUS CONTINUOUS
Status: DISCONTINUED | OUTPATIENT
Start: 2019-10-24 | End: 2019-10-26 | Stop reason: HOSPADM

## 2019-10-24 RX ORDER — OXYCODONE HYDROCHLORIDE 5 MG/1
10 TABLET ORAL EVERY 4 HOURS PRN
Status: DISCONTINUED | OUTPATIENT
Start: 2019-10-24 | End: 2019-10-26 | Stop reason: HOSPADM

## 2019-10-24 RX ORDER — MORPHINE SULFATE 4 MG/ML
2-4 INJECTION, SOLUTION INTRAMUSCULAR; INTRAVENOUS
Status: DISCONTINUED | OUTPATIENT
Start: 2019-10-24 | End: 2019-10-26 | Stop reason: HOSPADM

## 2019-10-24 RX ORDER — CEFAZOLIN SODIUM 1 G/3ML
INJECTION, POWDER, FOR SOLUTION INTRAMUSCULAR; INTRAVENOUS PRN
Status: DISCONTINUED | OUTPATIENT
Start: 2019-10-24 | End: 2019-10-24

## 2019-10-24 RX ORDER — SCOLOPAMINE TRANSDERMAL SYSTEM 1 MG/1
PATCH, EXTENDED RELEASE TRANSDERMAL
Status: COMPLETED
Start: 2019-10-24 | End: 2019-10-24

## 2019-10-24 RX ORDER — ONDANSETRON 2 MG/ML
4 INJECTION INTRAMUSCULAR; INTRAVENOUS EVERY 4 HOURS PRN
Status: DISCONTINUED | OUTPATIENT
Start: 2019-10-24 | End: 2019-10-26 | Stop reason: HOSPADM

## 2019-10-24 RX ORDER — HALOPERIDOL 5 MG/ML
1 INJECTION INTRAMUSCULAR EVERY 6 HOURS PRN
Status: DISCONTINUED | OUTPATIENT
Start: 2019-10-24 | End: 2019-10-26 | Stop reason: HOSPADM

## 2019-10-24 RX ORDER — GABAPENTIN 300 MG/1
300 CAPSULE ORAL ONCE
Status: COMPLETED | OUTPATIENT
Start: 2019-10-24 | End: 2019-10-24

## 2019-10-24 RX ORDER — DEXAMETHASONE SODIUM PHOSPHATE 4 MG/ML
INJECTION, SOLUTION INTRA-ARTICULAR; INTRALESIONAL; INTRAMUSCULAR; INTRAVENOUS; SOFT TISSUE PRN
Status: DISCONTINUED | OUTPATIENT
Start: 2019-10-24 | End: 2019-10-24 | Stop reason: SURG

## 2019-10-24 RX ORDER — HYDRALAZINE HYDROCHLORIDE 20 MG/ML
5 INJECTION INTRAMUSCULAR; INTRAVENOUS
Status: DISCONTINUED | OUTPATIENT
Start: 2019-10-24 | End: 2019-10-24 | Stop reason: HOSPADM

## 2019-10-24 RX ORDER — ACETAMINOPHEN 500 MG
1000 TABLET ORAL EVERY 6 HOURS
Status: DISCONTINUED | OUTPATIENT
Start: 2019-10-25 | End: 2019-10-26 | Stop reason: HOSPADM

## 2019-10-24 RX ORDER — MIDAZOLAM HYDROCHLORIDE 1 MG/ML
INJECTION INTRAMUSCULAR; INTRAVENOUS PRN
Status: DISCONTINUED | OUTPATIENT
Start: 2019-10-24 | End: 2019-10-24 | Stop reason: SURG

## 2019-10-24 RX ORDER — HYDROMORPHONE HYDROCHLORIDE 1 MG/ML
0.1 INJECTION, SOLUTION INTRAMUSCULAR; INTRAVENOUS; SUBCUTANEOUS
Status: DISCONTINUED | OUTPATIENT
Start: 2019-10-24 | End: 2019-10-24 | Stop reason: HOSPADM

## 2019-10-24 RX ORDER — METOCLOPRAMIDE HYDROCHLORIDE 5 MG/ML
INJECTION INTRAMUSCULAR; INTRAVENOUS PRN
Status: DISCONTINUED | OUTPATIENT
Start: 2019-10-24 | End: 2019-10-24 | Stop reason: HOSPADM

## 2019-10-24 RX ORDER — BUPIVACAINE HYDROCHLORIDE AND EPINEPHRINE 5; 5 MG/ML; UG/ML
INJECTION, SOLUTION EPIDURAL; INTRACAUDAL; PERINEURAL
Status: DISCONTINUED | OUTPATIENT
Start: 2019-10-24 | End: 2019-10-24 | Stop reason: HOSPADM

## 2019-10-24 RX ORDER — DIPHENHYDRAMINE HYDROCHLORIDE 50 MG/ML
25 INJECTION INTRAMUSCULAR; INTRAVENOUS EVERY 6 HOURS PRN
Status: DISCONTINUED | OUTPATIENT
Start: 2019-10-24 | End: 2019-10-26 | Stop reason: HOSPADM

## 2019-10-24 RX ORDER — SCOLOPAMINE TRANSDERMAL SYSTEM 1 MG/1
1 PATCH, EXTENDED RELEASE TRANSDERMAL
Status: DISCONTINUED | OUTPATIENT
Start: 2019-10-24 | End: 2019-10-26 | Stop reason: HOSPADM

## 2019-10-24 RX ORDER — CEFAZOLIN SODIUM 1 G/3ML
INJECTION, POWDER, FOR SOLUTION INTRAMUSCULAR; INTRAVENOUS PRN
Status: DISCONTINUED | OUTPATIENT
Start: 2019-10-24 | End: 2019-10-24 | Stop reason: HOSPADM

## 2019-10-24 RX ORDER — HALOPERIDOL 5 MG/ML
1 INJECTION INTRAMUSCULAR
Status: DISCONTINUED | OUTPATIENT
Start: 2019-10-24 | End: 2019-10-24 | Stop reason: HOSPADM

## 2019-10-24 RX ORDER — LABETALOL HYDROCHLORIDE 5 MG/ML
5 INJECTION, SOLUTION INTRAVENOUS
Status: DISCONTINUED | OUTPATIENT
Start: 2019-10-24 | End: 2019-10-24 | Stop reason: HOSPADM

## 2019-10-24 RX ORDER — OXYCODONE HYDROCHLORIDE 5 MG/1
5 TABLET ORAL EVERY 4 HOURS PRN
Status: DISCONTINUED | OUTPATIENT
Start: 2019-10-24 | End: 2019-10-26 | Stop reason: HOSPADM

## 2019-10-24 RX ORDER — SUCCINYLCHOLINE CHLORIDE 20 MG/ML
INJECTION INTRAMUSCULAR; INTRAVENOUS PRN
Status: DISCONTINUED | OUTPATIENT
Start: 2019-10-24 | End: 2019-10-24 | Stop reason: SURG

## 2019-10-24 RX ORDER — SODIUM CHLORIDE, SODIUM LACTATE, POTASSIUM CHLORIDE, CALCIUM CHLORIDE 600; 310; 30; 20 MG/100ML; MG/100ML; MG/100ML; MG/100ML
INJECTION, SOLUTION INTRAVENOUS CONTINUOUS
Status: DISCONTINUED | OUTPATIENT
Start: 2019-10-24 | End: 2019-10-24 | Stop reason: HOSPADM

## 2019-10-24 RX ORDER — DEXAMETHASONE SODIUM PHOSPHATE 4 MG/ML
4 INJECTION, SOLUTION INTRA-ARTICULAR; INTRALESIONAL; INTRAMUSCULAR; INTRAVENOUS; SOFT TISSUE
Status: DISCONTINUED | OUTPATIENT
Start: 2019-10-24 | End: 2019-10-26 | Stop reason: HOSPADM

## 2019-10-24 RX ORDER — CELECOXIB 200 MG/1
400 CAPSULE ORAL ONCE
Status: COMPLETED | OUTPATIENT
Start: 2019-10-24 | End: 2019-10-24

## 2019-10-24 RX ORDER — OMEPRAZOLE 20 MG/1
20 CAPSULE, DELAYED RELEASE ORAL DAILY
Status: DISCONTINUED | OUTPATIENT
Start: 2019-10-25 | End: 2019-10-26 | Stop reason: HOSPADM

## 2019-10-24 RX ORDER — MEPERIDINE HYDROCHLORIDE 25 MG/ML
12.5 INJECTION INTRAMUSCULAR; INTRAVENOUS; SUBCUTANEOUS
Status: DISCONTINUED | OUTPATIENT
Start: 2019-10-24 | End: 2019-10-24 | Stop reason: HOSPADM

## 2019-10-24 RX ORDER — LOSARTAN POTASSIUM 25 MG/1
25 TABLET ORAL
Status: DISCONTINUED | OUTPATIENT
Start: 2019-10-25 | End: 2019-10-26 | Stop reason: HOSPADM

## 2019-10-24 RX ORDER — HYDROMORPHONE HYDROCHLORIDE 1 MG/ML
0.2 INJECTION, SOLUTION INTRAMUSCULAR; INTRAVENOUS; SUBCUTANEOUS
Status: DISCONTINUED | OUTPATIENT
Start: 2019-10-24 | End: 2019-10-24 | Stop reason: HOSPADM

## 2019-10-24 RX ORDER — BUPIVACAINE HYDROCHLORIDE AND EPINEPHRINE 5; 5 MG/ML; UG/ML
INJECTION, SOLUTION EPIDURAL; INTRACAUDAL; PERINEURAL
Status: DISPENSED
Start: 2019-10-24 | End: 2019-10-25

## 2019-10-24 RX ORDER — ROCURONIUM BROMIDE 10 MG/ML
INJECTION, SOLUTION INTRAVENOUS PRN
Status: DISCONTINUED | OUTPATIENT
Start: 2019-10-24 | End: 2019-10-24 | Stop reason: SURG

## 2019-10-24 RX ORDER — OXYCODONE HCL 5 MG/5 ML
5 SOLUTION, ORAL ORAL
Status: DISCONTINUED | OUTPATIENT
Start: 2019-10-24 | End: 2019-10-24 | Stop reason: HOSPADM

## 2019-10-24 RX ORDER — DIPHENHYDRAMINE HYDROCHLORIDE 50 MG/ML
12.5 INJECTION INTRAMUSCULAR; INTRAVENOUS
Status: DISCONTINUED | OUTPATIENT
Start: 2019-10-24 | End: 2019-10-24 | Stop reason: HOSPADM

## 2019-10-24 RX ORDER — METOPROLOL TARTRATE 1 MG/ML
1 INJECTION, SOLUTION INTRAVENOUS
Status: DISCONTINUED | OUTPATIENT
Start: 2019-10-24 | End: 2019-10-24 | Stop reason: HOSPADM

## 2019-10-24 RX ORDER — ACETAMINOPHEN 500 MG
1000 TABLET ORAL ONCE
Status: COMPLETED | OUTPATIENT
Start: 2019-10-24 | End: 2019-10-24

## 2019-10-24 RX ORDER — SODIUM CHLORIDE, SODIUM LACTATE, POTASSIUM CHLORIDE, CALCIUM CHLORIDE 600; 310; 30; 20 MG/100ML; MG/100ML; MG/100ML; MG/100ML
INJECTION, SOLUTION INTRAVENOUS CONTINUOUS
Status: ACTIVE | OUTPATIENT
Start: 2019-10-24 | End: 2019-10-25

## 2019-10-24 RX ORDER — OXYCODONE HCL 5 MG/5 ML
10 SOLUTION, ORAL ORAL
Status: DISCONTINUED | OUTPATIENT
Start: 2019-10-24 | End: 2019-10-24 | Stop reason: HOSPADM

## 2019-10-24 RX ORDER — KETOROLAC TROMETHAMINE 30 MG/ML
INJECTION, SOLUTION INTRAMUSCULAR; INTRAVENOUS PRN
Status: DISCONTINUED | OUTPATIENT
Start: 2019-10-24 | End: 2019-10-24 | Stop reason: SURG

## 2019-10-24 RX ORDER — ONDANSETRON 2 MG/ML
INJECTION INTRAMUSCULAR; INTRAVENOUS PRN
Status: DISCONTINUED | OUTPATIENT
Start: 2019-10-24 | End: 2019-10-24 | Stop reason: SURG

## 2019-10-24 RX ADMIN — OXYCODONE HYDROCHLORIDE 10 MG: 5 TABLET ORAL at 21:41

## 2019-10-24 RX ADMIN — EPHEDRINE SULFATE 10 MG: 50 INJECTION INTRAMUSCULAR; INTRAVENOUS; SUBCUTANEOUS at 19:20

## 2019-10-24 RX ADMIN — SUGAMMADEX 200 MG: 100 INJECTION, SOLUTION INTRAVENOUS at 19:01

## 2019-10-24 RX ADMIN — CEFAZOLIN 2 G: 330 INJECTION, POWDER, FOR SOLUTION INTRAMUSCULAR; INTRAVENOUS at 18:45

## 2019-10-24 RX ADMIN — ROCURONIUM BROMIDE 20 MG: 10 INJECTION, SOLUTION INTRAVENOUS at 18:51

## 2019-10-24 RX ADMIN — FENTANYL CITRATE 75 MCG: 50 INJECTION, SOLUTION INTRAMUSCULAR; INTRAVENOUS at 16:31

## 2019-10-24 RX ADMIN — FENTANYL CITRATE 100 MCG: 50 INJECTION, SOLUTION INTRAMUSCULAR; INTRAVENOUS at 14:44

## 2019-10-24 RX ADMIN — ACETAMINOPHEN 1000 MG: 500 TABLET ORAL at 13:09

## 2019-10-24 RX ADMIN — FENTANYL CITRATE 75 MCG: 50 INJECTION, SOLUTION INTRAMUSCULAR; INTRAVENOUS at 18:51

## 2019-10-24 RX ADMIN — CELECOXIB 400 MG: 200 CAPSULE ORAL at 13:09

## 2019-10-24 RX ADMIN — PROPOFOL 150 MG: 10 INJECTION, EMULSION INTRAVENOUS at 14:44

## 2019-10-24 RX ADMIN — LABETALOL HYDROCHLORIDE 5 MG: 5 INJECTION INTRAVENOUS at 19:46

## 2019-10-24 RX ADMIN — EPHEDRINE SULFATE 15 MG: 50 INJECTION INTRAMUSCULAR; INTRAVENOUS; SUBCUTANEOUS at 14:59

## 2019-10-24 RX ADMIN — SODIUM CHLORIDE, POTASSIUM CHLORIDE, SODIUM LACTATE AND CALCIUM CHLORIDE: 600; 310; 30; 20 INJECTION, SOLUTION INTRAVENOUS at 13:11

## 2019-10-24 RX ADMIN — SUCCINYLCHOLINE CHLORIDE 140 MG: 20 INJECTION, SOLUTION INTRAMUSCULAR; INTRAVENOUS at 14:44

## 2019-10-24 RX ADMIN — ROCURONIUM BROMIDE 30 MG: 10 INJECTION, SOLUTION INTRAVENOUS at 16:31

## 2019-10-24 RX ADMIN — GABAPENTIN 300 MG: 300 CAPSULE ORAL at 13:09

## 2019-10-24 RX ADMIN — KETOROLAC TROMETHAMINE 30 MG: 30 INJECTION, SOLUTION INTRAMUSCULAR at 19:00

## 2019-10-24 RX ADMIN — EPHEDRINE SULFATE 10 MG: 50 INJECTION INTRAMUSCULAR; INTRAVENOUS; SUBCUTANEOUS at 18:40

## 2019-10-24 RX ADMIN — DEXAMETHASONE SODIUM PHOSPHATE 8 MG: 4 INJECTION, SOLUTION INTRA-ARTICULAR; INTRALESIONAL; INTRAMUSCULAR; INTRAVENOUS; SOFT TISSUE at 14:44

## 2019-10-24 RX ADMIN — METOCLOPRAMIDE 10 MG: 5 INJECTION, SOLUTION INTRAMUSCULAR; INTRAVENOUS at 14:44

## 2019-10-24 RX ADMIN — ONDANSETRON 4 MG: 2 INJECTION INTRAMUSCULAR; INTRAVENOUS at 21:46

## 2019-10-24 RX ADMIN — ACETAMINOPHEN 1000 MG: 500 TABLET ORAL at 23:12

## 2019-10-24 RX ADMIN — MIDAZOLAM 2 MG: 1 INJECTION INTRAMUSCULAR; INTRAVENOUS at 14:38

## 2019-10-24 RX ADMIN — ONDANSETRON 4 MG: 2 INJECTION INTRAMUSCULAR; INTRAVENOUS at 19:00

## 2019-10-24 RX ADMIN — CEFAZOLIN 2 G: 330 INJECTION, POWDER, FOR SOLUTION INTRAMUSCULAR; INTRAVENOUS at 14:38

## 2019-10-24 RX ADMIN — SODIUM CHLORIDE, POTASSIUM CHLORIDE, SODIUM LACTATE AND CALCIUM CHLORIDE: 600; 310; 30; 20 INJECTION, SOLUTION INTRAVENOUS at 16:32

## 2019-10-24 RX ADMIN — EPHEDRINE SULFATE 20 MG: 50 INJECTION INTRAMUSCULAR; INTRAVENOUS; SUBCUTANEOUS at 19:29

## 2019-10-24 RX ADMIN — SCOPALAMINE 1 PATCH: 1 PATCH, EXTENDED RELEASE TRANSDERMAL at 14:10

## 2019-10-24 RX ADMIN — POTASSIUM CHLORIDE AND SODIUM CHLORIDE: 450; 150 INJECTION, SOLUTION INTRAVENOUS at 23:12

## 2019-10-24 ASSESSMENT — LIFESTYLE VARIABLES
ON A TYPICAL DAY WHEN YOU DRINK ALCOHOL HOW MANY DRINKS DO YOU HAVE: 3
DOES PATIENT WANT TO STOP DRINKING: NO
CONSUMPTION TOTAL: NEGATIVE
EVER FELT BAD OR GUILTY ABOUT YOUR DRINKING: NO
TOTAL SCORE: 0
HAVE YOU EVER FELT YOU SHOULD CUT DOWN ON YOUR DRINKING: NO
EVER HAD A DRINK FIRST THING IN THE MORNING TO STEADY YOUR NERVES TO GET RID OF A HANGOVER: NO
HOW MANY TIMES IN THE PAST YEAR HAVE YOU HAD 5 OR MORE DRINKS IN A DAY: 0
AVERAGE NUMBER OF DAYS PER WEEK YOU HAVE A DRINK CONTAINING ALCOHOL: 2
EVER_SMOKED: YES
ALCOHOL_USE: YES
TOTAL SCORE: 0
HAVE PEOPLE ANNOYED YOU BY CRITICIZING YOUR DRINKING: NO
TOTAL SCORE: 0

## 2019-10-24 ASSESSMENT — PATIENT HEALTH QUESTIONNAIRE - PHQ9
7. TROUBLE CONCENTRATING ON THINGS, SUCH AS READING THE NEWSPAPER OR WATCHING TELEVISION: NOT AT ALL
1. LITTLE INTEREST OR PLEASURE IN DOING THINGS: SEVERAL DAYS
9. THOUGHTS THAT YOU WOULD BE BETTER OFF DEAD, OR OF HURTING YOURSELF: NOT AT ALL
SUM OF ALL RESPONSES TO PHQ QUESTIONS 1-9: 2
SUM OF ALL RESPONSES TO PHQ9 QUESTIONS 1 AND 2: 2
3. TROUBLE FALLING OR STAYING ASLEEP OR SLEEPING TOO MUCH: NOT AT ALL
2. FEELING DOWN, DEPRESSED, IRRITABLE, OR HOPELESS: SEVERAL DAYS
4. FEELING TIRED OR HAVING LITTLE ENERGY: NOT AT ALL
8. MOVING OR SPEAKING SO SLOWLY THAT OTHER PEOPLE COULD HAVE NOTICED. OR THE OPPOSITE, BEING SO FIGETY OR RESTLESS THAT YOU HAVE BEEN MOVING AROUND A LOT MORE THAN USUAL: NOT AT ALL
5. POOR APPETITE OR OVEREATING: NOT AT ALL
6. FEELING BAD ABOUT YOURSELF - OR THAT YOU ARE A FAILURE OR HAVE LET YOURSELF OR YOUR FAMILY DOWN: NOT AL ALL

## 2019-10-24 ASSESSMENT — COPD QUESTIONNAIRES
COPD SCREENING SCORE: 3
HAVE YOU SMOKED AT LEAST 100 CIGARETTES IN YOUR ENTIRE LIFE: YES
IN THE PAST 12 MONTHS DO YOU DO LESS THAN YOU USED TO BECAUSE OF YOUR BREATHING PROBLEMS: DISAGREE/UNSURE
DO YOU EVER COUGH UP ANY MUCUS OR PHLEGM?: NO/ONLY WITH OCCASIONAL COLDS OR INFECTIONS
DURING THE PAST 4 WEEKS HOW MUCH DID YOU FEEL SHORT OF BREATH: NONE/LITTLE OF THE TIME

## 2019-10-24 ASSESSMENT — PAIN SCALES - GENERAL: PAIN_LEVEL: 3

## 2019-10-24 NOTE — ANESTHESIA PREPROCEDURE EVALUATION
Relevant Problems   CARDIAC   (+) Hypertension      GI   (+) GERD (gastroesophageal reflux disease)       Physical Exam    Airway   Mallampati: II  TM distance: >3 FB  Neck ROM: full       Cardiovascular - normal exam  Rhythm: regular  Rate: normal  (-) murmur     Dental - normal exam         Pulmonary - normal exam  Breath sounds clear to auscultation     Abdominal    Neurological - normal exam                 Anesthesia Plan    ASA 2       Plan - general             Induction: intravenous    Postoperative Plan: Postoperative administration of opioids is intended.    Pertinent diagnostic labs and testing reviewed    Informed Consent:    Anesthetic plan and risks discussed with patient.    Use of blood products discussed with: patient whom consented to blood products.

## 2019-10-24 NOTE — ANESTHESIA PROCEDURE NOTES
Airway  Date/Time: 10/24/2019 2:45 PM  Performed by: Alfonso Berry M.D.  Authorized by: Alfonso Berry M.D.     Location:  OR  Urgency:  Elective  Indications for Airway Management:  Anesthesia  Spontaneous Ventilation: absent    Sedation Level:  Deep  Preoxygenated: Yes    Patient Position:  Sniffing  Mask Difficulty Assessment:  1 - vent by mask  Final Airway Type:  Endotracheal airway  Final Endotracheal Airway:  ETT  Cuffed: Yes    Technique Used for Successful ETT Placement:  Direct laryngoscopy  Insertion Site:  Oral  Blade Type:  Rueda  Laryngoscope Blade/Videolaryngoscope Blade Size:  2  ETT Size (mm):  7.0  Measured from:  Lips  ETT to Lips (cm):  21  Placement Verified by: auscultation and capnometry    Cormack-Lehane Classification:  Grade III - view of epiglottis only  Number of Attempts at Approach:  1

## 2019-10-25 PROCEDURE — 700102 HCHG RX REV CODE 250 W/ 637 OVERRIDE(OP): Performed by: SURGERY

## 2019-10-25 PROCEDURE — 700111 HCHG RX REV CODE 636 W/ 250 OVERRIDE (IP): Performed by: SURGERY

## 2019-10-25 PROCEDURE — A9270 NON-COVERED ITEM OR SERVICE: HCPCS | Performed by: SURGERY

## 2019-10-25 PROCEDURE — 700101 HCHG RX REV CODE 250: Performed by: SURGERY

## 2019-10-25 PROCEDURE — 770006 HCHG ROOM/CARE - MED/SURG/GYN SEMI*

## 2019-10-25 RX ADMIN — OXYCODONE HYDROCHLORIDE 10 MG: 5 TABLET ORAL at 06:12

## 2019-10-25 RX ADMIN — OXYCODONE HYDROCHLORIDE 10 MG: 5 TABLET ORAL at 14:27

## 2019-10-25 RX ADMIN — ONDANSETRON 4 MG: 2 INJECTION INTRAMUSCULAR; INTRAVENOUS at 06:12

## 2019-10-25 RX ADMIN — ACETAMINOPHEN 1000 MG: 500 TABLET ORAL at 06:12

## 2019-10-25 RX ADMIN — ENOXAPARIN SODIUM 30 MG: 100 INJECTION SUBCUTANEOUS at 18:02

## 2019-10-25 RX ADMIN — ACETAMINOPHEN 1000 MG: 500 TABLET ORAL at 18:02

## 2019-10-25 RX ADMIN — ONDANSETRON 4 MG: 2 INJECTION INTRAMUSCULAR; INTRAVENOUS at 18:00

## 2019-10-25 RX ADMIN — OXYCODONE HYDROCHLORIDE 10 MG: 5 TABLET ORAL at 10:09

## 2019-10-25 RX ADMIN — ACETAMINOPHEN 1000 MG: 500 TABLET ORAL at 12:20

## 2019-10-25 RX ADMIN — OMEPRAZOLE 20 MG: 20 CAPSULE, DELAYED RELEASE ORAL at 06:12

## 2019-10-25 RX ADMIN — POTASSIUM CHLORIDE AND SODIUM CHLORIDE: 450; 150 INJECTION, SOLUTION INTRAVENOUS at 23:52

## 2019-10-25 RX ADMIN — ACETAMINOPHEN 1000 MG: 500 TABLET ORAL at 23:14

## 2019-10-25 RX ADMIN — OXYCODONE HYDROCHLORIDE 10 MG: 5 TABLET ORAL at 19:38

## 2019-10-25 ASSESSMENT — ENCOUNTER SYMPTOMS
ABDOMINAL PAIN: 1
NAUSEA: 1
CHILLS: 0
FEVER: 0

## 2019-10-25 NOTE — DISCHARGE PLANNING
Care Transition Team Assessment    Anticipated Discharge Disposition: Home    Action: RN CM assessed pt at bedside and verified facesheet demographics.  Pt reports she lives alone in a single story home in [place].  She is independent with ADLs and IADLs and reports using no DME. Pt is employed, has prescription drug coverage, uses [facility] pharmacy on [address], and reports no financial barriers at this time. Pt's PCP is MONTSERRAT Alvarez. Pt anticipates discharging home with no needs when medically cleared. Her friend will be her ride home at discharge.     Barriers to Discharge: Medical clearance pending.    Plan: Await medical clearance for discharge home with no anticipated needs.       Information Source  Orientation : Oriented x 4  Information Given By: Patient  Who is responsible for making decisions for patient? : Patient    Readmission Evaluation  Is this a readmission?: No    Elopement Risk  Legal Hold: No  Ambulatory or Self Mobile in Wheelchair: Yes  Disoriented: No  Psychiatric Symptoms: None  History of Wandering: No  Elopement this Admit: No  Vocalizing Wanting to Leave: No  Displays Behaviors, Body Language Wanting to Leave: No-Not at Risk for Elopement  Elopement Risk: Not at Risk for Elopement    Interdisciplinary Discharge Planning  Does Admitting Nurse Feel This Could be a Complex Discharge?: No  Primary Care Physician: MONTSERRAT Alvarez  Lives with - Patient's Self Care Capacity: Alone and Able to Care For Self  Patient or legal guardian wants to designate a caregiver (see row info): No  Support Systems: Family Member(s)  Housing / Facility: 1 Story House  Do You Take your Prescribed Medications Regularly: Yes  Able to Return to Previous ADL's: Yes  Mobility Issues: No  Prior Services: None  Assistance Needed: No  Durable Medical Equipment: Not Applicable    Discharge Preparedness  What is your plan after discharge?: Home with help  What are your discharge supports?: Other  (comment)(Friend)  Prior Functional Level: Ambulatory, Drives Self, Independent with Activities of Daily Living, Independent with Medication Management  Difficulity with ADLs: None  Difficulity with IADLs: None    Functional Assesment  Prior Functional Level: Ambulatory, Drives Self, Independent with Activities of Daily Living, Independent with Medication Management    Finances  Financial Barriers to Discharge: No  Prescription Coverage: Yes    Vision / Hearing Impairment  Vision Impairment : No  Hearing Impairment : No         Advance Directive  Advance Directive?: None  Advance Directive offered?: AD Booklet refused    Domestic Abuse  Have you ever been the victim of abuse or violence?: No  Physical Abuse or Sexual Abuse: No  Verbal Abuse or Emotional Abuse: No  Possible Abuse Reported to:: Not Applicable         Discharge Risks or Barriers  Discharge risks or barriers?: No    Anticipated Discharge Information  Anticipated discharge disposition: Home  Discharge Address: [address] [place] [address]  Discharge Contact Phone Number: [phone]

## 2019-10-25 NOTE — OR NURSING
1936 to pacu awake but sleepy withoutairways breathing without dfficulty joon 94% on roomair bp and hr elevated on arrival 4 lap sites to abd guaze and gudelia cdi   1946 medicated labatolol per orders 165/76 hr 120  1956 bp and heart rate improving wide awake friend at bedside visiting slight nausea ice chips given   2007 o2 sats 86% on roomair placed o2 at 2 liters nasal cannula   2030- ready for room report called to roz keenan transport requsted   2045 pt transported to room via rney with transport with all belongings

## 2019-10-25 NOTE — OP REPORT
DATE OF SERVICE:  10/24/2019    PREOPERATIVE DIAGNOSIS:  Ventral incisional hernia.    POSTOPERATIVE DIAGNOSIS:  Multiple ventral incisional hernias with a total   longitudinal length of 18 cm and a total horizontal length of 8 cm.    INDICATION FOR SURGERY:  This is a 58-year-old female who has previously   undergone both an exploratory laparotomy, sigmoid colectomy and colostomy   placement as well as a colostomy takedown.  She was noted to have a ventral   incisional hernia and was brought to the operating room today for a planned   robotic-assisted laparoscopic ventral incisional hernia repair with mesh.    PROCEDURE:  Robotic-assisted laparoscopic ventral incisional hernia times many   repair with a single 25x15 cm piece of Ventralight mesh.    SURGEON:  Justus Will MD.    ASSISTANT:  Venkatesh Monroy MD.    ANESTHESIOLOGIST:  Alfonso Ybarra MD.    ANESTHESIA:  General endotracheal anesthesia.    FINDINGS:  Multiple small and large ventral incisional hernias with a total   longitudinal length of the Swiss cheese area of 18 cm and a total horizontal   length of the Swiss cheese area of 8 cm.    PROCEDURE NARRATIVE:  Signed informed consent was on the chart at the time of   the procedure.  The patient was brought to the operating room and placed in   the supine position.  General endotracheal anesthesia was induced and the skin   over the abdomen was prepped and draped in a sterile fashion.  The patient   was administered 2 g of Ancef IV preoperatively.  A timeout was performed.    The skin over the abdomen was prepped and draped in a sterile fashion.  After   first infusing the skin and soft tissue with local anesthetic, which in this   case is 0.5% Marcaine with epinephrine, a 1 cm incision was made in the left   upper quadrant.  Through this incision was placed a Visiport with a camera   within.  This appeared to end up in some adhesions and the Visiport was   removed.  After first infusing the skin  and soft tissue with local anesthetic,   a 3 cm longitudinal incision was made in the lateral right abdominal wall.    The underlying soft tissue was dissected through using careful Bovie cautery   down to the level of fascia.  The fascia was then grasped with Kocher clamps   at 2 sites and a Veress needle was introduced into the peritoneal space on the   first attempt.  Pneumoperitoneum was achieved without difficulty and the   Veress needle was replaced with an 8 mm trocar.  The underlying viscus was   inspected, no evidence of trauma was appreciated, and no evidence of trauma   was appreciated in the left upper quadrant port site either.  After first   infusing the skin and soft tissue with local anesthetic, two separate 2 cm   incisions were made, one in the right lower quadrant, one in the right upper   quadrant through which two 8 mm da Buck ports were placed under direct   visualization via the camera.  The 8 mm middle port on the right was then   replaced with a 12 mm da Buck port under direct visualization via the camera.    The robot was then docked to the patient  after first targeting the camera   port and then targeting the area of her planned operation and I moved to the   surgeon's station.  The adhesions in the anterior abdominal wall were taken   down revealing multiple small and large, the greatest of which was 2x2 cm   hernia defects along the anterior abdominal wall, all at the site of her   previous midline incision.  These were measured and noted to be 18 cm in   longitudinal direction and 8 cm in horizontal dimension.  A 25x15 cm piece of   Ventralight mesh was fashioned and rolled up and placed into the peritoneal   space.  This was unrolled and tacked to the anterior abdominal wall at   multiple sites eventually making sure that the mesh was appropriately placed.    Once the mesh was in place, the mesh was secured in place by using 8 separate   0 Ethibond sutures, each cut to 30 cm in each  run along a portion of the   periphery of the mesh, securing the mesh completely in place around its   periphery in a stepwise fashion.  All of the suture needles were stowed in the   anterior abdominal wall as we progressed, and eventually, I was able to   completely secure the mesh in place with no defect between the mesh and the   anterior abdominal wall along its circumference.  The patient was then   undocked from the robot and a laparoscopic 12 mm port was used to replace the   da Buck 12 mm port.  Using this as access and using the laparoscopic camera   for visualization, the eight suture needles were all removed.  Once our   instrument and needle count were correct, the 12 mm port was removed, and   using an Endoclose device under direct visualization via the camera, two   separate 0 Ethibond sutures were placed across that fascial defect.  These   were tightened and tied being careful not to include any underlying   structures.  This was done under direct visualization via the camera.  Each of   the other 3 ports was then removed and no evidence of bleeding was noted at   any of those sites.  Pneumoperitoneum was allowed to .  Each of the   incisions was irrigated and dried and all 4 incisions were closed at the skin   using a running 4-0 Vicryl subcuticular stitch.  Each of the incision was   dressed with a 2x2 gauze pad followed by clear Tegaderm.    FLUIDS:  1900 mL crystalloid.    ESTIMATED BLOOD LOSS:  20 mL.    DRAINS:  None.    SPECIMENS:  None.    COMPLICATIONS:  None.    DISPOSITION:  Patient was in stable condition to postanesthesia care unit.       ____________________________________     MD RAÚL Sanford / VELMA    DD:  10/24/2019 19:40:32  DT:  10/24/2019 20:03:23    D#:  [id number]  Job#:  [id number]

## 2019-10-25 NOTE — ANESTHESIA TIME REPORT
Anesthesia Start and Stop Event Times     Date Time Event    10/24/2019 1417 Ready for Procedure     1440 Anesthesia Start        Responsible Staff  10/24/19    Name Role Begin End    Alfonso Berry M.D. Anesth 1440         Preop Diagnosis (Free Text):  Pre-op Diagnosis     VENTRAL INCISIONAL HERNIA        Preop Diagnosis (Codes):    Post op Diagnosis  Ventral hernia      Premium Reason  A. 3PM - 7AM    Comments:

## 2019-10-25 NOTE — CARE PLAN
Problem: Discharge Barriers/Planning  Goal: Patient's continuum of care needs will be met  Outcome: PROGRESSING AS EXPECTED   Pain management with PO oxycodone Q4 PRN. Nausea management with Zofran PRN. Ambulating in hallway x2 , and up to bathroom.    Problem: Communication  Goal: The ability to communicate needs accurately and effectively will improve  Outcome: PROGRESSING AS EXPECTED   Uses call light appropriately.

## 2019-10-25 NOTE — CARE PLAN
Call light within reach. Pt calls appropriately.       Problem: Safety  Goal: Will remain free from injury  Outcome: PROGRESSING AS EXPECTED     Problem: Communication  Goal: The ability to communicate needs accurately and effectively will improve  Outcome: PROGRESSING AS EXPECTED

## 2019-10-25 NOTE — PROGRESS NOTES
2 RN skin check:    4 lap sites to abd  Old abd scars from previous surgeries  Red paige to left big toe     Otherwise, skin intact

## 2019-10-25 NOTE — PROGRESS NOTES
A&Ox4.   x4 lap sites, dressing CDI.  Pt states pain 8/10 with movement, medicated per MAR.   Tolerating GI soft, low fiber diet, mild nasuea.   Hypoactive bowel sounds, - flatus, LBM PTA.  Saturating >90% on RA.  IS pulling at 1000, strong effort.   Pt ambulates SBA, steady gait.  LR running at 75ml/hr.   Goal to ambulate in halls when pain controlled.   Updated on plan of care. Safety education provided. Bed locked in low. Call light within reach. Rounding in place.

## 2019-10-25 NOTE — OR SURGEON
Immediate Post OP Note    PreOp Diagnosis: Ventral incisional hernia    PostOp Diagnosis: Multiple ventral incisional hernias, total longitudinal length of 18 cm, total horizontal length of 8 cm    Procedure(s):  Robotic assisted, laparoscopic ventral hernia x many repair with single 25 x 15 cm Ventralyte mesh. - Wound Class: Clean    Surgeon(s):  ARTEMIO Goodman M.D.    Anesthesiologist/Type of Anesthesia:  Anesthesiologist: Alfonso Berry M.D./General    Surgical Staff:  Circulator: Josefina Ritchie RTROY  Relief Circulator: Jeanette Youngblood R.NPraneeth; Rosi Abbott RTROY  Scrub Person: Romain Torres    Specimens removed if any:  None    Estimated Blood Loss: 20 ml    Findings: Multiple small and large ventral incisional hernias, with total longitudinal length of 18 cm, total horizontal length of 8 cm.      Complications: None        10/24/2019 7:23 PM Justus Will M.D.

## 2019-10-25 NOTE — PROGRESS NOTES
Surgical Progress Note    Author: Justus Will Date & Time created: 10/25/2019   7:31 AM     Interval Events:  Pain significant when not lying in bed.  + nausea, adequately controlled with scopolamine patch.  Feels able to tolerate low residue diet.  IS not at bedside    Review of Systems   Constitutional: Negative for chills and fever.   Gastrointestinal: Positive for abdominal pain and nausea.     Hemodynamics:  Temp (24hrs), Av.4 °C (97.6 °F), Min:36 °C (96.8 °F), Max:37.6 °C (99.7 °F)  Temperature: 36 °C (96.8 °F)  Pulse  Av.5  Min: 84  Max: 133Heart Rate (Monitored): (!) 133  Blood Pressure: 103/61     Respiratory:    Respiration: 18, Pulse Oximetry: 97 %           Neuro:  GCS = 15       Fluids:    Intake/Output Summary (Last 24 hours) at 10/25/2019 0731  Last data filed at 10/24/2019 2141  Gross per 24 hour   Intake 2050 ml   Output --   Net 2050 ml     Weight: 81 kg (178 lb 9.2 oz)  Current Diet Order   Procedures   • Diet Order Low Fiber(GI Soft)     Physical Exam   Constitutional: She is oriented to person, place, and time. She appears well-developed and well-nourished. No distress.   HENT:   Head: Normocephalic.   Eyes: Pupils are equal, round, and reactive to light.   Cardiovascular: Normal rate and regular rhythm.   Pulmonary/Chest: Effort normal and breath sounds normal. No respiratory distress.   Abdominal: Soft. She exhibits distension. There is tenderness. There is no rebound and no guarding.   Minimal bowel sounds present on exam.  Laparoscopic incisions dressed.   Neurological: She is alert and oriented to person, place, and time.   Skin: Skin is warm and dry.   Psychiatric: She has a normal mood and affect. Her behavior is normal.     Labs:  No results found for this or any previous visit (from the past 24 hour(s)).  Medical Decision Making, by Problem:  Multiple ventral incisional hernias - POD #1, Robotic assisted, laparoscopic ventral hernia repair with single large piece of  mesh.       - Not ready for discharge home yet due to inadequate pan control without IV pain medication and nausea  Hypertension  Bladder incontinence  GERD    Plan:  Decrease IVF to 75 ml/hour, hep lock if able later (with Dr. Ellison only)  Continue low residue diet.  IS to bedside, instruct in use and encourage use 10x/hour when awake.    Quality Measures:  Quality-Core Measures   Reviewed items::  Medications reviewed  Simms catheter::  No Simms  DVT prophylaxis pharmacological::  Enoxaparin (Lovenox)  Ulcer Prophylaxis::  Yes      Discussed patient condition with Patient

## 2019-10-25 NOTE — ANESTHESIA POSTPROCEDURE EVALUATION
Patient: Ambar Jj    Procedure Summary     Date:  10/24/19 Room / Location:  [facility] ROOM 25 / SURGERY SAME DAY [facility]    Anesthesia Start:  1440 Anesthesia Stop:  1938    Procedure:  REPAIR, HERNIA, VENTRAL, ROBOT-ASSISTED, USING DA SANDRINE XI- FOR INCISIONAL WITH MESH (Abdomen) Diagnosis:  (VENTRAL INCISIONAL HERNIA)    Surgeon:  Justus Will M.D. Responsible Provider:  Alfonso Berry M.D.    Anesthesia Type:  general ASA Status:  2          Final Anesthesia Type: general  Last vitals  BP   Blood Pressure: (!) 165/76    Temp   37.6 °C (99.7 °F)    Pulse   Pulse: (!) 129, Heart Rate (Monitored): 84   Resp   19    SpO2   94 %      Anesthesia Post Evaluation    Patient location during evaluation: PACU  Patient participation: complete - patient participated  Level of consciousness: awake and alert  Pain score: 3    Airway patency: patent  Anesthetic complications: no  Cardiovascular status: hemodynamically stable  Respiratory status: acceptable  Hydration status: euvolemic    PONV: none           Nurse Pain Score: 3 (NPRS)

## 2019-10-26 VITALS
DIASTOLIC BLOOD PRESSURE: 84 MMHG | OXYGEN SATURATION: 92 % | HEIGHT: 67 IN | BODY MASS INDEX: 28.03 KG/M2 | WEIGHT: 178.57 LBS | SYSTOLIC BLOOD PRESSURE: 136 MMHG | TEMPERATURE: 99.4 F | RESPIRATION RATE: 16 BRPM | HEART RATE: 111 BPM

## 2019-10-26 PROCEDURE — 700111 HCHG RX REV CODE 636 W/ 250 OVERRIDE (IP): Performed by: SURGERY

## 2019-10-26 RX ORDER — ONDANSETRON 4 MG/1
4 TABLET, FILM COATED ORAL EVERY 6 HOURS PRN
Qty: 10 TAB | Refills: 0 | Status: SHIPPED | OUTPATIENT
Start: 2019-10-26 | End: 2021-09-27 | Stop reason: SDUPTHER

## 2019-10-26 RX ORDER — OXYCODONE AND ACETAMINOPHEN 7.5; 325 MG/1; MG/1
1 TABLET ORAL EVERY 6 HOURS PRN
Qty: 20 TAB | Refills: 0 | Status: SHIPPED | OUTPATIENT
Start: 2019-10-26 | End: 2019-11-02

## 2019-10-26 RX ADMIN — ONDANSETRON 4 MG: 2 INJECTION INTRAMUSCULAR; INTRAVENOUS at 10:28

## 2019-10-26 RX ADMIN — ONDANSETRON 4 MG: 2 INJECTION INTRAMUSCULAR; INTRAVENOUS at 04:20

## 2019-10-26 RX ADMIN — ENOXAPARIN SODIUM 30 MG: 100 INJECTION SUBCUTANEOUS at 06:48

## 2019-10-26 RX ADMIN — HALOPERIDOL LACTATE 1 MG: 5 INJECTION, SOLUTION INTRAMUSCULAR at 06:47

## 2019-10-26 NOTE — DISCHARGE SUMMARY
DATE OF ADMISSION:  10/24/2019    DATE OF DISCHARGE:  10/26/2019    FINAL DISCHARGE DIAGNOSIS:  Large incisional hernia.    OPERATION PERFORMED:  Robotic repair of large incisional hernia with mesh   placement.    HOSPITAL COURSE:  This is a 58-year-old female admitted postoperatively after   an elective incisional hernia repair due to nausea and pain control issues.    Postoperatively, she has done well.  She on postop day #1 still had   significant nausea and was not able to ambulate without severe pain.  Today,   her pain is better controlled.  She was still nauseated and is keeping things   down and would like to go home.  She was instructed to stay on a clear to full   liquid diet until her nausea improves.  She is passing gas and she will   follow up with Dr. Will next week.  She was given Percocet and Zofran   for her pain and nausea.  She has been instructed not to lift weight greater   than 10 pounds for the next 6 weeks.  She is to not drive for the next 2   weeks.  Showers only.  She is to remove her Tegaderm dressings in 4 days.       ____________________________________     Venkatesh Monroy MD    PMS / NTS    DD:  10/26/2019 10:27:27  DT:  10/26/2019 10:45:25    D#:  [id number]  Job#:  [id number]

## 2019-10-26 NOTE — PROGRESS NOTES
"Assumed care of patient from RN at 0700.     Reviewed VS, labs, orders, and notes.     Pt resting in bed. \"I just want to be left alone and sleep. A&Ox 4.    /84   Pulse (!) 111 Comment: RN notified  Temp 37.4 °C (99.4 °F) (Temporal)   Resp 16   Ht 1.702 m (5' 7\")   Wt 81 kg (178 lb 9.2 oz)   LMP 10/14/2001 (Approximate)   SpO2 92%   BMI 27.97 kg/m² . MEWS Score: 0.     On room ai L oxygen. Denies SOB.    Moves all extremities, denies numbness or tingling.     Skin assessed over bony prominences and under medical devices.    Voiding, normaoctive BS, + flatus, LBM 10/24 PTA.     Low fiber GI soft diet, experiencing moderate nausea - declines intervention    Wound(s): abdominal lap-stab X4 gauze and tape, CDI.     Patient reports 2 /10 pain in abdomen, declines instervention.    Pt. is SBA assist with steady gait.    [place] Fall Risk Score: No risk. Fall prevention education reinforced with pt. Pt is wearing treaded slipper socks, IV pole is on the same side as the bathroom, lower bedrails are down. Pt calls appropriatly.     Discussed POC, all questions answered at this time. Bed is locked and in the lowest position, call light within reach, Q 1 hour rounding in place. All needs met at this time.   "

## 2019-10-26 NOTE — DISCHARGE INSTRUCTIONS
Discharge Instructions    Discharged to home by car with friend. Discharged via wheelchair, hospital escort: Yes.  Special equipment needed: Not Applicable    Be sure to schedule a follow-up appointment with your primary care doctor or any specialists as instructed.     Discharge Plan:   Diet Plan: Discussed  Activity Level: Discussed  Confirmed Follow up Appointment: Patient to Call and Schedule Appointment  Confirmed Symptoms Management: Discussed  Medication Reconciliation Updated: Yes  Influenza Vaccine Indication: Patient Refuses    I understand that a diet low in cholesterol, fat, and sodium is recommended for good health. Unless I have been given specific instructions below for another diet, I accept this instruction as my diet prescription.   Other diet: Resume regular diet as tolorated    Special Instructions:   Follow up in 7-10 days. Activity: no lifting weight greater than 10 lbs for 6 weeks. Diet as tolerated. Showers only for 2 weeks No driving for one week or while on pain medications Wound care, remove tegaderms in 4 days.    · Is patient discharged on Warfarin / Coumadin?   No     Depression / Suicide Risk    As [facility] are [facility], it is important to learn how to keep safe from harming yourself.    Recognize the warning signs:  · Abrupt changes in personality, positive or negative- including increase in energy   · Giving away possessions  · Change in eating patterns- significant weight changes-  positive or negative  · Change in sleeping patterns- unable to sleep or sleeping all the time   · Unwillingness or inability to communicate  · Depression  · Unusual sadness, discouragement and loneliness  · Talk of wanting to die  · Neglect of personal appearance   · Rebelliousness- reckless behavior  · Withdrawal from people/activities they love  · Confusion- inability to concentrate     If you or a loved one observes any of these behaviors or has concerns about self-harm,  here's what you can do:  · Talk about it- your feelings and reasons for harming yourself  · Remove any means that you might use to hurt yourself (examples: pills, rope, extension cords, firearm)  · Get professional help from the community (Mental Health, Substance Abuse, psychological counseling)  · Do not be alone:Call your Safe Contact- someone whom you trust who will be there for you.  · Call your local CRISIS HOTLINE 221-6609 or 972-043-3425  · Call your local Children's Mobile Crisis Response Team [place] (093) 814-4932 or [employer]  · Call the toll free National Suicide Prevention Hotlines   · National Suicide Prevention Lifeline 871-501-HVIN (1797)  · National Prevacus Line Network 800-SUICIDE (773-3915)    Dressing Change  A dressing is a material placed over wounds. It keeps the wound clean, dry, and protected from further injury.   BEFORE YOU BEGIN  · Get your supplies together. Things you may need include:  ¨ Salt solution (saline).  ¨ Flexible gauze bandage.  ¨ Medicated cream.  ¨ Tape.  ¨ Gloves.  ¨ Belly (abdominal) pads.  ¨ Gauze squares.  ¨ Plastic bags.  · Take pain medicine 30 minutes before the bandage change if you need it.  · Take a shower before you do the first bandage change of the day. Put plastic wrap or a bag over the dressing.  REMOVING YOUR OLD BANDAGE  · Wash your hands with soap and water. Dry your hands with a clean towel.  · Put on your gloves.  · Remove any tape.  · Remove the old bandage as told. If it sticks, put a small amount of warm water on it to loosen the bandage.  · Remove any gauze or packing tape in your wound.  · Take off your gloves.  · Put the gloves, tape, gauze, or any packing tape in a plastic bag.  CHANGING YOUR BANDAGE  · Open the supplies.  · Take the cap off the salt solution.  · Open the gauze. Leave the gauze on the inside of the package.  · Put on your gloves.  · Clean your wound as told by your doctor.  · Keep your wound dry if your doctor told  you to do so.  · Your doctor may tell you to do one or more of the following:  ¨  the gauze. Pour the salt solution over the gauze. Squeeze out the extra salt solution.  ¨ Put medicated cream or other medicine on your wound.  ¨ Put solution soaked gauze only in your wound, not on the skin around it.  ¨ Pack your wound loosely.  ¨ Put dry gauze on your wound.  ¨ Put belly pads over the dry gauze if your bandages soak through.  · Tape the bandage in place so it will not fall off. Do not wrap the tape all the way around your arm or leg.  · Wrap the bandage with the flexible gauze bandage as told by your doctor.  · Take off your gloves. Put them in the plastic bag with the old bandage. Tie the bag shut and throw it away.  · Keep the bandage clean and dry.  · Wash your hands.  GET HELP RIGHT AWAY IF:   · Your skin around the wound looks red.  · Your wound feels more tender or sore.  · You see yellowish-white fluid (pus) in the wound.  · Your wound smells bad.  · You have a fever.  · Your skin around the wound has a red rash that itches and burns.  · You see black or yellow skin in your wound that was not there before.  · You feel sick to your stomach (nauseous), throw up (vomit), and feel very tired.     This information is not intended to replace advice given to you by your health care provider. Make sure you discuss any questions you have with your health care provider.     Document Released: 03/16/2010 Document Revised: 01/08/2016 Document Reviewed: 10/28/2012  [employer] Interactive Patient Education ©2016 [employer]

## 2019-10-26 NOTE — CARE PLAN
Call light within reach. Pt calls appropriately.      Problem: Communication  Goal: The ability to communicate needs accurately and effectively will improve  Outcome: PROGRESSING AS EXPECTED     Problem: Safety  Goal: Will remain free from injury  Outcome: PROGRESSING AS EXPECTED

## 2019-10-26 NOTE — PROGRESS NOTES
Patient discharged home. All lines removed. Armband removed. Discharge instructions and prescriptions reviewed and understanding verbalized. Wound care instructions reviewed. Patient states they will fill prescriptions. Patient states that they will return to emergency if discussed symptoms arise. Patient left via wheelchair was escorted of property by staff. All personal items were removed from the room prior to departure. Medications from drawer removed and sent back to pharmacy or disposed of per protocol. Chart given to unit clerk .

## 2019-11-06 RX ORDER — LOSARTAN POTASSIUM 25 MG/1
TABLET ORAL
Qty: 90 TAB | Refills: 0 | Status: SHIPPED | OUTPATIENT
Start: 2019-11-06 | End: 2020-01-27

## 2019-12-23 DIAGNOSIS — J45.902: ICD-10-CM

## 2019-12-23 RX ORDER — ALBUTEROL SULFATE 90 UG/1
2 AEROSOL, METERED RESPIRATORY (INHALATION) EVERY 6 HOURS
Qty: 8.5 G | Refills: 0 | Status: SHIPPED | OUTPATIENT
Start: 2019-12-23 | End: 2020-07-13 | Stop reason: SDUPTHER

## 2020-01-27 DIAGNOSIS — K21.9 GASTROESOPHAGEAL REFLUX DISEASE, ESOPHAGITIS PRESENCE NOT SPECIFIED: ICD-10-CM

## 2020-01-27 RX ORDER — LOSARTAN POTASSIUM 25 MG/1
TABLET ORAL
Qty: 90 TAB | Refills: 0 | Status: SHIPPED | OUTPATIENT
Start: 2020-01-27 | End: 2020-04-29

## 2020-01-27 RX ORDER — OMEPRAZOLE 20 MG/1
CAPSULE, DELAYED RELEASE ORAL
Qty: 90 CAP | Refills: 0 | Status: SHIPPED | OUTPATIENT
Start: 2020-01-27

## 2020-01-27 NOTE — TELEPHONE ENCOUNTER
Was the patient seen in the last year in this department? Yes 8/3/2019    Does patient have an active prescription for medications requested? No     Received Request Via: Pharmacy

## 2020-04-29 RX ORDER — LOSARTAN POTASSIUM 25 MG/1
TABLET ORAL
Qty: 90 TAB | Refills: 0 | Status: SHIPPED | OUTPATIENT
Start: 2020-04-29 | End: 2020-08-03

## 2020-06-17 ENCOUNTER — PATIENT MESSAGE (OUTPATIENT)
Dept: MEDICAL GROUP | Facility: PHYSICIAN GROUP | Age: 60
End: 2020-06-17

## 2020-07-13 DIAGNOSIS — J45.902: ICD-10-CM

## 2020-07-13 RX ORDER — ALBUTEROL SULFATE 90 UG/1
2 AEROSOL, METERED RESPIRATORY (INHALATION) EVERY 6 HOURS
Qty: 8.5 G | Refills: 0 | Status: SHIPPED | OUTPATIENT
Start: 2020-07-13 | End: 2020-11-12 | Stop reason: SDUPTHER

## 2020-08-03 RX ORDER — LOSARTAN POTASSIUM 25 MG/1
TABLET ORAL
Qty: 90 TAB | Refills: 0 | Status: SHIPPED | OUTPATIENT
Start: 2020-08-03 | End: 2020-11-12 | Stop reason: SDUPTHER

## 2020-11-12 ENCOUNTER — TELEMEDICINE (OUTPATIENT)
Dept: MEDICAL GROUP | Facility: PHYSICIAN GROUP | Age: 60
End: 2020-11-12
Payer: COMMERCIAL

## 2020-11-12 VITALS
WEIGHT: 180.6 LBS | HEART RATE: 81 BPM | SYSTOLIC BLOOD PRESSURE: 119 MMHG | DIASTOLIC BLOOD PRESSURE: 80 MMHG | TEMPERATURE: 98.5 F | BODY MASS INDEX: 28.35 KG/M2 | HEIGHT: 67 IN

## 2020-11-12 DIAGNOSIS — Z90.49 STATUS POST PARTIAL COLECTOMY: ICD-10-CM

## 2020-11-12 DIAGNOSIS — I10 ESSENTIAL HYPERTENSION: ICD-10-CM

## 2020-11-12 DIAGNOSIS — Z11.59 NEED FOR HEPATITIS C SCREENING TEST: ICD-10-CM

## 2020-11-12 DIAGNOSIS — Z00.00 WELLNESS EXAMINATION: ICD-10-CM

## 2020-11-12 DIAGNOSIS — J45.31 MILD PERSISTENT ASTHMA WITH ACUTE EXACERBATION: ICD-10-CM

## 2020-11-12 PROCEDURE — 99214 OFFICE O/P EST MOD 30 MIN: CPT | Mod: 95,CR | Performed by: NURSE PRACTITIONER

## 2020-11-12 RX ORDER — ALBUTEROL SULFATE 90 UG/1
2 AEROSOL, METERED RESPIRATORY (INHALATION) EVERY 6 HOURS
Qty: 8.5 G | Refills: 6 | Status: SHIPPED | OUTPATIENT
Start: 2020-11-12 | End: 2022-05-05 | Stop reason: SDUPTHER

## 2020-11-12 RX ORDER — LOSARTAN POTASSIUM 25 MG/1
25 TABLET ORAL
Qty: 90 TAB | Refills: 3 | Status: SHIPPED | OUTPATIENT
Start: 2020-11-12 | End: 2021-01-21 | Stop reason: SDUPTHER

## 2020-11-12 RX ORDER — FLUTICASONE PROPIONATE 50 MCG
1 SPRAY, SUSPENSION (ML) NASAL DAILY
COMMUNITY
End: 2022-03-12

## 2020-11-12 ASSESSMENT — FIBROSIS 4 INDEX: FIB4 SCORE: 1.36

## 2020-11-14 NOTE — PROGRESS NOTES
"Virtual Visit: Established Patient   This visit was conducted via Zoom using secure and encrypted videoconferencing technology. The patient was in a private location in the state of [place].    The patient's identity was confirmed and verbal consent was obtained for this virtual visit.    Subjective:   CC:   Chief Complaint   Patient presents with   • Medication Refill     Losartan, inhaler       Ambar Jj is a 59 y.o. female presenting for evaluation and management of:    Hypertension  Chronic in nature.  Stable.  Patient continues to take medication as prescribed.  Blood pressure today is 119/80.  Patient was concerned as she feels the higher number is a little more elevated than her normal on her medication.  Patient denies chest pain, palpitations, dizziness, blurry vision.    Mild intermittent asthma with exacerbation  Chronic in nature.  Patient has recently been noticing that she is using her rescue inhaler 1-2 times per day.  Patient states that when she uses it in the afternoon she may not \"need to\".  But states that she does find herself reaching for it related to some increased tightness in her chest.  Patient states that she does need to use it in the morning as she wakes up feeling short of breath.  Patient states that she has had some more frequent coughing and was surprised that her allergy symptoms have not calm down considering the recent snow.  Patient states that this is definitely abnormal in the past she has used her rescue inhaler no more than once a day.  States that it is usually seasonal when she uses it more frequently.  But states that recently she has noticed more of a tight feeling in her chest.    Status post partial colectomy  Patient continues to recover from multiple abdominal surgeries including a partial colectomy and a hernia repair.  Patient states that overall recovery has gone well states that she is slowly returning to exercise but does notice that anytime she does " exercises that engage her abdominal muscles she does have some mild pain patient states that this is scary to her and as such she has been unable to do these exercises comfortably.  Patient states that she is concerned and would like some guidance regarding going back to doing more than the most gentle yoga.         ROS   Denies any recent fevers or chills. No nausea or vomiting. No chest pains or shortness of breath.     Allergies   Allergen Reactions   • Codeine Vomiting       Current medicines (including changes today)  Current Outpatient Medications   Medication Sig Dispense Refill   • fluticasone (FLONASE) 50 MCG/ACT nasal spray Administer 1 Spray into affected nostril(S) every day.     • losartan (COZAAR) 25 MG Tab Take 1 Tab by mouth every day. 90 Tab 3   • albuterol (PROAIR HFA) 108 (90 Base) MCG/ACT Aero Soln inhalation aerosol Inhale 2 Puffs every 6 hours. INHALE 2 PUFFS BY MOUTH EVERY 6 HOURS AS NEEDED FOR SHORTNESS OF BREATH. 8.5 g 6   • beclomethasone (QVAR) 40 MCG/ACT inhaler Inhale 1 Puff 2 Times a Day. 1 Each 0   • omeprazole (PRILOSEC) 20 MG delayed-release capsule TAKE 1 CAPSULE BY MOUTH EVERY DAY 90 Cap 0     No current facility-administered medications for this visit.        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   • Hypokalemia 09/06/2018   • Proteinuria 04/17/2017   • Status post partial colectomy 12/09/2016   • Hypertriglyceridemia 11/29/2016   • Family history of breast cancer in mother 10/15/2015   • History of tobacco use 10/15/2015   • History of hysterectomy for benign disease 10/15/2015   • Urinary incontinence 05/06/2015   • Vitamin D deficiency disease 10/07/2013   • GERD (gastroesophageal reflux disease)    • Hypertension    • Mild intermittent asthma with exacerbation        Family History   Problem Relation Age of Onset   • Cancer Mother         Breast   • Lung Disease Mother         COPD   • Heart Disease Mother    • Diabetes Mother    •  "Lung Disease Father         COPD   • Heart Disease Father    • Alcohol/Drug Sister    • Lung Disease Sister         sister 2 Asthma   • Heart Disease Sister         sister 1 Heart murmur   • Alcohol/Drug Brother    • Other Son         IBS; working on his diet       She  has a past medical history of Anemia, Anemia, Anesthesia, Bowel habit changes (11/19/2018), Bronchitis (2010), Colostomy present (HCC), Diverticulitis (11/2017), Female bladder prolapse (11/19/2018), GERD (gastroesophageal reflux disease), Heart burn, HTN, HTN (hypertension) (11/19/2018), Indigestion, Other specified symptom associated with female genital organs, Pap smear (01/29/2009), PONV (postoperative nausea and vomiting), Psychiatric problem, Reactive airway disease (11/19/2018), Unspecified urinary incontinence (11/19/2018), and Urinary bladder disorder.  She  has a past surgical history that includes bladder suspension; endoscopy; tonsillectomy and adenoidectomy; bladder suspension (N/A, 5/6/2015); cystoscopy (N/A, 5/6/2015); other; sigmoid colectomy (N/A, 11/21/2018); colostomy takedown (5/6/2019); cholecystectomy; endoscopy procedure; appendectomy (5/6/2019); abdominal hysterectomy total; anterior and posterior repair (N/A, 5/6/2015); and ventral hernia repair robotic xi (10/24/2019).       Objective:   /80 (BP Location: Left arm, Patient Position: Sitting, BP Cuff Size: Adult) Comment: Pt reported  Pulse 81 Comment: Pt reported  Temp 36.9 °C (98.5 °F) (Axillary) Comment: Pt reported  Ht 1.702 m (5' 7\") Comment: Pt reported  Wt 81.9 kg (180 lb 9.6 oz) Comment: Pt reported  LMP 10/14/2001 (Approximate)   BMI 28.29 kg/m²     Physical Exam:  Constitutional: Alert, no distress, well-groomed.  Skin: No rashes in visible areas.  Eye: Round. Conjunctiva clear, lids normal. No icterus.   ENMT: Lips pink without lesions, good dentition, moist mucous membranes. Phonation normal.  Neck: No masses, no thyromegaly. Moves freely without " pain.  Respiratory: Unlabored respiratory effort, no cough or audible wheeze  Psych: Alert and oriented x3, normal affect and mood.       Assessment and Plan:   The following treatment plan was discussed:     1. Essential hypertension  - losartan (COZAAR) 25 MG Tab; Take 1 Tab by mouth every day.  Dispense: 90 Tab; Refill: 3    2. Wellness examination  - CBC WITH DIFFERENTIAL; Future  - Comp Metabolic Panel; Future  - Lipid Profile; Future    3. Mild persistent asthma with acute exacerbation  - albuterol (PROAIR HFA) 108 (90 Base) MCG/ACT Aero Soln inhalation aerosol; Inhale 2 Puffs every 6 hours. INHALE 2 PUFFS BY MOUTH EVERY 6 HOURS AS NEEDED FOR SHORTNESS OF BREATH.  Dispense: 8.5 g; Refill: 6  - beclomethasone (QVAR) 40 MCG/ACT inhaler; Inhale 1 Puff 2 Times a Day.  Dispense: 1 Each; Refill: 0    4. Need for hepatitis C screening test  - HEP C VIRUS ANTIBODY; Future    5. Status post partial colectomy    Other orders  - fluticasone (FLONASE) 50 MCG/ACT nasal spray; Administer 1 Spray into affected nostril(S) every day.    Plan at this time is to refill medications as requested, obtain updated labs.  We will also try a steroid inhaler to better control her asthma so that she is not using her rescue inhaler daily to twice a day.  Patient will try this over the next couple of weeks and is advised to rinse her mouth out after every use.  Discussed with patient that this can take some time to start to work discussed that we do have latitude to increase the dose if needed.  Patient will contact me in 1 month and let me know if symptoms have improved we may consider a virtual visit at that time depending on need.    Follow-up: No follow-ups on file.

## 2020-11-14 NOTE — ASSESSMENT & PLAN NOTE
Chronic in nature.  Stable.  Patient continues to take medication as prescribed.  Blood pressure today is 119/80.  Patient was concerned as she feels the higher number is a little more elevated than her normal on her medication.  Patient denies chest pain, palpitations, dizziness, blurry vision.

## 2020-11-14 NOTE — ASSESSMENT & PLAN NOTE
"Chronic in nature.  Patient has recently been noticing that she is using her rescue inhaler 1-2 times per day.  Patient states that when she uses it in the afternoon she may not \"need to\".  But states that she does find herself reaching for it related to some increased tightness in her chest.  Patient states that she does need to use it in the morning as she wakes up feeling short of breath.  Patient states that she has had some more frequent coughing and was surprised that her allergy symptoms have not calm down considering the recent snow.  Patient states that this is definitely abnormal in the past she has used her rescue inhaler no more than once a day.  States that it is usually seasonal when she uses it more frequently.  But states that recently she has noticed more of a tight feeling in her chest.  "

## 2020-11-14 NOTE — ASSESSMENT & PLAN NOTE
Patient continues to recover from multiple abdominal surgeries including a partial colectomy and a hernia repair.  Patient states that overall recovery has gone well states that she is slowly returning to exercise but does notice that anytime she does exercises that engage her abdominal muscles she does have some mild pain patient states that this is scary to her and as such she has been unable to do these exercises comfortably.  Patient states that she is concerned and would like some guidance regarding going back to doing more than the most gentle yoga.

## 2020-12-08 ENCOUNTER — HOSPITAL ENCOUNTER (OUTPATIENT)
Dept: RADIOLOGY | Facility: MEDICAL CENTER | Age: 60
End: 2020-12-08
Attending: NURSE PRACTITIONER
Payer: COMMERCIAL

## 2020-12-08 DIAGNOSIS — Z12.31 VISIT FOR SCREENING MAMMOGRAM: ICD-10-CM

## 2020-12-08 PROCEDURE — 77067 SCR MAMMO BI INCL CAD: CPT

## 2020-12-21 NOTE — PROGRESS NOTES
Pt aao x 4, on room air, up ambulating in hallway multiple times this shift, pain controlled with 0.5 tab of percocet. Tolerating gi soft diet without any nausea this shift. Pt reports more regular BM's. IR drain remains in place to left buttocks. Plan of care discussed with pt.   none

## 2021-01-21 ENCOUNTER — OFFICE VISIT (OUTPATIENT)
Dept: MEDICAL GROUP | Facility: PHYSICIAN GROUP | Age: 61
End: 2021-01-21
Payer: COMMERCIAL

## 2021-01-21 VITALS
BODY MASS INDEX: 28.19 KG/M2 | OXYGEN SATURATION: 96 % | DIASTOLIC BLOOD PRESSURE: 76 MMHG | HEART RATE: 89 BPM | SYSTOLIC BLOOD PRESSURE: 110 MMHG | RESPIRATION RATE: 16 BRPM | TEMPERATURE: 97.6 F | WEIGHT: 179.6 LBS | HEIGHT: 67 IN

## 2021-01-21 DIAGNOSIS — I10 ESSENTIAL HYPERTENSION: ICD-10-CM

## 2021-01-21 DIAGNOSIS — K21.9 GASTROESOPHAGEAL REFLUX DISEASE, UNSPECIFIED WHETHER ESOPHAGITIS PRESENT: ICD-10-CM

## 2021-01-21 DIAGNOSIS — D22.9 ATYPICAL MOLE: ICD-10-CM

## 2021-01-21 DIAGNOSIS — L98.9 NON-HEALING SKIN LESION: ICD-10-CM

## 2021-01-21 DIAGNOSIS — L82.1 SEBORRHEIC KERATOSES: ICD-10-CM

## 2021-01-21 DIAGNOSIS — R20.9 DISTURBANCE OF SKIN SENSATION: ICD-10-CM

## 2021-01-21 PROCEDURE — 17110 DESTRUCTION B9 LES UP TO 14: CPT | Performed by: NURSE PRACTITIONER

## 2021-01-21 PROCEDURE — 99214 OFFICE O/P EST MOD 30 MIN: CPT | Mod: 25 | Performed by: NURSE PRACTITIONER

## 2021-01-21 RX ORDER — LOSARTAN POTASSIUM 25 MG/1
25 TABLET ORAL
Qty: 90 TAB | Refills: 3 | Status: SHIPPED | OUTPATIENT
Start: 2021-01-21 | End: 2021-09-20 | Stop reason: SDUPTHER

## 2021-01-21 RX ORDER — IBUPROFEN 200 MG
200 TABLET ORAL EVERY 6 HOURS PRN
COMMUNITY
End: 2022-05-05

## 2021-01-21 ASSESSMENT — PATIENT HEALTH QUESTIONNAIRE - PHQ9: CLINICAL INTERPRETATION OF PHQ2 SCORE: 0

## 2021-01-21 ASSESSMENT — FIBROSIS 4 INDEX: FIB4 SCORE: 1.39

## 2021-01-22 NOTE — ASSESSMENT & PLAN NOTE
Chronic in nature.  Stable.  Blood pressure today is 110/76.  Patient continues to take losartan 25 mg daily without side effects.  States that at this time she denies any chest pain, palpitations, dizziness, blurry vision.

## 2021-01-22 NOTE — PROGRESS NOTES
Chief Complaint   Patient presents with   • Nevus     near hair line at the left on the forehead x 1 mo   • Medication Refill     losartan paper copy        HISTORY OF PRESENT ILLNESS: Patient is a 60 y.o. female established patient who presents today to discuss multiple issues especially seborrheic keratosis    GERD (gastroesophageal reflux disease)  Chronic in nature. Stable.  Continues taking omeprazole 20 mg daily without side effects.  Denies any substernal burning, change in appetite    Hypertension  Chronic in nature.  Stable.  Blood pressure today is 110/76.  Patient continues to take losartan 25 mg daily without side effects.  States that at this time she denies any chest pain, palpitations, dizziness, blurry vision.    Seborrheic keratoses  Is a new issue.  Over the last couple of months patient has noticed a growth on her forehead states that it has gotten bigger quite quickly but that it has not changed other than not states that it is bumpy, that it is round, states that it is ugly though she states other people have told her that they do not really notice it.  Patient states that she would like it removed or frozen.  States that it has not been itchy or painful.  States that she has noticed some redness and states that it did have a scab because it got scratched.      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   • Hypokalemia 09/06/2018   • Proteinuria 04/17/2017   • Status post partial colectomy 12/09/2016   • Hypertriglyceridemia 11/29/2016   • Family history of breast cancer in mother 10/15/2015   • History of tobacco use 10/15/2015   • History of hysterectomy for benign disease 10/15/2015   • Urinary incontinence 05/06/2015   • Vitamin D deficiency disease 10/07/2013   • GERD (gastroesophageal reflux disease)    • Hypertension    • Mild intermittent asthma with exacerbation        Allergies:Codeine    Current Outpatient  Medications   Medication Sig Dispense Refill   • ibuprofen (MOTRIN) 200 MG Tab Take 200 mg by mouth every 6 hours as needed.     • losartan (COZAAR) 25 MG Tab Take 1 Tab by mouth every day. 90 Tab 3   • fluticasone (FLONASE) 50 MCG/ACT nasal spray Administer 1 Spray into affected nostril(S) every day.     • albuterol (PROAIR HFA) 108 (90 Base) MCG/ACT Aero Soln inhalation aerosol Inhale 2 Puffs every 6 hours. INHALE 2 PUFFS BY MOUTH EVERY 6 HOURS AS NEEDED FOR SHORTNESS OF BREATH. 8.5 g 6   • omeprazole (PRILOSEC) 20 MG delayed-release capsule TAKE 1 CAPSULE BY MOUTH EVERY DAY 90 Cap 0     No current facility-administered medications for this visit.        Social History     Tobacco Use   • Smoking status: Former Smoker     Packs/day: 0.00     Years: 34.00     Pack years: 0.00     Types: Cigarettes     Quit date: 2010     Years since quittin.0   • Smokeless tobacco: Never Used   • Tobacco comment: 2010   Substance Use Topics   • Alcohol use: Yes     Alcohol/week: 7.2 oz     Types: 6 Cans of beer, 6 Standard drinks or equivalent per week     Comment: 4 per week   • Drug use: Yes     Types: Inhaled     Comment: marijuana occasionally        Family Status   Relation Name Status   • Mo     • Fa     • Sis 2 Alive   • Bro 3    • Son 1 Alive     Family History   Problem Relation Age of Onset   • Cancer Mother         Breast   • Lung Disease Mother         COPD   • Heart Disease Mother    • Diabetes Mother    • Lung Disease Father         COPD   • Heart Disease Father    • Alcohol/Drug Sister    • Lung Disease Sister         sister 2 Asthma   • Heart Disease Sister         sister 1 Heart murmur   • Alcohol/Drug Brother    • Other Son         IBS; working on his diet       Review of Systems:   Constitutional:  Negative for fever, chills, weight loss and malaise/fatigue.   HEENT:  Negative for ear pain, nosebleeds, congestion, sore throat and neck pain.    Eyes:  Negative for blurred  "vision.   Respiratory:  Negative for cough, sputum production, shortness of breath and wheezing.    Cardiovascular:  Negative for chest pain, palpitations, orthopnea and leg swelling.   Gastrointestinal:  Negative for heartburn, nausea, vomiting and abdominal pain.   Genitourinary:  Negative for dysuria, urgency and frequency.   Musculoskeletal:  Negative for myalgias, back pain and joint pain.   Skin:  Negative for rash and itching.   Neurological:  Negative for dizziness, tingling, tremors, sensory change, focal weakness and headaches.   Endo/Heme/Allergies:  Does not bruise/bleed easily.   Psychiatric/Behavioral:  Negative for depression, suicidal ideas and memory loss.  The patient is not nervous/anxious and does not have insomnia.    All other systems reviewed and are negative except as in HPI.    Exam:  /76 (BP Location: Left arm, Patient Position: Sitting, BP Cuff Size: Adult)   Pulse 89   Temp 36.4 °C (97.6 °F) (Temporal)   Resp 16   Ht 1.702 m (5' 7\")   Wt 81.5 kg (179 lb 9.6 oz)   SpO2 96%   Constitutional: Alert, no distress, well-groomed.  Skin: Warm, dry, good turgor, no rashes in visible areas.  Small round rough raised lesion approximately the size of a pencil eraser noticed on patient's left forehead just inferior to the hairline, is consistent with a seborrheic keratosis.  Patient also points to a lesion on her scalp which is round larger than a pencil eraser with some discoloration it is scabbed and patient states it has been there for several months without healing  Eye: Equal, round and reactive, conjunctiva clear, lids normal.  ENMT: Lips without lesions, good dentition, moist mucous membranes.  Neck: Trachea midline, no masses, no thyromegaly.  Respiratory: Unlabored respiratory effort, no cough.  MSK: Normal gait, moves all extremities.  Neuro: Grossly non-focal.   Psych: Alert and oriented x3, normal affect and mood.        PLAN:    1. Atypical mole  2. Non-healing skin " lesion  Mole on scalp likely requires biopsy some concern for this lesion being cancerous  - REFERRAL TO DERMATOLOGY    3. Seborrheic keratoses  CRYOTHERAPY:  Discussed risks and benefits of cryotherapy. Patient verbally agreed. Three applications of liquid nitrogen were applied to the right keratosis lesion on left forehead. Patient tolerated procedure well. Aftercare and return precaution instructions given.      4. Essential hypertension  Refill provided at this time this is printed as patient needs to submit it to [facility]  - losartan (COZAAR) 25 MG Tab; Take 1 Tab by mouth every day.  Dispense: 90 Tab; Refill: 3    5. Gastroesophageal reflux disease, unspecified whether esophagitis present  Continue omeprazole    6. Disturbance of skin sensation    Follow-up in 2 weeks if seborrheic keratosis does not resolve.    Please note that this dictation was created using voice recognition software. I have made every reasonable attempt to correct obvious errors, but I expect that there are errors of grammar and possibly content that I did not discover before finalizing the note.    Assessment/Plan:  1. Atypical mole  REFERRAL TO DERMATOLOGY   2. Non-healing skin lesion  REFERRAL TO DERMATOLOGY   3. Seborrheic keratoses     4. Essential hypertension  losartan (COZAAR) 25 MG Tab   5. Gastroesophageal reflux disease, unspecified whether esophagitis present     6. Disturbance of skin sensation            I have placed the below orders and discussed them with an approved delegating provider. The MA is performing the below orders under the direction of Dr. Wells.

## 2021-01-22 NOTE — ASSESSMENT & PLAN NOTE
Is a new issue.  Over the last couple of months patient has noticed a growth on her forehead states that it has gotten bigger quite quickly but that it has not changed other than not states that it is bumpy, that it is round, states that it is ugly though she states other people have told her that they do not really notice it.  Patient states that she would like it removed or frozen.  States that it has not been itchy or painful.  States that she has noticed some redness and states that it did have a scab because it got scratched.

## 2021-01-22 NOTE — ASSESSMENT & PLAN NOTE
Chronic in nature. Stable.  Continues taking omeprazole 20 mg daily without side effects.  Denies any substernal burning, change in appetite

## 2021-02-23 ENCOUNTER — APPOINTMENT (RX ONLY)
Dept: URBAN - METROPOLITAN AREA CLINIC 4 | Facility: CLINIC | Age: 61
Setting detail: DERMATOLOGY
End: 2021-02-23

## 2021-02-23 DIAGNOSIS — L82.1 OTHER SEBORRHEIC KERATOSIS: ICD-10-CM

## 2021-02-23 DIAGNOSIS — L81.4 OTHER MELANIN HYPERPIGMENTATION: ICD-10-CM

## 2021-02-23 DIAGNOSIS — L65.9 NONSCARRING HAIR LOSS, UNSPECIFIED: ICD-10-CM

## 2021-02-23 DIAGNOSIS — D22 MELANOCYTIC NEVI: ICD-10-CM

## 2021-02-23 PROBLEM — D22.71 MELANOCYTIC NEVI OF RIGHT LOWER LIMB, INCLUDING HIP: Status: ACTIVE | Noted: 2021-02-23

## 2021-02-23 PROBLEM — D22.5 MELANOCYTIC NEVI OF TRUNK: Status: ACTIVE | Noted: 2021-02-23

## 2021-02-23 PROBLEM — D22.61 MELANOCYTIC NEVI OF RIGHT UPPER LIMB, INCLUDING SHOULDER: Status: ACTIVE | Noted: 2021-02-23

## 2021-02-23 PROBLEM — D22.72 MELANOCYTIC NEVI OF LEFT LOWER LIMB, INCLUDING HIP: Status: ACTIVE | Noted: 2021-02-23

## 2021-02-23 PROCEDURE — ? ADDITIONAL NOTES

## 2021-02-23 PROCEDURE — 99203 OFFICE O/P NEW LOW 30 MIN: CPT

## 2021-02-23 PROCEDURE — ? COUNSELING

## 2021-02-23 ASSESSMENT — LOCATION SIMPLE DESCRIPTION DERM
LOCATION SIMPLE: RIGHT THIGH
LOCATION SIMPLE: RIGHT AXILLARY VAULT
LOCATION SIMPLE: RIGHT LOWER BACK
LOCATION SIMPLE: LEFT UPPER BACK
LOCATION SIMPLE: LEFT THIGH
LOCATION SIMPLE: ABDOMEN
LOCATION SIMPLE: RIGHT UPPER BACK
LOCATION SIMPLE: RIGHT PRETIBIAL REGION
LOCATION SIMPLE: LEFT CHEEK
LOCATION SIMPLE: LEFT PRETIBIAL REGION
LOCATION SIMPLE: RIGHT CHEEK
LOCATION SIMPLE: LEFT BREAST
LOCATION SIMPLE: SCALP

## 2021-02-23 ASSESSMENT — LOCATION ZONE DERM
LOCATION ZONE: AXILLAE
LOCATION ZONE: FACE
LOCATION ZONE: TRUNK
LOCATION ZONE: SCALP
LOCATION ZONE: LEG

## 2021-02-23 ASSESSMENT — LOCATION DETAILED DESCRIPTION DERM
LOCATION DETAILED: RIGHT SUPERIOR PARIETAL SCALP
LOCATION DETAILED: LEFT ANTERIOR PROXIMAL THIGH
LOCATION DETAILED: LEFT INFERIOR CENTRAL MALAR CHEEK
LOCATION DETAILED: LEFT SUPERIOR PARIETAL SCALP
LOCATION DETAILED: RIGHT INFERIOR CENTRAL MALAR CHEEK
LOCATION DETAILED: RIGHT INFERIOR UPPER BACK
LOCATION DETAILED: RIGHT SUPERIOR MEDIAL MIDBACK
LOCATION DETAILED: LEFT PROXIMAL PRETIBIAL REGION
LOCATION DETAILED: RIGHT ANTERIOR PROXIMAL THIGH
LOCATION DETAILED: RIGHT RIB CAGE
LOCATION DETAILED: LEFT MEDIAL BREAST 7-8:00 REGION
LOCATION DETAILED: LEFT SUPERIOR UPPER BACK
LOCATION DETAILED: RIGHT PROXIMAL PRETIBIAL REGION
LOCATION DETAILED: RIGHT AXILLARY VAULT

## 2021-02-23 NOTE — PROCEDURE: ADDITIONAL NOTES
Render Risk Assessment In Note?: no
Detail Level: Simple
Additional Notes: Instructed to apply more sunscreen in the afternoon

## 2021-03-15 DIAGNOSIS — Z23 NEED FOR VACCINATION: ICD-10-CM

## 2021-03-25 ENCOUNTER — HOSPITAL ENCOUNTER (OUTPATIENT)
Dept: LAB | Facility: MEDICAL CENTER | Age: 61
End: 2021-03-25
Attending: NURSE PRACTITIONER
Payer: COMMERCIAL

## 2021-03-25 DIAGNOSIS — Z11.59 NEED FOR HEPATITIS C SCREENING TEST: ICD-10-CM

## 2021-03-25 DIAGNOSIS — Z00.00 WELLNESS EXAMINATION: ICD-10-CM

## 2021-03-25 LAB
ALBUMIN SERPL BCP-MCNC: 4.5 G/DL (ref 3.2–4.9)
ALBUMIN/GLOB SERPL: 1.5 G/DL
ALP SERPL-CCNC: 101 U/L (ref 30–99)
ALT SERPL-CCNC: 22 U/L (ref 2–50)
ANION GAP SERPL CALC-SCNC: 11 MMOL/L (ref 7–16)
AST SERPL-CCNC: 24 U/L (ref 12–45)
BASOPHILS # BLD AUTO: 1.2 % (ref 0–1.8)
BASOPHILS # BLD: 0.07 K/UL (ref 0–0.12)
BILIRUB SERPL-MCNC: 0.4 MG/DL (ref 0.1–1.5)
BUN SERPL-MCNC: 12 MG/DL (ref 8–22)
CALCIUM SERPL-MCNC: 9.6 MG/DL (ref 8.5–10.5)
CHLORIDE SERPL-SCNC: 104 MMOL/L (ref 96–112)
CHOLEST SERPL-MCNC: 174 MG/DL (ref 100–199)
CO2 SERPL-SCNC: 25 MMOL/L (ref 20–33)
CREAT SERPL-MCNC: 0.66 MG/DL (ref 0.5–1.4)
EOSINOPHIL # BLD AUTO: 0.51 K/UL (ref 0–0.51)
EOSINOPHIL NFR BLD: 8.7 % (ref 0–6.9)
ERYTHROCYTE [DISTWIDTH] IN BLOOD BY AUTOMATED COUNT: 45.7 FL (ref 35.9–50)
GLOBULIN SER CALC-MCNC: 3 G/DL (ref 1.9–3.5)
GLUCOSE SERPL-MCNC: 93 MG/DL (ref 65–99)
HCT VFR BLD AUTO: 43.4 % (ref 37–47)
HCV AB SER QL: NORMAL
HDLC SERPL-MCNC: 40 MG/DL
HGB BLD-MCNC: 13.9 G/DL (ref 12–16)
IMM GRANULOCYTES # BLD AUTO: 0.01 K/UL (ref 0–0.11)
IMM GRANULOCYTES NFR BLD AUTO: 0.2 % (ref 0–0.9)
LDLC SERPL CALC-MCNC: 99 MG/DL
LYMPHOCYTES # BLD AUTO: 1.87 K/UL (ref 1–4.8)
LYMPHOCYTES NFR BLD: 31.9 % (ref 22–41)
MCH RBC QN AUTO: 28.8 PG (ref 27–33)
MCHC RBC AUTO-ENTMCNC: 32 G/DL (ref 33.6–35)
MCV RBC AUTO: 89.9 FL (ref 81.4–97.8)
MONOCYTES # BLD AUTO: 0.33 K/UL (ref 0–0.85)
MONOCYTES NFR BLD AUTO: 5.6 % (ref 0–13.4)
NEUTROPHILS # BLD AUTO: 3.07 K/UL (ref 2–7.15)
NEUTROPHILS NFR BLD: 52.4 % (ref 44–72)
NRBC # BLD AUTO: 0 K/UL
NRBC BLD-RTO: 0 /100 WBC
PLATELET # BLD AUTO: 207 K/UL (ref 164–446)
PMV BLD AUTO: 11.3 FL (ref 9–12.9)
POTASSIUM SERPL-SCNC: 4.6 MMOL/L (ref 3.6–5.5)
PROT SERPL-MCNC: 7.5 G/DL (ref 6–8.2)
RBC # BLD AUTO: 4.83 M/UL (ref 4.2–5.4)
SODIUM SERPL-SCNC: 140 MMOL/L (ref 135–145)
TRIGL SERPL-MCNC: 177 MG/DL (ref 0–149)
WBC # BLD AUTO: 5.9 K/UL (ref 4.8–10.8)

## 2021-03-25 PROCEDURE — 36415 COLL VENOUS BLD VENIPUNCTURE: CPT

## 2021-03-25 PROCEDURE — 86803 HEPATITIS C AB TEST: CPT

## 2021-03-25 PROCEDURE — 85025 COMPLETE CBC W/AUTO DIFF WBC: CPT

## 2021-03-25 PROCEDURE — 80061 LIPID PANEL: CPT

## 2021-03-25 PROCEDURE — 80053 COMPREHEN METABOLIC PANEL: CPT

## 2021-05-27 ENCOUNTER — PATIENT MESSAGE (OUTPATIENT)
Dept: MEDICAL GROUP | Facility: PHYSICIAN GROUP | Age: 61
End: 2021-05-27

## 2021-05-27 DIAGNOSIS — Z87.19 S/P HERNIA REPAIR: ICD-10-CM

## 2021-05-27 DIAGNOSIS — Z98.890 S/P HERNIA REPAIR: ICD-10-CM

## 2021-06-08 ENCOUNTER — HOSPITAL ENCOUNTER (OUTPATIENT)
Dept: LAB | Facility: MEDICAL CENTER | Age: 61
End: 2021-06-08
Attending: NURSE PRACTITIONER
Payer: COMMERCIAL

## 2021-06-08 ENCOUNTER — OFFICE VISIT (OUTPATIENT)
Dept: MEDICAL GROUP | Facility: PHYSICIAN GROUP | Age: 61
End: 2021-06-08

## 2021-06-08 VITALS
HEART RATE: 92 BPM | HEIGHT: 67 IN | TEMPERATURE: 98.1 F | RESPIRATION RATE: 12 BRPM | DIASTOLIC BLOOD PRESSURE: 74 MMHG | WEIGHT: 174.6 LBS | SYSTOLIC BLOOD PRESSURE: 104 MMHG | BODY MASS INDEX: 27.4 KG/M2 | OXYGEN SATURATION: 97 %

## 2021-06-08 DIAGNOSIS — R42 DIZZINESS: ICD-10-CM

## 2021-06-08 DIAGNOSIS — Z90.49 STATUS POST PARTIAL COLECTOMY: ICD-10-CM

## 2021-06-08 DIAGNOSIS — I10 ESSENTIAL HYPERTENSION: ICD-10-CM

## 2021-06-08 DIAGNOSIS — R00.2 PALPITATION: ICD-10-CM

## 2021-06-08 LAB
ANION GAP SERPL CALC-SCNC: 12 MMOL/L (ref 7–16)
BUN SERPL-MCNC: 10 MG/DL (ref 8–22)
CALCIUM SERPL-MCNC: 9.3 MG/DL (ref 8.5–10.5)
CHLORIDE SERPL-SCNC: 108 MMOL/L (ref 96–112)
CO2 SERPL-SCNC: 22 MMOL/L (ref 20–33)
CREAT SERPL-MCNC: 0.63 MG/DL (ref 0.5–1.4)
ERYTHROCYTE [DISTWIDTH] IN BLOOD BY AUTOMATED COUNT: 44.3 FL (ref 35.9–50)
FASTING STATUS PATIENT QL REPORTED: NORMAL
GLUCOSE SERPL-MCNC: 95 MG/DL (ref 65–99)
HCT VFR BLD AUTO: 42.4 % (ref 37–47)
HGB BLD-MCNC: 13.8 G/DL (ref 12–16)
MCH RBC QN AUTO: 29.1 PG (ref 27–33)
MCHC RBC AUTO-ENTMCNC: 32.5 G/DL (ref 33.6–35)
MCV RBC AUTO: 89.5 FL (ref 81.4–97.8)
PLATELET # BLD AUTO: 234 K/UL (ref 164–446)
PMV BLD AUTO: 11.4 FL (ref 9–12.9)
POTASSIUM SERPL-SCNC: 4.2 MMOL/L (ref 3.6–5.5)
RBC # BLD AUTO: 4.74 M/UL (ref 4.2–5.4)
SODIUM SERPL-SCNC: 142 MMOL/L (ref 135–145)
WBC # BLD AUTO: 6.1 K/UL (ref 4.8–10.8)

## 2021-06-08 PROCEDURE — 80048 BASIC METABOLIC PNL TOTAL CA: CPT

## 2021-06-08 PROCEDURE — 36415 COLL VENOUS BLD VENIPUNCTURE: CPT

## 2021-06-08 PROCEDURE — 99214 OFFICE O/P EST MOD 30 MIN: CPT | Performed by: NURSE PRACTITIONER

## 2021-06-08 PROCEDURE — 85027 COMPLETE CBC AUTOMATED: CPT

## 2021-06-08 ASSESSMENT — FIBROSIS 4 INDEX: FIB4 SCORE: 1.43

## 2021-06-08 NOTE — ASSESSMENT & PLAN NOTE
"Chronic in nature.  Stable.  Blood pressure today is 104/74.  Patient states that over the last 3 weeks she has been noticing episodes of dizziness coupled with elevated blood pressure and heart palpitations.  She states that this morning she was having some dizziness and her blood pressure was 145/95 at that time.  Patient states that after she took her blood pressure medication and sat down that it improved.  She reports some concern with changing blood pressure medication related to the potential for it to lower her blood pressure too much especially considering that currently is 104/74.  Patient states that when the episodes of dizziness starts that she tends to notice the lightheaded sensation, like she might pass out, accompanied increase in her heart rate or episodes where it \"feels funny\".  She states that often she is able to use breathing techniques to lower her heart rate and to calm the anxiety that starts after the dizziness starts.  She has noticed over the last couple of weeks that this has been more difficult.  Patient denies any chest pain, chest pressure, nausea, vomiting, acid reflux or indigestion symptoms, headaches, changes in vision.  Patient does mention that she has had episodes of palpitations since she was in her 20s.  While she reports feeling like she could pass out she has never had any episodes of syncope or near syncope.  Patient does have a strong family history of cardiac issues with her mother being diagnosed with atrial fibrillation and tachycardia eventually leading to placement of a pacemaker.  Recently her sister who is approximately 10 years older than her has been seeing cardiology related to some new onset a. flutter.  Patient request to see a cardiologist related to current symptoms in conjunction with family history.  "

## 2021-06-08 NOTE — PROGRESS NOTES
"Chief Complaint   Patient presents with   • Dizziness     couple of weeks ago, HTN elevated at the time, dizziness, has been coming and going, HTN has continued to stay elevated  145/95 on home BP b4 meds this AM    • Possible Hernia     Worried, has been feeling some pulling sensations in the abdomen        HISTORY OF PRESENT ILLNESS: Patient is a 60 y.o. female established patient who presents today to discuss dizziness.     Dizziness  This is a new issue. Started 2-3 weeks ago, dizziness, felt like she was going to pass out. States ate food, drank water that the dizziness did not resolve. Dizziness lasted 2 hours solid.  States that she did have significant fatigue during and after the episodes she also noticed some brief shortness of breath.  After this initial episode she continued to have symptoms on and off for 2 days.  In general episodes of palpitations and dizziness will happen every 3 to 4 day, she reports they are difficult to predict she has not found any specific triggers.  States she will notice some palpitations that happen intermittently feels \"fast\", takes her breath away, other times she will have episodes that feel like \"flipping\".  She mentions a specific episode that happened in 11/2020 and lasted for hours she states that she was concerned but chose not to go to the emergency room related to COVID-19.  She states that since these episodes have started she has felt increasingly fatigued.  She has also been having increased amounts of lower leg cramps.  Patient did have labs completed in March 2021 which were generally within normal limits.  Patient did have a history of anemia when she was going through a lot of abdominal surgeries she also had some low potassium intermittently throughout this ordeal.     Hypertension  Chronic in nature.  Stable.  Blood pressure today is 104/74.  Patient states that over the last 3 weeks she has been noticing episodes of dizziness coupled with elevated blood " "pressure and heart palpitations.  She states that this morning she was having some dizziness and her blood pressure was 145/95 at that time.  Patient states that after she took her blood pressure medication and sat down that it improved.  She reports some concern with changing blood pressure medication related to the potential for it to lower her blood pressure too much especially considering that currently is 104/74.  Patient states that when the episodes of dizziness starts that she tends to notice the lightheaded sensation, like she might pass out, accompanied increase in her heart rate or episodes where it \"feels funny\".  She states that often she is able to use breathing techniques to lower her heart rate and to calm the anxiety that starts after the dizziness starts.  She has noticed over the last couple of weeks that this has been more difficult.  Patient denies any chest pain, chest pressure, nausea, vomiting, acid reflux or indigestion symptoms, headaches, changes in vision.  Patient does mention that she has had episodes of palpitations since she was in her 20s.  While she reports feeling like she could pass out she has never had any episodes of syncope or near syncope.  Patient does have a strong family history of cardiac issues with her mother being diagnosed with atrial fibrillation and tachycardia eventually leading to placement of a pacemaker.  Recently her sister who is approximately 10 years older than her has been seeing cardiology related to some new onset a. flutter.  Patient request to see a cardiologist related to current symptoms in conjunction with family history.    Status post partial colectomy  Patient reports that since her multiple surgeries and previous hernia surgeries as well as her partial colectomy she has overall felt well but recently she has noticed some pulling and discomfort in her abdomen that she associates with a possible recurrence of hernia.  As such she is planning to " follow-up with [facility] regarding this issue.      Patient Active Problem List    Diagnosis Date Noted   • Dizziness 2021   • Palpitation 2021   • Seborrheic keratoses 2021   • 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   • Hypokalemia 2018   • Proteinuria 2017   • Status post partial colectomy 2016   • Hypertriglyceridemia 2016   • Family history of breast cancer in mother 10/15/2015   • History of tobacco use 10/15/2015   • History of hysterectomy for benign disease 10/15/2015   • Urinary incontinence 2015   • Vitamin D deficiency disease 10/07/2013   • GERD (gastroesophageal reflux disease)    • Hypertension    • Mild intermittent asthma with exacerbation        Allergies:Codeine    Current Outpatient Medications   Medication Sig Dispense Refill   • Loratadine (CLARITIN PO) Take 1 tablet by mouth every day.     • ASPIRIN 81 PO Take 1 tablet by mouth every day.     • ibuprofen (MOTRIN) 200 MG Tab Take 200 mg by mouth every 6 hours as needed.     • losartan (COZAAR) 25 MG Tab Take 1 Tab by mouth every day. 90 Tab 3   • fluticasone (FLONASE) 50 MCG/ACT nasal spray Administer 1 Spray into affected nostril(S) every day.     • albuterol (PROAIR HFA) 108 (90 Base) MCG/ACT Aero Soln inhalation aerosol Inhale 2 Puffs every 6 hours. INHALE 2 PUFFS BY MOUTH EVERY 6 HOURS AS NEEDED FOR SHORTNESS OF BREATH. 8.5 g 6   • omeprazole (PRILOSEC) 20 MG delayed-release capsule TAKE 1 CAPSULE BY MOUTH EVERY DAY 90 Cap 0     No current facility-administered medications for this visit.       Social History     Tobacco Use   • Smoking status: Former Smoker     Packs/day: 0.00     Years: 34.00     Pack years: 0.00     Types: Cigarettes     Quit date: 2010     Years since quittin.4   • Smokeless tobacco: Never Used   • Tobacco comment: 2010   Vaping Use   • Vaping Use: Never used   Substance Use Topics   • Alcohol use: Yes     Alcohol/week: 7.2 oz     Types: 6 Cans of beer, 6  "Standard drinks or equivalent per week     Comment: 4 per week   • Drug use: Yes     Types: Inhaled     Comment: marijuana occasionally        Family Status   Relation Name Status   • Mo     • Fa     • Sis 2 Alive   • Bro 3    • Son 1 Alive     Family History   Problem Relation Age of Onset   • Cancer Mother         Breast   • Lung Disease Mother         COPD   • Heart Disease Mother    • Diabetes Mother    • Lung Disease Father         COPD   • Heart Disease Father    • Alcohol/Drug Sister    • Lung Disease Sister         sister 2 Asthma   • Heart Disease Sister         sister 1 Heart murmur   • Alcohol/Drug Brother    • Other Son         IBS; working on his diet       ROS:   Patient denies headache, blurry vision, dizziness, chest pain, shortness of breath, abdominal pain, nausea, vomiting, change in level of consciousness.  All other systems reviewed and are negative except as in HPI.    Exam:  /74 (BP Location: Left arm, Patient Position: Sitting, BP Cuff Size: Adult)   Pulse 92   Temp 36.7 °C (98.1 °F) (Temporal)   Resp 12   Ht 1.702 m (5' 7\")   Wt 79.2 kg (174 lb 9.6 oz)   SpO2 97%   General:  Normal appearing. No distress.  Neck:  Supple without JVD or bruit. Thyroid is not enlarged.  Pulmonary:  Clear to ausculation.  Normal effort. No rales, ronchi, or wheezing.  Cardiovascular:  Regular rate and rhythm without murmur. Carotid and radial pulses are intact and equal bilaterally.  Neurologic: Grossly nonfocal  Lymph:  No cervical, supraclavicular or axillary lymph nodes are palpable  Skin:  Warm and dry.  No obvious lesions.  Musculoskeletal:  Normal gait. No extremity cyanosis, clubbing, or edema.  Psych:  Normal mood and affect. Alert and oriented x3. Judgment and insight is normal.      PLAN:    1. Dizziness  Patient is completely asymptomatic at this time as such I do not think it would be warranted to complete an EKG.  Considering strong family history of dysrhythmia I " agree with patient's request to be seen by cardiology.   We will update CBC and BMP to verify that H&H and potassium are within normal limits  Patient will continue to monitor blood pressure and symptoms  Patient notes that anxiety onsets after the palpitations and dizziness have started as such she and I believe that the anxiety is triggered by the symptoms that she is having versus the symptoms being triggered by the anxiety.  Patient is counseled to stop caffeine, NSAIDs, monitor salt intake as these things can increase blood pressure   - REFERRAL TO CARDIOLOGY  - CBC WITHOUT DIFFERENTIAL; Future  - Basic Metabolic Panel; Future    2. Palpitation  - REFERRAL TO CARDIOLOGY  - CBC WITHOUT DIFFERENTIAL; Future  - Basic Metabolic Panel; Future    3. Essential hypertension  Plan to continue to monitor patient will continue current medication at this time    4. Status post partial colectomy  Patient will follow up with [facility] as planned.      Return if symptoms worsen or fail to improve.    Please note that this dictation was created using voice recognition software. I have made every reasonable attempt to correct obvious errors, but I expect that there are errors of grammar and possibly content that I did not discover before finalizing the note.

## 2021-06-08 NOTE — ASSESSMENT & PLAN NOTE
Patient reports that since her multiple surgeries and previous hernia surgeries as well as her partial colectomy she has overall felt well but recently she has noticed some pulling and discomfort in her abdomen that she associates with a possible recurrence of hernia.  As such she is planning to follow-up with [facility] regarding this issue.

## 2021-06-08 NOTE — ASSESSMENT & PLAN NOTE
"This is a new issue. Started 2-3 weeks ago, dizziness, felt like she was going to pass out. States ate food, drank water that the dizziness did not resolve. Dizziness lasted 2 hours solid.  States that she did have significant fatigue during and after the episodes she also noticed some brief shortness of breath.  After this initial episode she continued to have symptoms on and off for 2 days.  In general episodes of palpitations and dizziness will happen every 3 to 4 day, she reports they are difficult to predict she has not found any specific triggers.  States she will notice some palpitations that happen intermittently feels \"fast\", takes her breath away, other times she will have episodes that feel like \"flipping\".  She mentions a specific episode that happened in 11/2020 and lasted for hours she states that she was concerned but chose not to go to the emergency room related to COVID-19.  She states that since these episodes have started she has felt increasingly fatigued.  She has also been having increased amounts of lower leg cramps.  Patient did have labs completed in March 2021 which were generally within normal limits.  Patient did have a history of anemia when she was going through a lot of abdominal surgeries she also had some low potassium intermittently throughout this ordeal.  Patient denies any continued shortness of breath after an episode of palpitations and dizziness, denies any swelling of her feet or ankles, concerns for dehydration, denies change in diet.  "

## 2021-06-11 ENCOUNTER — TELEPHONE (OUTPATIENT)
Dept: CARDIOLOGY | Facility: MEDICAL CENTER | Age: 61
End: 2021-06-11

## 2021-06-11 NOTE — TELEPHONE ENCOUNTER
Chart prep    Spoke to patient checking for prior cardiac records (NP with [facility]). Patient states she has never seen a cardiologist before. Confirms that lab work from 6/8/2021 is the most recent (results available in chart) Also confirms that she has not had any recent cardiac related testing. Confirmed appt date and time.

## 2021-06-16 ENCOUNTER — TELEMEDICINE (OUTPATIENT)
Dept: CARDIOLOGY | Facility: MEDICAL CENTER | Age: 61
End: 2021-06-16
Attending: NURSE PRACTITIONER
Payer: COMMERCIAL

## 2021-06-16 VITALS
SYSTOLIC BLOOD PRESSURE: 145 MMHG | WEIGHT: 174 LBS | DIASTOLIC BLOOD PRESSURE: 92 MMHG | BODY MASS INDEX: 27.31 KG/M2 | HEIGHT: 67 IN

## 2021-06-16 DIAGNOSIS — R00.2 PALPITATIONS: ICD-10-CM

## 2021-06-16 DIAGNOSIS — R42 DIZZINESS: ICD-10-CM

## 2021-06-16 DIAGNOSIS — I10 ESSENTIAL HYPERTENSION: ICD-10-CM

## 2021-06-16 PROCEDURE — 99204 OFFICE O/P NEW MOD 45 MIN: CPT | Performed by: INTERNAL MEDICINE

## 2021-06-16 ASSESSMENT — ENCOUNTER SYMPTOMS
LOSS OF CONSCIOUSNESS: 0
SHORTNESS OF BREATH: 0
DIZZINESS: 1
DEPRESSION: 0
PND: 0
ABDOMINAL PAIN: 0
PALPITATIONS: 1
ORTHOPNEA: 0
FALLS: 0

## 2021-06-16 ASSESSMENT — FIBROSIS 4 INDEX: FIB4 SCORE: 1.27

## 2021-06-16 NOTE — PROGRESS NOTES
"Chief Complaint   Patient presents with   • Dizziness       Subjective:   Ambar Jj is a 60 y.o. female who presents today as a referral for dizziness.     Patient was in her usual state of health till about 2 weeks ago when she had an episode of feeling lightheaded while standing.  She ate something and shortly thereafter felt better.  At that time she had been dieting.  She denies having any prior episodes of dizziness or any recurrent episodes since.    For about a week after this episode her blood pressure was in the 140s to 150s systolic which is unusual for her.  She was seen by her PCP about 8 days ago and since then her blood pressure has normalized without any medication changes.    She reports a history of palpitations all her life.  Around the time of her dizziness she had few persistent episodes of palpitations.  Usually her symptoms resolve within a few seconds but these recent episodes lasted a couple hours.  In the past week or so she is back to her baseline of having symptoms couple of times a week which resolved within a few seconds.    She has a history of diverticulitis for which she underwent a partial colectomy.  At that time she sustained an injury to her right femoral artery which was repaired.    She denies any anginal symptoms.  She walks 3 miles at a pace of 15 minutes per mile.  She worries about her wellbeing because her mother had tachycardia and atrial fibrillation and eventually required a pacemaker.  Her sister also has atrial fibrillation.  Nobody in her family has coronary artery disease that she knows of.    Referring physician: Devon Diggs, A.P.R.N.    Past Medical History:   Diagnosis Date   • Anemia    • Anemia    • Anesthesia     PONV   • Bowel habit changes 11/19/2018    \"Constipation/diarrhea\".   • Bronchitis 2010   • Colostomy present ([facility])    • Diverticulitis 11/2017   • Female bladder prolapse 11/19/2018   • GERD (gastroesophageal reflux disease)    • " Heart burn    • HTN     resolved with wt loss and lifestyle changes   • HTN (hypertension) 11/19/2018    Takes Losartan   • Indigestion    • Other specified symptom associated with female genital organs    • Pap smear 01/29/2009    Normal   • PONV (postoperative nausea and vomiting)     x2 days post op   • Psychiatric problem     anxiety   • Reactive airway disease 11/19/2018    Inhaler use PRN   • Unspecified urinary incontinence 11/19/2018    Wears Pads   • Urinary bladder disorder     prolapse      Past Surgical History:   Procedure Laterality Date   • VENTRAL HERNIA REPAIR ROBOTIC XI  10/24/2019    Procedure: REPAIR, HERNIA, VENTRAL, ROBOT-ASSISTED, USING DA SANDRINE XI- FOR INCISIONAL WITH MESH;  Surgeon: Justus Will M.D.;  Location: SURGERY SAME DAY [facility];  Service: General   • COLOSTOMY TAKEDOWN  5/6/2019    Procedure: COLOSTOMY TAKEDOWN;  Surgeon: Justus Will M.D.;  Location: SURGERY [facility];  Service: General   • APPENDECTOMY  5/6/2019    Procedure: APPENDECTOMY;  Surgeon: Justus Will M.D.;  Location: SURGERY [facility];  Service: General   • SIGMOID COLECTOMY N/A 11/21/2018    Procedure: SIGMOID COLECTOMY;  Surgeon: Justus Will M.D.;  Location: SURGERY [facility];  Service: General   • BLADDER SUSPENSION N/A 5/6/2015    Procedure: BLADDER SUSPENSION [57.89] TOT;  Surgeon: Yazmin Armando M.D.;  Location: SURGERY SAME DAY [facility];  Service:    • CYSTOSCOPY N/A 5/6/2015    Procedure: CYSTOSCOPY;  Surgeon: Yazmin Armando M.D.;  Location: SURGERY SAME DAY [facility];  Service:    • ANTERIOR AND POSTERIOR REPAIR N/A 5/6/2015    Procedure: ANTERIOR AND POSTERIOR REPAIR [70.53];  Surgeon: Yazmin Armando M.D.;  Location: SURGERY SAME DAY [facility];  Service:    • ABDOMINAL HYSTERECTOMY TOTAL      Ovaries intact   • BLADDER SUSPENSION     • CHOLECYSTECTOMY     • ENDOSCOPY     • ENDOSCOPY PROCEDURE      dilation due to stricture   • OTHER    "   \"Abdominal abcess drained twice 2018\".   • TONSILLECTOMY AND ADENOIDECTOMY       Family History   Problem Relation Age of Onset   • Cancer Mother         Breast   • Lung Disease Mother         COPD   • Heart Disease Mother    • Diabetes Mother    • Lung Disease Father         COPD   • Heart Disease Father    • Alcohol/Drug Sister    • Lung Disease Sister         sister 2 Asthma   • Heart Disease Sister         sister 1 Heart murmur   • Alcohol/Drug Brother    • Other Son         IBS; working on his diet     Social History     Socioeconomic History   • Marital status:      Spouse name: Not on file   • Number of children: Not on file   • Years of education: Not on file   • Highest education level: Not on file   Occupational History   • Not on file   Tobacco Use   • Smoking status: Former Smoker     Packs/day: 0.00     Years: 34.00     Pack years: 0.00     Types: Cigarettes     Quit date: 2010     Years since quittin.4   • Smokeless tobacco: Never Used   • Tobacco comment: 2010   Vaping Use   • Vaping Use: Never used   Substance and Sexual Activity   • Alcohol use: Yes     Alcohol/week: 7.2 oz     Types: 6 Cans of beer, 6 Standard drinks or equivalent per week     Comment: 4 per week   • Drug use: Yes     Types: Inhaled     Comment: marijuana occasionally    • Sexual activity: Yes     Partners: Male   Other Topics Concern   • Not on file   Social History Narrative   • Not on file     Social Determinants of Health     Financial Resource Strain:    • Difficulty of Paying Living Expenses:    Food Insecurity:    • Worried About Running Out of Food in the Last Year:    • Ran Out of Food in the Last Year:    Transportation Needs:    • Lack of Transportation (Medical):    • Lack of Transportation (Non-Medical):    Physical Activity:    • Days of Exercise per Week:    • Minutes of Exercise per Session:    Stress:    • Feeling of Stress :    Social Connections:    • Frequency of Communication with " "Friends and Family:    • Frequency of Social Gatherings with Friends and Family:    • Attends Confucianism Services:    • Active Member of Clubs or Organizations:    • Attends Club or Organization Meetings:    • Marital Status:    Intimate Partner Violence:    • Fear of Current or Ex-Partner:    • Emotionally Abused:    • Physically Abused:    • Sexually Abused:      Allergies   Allergen Reactions   • Codeine Vomiting     Outpatient Encounter Medications as of 6/16/2021   Medication Sig Dispense Refill   • Loratadine (CLARITIN PO) Take 1 tablet by mouth every day.     • ASPIRIN 81 PO Take 1 tablet by mouth every day.     • ibuprofen (MOTRIN) 200 MG Tab Take 200 mg by mouth every 6 hours as needed.     • losartan (COZAAR) 25 MG Tab Take 1 Tab by mouth every day. 90 Tab 3   • fluticasone (FLONASE) 50 MCG/ACT nasal spray Administer 1 Spray into affected nostril(S) every day.     • albuterol (PROAIR HFA) 108 (90 Base) MCG/ACT Aero Soln inhalation aerosol Inhale 2 Puffs every 6 hours. INHALE 2 PUFFS BY MOUTH EVERY 6 HOURS AS NEEDED FOR SHORTNESS OF BREATH. 8.5 g 6   • omeprazole (PRILOSEC) 20 MG delayed-release capsule TAKE 1 CAPSULE BY MOUTH EVERY DAY 90 Cap 0     No facility-administered encounter medications on file as of 6/16/2021.     Review of Systems   Constitutional: Negative for malaise/fatigue.   Respiratory: Negative for shortness of breath.    Cardiovascular: Positive for palpitations. Negative for chest pain, orthopnea, leg swelling and PND.   Gastrointestinal: Negative for abdominal pain.   Musculoskeletal: Negative for falls.   Neurological: Positive for dizziness. Negative for loss of consciousness.   Psychiatric/Behavioral: Negative for depression.   All other systems reviewed and are negative.       Objective:   /92 (BP Location: Left arm, Patient Position: Sitting, BP Cuff Size: Adult)   Ht 1.702 m (5' 7\")   Wt 78.9 kg (174 lb)   LMP 10/14/2001 (Approximate)   BMI 27.25 kg/m²     Physical Exam "   Constitutional: She is oriented to person, place, and time. She appears well-developed. No distress.   HENT:   Head: Normocephalic and atraumatic.   Eyes: Conjunctivae are normal.   Abdominal: Normal appearance.   Musculoskeletal:      Cervical back: Neck supple.   Neurological: She is alert and oriented to person, place, and time.   Skin: No rash noted. She is not diaphoretic.   Psychiatric: Her behavior is normal. Mood, judgment and thought content normal.   Nursing note and vitals reviewed.    CBC, Chem-7, lipid panel performed March 2021 were reviewed and showed normal hemoglobin and renal function.  LDL 99.    Assessment:     1. Palpitations  TSH    FREE THYROXINE    Cardiac Event Monitor   2. Dizziness     3. Essential hypertension         Medical Decision Making:  Today's Assessment / Status / Plan:     Patient reports palpitations all her life that transiently increased in frequency couple of weeks ago.  She will be referred for a 2-week Zio patch monitor for further evaluation.  Thyroid testing has been ordered today.    One episode of dizziness, likely secondary to orthostasis based on history.  Patient has been advised to increase her fluid intake.  If she has recurrent episodes of dizziness, she will let us know.    Hypertension not at goal today however patient has not taken her medications yet.  She has been advised to check her blood pressure at various times of the day and let us know if it stays elevated.  Continue losartan at current dose for now.    Return to clinic in 3 months or earlier if needed.    Thank you for allowing me to participate in the care of this patient. Please do not hesitate to contact me with any questions.    Karissa Lizarraga MD, [facility]  Cardiologist  [facility]    PLEASE NOTE: This dictation was created using voice recognition software.

## 2021-06-16 NOTE — LETTER
[facility]   [address] [place] [address]  [place] [address]  Phone: [phone]  Fax: [phone]              Ambar Jj  1960    Encounter Date: 6/16/2021    Karissa Lizarraga M.D.          PROGRESS NOTE:  Chief Complaint   Patient presents with   • Dizziness       Subjective:   Ambar Jj is a 60 y.o. female who presents today as a referral for dizziness.     Patient was in her usual state of health till about 2 weeks ago when she had an episode of feeling lightheaded while standing.  She ate something and shortly thereafter felt better.  At that time she had been dieting.  She denies having any prior episodes of dizziness or any recurrent episodes since.    For about a week after this episode her blood pressure was in the 140s to 150s systolic which is unusual for her.  She was seen by her PCP about 8 days ago and since then her blood pressure has normalized without any medication changes.    She reports a history of palpitations all her life.  Around the time of her dizziness she had few persistent episodes of palpitations.  Usually her symptoms resolve within a few seconds but these recent episodes lasted a couple hours.  In the past week or so she is back to her baseline of having symptoms couple of times a week which resolved within a few seconds.    She has a history of diverticulitis for which she underwent a partial colectomy.  At that time she sustained an injury to her right femoral artery which was repaired.    She denies any anginal symptoms.  She walks 3 miles at a pace of 15 minutes per mile.  She worries about her wellbeing because her mother had tachycardia and atrial fibrillation and eventually required a pacemaker.  Her sister also has atrial fibrillation.  Nobody in her family has coronary artery disease that she knows of.    Referring physician: Devon Diggs, [email or web address]    Past Medical History:   Diagnosis Date   • Anemia   "  • Anemia    • Anesthesia     PONV   • Bowel habit changes 11/19/2018    \"Constipation/diarrhea\".   • Bronchitis 2010   • Colostomy present (HCC)    • Diverticulitis 11/2017   • Female bladder prolapse 11/19/2018   • GERD (gastroesophageal reflux disease)    • Heart burn    • HTN     resolved with wt loss and lifestyle changes   • HTN (hypertension) 11/19/2018    Takes Losartan   • Indigestion    • Other specified symptom associated with female genital organs    • Pap smear 01/29/2009    Normal   • PONV (postoperative nausea and vomiting)     x2 days post op   • Psychiatric problem     anxiety   • Reactive airway disease 11/19/2018    Inhaler use PRN   • Unspecified urinary incontinence 11/19/2018    Wears Pads   • Urinary bladder disorder     prolapse      Past Surgical History:   Procedure Laterality Date   • VENTRAL HERNIA REPAIR ROBOTIC XI  10/24/2019    Procedure: REPAIR, HERNIA, VENTRAL, ROBOT-ASSISTED, USING [employer] XI- FOR INCISIONAL WITH MESH;  Surgeon: Justus Will M.D.;  Location: SURGERY SAME DAY [facility];  Service: General   • COLOSTOMY TAKEDOWN  5/6/2019    Procedure: COLOSTOMY TAKEDOWN;  Surgeon: Justus Will M.D.;  Location: SURGERY [facility];  Service: General   • APPENDECTOMY  5/6/2019    Procedure: APPENDECTOMY;  Surgeon: Justus Will M.D.;  Location: SURGERY [facility];  Service: General   • SIGMOID COLECTOMY N/A 11/21/2018    Procedure: SIGMOID COLECTOMY;  Surgeon: Justus Will M.D.;  Location: SURGERY [facility];  Service: General   • BLADDER SUSPENSION N/A 5/6/2015    Procedure: BLADDER SUSPENSION [57.89] TOT;  Surgeon: Yazmin Armando M.D.;  Location: SURGERY SAME DAY [facility];  Service:    • CYSTOSCOPY N/A 5/6/2015    Procedure: CYSTOSCOPY;  Surgeon: Yazmin Armando M.D.;  Location: SURGERY SAME DAY [facility];  Service:    • ANTERIOR AND POSTERIOR REPAIR N/A 5/6/2015    Procedure: ANTERIOR AND POSTERIOR REPAIR [70.53];  " "Surgeon: Yazmin Armando M.D.;  Location: SURGERY SAME DAY [facility];  Service:    • ABDOMINAL HYSTERECTOMY TOTAL      Ovaries intact   • BLADDER SUSPENSION     • CHOLECYSTECTOMY     • ENDOSCOPY     • ENDOSCOPY PROCEDURE      dilation due to stricture   • OTHER      \"Abdominal abcess drained twice 2018\".   • TONSILLECTOMY AND ADENOIDECTOMY       Family History   Problem Relation Age of Onset   • Cancer Mother         Breast   • Lung Disease Mother         COPD   • Heart Disease Mother    • Diabetes Mother    • Lung Disease Father         COPD   • Heart Disease Father    • Alcohol/Drug Sister    • Lung Disease Sister         sister 2 Asthma   • Heart Disease Sister         sister 1 Heart murmur   • Alcohol/Drug Brother    • Other Son         IBS; working on his diet     Social History     Socioeconomic History   • Marital status:      Spouse name: Not on file   • Number of children: Not on file   • Years of education: Not on file   • Highest education level: Not on file   Occupational History   • Not on file   Tobacco Use   • Smoking status: Former Smoker     Packs/day: 0.00     Years: 34.00     Pack years: 0.00     Types: Cigarettes     Quit date: 2010     Years since quittin.4   • Smokeless tobacco: Never Used   • Tobacco comment: 2010   Vaping Use   • Vaping Use: Never used   Substance and Sexual Activity   • Alcohol use: Yes     Alcohol/week: 7.2 oz     Types: 6 Cans of beer, 6 Standard drinks or equivalent per week     Comment: 4 per week   • Drug use: Yes     Types: Inhaled     Comment: marijuana occasionally    • Sexual activity: Yes     Partners: Male   Other Topics Concern   • Not on file   Social History Narrative   • Not on file     Social Determinants of Health     Financial Resource Strain:    • Difficulty of Paying Living Expenses:    Food Insecurity:    • Worried About Running Out of Food in the Last Year:    • Ran Out of Food in the Last Year:    Transportation Needs:    • " Lack of Transportation (Medical):    • Lack of Transportation (Non-Medical):    Physical Activity:    • Days of Exercise per Week:    • Minutes of Exercise per Session:    Stress:    • Feeling of Stress :    Social Connections:    • Frequency of Communication with Friends and Family:    • Frequency of Social Gatherings with Friends and Family:    • Attends [employer] Services:    • Active Member of Clubs or Organizations:    • Attends Club or Organization Meetings:    • Marital Status:    Intimate Partner Violence:    • Fear of Current or Ex-Partner:    • Emotionally Abused:    • Physically Abused:    • Sexually Abused:      Allergies   Allergen Reactions   • Codeine Vomiting     Outpatient Encounter Medications as of 6/16/2021   Medication Sig Dispense Refill   • Loratadine (CLARITIN PO) Take 1 tablet by mouth every day.     • ASPIRIN 81 PO Take 1 tablet by mouth every day.     • ibuprofen (MOTRIN) 200 MG Tab Take 200 mg by mouth every 6 hours as needed.     • losartan (COZAAR) 25 MG Tab Take 1 Tab by mouth every day. 90 Tab 3   • fluticasone (FLONASE) 50 MCG/ACT nasal spray Administer 1 Spray into affected nostril(S) every day.     • albuterol (PROAIR HFA) 108 (90 Base) MCG/ACT Aero Soln inhalation aerosol Inhale 2 Puffs every 6 hours. INHALE 2 PUFFS BY MOUTH EVERY 6 HOURS AS NEEDED FOR SHORTNESS OF BREATH. 8.5 g 6   • omeprazole (PRILOSEC) 20 MG delayed-release capsule TAKE 1 CAPSULE BY MOUTH EVERY DAY 90 Cap 0     No facility-administered encounter medications on file as of 6/16/2021.     Review of Systems   Constitutional: Negative for malaise/fatigue.   Respiratory: Negative for shortness of breath.    Cardiovascular: Positive for palpitations. Negative for chest pain, orthopnea, leg swelling and PND.   Gastrointestinal: Negative for abdominal pain.   Musculoskeletal: Negative for falls.   Neurological: Positive for dizziness. Negative for loss of consciousness.   Psychiatric/Behavioral: Negative for  "depression.   All other systems reviewed and are negative.       Objective:   /92 (BP Location: Left arm, Patient Position: Sitting, BP Cuff Size: Adult)   Ht 1.702 m (5' 7\")   Wt 78.9 kg (174 lb)   LMP 10/14/2001 (Approximate)   BMI 27.25 kg/m²     Physical Exam   Constitutional: She is oriented to person, place, and time. She appears well-developed. No distress.   HENT:   Head: Normocephalic and atraumatic.   Eyes: Conjunctivae are normal.   Abdominal: Normal appearance.   Musculoskeletal:      Cervical back: Neck supple.   Neurological: She is alert and oriented to person, place, and time.   Skin: No rash noted. She is not diaphoretic.   Psychiatric: Her behavior is normal. Mood, judgment and thought content normal.   Nursing note and vitals reviewed.    CBC, Chem-7, lipid panel performed March 2021 were reviewed and showed normal hemoglobin and renal function.  LDL 99.    Assessment:     1. Palpitations  TSH    FREE THYROXINE    Cardiac Event Monitor   2. Dizziness     3. Essential hypertension         Medical Decision Making:  Today's Assessment / Status / Plan:     Patient reports palpitations all her life that transiently increased in frequency couple of weeks ago.  She will be referred for a 2-week Zio patch monitor for further evaluation.  Thyroid testing has been ordered today.    One episode of dizziness, likely secondary to orthostasis based on history.  Patient has been advised to increase her fluid intake.  If she has recurrent episodes of dizziness, she will let us know.    Hypertension not at goal today however patient has not taken her medications yet.  She has been advised to check her blood pressure at various times of the day and let us know if it stays elevated.  Continue losartan at current dose for now.    Return to clinic in 3 months or earlier if needed.    Thank you for allowing me to participate in the care of this patient. Please do not hesitate to contact me with any " questions.    Karissa Lizarraga MD, [facility]  Cardiologist  [facility]    PLEASE NOTE: This dictation was created using voice recognition software.           Devon Diggs, A.P.R.N.  [address]   [place] [id number]  Via In Basket

## 2021-07-20 ENCOUNTER — TELEPHONE (OUTPATIENT)
Dept: CARDIOLOGY | Facility: MEDICAL CENTER | Age: 61
End: 2021-07-20

## 2021-07-20 ENCOUNTER — NON-PROVIDER VISIT (OUTPATIENT)
Dept: CARDIOLOGY | Facility: MEDICAL CENTER | Age: 61
End: 2021-07-20
Payer: COMMERCIAL

## 2021-07-20 DIAGNOSIS — I49.1 PREMATURE ATRIAL CONTRACTIONS: ICD-10-CM

## 2021-07-20 DIAGNOSIS — I49.3 PVC (PREMATURE VENTRICULAR CONTRACTION): ICD-10-CM

## 2021-07-20 DIAGNOSIS — R00.2 PALPITATIONS: ICD-10-CM

## 2021-07-20 NOTE — TELEPHONE ENCOUNTER
Patient enrolled in the 14 day ePatch Holter monitoring program per Karissa Lizarraga MD.  In-Office hookup.   >Currently pending EOS.

## 2021-07-27 ENCOUNTER — HOSPITAL ENCOUNTER (OUTPATIENT)
Dept: LAB | Facility: MEDICAL CENTER | Age: 61
End: 2021-07-27
Attending: INTERNAL MEDICINE
Payer: COMMERCIAL

## 2021-07-27 DIAGNOSIS — R00.2 PALPITATIONS: ICD-10-CM

## 2021-07-27 LAB
T4 FREE SERPL-MCNC: 1.09 NG/DL (ref 0.93–1.7)
TSH SERPL DL<=0.005 MIU/L-ACNC: 2.76 UIU/ML (ref 0.38–5.33)

## 2021-07-27 PROCEDURE — 84443 ASSAY THYROID STIM HORMONE: CPT

## 2021-07-27 PROCEDURE — 36415 COLL VENOUS BLD VENIPUNCTURE: CPT

## 2021-07-27 PROCEDURE — 84439 ASSAY OF FREE THYROXINE: CPT

## 2021-08-11 PROCEDURE — 93228 REMOTE 30 DAY ECG REV/REPORT: CPT | Performed by: INTERNAL MEDICINE

## 2021-08-13 ENCOUNTER — HOSPITAL ENCOUNTER (OUTPATIENT)
Dept: RADIOLOGY | Facility: MEDICAL CENTER | Age: 61
End: 2021-08-13
Attending: SURGERY
Payer: COMMERCIAL

## 2021-08-13 DIAGNOSIS — K46.9 HERNIA OF ABDOMINAL CAVITY: ICD-10-CM

## 2021-08-13 DIAGNOSIS — R10.9 STOMACH ACHE: ICD-10-CM

## 2021-08-13 PROCEDURE — 76705 ECHO EXAM OF ABDOMEN: CPT

## 2021-09-15 ENCOUNTER — TELEPHONE (OUTPATIENT)
Dept: MEDICAL GROUP | Facility: PHYSICIAN GROUP | Age: 61
End: 2021-09-15

## 2021-09-15 VITALS — DIASTOLIC BLOOD PRESSURE: 90 MMHG | SYSTOLIC BLOOD PRESSURE: 135 MMHG

## 2021-09-15 NOTE — TELEPHONE ENCOUNTER
9/9/21 requested home blood pressure reading from patient via [employer], patient responded with same, some of the readings are outside our goal of 140/90, requested two more days worth of readings, see flowsheets.

## 2021-09-16 ENCOUNTER — TELEPHONE (OUTPATIENT)
Dept: MEDICAL GROUP | Facility: PHYSICIAN GROUP | Age: 61
End: 2021-09-16

## 2021-09-16 VITALS — SYSTOLIC BLOOD PRESSURE: 119 MMHG | DIASTOLIC BLOOD PRESSURE: 80 MMHG

## 2021-09-16 NOTE — TELEPHONE ENCOUNTER
9/9/21 requested home blood pressure reading from patient via [employer], patient responded with same, 2 readings within normal range, 2 readings outside of range, see flowsheets.

## 2021-09-16 NOTE — PROGRESS NOTES
"Chief Complaint   Patient presents with   • Hypertension   • Palpitations       Subjective     Ambar Jj is a 60 y.o. female former patient of Dr. Karissa Lizarraga who presents today for follow up.    Patient was last seen by Dr. Lizarraga on 6/16/2021 with c/o lightheadedness and intermittent elevated BP. She reported having palpitations all of her life. A 14 day Zio was ordered. She was advised to increase her hydration as her dizziness was felt to likely be secondary to orthostasis. Her BP was above goal however patient had not taken her BP medications yet, she was advised to check her BP at various times of the day and continue her low dose losartan in the mean time.     PMH pertinent for diverticulitis S/P partial colectomy with colostomy, repair of previous right femoral artery injury, anemia, asthma and GERD.     Today patient that she is overall feeling better. Her lightheadedness has resolved. Her primary concern is her BP, she has been having SBP readings up in the 150's.    Past Medical History:   Diagnosis Date   • Anemia    • Anemia    • Anesthesia     PONV   • Bowel habit changes 11/19/2018    \"Constipation/diarrhea\".   • Bronchitis 2010   • Colostomy present (HCC)    • Diverticulitis 11/2017   • Female bladder prolapse 11/19/2018   • GERD (gastroesophageal reflux disease)    • Heart burn    • HTN     resolved with wt loss and lifestyle changes   • HTN (hypertension) 11/19/2018    Takes Losartan   • Indigestion    • Other specified symptom associated with female genital organs    • Pap smear 01/29/2009    Normal   • PONV (postoperative nausea and vomiting)     x2 days post op   • Psychiatric problem     anxiety   • Reactive airway disease 11/19/2018    Inhaler use PRN   • Unspecified urinary incontinence 11/19/2018    Wears Pads   • Urinary bladder disorder     prolapse      Past Surgical History:   Procedure Laterality Date   • VENTRAL HERNIA REPAIR ROBOTIC XI  10/24/2019    Procedure: REPAIR, " "HERNIA, VENTRAL, ROBOT-ASSISTED, USING [employer] XI- FOR INCISIONAL WITH MESH;  Surgeon: Justus Will M.D.;  Location: SURGERY SAME DAY [facility];  Service: General   • COLOSTOMY TAKEDOWN  5/6/2019    Procedure: COLOSTOMY TAKEDOWN;  Surgeon: Justus Will M.D.;  Location: SURGERY [facility];  Service: General   • APPENDECTOMY  5/6/2019    Procedure: APPENDECTOMY;  Surgeon: Justus Will M.D.;  Location: SURGERY [facility];  Service: General   • SIGMOID COLECTOMY N/A 11/21/2018    Procedure: SIGMOID COLECTOMY;  Surgeon: Justus Will M.D.;  Location: SURGERY [facility];  Service: General   • BLADDER SUSPENSION N/A 5/6/2015    Procedure: BLADDER SUSPENSION [57.89] TOT;  Surgeon: Yazmin Armando M.D.;  Location: SURGERY SAME DAY [facility];  Service:    • CYSTOSCOPY N/A 5/6/2015    Procedure: CYSTOSCOPY;  Surgeon: Yzamin Armando M.D.;  Location: SURGERY SAME DAY [facility];  Service:    • ANTERIOR AND POSTERIOR REPAIR N/A 5/6/2015    Procedure: ANTERIOR AND POSTERIOR REPAIR [70.53];  Surgeon: Yazmin Armando M.D.;  Location: SURGERY SAME DAY [facility];  Service:    • ABDOMINAL HYSTERECTOMY TOTAL      Ovaries intact   • BLADDER SUSPENSION     • CHOLECYSTECTOMY     • ENDOSCOPY     • ENDOSCOPY PROCEDURE      dilation due to stricture   • OTHER      \"Abdominal abcess drained twice 2018\".   • TONSILLECTOMY AND ADENOIDECTOMY       Family History   Problem Relation Age of Onset   • Cancer Mother         Breast   • Lung Disease Mother         COPD   • Heart Disease Mother    • Diabetes Mother    • Lung Disease Father         COPD   • Heart Disease Father    • Alcohol/Drug Sister    • Lung Disease Sister         sister 2 Asthma   • Heart Disease Sister         sister 1 Heart murmur   • Alcohol/Drug Brother    • Other Son         IBS; working on his diet     Social History     Socioeconomic History   • Marital status:      Spouse name: Not on file   • Number of " children: Not on file   • Years of education: Not on file   • Highest education level: Not on file   Occupational History   • Not on file   Tobacco Use   • Smoking status: Former Smoker     Packs/day: 0.00     Years: 34.00     Pack years: 0.00     Types: Cigarettes     Quit date: 2010     Years since quittin.7   • Smokeless tobacco: Never Used   • Tobacco comment: 2010   Vaping Use   • Vaping Use: Never used   Substance and Sexual Activity   • Alcohol use: Yes     Alcohol/week: 7.2 oz     Types: 6 Cans of beer, 6 Standard drinks or equivalent per week     Comment: 4 per week   • Drug use: Yes     Types: Inhaled     Comment: marijuana occasionally    • Sexual activity: Yes     Partners: Male   Other Topics Concern   • Not on file   Social History Narrative   • Not on file     Social Determinants of Health     Financial Resource Strain:    • Difficulty of Paying Living Expenses:    Food Insecurity:    • Worried About Running Out of Food in the Last Year:    • Ran Out of Food in the Last Year:    Transportation Needs:    • Lack of Transportation (Medical):    • Lack of Transportation (Non-Medical):    Physical Activity:    • Days of Exercise per Week:    • Minutes of Exercise per Session:    Stress:    • Feeling of Stress :    Social Connections:    • Frequency of Communication with Friends and Family:    • Frequency of Social Gatherings with Friends and Family:    • Attends Shinto Services:    • Active Member of Clubs or Organizations:    • Attends Club or Organization Meetings:    • Marital Status:    Intimate Partner Violence:    • Fear of Current or Ex-Partner:    • Emotionally Abused:    • Physically Abused:    • Sexually Abused:      Allergies   Allergen Reactions   • Codeine Vomiting     Outpatient Encounter Medications as of 2021   Medication Sig Dispense Refill   • losartan (COZAAR) 25 MG Tab Take 1 Tablet by mouth 2 times a day. 180 Tablet 3   • Loratadine (CLARITIN PO) Take 1 tablet by  "mouth every day.     • ibuprofen (MOTRIN) 200 MG Tab Take 200 mg by mouth every 6 hours as needed.     • fluticasone (FLONASE) 50 MCG/ACT nasal spray Administer 1 Spray into affected nostril(S) every day.     • albuterol (PROAIR HFA) 108 (90 Base) MCG/ACT Aero Soln inhalation aerosol Inhale 2 Puffs every 6 hours. INHALE 2 PUFFS BY MOUTH EVERY 6 HOURS AS NEEDED FOR SHORTNESS OF BREATH. 8.5 g 6   • omeprazole (PRILOSEC) 20 MG delayed-release capsule TAKE 1 CAPSULE BY MOUTH EVERY DAY 90 Cap 0   • [DISCONTINUED] ASPIRIN 81 PO Take 1 tablet by mouth every day. (Patient not taking: Reported on 9/20/2021)     • [DISCONTINUED] losartan (COZAAR) 25 MG Tab Take 1 Tab by mouth every day. 90 Tab 3     No facility-administered encounter medications on file as of 9/20/2021.     Review of Systems   Constitutional: Negative for malaise/fatigue and weight loss.   Respiratory: Negative for shortness of breath.    Cardiovascular: Negative for chest pain, palpitations, orthopnea, claudication, leg swelling and PND.   Neurological: Negative for dizziness and weakness.   All other systems reviewed and are negative.             Objective     /98 (BP Location: Left arm, Patient Position: Sitting, BP Cuff Size: Adult)   Pulse 100   Resp 14   Ht 1.702 m (5' 7\")   Wt 81.2 kg (179 lb)   LMP 10/14/2001 (Approximate)   SpO2 96%   BMI 28.04 kg/m²     Physical Exam  Constitutional:       General: She is not in acute distress.     Appearance: She is well-developed.   HENT:      Head: Normocephalic.   Eyes:      Extraocular Movements: Extraocular movements intact.   Neck:      Vascular: No carotid bruit or JVD.   Cardiovascular:      Rate and Rhythm: Normal rate and regular rhythm.      Heart sounds: Normal heart sounds.   Pulmonary:      Effort: No respiratory distress.      Breath sounds: Normal breath sounds.   Abdominal:      Palpations: Abdomen is soft.      Tenderness: There is no abdominal tenderness.   Musculoskeletal:      " Cervical back: Normal range of motion.      Right lower leg: No edema.      Left lower leg: No edema.   Skin:     General: Skin is warm and dry.   Neurological:      Mental Status: She is alert and oriented to person, place, and time.   Psychiatric:         Mood and Affect: Mood normal.         Behavior: Behavior normal.         Thought Content: Thought content normal.         14 Day Cardiac Monitor 8/11/21:  1. Sinus rhythm with heart rate range from 55 to 145 bpm. Average heart rate 90 bpm.   2. Normal sinus node and AV node conduction normal. No pauses.   3. Rare PACs.   4. Rare PVCs. 4 beats of NSVT   5. No sustained rhythm changes. No atrial fibrillation/flutter.   6. Patient triggers were associated with sinus rhythm with heart rate range 70-110s bpm.       Assessment & Plan     1. Essential hypertension  losartan (COZAAR) 25 MG Tab    Basic Metabolic Panel   2. Dizziness  EC-ECHOCARDIOGRAM COMPLETE W/O CONT   3. Palpitations     4. Atrial ectopy         Medical Decision Making: Today's Assessment/Status/Plan:     Lightheadedness:  - Cardiac event monitor showed NSR, average HR of 90 bpm. Patient triggers were associated with NSR with HR 70 - 110 bpm. Did show PAC's. Discussed starting low dose BB, patient is overall feeling better and would like to hold off for now.     HTN:  - Home log showing SBP ranging in the 130's-150's.   - Increased losartan to 25 mg BID from daily.   - Repeat BMP in 10-14 days.     PAC's:  - TTE ordered.     Mildly Elevated TG:  - Diet and exercise discussed.     Patient will follow up with myself as scheduled below or earlier if needed. Encouraged patient to contact our office should any questions or concerns arise in the mean time.       Future Appointments   Date Time Provider Department Center   9/27/2021 10:30 AM [address]   11/30/2021  4:15 PM [facility] EXAM 10 ECHO [facility]   12/16/2021  3:30 PM SHAJI Garcia. CB None

## 2021-09-20 ENCOUNTER — OFFICE VISIT (OUTPATIENT)
Dept: CARDIOLOGY | Facility: MEDICAL CENTER | Age: 61
End: 2021-09-20
Payer: COMMERCIAL

## 2021-09-20 VITALS
DIASTOLIC BLOOD PRESSURE: 98 MMHG | BODY MASS INDEX: 28.09 KG/M2 | HEART RATE: 100 BPM | SYSTOLIC BLOOD PRESSURE: 130 MMHG | RESPIRATION RATE: 14 BRPM | WEIGHT: 179 LBS | OXYGEN SATURATION: 96 % | HEIGHT: 67 IN

## 2021-09-20 DIAGNOSIS — I49.1 ATRIAL ECTOPY: ICD-10-CM

## 2021-09-20 DIAGNOSIS — R00.2 PALPITATIONS: ICD-10-CM

## 2021-09-20 DIAGNOSIS — I10 ESSENTIAL HYPERTENSION: ICD-10-CM

## 2021-09-20 DIAGNOSIS — R42 DIZZINESS: ICD-10-CM

## 2021-09-20 PROCEDURE — 99214 OFFICE O/P EST MOD 30 MIN: CPT | Performed by: NURSE PRACTITIONER

## 2021-09-20 RX ORDER — LOSARTAN POTASSIUM 25 MG/1
25 TABLET ORAL 2 TIMES DAILY
Qty: 180 TABLET | Refills: 3 | Status: SHIPPED | OUTPATIENT
Start: 2021-09-20 | End: 2021-12-16 | Stop reason: SDUPTHER

## 2021-09-20 ASSESSMENT — FIBROSIS 4 INDEX: FIB4 SCORE: 1.27

## 2021-09-20 NOTE — PATIENT INSTRUCTIONS
Start taking 1/2 table (12.5 mg) of Losartan at night if your BP is still above 140/90 increase to 1 full pill every morning and night.

## 2021-09-21 ASSESSMENT — ENCOUNTER SYMPTOMS
PND: 0
WEIGHT LOSS: 0
ORTHOPNEA: 0
DIZZINESS: 0
WEAKNESS: 0
SHORTNESS OF BREATH: 0
PALPITATIONS: 0
CLAUDICATION: 0

## 2021-09-27 ENCOUNTER — OFFICE VISIT (OUTPATIENT)
Dept: MEDICAL GROUP | Facility: PHYSICIAN GROUP | Age: 61
End: 2021-09-27
Payer: COMMERCIAL

## 2021-09-27 VITALS
BODY MASS INDEX: 28.72 KG/M2 | TEMPERATURE: 97.9 F | OXYGEN SATURATION: 97 % | RESPIRATION RATE: 16 BRPM | HEART RATE: 80 BPM | HEIGHT: 67 IN | WEIGHT: 183 LBS | SYSTOLIC BLOOD PRESSURE: 134 MMHG | DIASTOLIC BLOOD PRESSURE: 80 MMHG

## 2021-09-27 DIAGNOSIS — M79.675 TOE PAIN, CHRONIC, LEFT: ICD-10-CM

## 2021-09-27 DIAGNOSIS — G89.29 TOE PAIN, CHRONIC, LEFT: ICD-10-CM

## 2021-09-27 DIAGNOSIS — F41.8 SITUATIONAL ANXIETY: ICD-10-CM

## 2021-09-27 DIAGNOSIS — R00.2 PALPITATION: ICD-10-CM

## 2021-09-27 DIAGNOSIS — R42 VERTIGO: ICD-10-CM

## 2021-09-27 DIAGNOSIS — Z87.891 FORMER SMOKER: ICD-10-CM

## 2021-09-27 DIAGNOSIS — I10 ESSENTIAL HYPERTENSION: ICD-10-CM

## 2021-09-27 DIAGNOSIS — F40.243 FLYING PHOBIA: ICD-10-CM

## 2021-09-27 PROCEDURE — 99214 OFFICE O/P EST MOD 30 MIN: CPT | Performed by: NURSE PRACTITIONER

## 2021-09-27 RX ORDER — LORAZEPAM 0.5 MG/1
0.5 TABLET ORAL EVERY 8 HOURS PRN
Qty: 15 TABLET | Refills: 0 | Status: SHIPPED | OUTPATIENT
Start: 2021-09-27 | End: 2022-05-05 | Stop reason: SDUPTHER

## 2021-09-27 RX ORDER — ONDANSETRON 4 MG/1
4 TABLET, FILM COATED ORAL EVERY 6 HOURS PRN
Qty: 20 TABLET | Refills: 0 | Status: SHIPPED | OUTPATIENT
Start: 2021-09-27 | End: 2021-10-04

## 2021-09-27 ASSESSMENT — FIBROSIS 4 INDEX: FIB4 SCORE: 1.27

## 2021-09-27 NOTE — ASSESSMENT & PLAN NOTE
-Discussed option for physical therapy referral.  -Patient will try Epley maneuver at home and will contact this provider for referral as required.

## 2021-09-27 NOTE — ASSESSMENT & PLAN NOTE
-Continue current prescribed medication.  -Continue follow-up with cardiology for further management and adjustment of medication.

## 2021-09-28 NOTE — PROGRESS NOTES
Subjective:     Chief Complaint   Patient presents with   • Medication Refill     Ativan     HPI:   Ambar presents today with multiple issues    Problem   Toe Pain, Chronic, Left    Chronic in nature.  Patient has 4th toe pain.  Which is more painful when she walks for long distances.  States that she would like to see a podiatrist regarding this issue as it has not gotten better and has been more bothersome recently.  She denies any swelling, specific injury.     Vertigo    Chronic in nature.  She continues to have intermittent issues with less than 1 min episodes of vertigo.  States that it is left-sided, states that she notices this daily.  States that she notices it specifically with bending down quickly.  Dates she does plan to try Epley maneuver at home.  She will report whether this is successful.     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 Stable.  Patient states that she has continued to use medication very intermittently for flights and states that the Ativan 0.5 mg works very well to control her anxiety related to travel.  She states that related to Covid she has not traveled in a long time but states that she does have a couple of trips coming up she is planning to go to [place] to see her son and would like a refill of medication.    Is the medication improving the patient’s symptoms: Yes  Any adverse effects: No    Alcohol or illicit drug use:   She  reports current alcohol use of about 7.2 oz of alcohol per week.  She  reports current drug use. Drug: Inhaled.     History of controlled substance used in a way other than prescribed? No  Any early refills of a controlled substance: No  History of lost or stolen controlled substance prescription: No  Any aberrant behavior or intoxication while on a controlled substance: No  Has the patient self-modified their dose or frequency of the medication :No  Compliant with treatment recommendations and plan: Yes  Any major health change to the patient:  No  Concerns for misuse, abuse or addiction: No    /NarxCheck report reviewed: Yes  History of abnormal drug screening: N/A       Hypertension    Chronic in nature.  Stable.  Blood pressure today is 134/80.  She denies chest pain, palpitations, dizziness, blurry vision.  States that she has been seeing cardiology and that they have been working to adjust her medications she states she has still had symptoms pretty significant fluctuation in blood pressure results.  States that she was pleased with discussion with cardiology regarding palpitations.  She states that they were very reassuring.         Current Outpatient Medications Ordered in Epic   Medication Sig Dispense Refill   • ondansetron (ZOFRAN) 4 MG Tab tablet Take 1 Tablet by mouth every 6 hours as needed for up to 7 days. 20 Tablet 0   • LORazepam (ATIVAN) 0.5 MG Tab Take 1 Tablet by mouth every 8 hours as needed for Anxiety for up to 5 days. 15 Tablet 0   • losartan (COZAAR) 25 MG Tab Take 1 Tablet by mouth 2 times a day. 180 Tablet 3   • Loratadine (CLARITIN PO) Take 1 tablet by mouth every day.     • ibuprofen (MOTRIN) 200 MG Tab Take 200 mg by mouth every 6 hours as needed.     • fluticasone (FLONASE) 50 MCG/ACT nasal spray Administer 1 Spray into affected nostril(S) every day.     • albuterol (PROAIR HFA) 108 (90 Base) MCG/ACT Aero Soln inhalation aerosol Inhale 2 Puffs every 6 hours. INHALE 2 PUFFS BY MOUTH EVERY 6 HOURS AS NEEDED FOR SHORTNESS OF BREATH. 8.5 g 6   • omeprazole (PRILOSEC) 20 MG delayed-release capsule TAKE 1 CAPSULE BY MOUTH EVERY DAY 90 Cap 0     No current Epic-ordered facility-administered medications on file.       Health Maintenance: Patient declines vaccines we will revisit them at next follow-up.    ROS:  Gen: no fevers/chills, no changes in weight  Eyes: no changes in vision  ENT: no sore throat, no hearing loss, no bloody nose  Pulm: no sob, no cough  CV: no chest pain, no palpitations  GI: no nausea/vomiting, no  "diarrhea  : no dysuria  MSk: no myalgias  Skin: no rash  Neuro: no headaches, no numbness/tingling  Heme/Lymph: no easy bruising      Objective:     Exam:  /80 (BP Location: Right arm, Patient Position: Sitting, BP Cuff Size: Adult)   Pulse 80   Temp 36.6 °C (97.9 °F) (Temporal)   Resp 16   Ht 1.702 m (5' 7\")   Wt 83 kg (183 lb)   LMP 10/14/2001 (Approximate)   SpO2 97%   BMI 28.66 kg/m²  Body mass index is 28.66 kg/m².    Gen: Alert and oriented, No apparent distress.  Neck: Neck is supple without lymphadenopathy.  Lungs: Normal effort, CTA bilaterally, no wheezes, rhonchi, or rales  CV: Regular rate and rhythm. No murmurs, rubs, or gallops.  Ext: No clubbing, cyanosis, edema.  No swelling, redness to left fourth toe, no pain with range of motion of toe.      Assessment & Plan:     60 y.o. female with the following -     Problem List Items Addressed This Visit     Flying phobia     -Continue Ativan minimally as needed for flying phobia.           Relevant Medications    LORazepam (ATIVAN) 0.5 MG Tab    Hypertension     -Continue current prescribed medication.  -Continue follow-up with cardiology for further management and adjustment of medication.           Palpitation    Toe pain, chronic, left     -Podiatry referral placed.         Relevant Orders    REFERRAL TO PODIATRY    Vertigo     -Discussed option for physical therapy referral.  -Patient will try Epley maneuver at home and will contact this provider for referral as required.           Relevant Medications    ondansetron (ZOFRAN) 4 MG Tab tablet      Other Visit Diagnoses     Situational anxiety        Relevant Medications    LORazepam (ATIVAN) 0.5 MG Tab    Former smoker        Relevant Orders    REFERRAL TO LUNG CANCER SCREENING PROGRAM            Return if symptoms worsen or fail to improve.    Please note that this dictation was created using voice recognition software. I have made every reasonable attempt to correct obvious errors, but I " expect that there are errors of grammar and possibly content that I did not discover before finalizing the note.

## 2021-10-04 ENCOUNTER — TELEPHONE (OUTPATIENT)
Dept: HEMATOLOGY ONCOLOGY | Facility: MEDICAL CENTER | Age: 61
End: 2021-10-04

## 2021-10-04 NOTE — TELEPHONE ENCOUNTER
Received referral to lung cancer screening program.  Chart review to assess for lung cancer screening program eligibility.   1. Age 55-77 yrs of age? Yes 60 y.o.  2. 30 pack year hx of smoking, or greater? Yes 1 [id number] hx  3. Current smoker or if quit, has pt quit within last 15 yrs?Yes  Quit 1/1/2010  4. Any signs or symptoms of lung cancer? None noted  5. Previous history of lung cancer? None noted  6. Chest CT within past 12 mos.? None noted  Patient does meet eligibility criteria. [facility] scheduling notified to schedule the shared decision making visit.

## 2021-11-30 ENCOUNTER — HOSPITAL ENCOUNTER (OUTPATIENT)
Dept: CARDIOLOGY | Facility: MEDICAL CENTER | Age: 61
End: 2021-11-30
Attending: NURSE PRACTITIONER
Payer: COMMERCIAL

## 2021-11-30 DIAGNOSIS — R42 DIZZINESS: ICD-10-CM

## 2021-11-30 LAB
LV EJECT FRACT  99904: 65
LV EJECT FRACT MOD 2C 99903: 61.8
LV EJECT FRACT MOD 4C 99902: 69.51
LV EJECT FRACT MOD BP 99901: 66.16

## 2021-11-30 PROCEDURE — 93306 TTE W/DOPPLER COMPLETE: CPT | Mod: 26 | Performed by: INTERNAL MEDICINE

## 2021-11-30 PROCEDURE — 93306 TTE W/DOPPLER COMPLETE: CPT

## 2021-12-09 NOTE — PROGRESS NOTES
"Chief Complaint   Patient presents with   • Hypertension     F/V Dx: Essential hypertension   • Palpitations       Subjective     Ambar Jj is a 60 y.o. female former patient of Dr. Karissa Lizarraga who presents today for follow up.    Patient was last seen by myself on 9/20/2021. Her pervious c/o lightheadedness had resolved. Her cardiac event monitor was reviewed which showed NSR/ST  bpm.We discussed low dose BB, she declined as she was feeling better. An echocardiogram was ordered.     PMH pertinent for diverticulitis S/P partial colectomy with colostomy, repair of previous right femoral artery injury, anemia, asthma and GERD.     Today patient states that her palpitations seem to be increasing slightly in severity and frequency and that she is interested in starting low dose BB therapy. Denies having any other concerns of complaints.     Past Medical History:   Diagnosis Date   • Anemia    • Anemia    • Anesthesia     PONV   • Bowel habit changes 11/19/2018    \"Constipation/diarrhea\".   • Bronchitis 2010   • Colostomy present (HCC)    • Diverticulitis 11/2017   • Female bladder prolapse 11/19/2018   • GERD (gastroesophageal reflux disease)    • Heart burn    • HTN     resolved with wt loss and lifestyle changes   • HTN (hypertension) 11/19/2018    Takes Losartan   • Indigestion    • Other specified symptom associated with female genital organs    • Pap smear 01/29/2009    Normal   • PONV (postoperative nausea and vomiting)     x2 days post op   • Psychiatric problem     anxiety   • Reactive airway disease 11/19/2018    Inhaler use PRN   • Unspecified urinary incontinence 11/19/2018    Wears Pads   • Urinary bladder disorder     prolapse      Past Surgical History:   Procedure Laterality Date   • VENTRAL HERNIA REPAIR ROBOTIC XI  10/24/2019    Procedure: REPAIR, HERNIA, VENTRAL, ROBOT-ASSISTED, USING DA SANDRINE XI- FOR INCISIONAL WITH MESH;  Surgeon: Justus Will M.D.;  Location: SURGERY SAME DAY " "[facility];  Service: General   • COLOSTOMY TAKEDOWN  5/6/2019    Procedure: COLOSTOMY TAKEDOWN;  Surgeon: Justus Will M.D.;  Location: SURGERY [facility];  Service: General   • APPENDECTOMY  5/6/2019    Procedure: APPENDECTOMY;  Surgeon: Justus Will M.D.;  Location: SURGERY [facility];  Service: General   • SIGMOID COLECTOMY N/A 11/21/2018    Procedure: SIGMOID COLECTOMY;  Surgeon: Justus Will M.D.;  Location: SURGERY [facility];  Service: General   • BLADDER SUSPENSION N/A 5/6/2015    Procedure: BLADDER SUSPENSION [57.89] TOT;  Surgeon: Yazmin Armando M.D.;  Location: SURGERY SAME DAY [facility];  Service:    • CYSTOSCOPY N/A 5/6/2015    Procedure: CYSTOSCOPY;  Surgeon: Yazmin Armando M.D.;  Location: SURGERY SAME DAY [facility];  Service:    • ANTERIOR AND POSTERIOR REPAIR N/A 5/6/2015    Procedure: ANTERIOR AND POSTERIOR REPAIR [70.53];  Surgeon: Yazmin Armando M.D.;  Location: SURGERY SAME DAY [facility];  Service:    • ABDOMINAL HYSTERECTOMY TOTAL      Ovaries intact   • BLADDER SUSPENSION     • CHOLECYSTECTOMY     • ENDOSCOPY     • ENDOSCOPY PROCEDURE      dilation due to stricture   • OTHER      \"Abdominal abcess drained twice 2018\".   • TONSILLECTOMY AND ADENOIDECTOMY       Family History   Problem Relation Age of Onset   • Cancer Mother         Breast   • Lung Disease Mother         COPD   • Heart Disease Mother    • Diabetes Mother    • Lung Disease Father         COPD   • Heart Disease Father    • Alcohol/Drug Sister    • Lung Disease Sister         sister 2 Asthma   • Heart Disease Sister         sister 1 Heart murmur   • Alcohol/Drug Brother    • Other Son         IBS; working on his diet     Social History     Socioeconomic History   • Marital status:      Spouse name: Not on file   • Number of children: Not on file   • Years of education: Not on file   • Highest education level: Not on file   Occupational History   • Not on file "   Tobacco Use   • Smoking status: Former Smoker     Packs/day: 0.00     Years: 34.00     Pack years: 0.00     Types: Cigarettes     Quit date: 2010     Years since quittin.9   • Smokeless tobacco: Never Used   • Tobacco comment: 2010   Vaping Use   • Vaping Use: Never used   Substance and Sexual Activity   • Alcohol use: Yes     Alcohol/week: 7.2 oz     Types: 6 Cans of beer, 6 Standard drinks or equivalent per week     Comment: 4 per week   • Drug use: Yes     Types: Inhaled     Comment: marijuana occasionally    • Sexual activity: Yes     Partners: Male   Other Topics Concern   • Not on file   Social History Narrative   • Not on file     Social Determinants of Health     Financial Resource Strain:    • Difficulty of Paying Living Expenses: Not on file   Food Insecurity:    • Worried About Running Out of Food in the Last Year: Not on file   • Ran Out of Food in the Last Year: Not on file   Transportation Needs:    • Lack of Transportation (Medical): Not on file   • Lack of Transportation (Non-Medical): Not on file   Physical Activity:    • Days of Exercise per Week: Not on file   • Minutes of Exercise per Session: Not on file   Stress:    • Feeling of Stress : Not on file   Social Connections:    • Frequency of Communication with Friends and Family: Not on file   • Frequency of Social Gatherings with Friends and Family: Not on file   • Attends Oriental orthodox Services: Not on file   • Active Member of Clubs or Organizations: Not on file   • Attends Club or Organization Meetings: Not on file   • Marital Status: Not on file   Intimate Partner Violence:    • Fear of Current or Ex-Partner: Not on file   • Emotionally Abused: Not on file   • Physically Abused: Not on file   • Sexually Abused: Not on file   Housing Stability:    • Unable to Pay for Housing in the Last Year: Not on file   • Number of Places Lived in the Last Year: Not on file   • Unstable Housing in the Last Year: Not on file     Allergies    Allergen Reactions   • Codeine Vomiting     Outpatient Encounter Medications as of 12/16/2021   Medication Sig Dispense Refill   • losartan (COZAAR) 25 MG Tab Take 1 Tablet by mouth every day. 90 Tablet 3   • metoprolol SR (TOPROL XL) 25 MG TABLET SR 24 HR Take 1 Tablet by mouth every evening. 90 Tablet 3   • Loratadine (CLARITIN PO) Take 1 tablet by mouth every day.     • ibuprofen (MOTRIN) 200 MG Tab Take 200 mg by mouth every 6 hours as needed.     • fluticasone (FLONASE) 50 MCG/ACT nasal spray Administer 1 Spray into affected nostril(S) every day.     • albuterol (PROAIR HFA) 108 (90 Base) MCG/ACT Aero Soln inhalation aerosol Inhale 2 Puffs every 6 hours. INHALE 2 PUFFS BY MOUTH EVERY 6 HOURS AS NEEDED FOR SHORTNESS OF BREATH. 8.5 g 6   • omeprazole (PRILOSEC) 20 MG delayed-release capsule TAKE 1 CAPSULE BY MOUTH EVERY DAY 90 Cap 0   • [DISCONTINUED] cyclobenzaprine (FLEXERIL) 5 mg tablet Cyclobenzaprine HCl 5 MG Oral Tablet QTY: 0 tablet Days: 360 Refills: 0  Written: 07/26/21 Patient Instructions: (Patient not taking: Reported on 12/16/2021)     • [DISCONTINUED] Albuterol Sulfate 108 (90 Base) MCG/ACT AEROSOL POWDER, BREATH ACTIVATED Albuterol Sulfate 108 (90 Base) MCG/ACT Inhalation Aerosol Powder Breath Activated QTY: 0  Days: 300 Refills: 0  Written: 07/26/21 Patient Instructions: (Patient not taking: Reported on 12/16/2021)     • [DISCONTINUED] losartan (COZAAR) 25 MG Tab Take 1 Tablet by mouth 2 times a day. (Patient taking differently: Take 25 mg by mouth every day.) 180 Tablet 3     No facility-administered encounter medications on file as of 12/16/2021.     Review of Systems   Constitutional: Negative for malaise/fatigue and weight loss.   Respiratory: Negative for shortness of breath.    Cardiovascular: Negative for chest pain, palpitations, orthopnea, claudication, leg swelling and PND.   Neurological: Negative for dizziness and weakness.   All other systems reviewed and are negative.        "      Objective     /86 (BP Location: Left arm, Patient Position: Sitting, BP Cuff Size: Adult)   Pulse 88   Resp 14   Ht 1.702 m (5' 7\")   Wt 82.6 kg (182 lb)   LMP 10/14/2001 (Approximate)   SpO2 97%   BMI 28.51 kg/m²     Physical Exam  Constitutional:       General: She is not in acute distress.     Appearance: She is well-developed.   HENT:      Head: Normocephalic.   Eyes:      Extraocular Movements: Extraocular movements intact.   Neck:      Vascular: No carotid bruit or JVD.   Cardiovascular:      Rate and Rhythm: Normal rate and regular rhythm.      Heart sounds: Normal heart sounds.   Pulmonary:      Effort: No respiratory distress.      Breath sounds: Normal breath sounds.   Abdominal:      Palpations: Abdomen is soft.      Tenderness: There is no abdominal tenderness.   Musculoskeletal:      Cervical back: Normal range of motion.      Right lower leg: No edema.      Left lower leg: No edema.   Skin:     General: Skin is warm and dry.   Neurological:      Mental Status: She is alert and oriented to person, place, and time.   Psychiatric:         Mood and Affect: Mood normal.         Behavior: Behavior normal.         Thought Content: Thought content normal.         14 Day Cardiac Monitor 8/11/21:  1. Sinus rhythm with heart rate range from 55 to 145 bpm. Average heart rate 90 bpm.   2. Normal sinus node and AV node conduction normal. No pauses.   3. Rare PACs.   4. Rare PVCs. 4 beats of NSVT   5. No sustained rhythm changes. No atrial fibrillation/flutter.   6. Patient triggers were associated with sinus rhythm with heart rate range 70-110s bpm.     TTE 11/30/21:  CONCLUSIONS  Normal transthoracic echocardiogram study.   No prior study is available for comparison.       Assessment & Plan     1. Essential hypertension  losartan (COZAAR) 25 MG Tab   2. Palpitations     3. PVC (premature ventricular contraction)     4. Dizziness     5. Primary hypertension         Medical Decision Making: Today's " Assessment/Status/Plan:     Lightheadedness:  - Chronic, currently stable.   - No significant valvular abnormality on recent TTE.     HTN:  - Well controlled.   - Continue Losartan 25 mg daily and BB as below.     PAC's:  - Normal TTE on 11/30/2021.   - Start Toprol XL 25 mg every evening.     Mildly Elevated TG:  - Continue diet and exercise, re-check next year.     Patient will follow up with myself in one year or earlier if needed. Encouraged patient to contact our office should any questions or concerns arise in the mean time.

## 2021-12-13 ENCOUNTER — TELEPHONE (OUTPATIENT)
Dept: CARDIOLOGY | Facility: MEDICAL CENTER | Age: 61
End: 2021-12-13

## 2021-12-13 NOTE — TELEPHONE ENCOUNTER
LVM asking if she did lab work. To let us know where if she did. And if not that she can go to [facility] without the lab slip and she does not winsome to fast.

## 2021-12-16 ENCOUNTER — OFFICE VISIT (OUTPATIENT)
Dept: CARDIOLOGY | Facility: MEDICAL CENTER | Age: 61
End: 2021-12-16
Payer: COMMERCIAL

## 2021-12-16 VITALS
OXYGEN SATURATION: 97 % | SYSTOLIC BLOOD PRESSURE: 118 MMHG | DIASTOLIC BLOOD PRESSURE: 86 MMHG | RESPIRATION RATE: 14 BRPM | WEIGHT: 182 LBS | HEIGHT: 67 IN | HEART RATE: 88 BPM | BODY MASS INDEX: 28.56 KG/M2

## 2021-12-16 DIAGNOSIS — I49.3 PVC (PREMATURE VENTRICULAR CONTRACTION): ICD-10-CM

## 2021-12-16 DIAGNOSIS — R42 DIZZINESS: ICD-10-CM

## 2021-12-16 DIAGNOSIS — I10 ESSENTIAL HYPERTENSION: ICD-10-CM

## 2021-12-16 DIAGNOSIS — I10 PRIMARY HYPERTENSION: ICD-10-CM

## 2021-12-16 DIAGNOSIS — R00.2 PALPITATIONS: ICD-10-CM

## 2021-12-16 PROCEDURE — 99214 OFFICE O/P EST MOD 30 MIN: CPT | Performed by: NURSE PRACTITIONER

## 2021-12-16 RX ORDER — CYCLOBENZAPRINE HCL 5 MG
TABLET ORAL
COMMUNITY
Start: 2021-07-26 | End: 2021-12-16

## 2021-12-16 RX ORDER — METOPROLOL SUCCINATE 25 MG/1
25 TABLET, EXTENDED RELEASE ORAL EVERY EVENING
Qty: 90 TABLET | Refills: 3 | Status: SHIPPED | OUTPATIENT
Start: 2021-12-16 | End: 2023-01-10

## 2021-12-16 RX ORDER — LOSARTAN POTASSIUM 25 MG/1
25 TABLET ORAL DAILY
Qty: 90 TABLET | Refills: 3 | Status: SHIPPED | OUTPATIENT
Start: 2021-12-16 | End: 2023-01-20

## 2021-12-16 ASSESSMENT — FIBROSIS 4 INDEX: FIB4 SCORE: 1.29

## 2021-12-18 NOTE — ANESTHESIA POST-OP FOLLOW-UP NOTE
"Anesthesia Pain Service Note    Type:  Epidural      Patient: Ambar Jj        /71   Pulse 91   Temp 37.2 °C (99 °F) (Temporal)   Resp 16   Ht 1.702 m (5' 7\")   Wt 75.9 kg (167 lb 5.3 oz)   SpO2 92%   Breastfeeding? No   BMI 26.21 kg/m²     Complaints: Doing very well with epidural .     Pain:   2/10      Rate:  4/ hour    Exam:  Catheter in place , no sign of infection    Impression:  Very good pain relief with epidural.    Assessment & Plan: Continue one more day with epidural as working well for patient.      Remove catheter and pump on Friday.       Randy Martínez M.D.  5/9/2019  2:03 PM  " No respiratory distress. No stridor, Lungs sounds clear with good aeration bilaterally.

## 2021-12-21 ASSESSMENT — ENCOUNTER SYMPTOMS
SHORTNESS OF BREATH: 0
ORTHOPNEA: 0
CLAUDICATION: 0
PALPITATIONS: 0
PND: 0
WEIGHT LOSS: 0
DIZZINESS: 0
WEAKNESS: 0

## 2022-01-25 ENCOUNTER — HOSPITAL ENCOUNTER (OUTPATIENT)
Dept: RADIOLOGY | Facility: MEDICAL CENTER | Age: 62
End: 2022-01-25
Attending: NURSE PRACTITIONER
Payer: COMMERCIAL

## 2022-01-25 DIAGNOSIS — Z12.31 VISIT FOR SCREENING MAMMOGRAM: ICD-10-CM

## 2022-01-25 PROCEDURE — 77063 BREAST TOMOSYNTHESIS BI: CPT

## 2022-01-31 ENCOUNTER — HOSPITAL ENCOUNTER (OUTPATIENT)
Facility: MEDICAL CENTER | Age: 62
End: 2022-01-31
Attending: NURSE PRACTITIONER
Payer: COMMERCIAL

## 2022-01-31 ENCOUNTER — OFFICE VISIT (OUTPATIENT)
Dept: MEDICAL GROUP | Facility: PHYSICIAN GROUP | Age: 62
End: 2022-01-31
Payer: COMMERCIAL

## 2022-01-31 VITALS
SYSTOLIC BLOOD PRESSURE: 108 MMHG | RESPIRATION RATE: 16 BRPM | BODY MASS INDEX: 28.5 KG/M2 | OXYGEN SATURATION: 96 % | DIASTOLIC BLOOD PRESSURE: 88 MMHG | HEART RATE: 80 BPM | TEMPERATURE: 98 F | WEIGHT: 181.6 LBS | HEIGHT: 67 IN

## 2022-01-31 DIAGNOSIS — D22.9 ATYPICAL MOLE: ICD-10-CM

## 2022-01-31 DIAGNOSIS — L98.9 SKIN LESION OF RIGHT ARM: ICD-10-CM

## 2022-01-31 DIAGNOSIS — D22.9 NUMEROUS MOLES: ICD-10-CM

## 2022-01-31 LAB — PATHOLOGY CONSULT NOTE: NORMAL

## 2022-01-31 PROCEDURE — 88305 TISSUE EXAM BY PATHOLOGIST: CPT

## 2022-01-31 PROCEDURE — 11102 TANGNTL BX SKIN SINGLE LES: CPT | Performed by: NURSE PRACTITIONER

## 2022-01-31 PROCEDURE — 99213 OFFICE O/P EST LOW 20 MIN: CPT | Mod: 25 | Performed by: NURSE PRACTITIONER

## 2022-01-31 RX ADMIN — Medication 0.5 ML: at 13:14

## 2022-01-31 ASSESSMENT — ENCOUNTER SYMPTOMS
CHILLS: 0
PALPITATIONS: 0
NAUSEA: 0
COUGH: 0
SHORTNESS OF BREATH: 0
HEARTBURN: 0
FEVER: 0
VOMITING: 0

## 2022-01-31 ASSESSMENT — PATIENT HEALTH QUESTIONNAIRE - PHQ9: CLINICAL INTERPRETATION OF PHQ2 SCORE: 0

## 2022-01-31 ASSESSMENT — FIBROSIS 4 INDEX: FIB4 SCORE: 1.29

## 2022-01-31 NOTE — PROCEDURES
SHAVE BIOPSY PROCEDURE NOTE:  Discussed with the patient the risks, benefits and alternatives to excision of the lesion. Informed consent was obtained. The area was alcohol swabbed and 1 cc of 1% lidocaine with epinephrine was administered. The area was then prepped and draped in the usual sterile fashion. A shave biopsy was used to excise the lesion. The excision was approximately 0.75 cm in diameter.  The specimen was placed in a pathology jar and sent to the lab.  Hemostasis was obtained with gentle pressure.  Antibiotic ointment and bandage was applied. The patient was given wound care instructions and follow-up precautions.

## 2022-01-31 NOTE — ASSESSMENT & PLAN NOTE
-Biopsy of right posterior arm  -Concern for potential skin cancer vs. keratin horn.  -Patient generally sees dermatology yearly and is requesting the referral to skin cancer and dermatology [place] as she was seeing Vannessa Rivers

## 2022-01-31 NOTE — PROGRESS NOTES
Subjective:     Chief Complaint   Patient presents with   • Nevus     two questionable moles, one turned black, and some of it fell off a couple of weeks ago, the other just popped up a month ago       HPI:   Ambar presents today with     Problem   Skin Lesion of Right Arm    Patient reports 2 skin lesions of concern.  States that she has one on her left upper chest that she has noticed changing colors.  She states that she is unsure if this is related to rubbing from her lanyard states that it had been present over multiple years and that it had developed a black area which recently came off after being caught on her lanyard.  She states it is an oval shaped raised mole that has been present for multiple years.  The second mole is on her posterior right upper arm it is new within the last week and states that it has gotten really large quickly it is approximately half to three-quarter centimeter around with a firm center patient states that it is not red, hot, itchy or painful but that it keeps getting caught on things she is concerned as it popped up so suddenly.         Current Outpatient Medications Ordered in Epic   Medication Sig Dispense Refill   • losartan (COZAAR) 25 MG Tab Take 1 Tablet by mouth every day. 90 Tablet 3   • metoprolol SR (TOPROL XL) 25 MG TABLET SR 24 HR Take 1 Tablet by mouth every evening. 90 Tablet 3   • Loratadine (CLARITIN PO) Take 1 tablet by mouth every day.     • ibuprofen (MOTRIN) 200 MG Tab Take 200 mg by mouth every 6 hours as needed.     • fluticasone (FLONASE) 50 MCG/ACT nasal spray Administer 1 Spray into affected nostril(S) every day.     • albuterol (PROAIR HFA) 108 (90 Base) MCG/ACT Aero Soln inhalation aerosol Inhale 2 Puffs every 6 hours. INHALE 2 PUFFS BY MOUTH EVERY 6 HOURS AS NEEDED FOR SHORTNESS OF BREATH. 8.5 g 6   • omeprazole (PRILOSEC) 20 MG delayed-release capsule TAKE 1 CAPSULE BY MOUTH EVERY DAY 90 Cap 0     No current Epic-ordered facility-administered  "medications on file.       Health Maintenance: Vaccines declined today.    Review of Systems   Constitutional: Negative for chills, fever and malaise/fatigue.   Respiratory: Negative for cough and shortness of breath.    Cardiovascular: Negative for chest pain and palpitations.   Gastrointestinal: Negative for heartburn, nausea and vomiting.   Skin: Negative for itching and rash.         Objective:     Exam:  /88 (BP Location: Left arm, Patient Position: Sitting, BP Cuff Size: Adult)   Pulse 80   Temp 36.7 °C (98 °F) (Temporal)   Resp 16   Ht 1.702 m (5' 7\")   Wt 82.4 kg (181 lb 9.6 oz)   LMP 10/14/2001 (Approximate)   SpO2 96%   BMI 28.44 kg/m²  Body mass index is 28.44 kg/m².    Physical Exam  Constitutional:       Appearance: Normal appearance.   HENT:      Head: Normocephalic.   Skin:     General: Skin is warm.      Capillary Refill: Capillary refill takes less than 2 seconds.      Findings: Lesion present.          Neurological:      Mental Status: She is alert.       Assessment & Plan:     61 y.o. female with the following -     Problem List Items Addressed This Visit     Skin lesion of right arm     -Biopsy of right posterior arm  -Concern for potential skin cancer vs. keratin horn.  -Patient generally sees dermatology yearly and is requesting the referral to skin cancer and dermatology [place] as she was seeing Vannessa Rivers           Other Visit Diagnoses     Numerous moles        Relevant Orders    Referral to Dermatology    Atypical mole        Relevant Medications    lidocaine (XYLOCAINE) 1 % injection 0.5 mL (Completed)    Other Relevant Orders    Pathology Specimen          No follow-ups on file.    I have placed the below orders and discussed them with an approved delegating provider. The MA is performing the below orders under the direction of Dr. Mitchell.     Please note that this dictation was created using voice recognition software. I have made every reasonable attempt to correct " obvious errors, but I expect that there are errors of grammar and possibly content that I did not discover before finalizing the note.

## 2022-02-03 DIAGNOSIS — C44.622 SQUAMOUS CELL CARCINOMA OF SKIN OF RIGHT UPPER ARM: ICD-10-CM

## 2022-02-10 ENCOUNTER — OFFICE VISIT (OUTPATIENT)
Dept: DERMATOLOGY | Facility: IMAGING CENTER | Age: 62
End: 2022-02-10
Payer: COMMERCIAL

## 2022-02-10 DIAGNOSIS — L85.8 KERATOACANTHOMA: ICD-10-CM

## 2022-02-10 DIAGNOSIS — D49.2 NEOPLASM OF SKIN: ICD-10-CM

## 2022-02-10 PROCEDURE — 11102 TANGNTL BX SKIN SINGLE LES: CPT | Performed by: DERMATOLOGY

## 2022-02-10 PROCEDURE — 99203 OFFICE O/P NEW LOW 30 MIN: CPT | Mod: 25 | Performed by: DERMATOLOGY

## 2022-02-10 NOTE — PROGRESS NOTES
CC:  Skin lesion     Subjective: new patient here for f/u after PCP bx - KA RIGHT arm.     HPI:  Skin lesion   Bx 1 week ago by PCP  , Dx SCC path in epic   Location: right upper arm   Time present: 6 weeks   Painful lesion: No  Itching lesion: Yes  Enlarging lesion: Yes  Anything make it better or worse? No     HPI:  Skin lesion   Location: left side  Of collar bone   Time present:  Unknown   Painful lesion: No  Itching lesion: Yes  Enlarging lesion: No  Anything make it better or worse?no     ROS: no fevers/chills. No itch.  No cough  Relevant PMH: mult med comorbidities  Social: former smoker    PE: Gen:WDWN female in NAD. Scar - approx 0.8cm, crusted on right upper arm.  Skin-tag, appearing irritated on left collarbone/upper chest.     FINAL DIAGNOSIS:     A. 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         Margins uninvolved in all sections examined (see comment).     A/P: KA-SCC, right arm:  -will message path to amend path report for site  -explained dx/tx  -elects excision - PA completed and will schedule    Neoplasm NOS: suspect irritated skin tag  -consent for bx, including R/B/A. Cleaned with EtOH, anesthesia with lidocaine 1% + epinephrine, shave bx, AlCl3 for hemostasis  -vaseline/bandage and wound care reviewed    MALISSA future    I have reviewed medications relevant to my specialty.    F/u surgery

## 2022-02-18 ENCOUNTER — TELEPHONE (OUTPATIENT)
Dept: DERMATOLOGY | Facility: IMAGING CENTER | Age: 62
End: 2022-02-18
Payer: COMMERCIAL

## 2022-03-03 ENCOUNTER — OFFICE VISIT (OUTPATIENT)
Dept: DERMATOLOGY | Facility: IMAGING CENTER | Age: 62
End: 2022-03-03
Payer: COMMERCIAL

## 2022-03-03 VITALS
TEMPERATURE: 99.6 F | SYSTOLIC BLOOD PRESSURE: 112 MMHG | DIASTOLIC BLOOD PRESSURE: 74 MMHG | WEIGHT: 180 LBS | BODY MASS INDEX: 28.25 KG/M2 | HEIGHT: 67 IN

## 2022-03-03 DIAGNOSIS — L85.8 KERATOACANTHOMA: ICD-10-CM

## 2022-03-03 PROCEDURE — 12032 INTMD RPR S/A/T/EXT 2.6-7.5: CPT | Performed by: DERMATOLOGY

## 2022-03-03 PROCEDURE — 11602 EXC TR-EXT MAL+MARG 1.1-2 CM: CPT | Performed by: DERMATOLOGY

## 2022-03-03 ASSESSMENT — FIBROSIS 4 INDEX: FIB4 SCORE: 1.29

## 2022-03-03 NOTE — PROGRESS NOTES
"PROCEDURE NOTE:    MALIGNANT LESION - WIDE LOCAL EXCISION    After patient received diagnosis of SCC KA, further management was discussed, including wide local excision vs other. Risks, benefits and alternatives of procedure, including, but not limited to scar, bleeding, pain, infection, nerve damage, recurrence of tumor, failed surgery, and need for further surgery, were discussed and written informed consent obtained. Correct site was verified by patient and myself, and verbal time out completed.     Allergies reviewed: Yes  Pacemaker/defibrillator: No  Artificial joints: No  Antibiotics given: No  Blood thinners/anticoagulants: No    Pre-op diagnosis:SCC-KA (requisition number: see [facility] - note laterality is RIGHT)  Post-op diagnosis: Same  Site:right arm  Pre-op size: 0.4cm + 4mm margins = 1.2cm  Antibiotics: no    /74   Temp 37.6 °C (99.6 °F)   Ht 1.702 m (5' 7\")   Wt 81.6 kg (180 lb)     Procedure: Area of surgery was prepped with alcohol, marked with 4mm margins, and with sterile marking pen. Anesthesia with 1% lidocaine with epinephrine administered with 30 gauge needle. The area was again cleaned with povidine-iodine swab. With sterile technique, a 15 blade scalpel was used to make a fusiform incision around the tumor to the level of the subcutaneous fat. The tumor was removed. Bleeding was minimal, and hemostasis was achieved with pressure, hyfrecation. Specimen was placed into biopsy container and sent to pathology by staff.    Intermediate Closure:  Buried vertical mattress sutures were placed with 4.0 monocryl to close dead space. 4.0 prolene superficial interrupted sutures were placed to approximate wound edges.  Vaseline applied to wound with bandage. Patient tolerated procedure well and there were no complications, blood loss was minimal.     Final wound size: 3.3 cm    Bandage was placed with vaseline, telfa, gauze and tape. Wound care was discussed with the patient, and written " instructions were provided. Patient to return to clinic in 10-14 days for suture removal. Patient to call us if any problems or concerns with the procedure site arise prior to scheduled appointment.     Additional follow-up will be planned for 3 months for total skin exam    Heather Pal MD

## 2022-03-10 ENCOUNTER — TELEPHONE (OUTPATIENT)
Dept: DERMATOLOGY | Facility: IMAGING CENTER | Age: 62
End: 2022-03-10
Payer: COMMERCIAL

## 2022-03-10 NOTE — TELEPHONE ENCOUNTER
Spoke to pt   Biopsy Results on Excision done on 03/03/22 by Dr. Pal,  Rt upper arm- Margins clear.  D-path was scanned in

## 2022-03-11 ENCOUNTER — PATIENT MESSAGE (OUTPATIENT)
Dept: DERMATOLOGY | Facility: IMAGING CENTER | Age: 62
End: 2022-03-11
Payer: COMMERCIAL

## 2022-03-12 ENCOUNTER — APPOINTMENT (OUTPATIENT)
Dept: RADIOLOGY | Facility: IMAGING CENTER | Age: 62
End: 2022-03-12
Attending: PHYSICIAN ASSISTANT
Payer: COMMERCIAL

## 2022-03-12 ENCOUNTER — OFFICE VISIT (OUTPATIENT)
Dept: URGENT CARE | Facility: CLINIC | Age: 62
End: 2022-03-12
Payer: COMMERCIAL

## 2022-03-12 VITALS
DIASTOLIC BLOOD PRESSURE: 64 MMHG | SYSTOLIC BLOOD PRESSURE: 130 MMHG | BODY MASS INDEX: 29.13 KG/M2 | RESPIRATION RATE: 18 BRPM | TEMPERATURE: 98 F | HEIGHT: 67 IN | HEART RATE: 80 BPM | OXYGEN SATURATION: 98 % | WEIGHT: 185.6 LBS

## 2022-03-12 DIAGNOSIS — M62.830 SPASM OF THORACIC BACK MUSCLE: ICD-10-CM

## 2022-03-12 DIAGNOSIS — R05.9 COUGH: ICD-10-CM

## 2022-03-12 PROCEDURE — 99213 OFFICE O/P EST LOW 20 MIN: CPT | Performed by: PHYSICIAN ASSISTANT

## 2022-03-12 PROCEDURE — 71046 X-RAY EXAM CHEST 2 VIEWS: CPT | Mod: TC | Performed by: PHYSICIAN ASSISTANT

## 2022-03-12 RX ORDER — CYCLOBENZAPRINE HCL 10 MG
10 TABLET ORAL 3 TIMES DAILY PRN
Qty: 30 TABLET | Refills: 0 | Status: SHIPPED | OUTPATIENT
Start: 2022-03-12 | End: 2022-05-05

## 2022-03-12 ASSESSMENT — ENCOUNTER SYMPTOMS
SHORTNESS OF BREATH: 0
PARESTHESIAS: 0
HEADACHES: 0
FEVER: 0
BOWEL INCONTINENCE: 0
SPUTUM PRODUCTION: 0
ABDOMINAL PAIN: 0
TINGLING: 0
COUGH: 1
WHEEZING: 1
HEMOPTYSIS: 0
MYALGIAS: 1
BACK PAIN: 1

## 2022-03-12 ASSESSMENT — FIBROSIS 4 INDEX: FIB4 SCORE: 1.29

## 2022-03-12 NOTE — PROGRESS NOTES
"Subjective     Ambar Jj is a 61 y.o. female who presents with Back Pain (L upper back pain/x10 days)    Medications:    • albuterol Aers  • CLARITIN PO  • ibuprofen Tabs  • losartan Tabs  • metoprolol SR Tb24  • omeprazole    Allergies: Codeine    Problem List: Ambar Jj does not have any pertinent problems on file.    Surgical History:  Past Surgical History:   Procedure Laterality Date   • VENTRAL HERNIA REPAIR ROBOTIC XI  10/24/2019    Procedure: REPAIR, HERNIA, VENTRAL, ROBOT-ASSISTED, USING DA SANDRINE XI- FOR INCISIONAL WITH MESH;  Surgeon: Justus Will M.D.;  Location: SURGERY SAME DAY [facility];  Service: General   • COLOSTOMY TAKEDOWN  5/6/2019    Procedure: COLOSTOMY TAKEDOWN;  Surgeon: Justus Will M.D.;  Location: SURGERY [facility];  Service: General   • APPENDECTOMY  5/6/2019    Procedure: APPENDECTOMY;  Surgeon: Justus Will M.D.;  Location: SURGERY [facility];  Service: General   • SIGMOID COLECTOMY N/A 11/21/2018    Procedure: SIGMOID COLECTOMY;  Surgeon: Justus Will M.D.;  Location: SURGERY [facility];  Service: General   • BLADDER SUSPENSION N/A 5/6/2015    Procedure: BLADDER SUSPENSION [57.89] TOT;  Surgeon: Yazmin Armando M.D.;  Location: SURGERY SAME DAY [facility];  Service:    • CYSTOSCOPY N/A 5/6/2015    Procedure: CYSTOSCOPY;  Surgeon: Yazmin Armando M.D.;  Location: SURGERY SAME DAY [facility];  Service:    • ANTERIOR AND POSTERIOR REPAIR N/A 5/6/2015    Procedure: ANTERIOR AND POSTERIOR REPAIR [70.53];  Surgeon: Yazmin Armando M.D.;  Location: SURGERY SAME DAY [facility];  Service:    • ABDOMINAL HYSTERECTOMY TOTAL      Ovaries intact   • BLADDER SUSPENSION     • CHOLECYSTECTOMY     • ENDOSCOPY     • ENDOSCOPY PROCEDURE      dilation due to stricture   • OTHER      \"Abdominal abcess drained twice 2018\".   • TONSILLECTOMY AND ADENOIDECTOMY         Past Social Hx: Ambar Jj  reports that she quit smoking " about 12 years ago. Her smoking use included cigarettes. She smoked 0.00 packs per day for 34.00 years. She has never used smokeless tobacco. She reports current alcohol use of about 7.2 oz of alcohol per week. She reports current drug use. Drug: Inhaled.     Past Family Hx:  Ambar Jj family history includes Alcohol/Drug in her brother and sister; Cancer in her mother; Diabetes in her mother; Heart Disease in her father, mother, and sister; Lung Disease in her father, mother, and sister; Other in her son.     Problem list, medications, and allergies reviewed by myself today in Epic.          Patient presents with:  Back Pain: L upper back pain/x10 days    Pt states she has history of asthma and a sort of chronic cough that has gotten progressively more persistent but still dry over the last week.  She has been using her inhaler as prescribed with not much change.  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 Pt states she has tried to be very mindful of her posture and is noticing that this is almost giving her cramp in her upper back trying to stand up so straight.  Patient states the pain in her upper back is better with ibuprofen, better with stretching but she is still concerned about her cough.  Patient denies fever, chills, nausea vomiting or diarrhea.  No chest pain no abdominal pain no diaphoresis.  No other complaints.    Back Pain  This is a new problem. Episode onset: 10 days. The problem occurs constantly. The problem has been gradually worsening since onset. The pain is present in the thoracic spine. The quality of the pain is described as aching and cramping. The pain does not radiate. The pain is moderate. The symptoms are aggravated by position, lying down, standing and twisting. Stiffness is present all day. Pertinent negatives include no abdominal pain, bladder incontinence, bowel incontinence, chest pain, fever, headaches, paresthesias or  "tingling. Risk factors include menopause and poor posture. She has tried NSAIDs for the symptoms. The treatment provided mild relief.       Review of Systems   Constitutional: Negative for fever.   Respiratory: Positive for cough and wheezing (occasionally). Negative for hemoptysis, sputum production and shortness of breath.    Cardiovascular: Negative for chest pain.   Gastrointestinal: Negative for abdominal pain and bowel incontinence.   Genitourinary: Negative for bladder incontinence.   Musculoskeletal: Positive for back pain and myalgias.   Neurological: Negative for tingling, headaches and paresthesias.   Endo/Heme/Allergies: Positive for environmental allergies.   All other systems reviewed and are negative.             Objective     /64 (BP Location: Left arm, Patient Position: Sitting)   Pulse 80   Temp 36.7 °C (98 °F) (Temporal)   Resp 18   Ht 1.702 m (5' 7\")   Wt 84.2 kg (185 lb 9.6 oz)   LMP 10/14/2001 (Approximate)   SpO2 98%   BMI 29.07 kg/m²      Physical Exam  Vitals and nursing note reviewed.   Constitutional:       General: She is not in acute distress.     Appearance: Normal appearance. She is well-developed and normal weight. She is not ill-appearing or toxic-appearing.   HENT:      Head: Normocephalic and atraumatic.      Right Ear: Tympanic membrane normal.      Left Ear: Tympanic membrane normal.      Nose: Nose normal.      Mouth/Throat:      Lips: Pink.      Mouth: Mucous membranes are moist.      Pharynx: Oropharynx is clear. Uvula midline.   Eyes:      Extraocular Movements: Extraocular movements intact.      Conjunctiva/sclera: Conjunctivae normal.      Pupils: Pupils are equal, round, and reactive to light.   Cardiovascular:      Rate and Rhythm: Normal rate and regular rhythm.      Pulses: Normal pulses.      Heart sounds: Normal heart sounds.   Pulmonary:      Effort: Pulmonary effort is normal. No respiratory distress.      Breath sounds: No stridor. Wheezing (faint " expiratory) present. No rhonchi or rales.   Chest:      Chest wall: No tenderness.   Abdominal:      General: Bowel sounds are normal.      Palpations: Abdomen is soft.   Musculoskeletal:         General: Normal range of motion.      Cervical back: Normal range of motion and neck supple.      Thoracic back: Spasms and tenderness present. No swelling, deformity, signs of trauma or bony tenderness. Normal range of motion. No scoliosis.        Back:    Skin:     General: Skin is warm and dry.      Capillary Refill: Capillary refill takes less than 2 seconds.   Neurological:      General: No focal deficit present.      Mental Status: She is alert and oriented to person, place, and time.      Cranial Nerves: No cranial nerve deficit.      Motor: Motor function is intact.      Coordination: Coordination is intact.      Gait: Gait normal.   Psychiatric:         Mood and Affect: Mood normal.             Xray images viewed and interpreted by me:   No acute cardiopulmonary disease or findings.     confirmed by radiology:    RADIOLOGY RESULTS   DX-CHEST-2 VIEWS    Result Date: 3/12/2022  3/12/2022 1:11 PM HISTORY/REASON FOR EXAM: Cough. Pain TECHNIQUE/EXAM DESCRIPTION AND NUMBER OF VIEWS: Two views of the chest. COMPARISON:  8/3/2019 FINDINGS: Cardiomediastinal contours are normal. No pulmonary consolidation is seen. No pleural space process is evident.     No radiographic evidence of acute cardiopulmonary process.                Assessment & Plan              1. Cough  DX-CHEST-2 VIEWS    cyclobenzaprine (FLEXERIL) 10 mg Tab   2. Spasm of thoracic back muscle  cyclobenzaprine (FLEXERIL) 10 mg Tab       Patient was evaluated in clinic today while wearing appropriate personal protective equipment.      Reviewed xray images with patient.  Not suspicious of respiratory process.     PT to begin prescription medications today as discussed for muscle spasm of upper back.     Motrin/Advil/Ibuprophen 600 mg every 6 hours as needed for  pain or fever.    Gentle range of motion exercises discussed, demonstrated and encouraged.    PT should follow up with PCP in 1-2 days for re-evaluation if symptoms have not improved.      Discussed red flags and reasons to return to UC or ED.      Pt and/or family verbalized understanding of diagnosis and follow up instructions and was offered informational handout on diagnosis.  PT discharged.

## 2022-03-14 ENCOUNTER — NON-PROVIDER VISIT (OUTPATIENT)
Dept: DERMATOLOGY | Facility: IMAGING CENTER | Age: 62
End: 2022-03-14
Payer: COMMERCIAL

## 2022-03-14 NOTE — PROGRESS NOTES
Ambar Jj is a 61 y.o. female here for a Non-Provider Visit for Suture Removal.    Sutures were placed by Dr. jo on date:  03/03/2022  Skin is healed: Yes  Provider notified if skin is not healed, or if there is redness, heat, pain, or drainage from incision: N\A  Well healed   Sutures removed.   Mastisol and steristips are placed: Yes    Advised to use emollient (vaseline, aquaphor, etc.) as needed, avoid peroxide and antibiotic ointment to reduce irritation.     Path report has been reviewed by provider.  Path report has reviewed with patient.

## 2022-03-25 ENCOUNTER — OFFICE VISIT (OUTPATIENT)
Dept: DERMATOLOGY | Facility: IMAGING CENTER | Age: 62
End: 2022-03-25
Payer: COMMERCIAL

## 2022-03-25 DIAGNOSIS — D18.01 CHERRY ANGIOMA: ICD-10-CM

## 2022-03-25 DIAGNOSIS — D22.9 NEVUS: ICD-10-CM

## 2022-03-25 DIAGNOSIS — Z85.828 HX OF MALIGNANT NEOPLASM OF SKIN: ICD-10-CM

## 2022-03-25 DIAGNOSIS — L90.8 SKIN AGING: ICD-10-CM

## 2022-03-25 DIAGNOSIS — L57.0 ACTINIC KERATOSIS: ICD-10-CM

## 2022-03-25 DIAGNOSIS — Z12.83 SKIN CANCER SCREENING: ICD-10-CM

## 2022-03-25 DIAGNOSIS — L81.4 LENTIGO: ICD-10-CM

## 2022-03-25 DIAGNOSIS — L82.1 SEBORRHEIC KERATOSIS: ICD-10-CM

## 2022-03-25 PROCEDURE — 99213 OFFICE O/P EST LOW 20 MIN: CPT | Mod: 25 | Performed by: DERMATOLOGY

## 2022-03-25 PROCEDURE — 17000 DESTRUCT PREMALG LESION: CPT | Performed by: DERMATOLOGY

## 2022-03-25 NOTE — PROGRESS NOTES
CC: MALISSA     Subjective: Establish pt here for full skin exam    HPI/location: upper lip   Time present: 1 year   Painful lesion: No  Itching lesion: No  Enlarging lesion: No  Anything make it better or worse?    History of skin cancer: Yes, Details: KA RIGHT arm.   History of precancers/actinic keratoses: No  History of biopsies:Yes, Details: benign mole  History of blistering/severe sunburns:Yes, Details: child  Family history of skin cancer:Yes, Details: melanome aunt   Family history of atypical moles:No    ROS: no fevers/chills. No itch.  No cough  Relevant PMH: mult med comorbidities  Social: former smoker    PE: Gen:WDWN female in NAD. Skin: Scalp/face/eyes/lips/neck/chest/back/arms/legs/hands/feet/buttocks - without suspicious lesions noted.  Genitals exam declined  -thin HK papule on cutaneous lip  -scar well healed on right shoulder/arm  -scattered hyperpigmented macules/papules, appearing benign on torso and extremities.  Diffuse waxy papules on torso>extremities, appearing benign  -cherry red vascular macules/papules    A/P: AKs: cut lip  -counseled on diagnosis and treatment, including risks/benefits/alternatives of cyrotherapy  -LN2 25 sec X 2 cycles X 1lesions  -f/u if persists at 1 month    Nevi: benign appearing:  -Reviewed ABCDEs  -Reviewed skin cancer detection/prevention  -RTC PRN growth/changes/concerning features    Lentigos/SKs: benign  -reassurance  -reviewed skin cancer detection/prevention    Cherry angioma: benign    Hx of skin cancer: KA right arm  -cont'd sunprotection and skin cancer surveillance  -Q 6mo-annual exam recommended; f/u suspicious lesions PRN      I have reviewed medications relevant to my specialty.

## 2022-05-05 ENCOUNTER — OFFICE VISIT (OUTPATIENT)
Dept: MEDICAL GROUP | Facility: PHYSICIAN GROUP | Age: 62
End: 2022-05-05
Payer: COMMERCIAL

## 2022-05-05 VITALS
HEART RATE: 105 BPM | DIASTOLIC BLOOD PRESSURE: 62 MMHG | TEMPERATURE: 97.8 F | OXYGEN SATURATION: 96 % | WEIGHT: 181 LBS | BODY MASS INDEX: 28.41 KG/M2 | SYSTOLIC BLOOD PRESSURE: 100 MMHG | HEIGHT: 67 IN

## 2022-05-05 DIAGNOSIS — F41.8 SITUATIONAL ANXIETY: ICD-10-CM

## 2022-05-05 DIAGNOSIS — Z23 NEED FOR VACCINATION: ICD-10-CM

## 2022-05-05 DIAGNOSIS — J45.20 MILD INTERMITTENT ASTHMA WITHOUT COMPLICATION: ICD-10-CM

## 2022-05-05 DIAGNOSIS — F40.243 FLYING PHOBIA: ICD-10-CM

## 2022-05-05 PROCEDURE — 90471 IMMUNIZATION ADMIN: CPT | Performed by: NURSE PRACTITIONER

## 2022-05-05 PROCEDURE — 90677 PCV20 VACCINE IM: CPT | Performed by: NURSE PRACTITIONER

## 2022-05-05 PROCEDURE — 99214 OFFICE O/P EST MOD 30 MIN: CPT | Mod: 25 | Performed by: NURSE PRACTITIONER

## 2022-05-05 RX ORDER — ALBUTEROL SULFATE 90 UG/1
2 AEROSOL, METERED RESPIRATORY (INHALATION) EVERY 6 HOURS
Qty: 8.5 G | Refills: 6 | Status: SHIPPED | OUTPATIENT
Start: 2022-05-05 | End: 2023-09-05

## 2022-05-05 RX ORDER — LORAZEPAM 0.5 MG/1
0.5 TABLET ORAL EVERY 8 HOURS PRN
Qty: 15 TABLET | Refills: 0 | Status: SHIPPED | OUTPATIENT
Start: 2022-05-05 | End: 2023-02-09 | Stop reason: SDUPTHER

## 2022-05-05 ASSESSMENT — ENCOUNTER SYMPTOMS
FEVER: 0
COUGH: 0
WHEEZING: 0
HEMOPTYSIS: 0
DEPRESSION: 0
PALPITATIONS: 0
CHILLS: 0
HEADACHES: 0
DIZZINESS: 0

## 2022-05-05 ASSESSMENT — FIBROSIS 4 INDEX: FIB4 SCORE: 1.29

## 2022-05-05 NOTE — PROGRESS NOTES
Subjective:     Chief Complaint   Patient presents with   • Medication Management     Requesting medication to fly   • Medication Refill     inhaler       HPI:   Ambar presents today with     Problem   Flying Phobia    Chronic in nature.  Controlled with medication.  Patient states that she has continued to use medication very intermittently for flights and states that the Ativan 0.5 mg works very well to control her anxiety related to travel.  Patient states that she has a flight to attend a concert and then another trip to [place] to see her son coming up.    Is the medication improving the patient’s symptoms: Yes  Any adverse effects: No    Alcohol or illicit drug use:   She  reports current alcohol use of about 7.2 oz of alcohol per week.  She  reports current drug use. Drug: Inhaled.     History of controlled substance used in a way other than prescribed? No  Any early refills of a controlled substance: No  History of lost or stolen controlled substance prescription: No  Any aberrant behavior or intoxication while on a controlled substance: No  Has the patient self-modified their dose or frequency of the medication :No  Compliant with treatment recommendations and plan: Yes  Any major health change to the patient: No  Concerns for misuse, abuse or addiction: No    /NarxCheck report reviewed: Yes  History of abnormal drug screening: N/A       Mild Intermittent Asthma Without Complication    Chronic in nature. States she does generally use her inhaler daily.  States that she will generally feel short of breath when she first wakes up.  States that when we tried the Qvar last year she did use it for a couple of weeks but did not notice it creating significant change and as such stopped the medication.  She denies any nighttime waking or cough.          Current Outpatient Medications Ordered in Epic   Medication Sig Dispense Refill   • albuterol (PROAIR HFA) 108 (90 Base) MCG/ACT Aero Soln inhalation  "aerosol Inhale 2 Puffs every 6 hours. INHALE 2 PUFFS BY MOUTH EVERY 6 HOURS AS NEEDED FOR SHORTNESS OF BREATH. 8.5 g 6   • beclomethasone (QVAR) 80 MCG/ACT inhaler Inhale 1 Puff 2 times a day. 7.3 g 11   • LORazepam (ATIVAN) 0.5 MG Tab Take 1 Tablet by mouth every 8 hours as needed for Anxiety for up to 5 days. 15 Tablet 0   • losartan (COZAAR) 25 MG Tab Take 1 Tablet by mouth every day. 90 Tablet 3   • metoprolol SR (TOPROL XL) 25 MG TABLET SR 24 HR Take 1 Tablet by mouth every evening. 90 Tablet 3   • Loratadine (CLARITIN PO) Take 1 tablet by mouth every day.     • omeprazole (PRILOSEC) 20 MG delayed-release capsule TAKE 1 CAPSULE BY MOUTH EVERY DAY 90 Cap 0     No current Epic-ordered facility-administered medications on file.       Health Maintenance: Completed    Review of Systems   Constitutional: Negative for chills, fever and malaise/fatigue.   Respiratory: Negative for cough, hemoptysis and wheezing.    Cardiovascular: Negative for chest pain, palpitations and leg swelling.   Neurological: Negative for dizziness and headaches.   Psychiatric/Behavioral: Negative for depression and suicidal ideas.         Objective:     Exam:  /62 (BP Location: Left arm, Patient Position: Sitting, BP Cuff Size: Adult)   Pulse (!) 105   Temp 36.6 °C (97.8 °F) (Temporal)   Ht 1.702 m (5' 7\")   Wt 82.1 kg (181 lb)   LMP 10/14/2001 (Approximate)   SpO2 96%   BMI 28.35 kg/m²  Body mass index is 28.35 kg/m².    Physical Exam  Constitutional:       Appearance: Normal appearance.   HENT:      Head: Normocephalic and atraumatic.   Pulmonary:      Effort: Pulmonary effort is normal.      Breath sounds: Normal breath sounds.   Skin:     General: Skin is warm and dry.      Capillary Refill: Capillary refill takes less than 2 seconds.   Neurological:      General: No focal deficit present.      Mental Status: She is alert and oriented to person, place, and time.   Psychiatric:         Mood and Affect: Mood normal.         " Behavior: Behavior normal.         Thought Content: Thought content normal.         Judgment: Judgment normal.       Assessment & Plan:     61 y.o. female with the following -     Problem List Items Addressed This Visit     Flying phobia     - Lorazepam 0.5 mg p.o. as needed for flying.         Relevant Medications    LORazepam (ATIVAN) 0.5 MG Tab    Mild intermittent asthma without complication     -We discussed daily steroid inhaler considering she is using her rescue inhaler daily.  -If this is ineffective we may consider a steroid long-acting albuterol combo.  -We discussed starting medication now versus starting medication closer to fire season in the summer.           Relevant Medications    albuterol (PROAIR HFA) 108 (90 Base) MCG/ACT Aero Soln inhalation aerosol    beclomethasone (QVAR) 80 MCG/ACT inhaler      Other Visit Diagnoses     Situational anxiety        Relevant Medications    LORazepam (ATIVAN) 0.5 MG Tab    Need for vaccination        Relevant Orders    Pneumococcal Conjugate Vaccine 20-Valent (19 yrs+) (Completed)          Return in about 6 months (around 11/5/2022), or if symptoms worsen or fail to improve.    I have placed the below orders and discussed them with an approved delegating provider. The MA is performing the below orders under the direction of Dr. Mitchell.     Please note that this dictation was created using voice recognition software. I have made every reasonable attempt to correct obvious errors, but I expect that there are errors of grammar and possibly content that I did not discover before finalizing the note.

## 2022-05-05 NOTE — ASSESSMENT & PLAN NOTE
-We discussed daily steroid inhaler considering she is using her rescue inhaler daily.  -If this is ineffective we may consider a steroid long-acting albuterol combo.  -We discussed starting medication now versus starting medication closer to fire season in the summer.

## 2022-09-11 ENCOUNTER — OFFICE VISIT (OUTPATIENT)
Dept: URGENT CARE | Facility: CLINIC | Age: 62
End: 2022-09-11
Payer: COMMERCIAL

## 2022-09-11 VITALS
TEMPERATURE: 97.8 F | DIASTOLIC BLOOD PRESSURE: 78 MMHG | HEART RATE: 93 BPM | HEIGHT: 67 IN | RESPIRATION RATE: 14 BRPM | WEIGHT: 190 LBS | SYSTOLIC BLOOD PRESSURE: 116 MMHG | BODY MASS INDEX: 29.82 KG/M2 | OXYGEN SATURATION: 97 %

## 2022-09-11 DIAGNOSIS — M43.6 TORTICOLLIS: ICD-10-CM

## 2022-09-11 PROCEDURE — 99213 OFFICE O/P EST LOW 20 MIN: CPT | Performed by: PHYSICIAN ASSISTANT

## 2022-09-11 RX ORDER — CYCLOBENZAPRINE HCL 10 MG
10 TABLET ORAL 3 TIMES DAILY PRN
Qty: 14 TABLET | Refills: 0 | Status: SHIPPED | OUTPATIENT
Start: 2022-09-11 | End: 2023-02-09

## 2022-09-11 ASSESSMENT — FIBROSIS 4 INDEX: FIB4 SCORE: 1.29

## 2022-09-11 NOTE — PROGRESS NOTES
Subjective:   Ambar Jj is a 61 y.o. female who presents for Neck Pain (X 2 days on L side of neck.  She woke up and had pain and stiffness. )      HPI  The patient presents to the Urgent Care with complaints of left-sided neck pain and stiffness.  She states she woke up yesterday leaned over to get water, and noticed that shooting pain to her neck and stiffness.  Denies any other known injury or trauma.  She has applied ice and heat.  Ibuprofen is controlling her pain.  Pain is worse with turning her head to the left and with neck flexion. Denies any fever, chills, recent illness, sore throat, cough, pain radiation, numbness, tingling, weakness.     Medications:    albuterol Aers  CLARITIN PO  losartan Tabs  metoprolol SR Tb24  omeprazole  Qvar Aers    Allergies: Codeine    Problem List: Ambar Jj does not have any pertinent problems on file.    Surgical History:  Past Surgical History:   Procedure Laterality Date    VENTRAL HERNIA REPAIR ROBOTIC XI  10/24/2019    Procedure: REPAIR, HERNIA, VENTRAL, ROBOT-ASSISTED, USING DA SANDRINE XI- FOR INCISIONAL WITH MESH;  Surgeon: Justus Will M.D.;  Location: SURGERY SAME DAY [facility];  Service: General    COLOSTOMY TAKEDOWN  5/6/2019    Procedure: COLOSTOMY TAKEDOWN;  Surgeon: Justus Will M.D.;  Location: SURGERY [facility];  Service: General    APPENDECTOMY  5/6/2019    Procedure: APPENDECTOMY;  Surgeon: Justus Will M.D.;  Location: SURGERY [facility];  Service: General    SIGMOID COLECTOMY N/A 11/21/2018    Procedure: SIGMOID COLECTOMY;  Surgeon: Justus Will M.D.;  Location: SURGERY [facility];  Service: General    BLADDER SUSPENSION N/A 5/6/2015    Procedure: BLADDER SUSPENSION [57.89] TOT;  Surgeon: Yazmin Armando M.D.;  Location: SURGERY SAME DAY [facility];  Service:     CYSTOSCOPY N/A 5/6/2015    Procedure: CYSTOSCOPY;  Surgeon: Yazmin Armando M.D.;  Location: SURGERY SAME DAY [facility];   "Service:     ANTERIOR AND POSTERIOR REPAIR N/A 5/6/2015    Procedure: ANTERIOR AND POSTERIOR REPAIR [70.53];  Surgeon: Yazmin Armando M.D.;  Location: SURGERY SAME DAY [facility];  Service:     ABDOMINAL HYSTERECTOMY TOTAL      Ovaries intact    BLADDER SUSPENSION      CHOLECYSTECTOMY      ENDOSCOPY      ENDOSCOPY PROCEDURE      dilation due to stricture    OTHER      \"Abdominal abcess drained twice 2018\".    TONSILLECTOMY AND ADENOIDECTOMY         Past Social Hx: Ambar Jj  reports that she quit smoking about 12 years ago. Her smoking use included cigarettes. She has never used smokeless tobacco. She reports current alcohol use of about 7.2 oz per week. She reports current drug use. Drugs: Inhaled and Marijuana.     Past Family Hx:  Ambar Jj family history includes Alcohol/Drug in her brother and sister; Cancer in her mother; Diabetes in her mother; Heart Disease in her father, mother, and sister; Lung Disease in her father, mother, and sister; Other in her son.     Problem list, medications, and allergies reviewed by myself today in Epic.     Objective:     /78   Pulse 93   Temp 36.6 °C (97.8 °F) (Temporal)   Resp 14   Ht 1.702 m (5' 7\")   Wt 86.2 kg (190 lb)   LMP 10/14/2001 (Approximate)   SpO2 97%   BMI 29.76 kg/m²     Physical Exam  Vitals reviewed.   Constitutional:       Appearance: Normal appearance.   HENT:      Mouth/Throat:      Mouth: Mucous membranes are moist.      Pharynx: Oropharynx is clear. No oropharyngeal exudate.   Eyes:      Conjunctiva/sclera: Conjunctivae normal.      Pupils: Pupils are equal, round, and reactive to light.   Cardiovascular:      Rate and Rhythm: Normal rate.   Pulmonary:      Effort: Pulmonary effort is normal.   Musculoskeletal:      Cervical back: Neck supple. Torticollis (spasm to left side of neck) present. No edema, erythema or crepitus. Pain with movement and muscular tenderness (left side of neck) present. No spinous process " tenderness. Decreased range of motion (left rotation and flexion secondary to pain. FROM of right rotation and extension).   Lymphadenopathy:      Cervical: No cervical adenopathy.   Skin:     General: Skin is warm and dry.   Neurological:      General: No focal deficit present.      Mental Status: She is alert and oriented to person, place, and time.      Comments: Strength 5/5 bilateral upper extremities.    Psychiatric:         Mood and Affect: Mood normal.         Behavior: Behavior normal.       Diagnosis and associated orders:     1. Torticollis  - cyclobenzaprine (FLEXERIL) 10 mg Tab; Take 1 Tablet by mouth 3 times a day as needed for Moderate Pain or Muscle Spasms.  Dispense: 14 Tablet; Refill: 0     Comments/MDM:     Discussed with patient signs and symptoms consistent with neck spasm.   Treatment of initial rest with no heavy lifting, stooping, or strenuous activity. Massage, heat, and Ibuprofen per manufacture's instructions. Encouraged walking, stretching, and range of motion exercises as tolerated. Avoid sitting or laying down for long periods of time except for at night during sleep.   Treatment of cyclobenzaprine.   Discussed with patient this medication can cause drowsiness/sedation. The patient was instructed to use medication mostly during the evening/night time. Instructed to avoid driving, operating machinery, or drinking alcohol while using this medication.  Patient is overall very well-appearing, no acute distress, and normal vital signs. Suspicions for acute emergent pathology are low.   Follow up with your PCP or return to urgent care if symptoms not improving in 1-2 weeks.      I personally reviewed prior external notes and test results pertinent to today's visit. Pathogenesis of diagnosis discussed including typical length and natural progression. Supportive care, natural history, differential diagnoses, and indications for immediate follow-up discussed. Patient expresses understanding and  agrees to plan. Patient denies any other questions or concerns.     Follow-up with the primary care physician for recheck, reevaluation, and consideration of further management.    Please note that this dictation was created using voice recognition software. I have made a reasonable attempt to correct obvious errors, but I expect that there are errors of grammar and possibly content that I did not discover before finalizing the note.    This note was electronically signed by De Geronimo PA-C

## 2022-10-06 ENCOUNTER — OFFICE VISIT (OUTPATIENT)
Dept: DERMATOLOGY | Facility: IMAGING CENTER | Age: 62
End: 2022-10-06
Payer: COMMERCIAL

## 2022-10-06 DIAGNOSIS — L90.8 SKIN AGING: ICD-10-CM

## 2022-10-06 DIAGNOSIS — Z85.89 HISTORY OF SQUAMOUS CELL CARCINOMA: ICD-10-CM

## 2022-10-06 DIAGNOSIS — D22.9 NEVUS: ICD-10-CM

## 2022-10-06 DIAGNOSIS — D18.01 CHERRY ANGIOMA: ICD-10-CM

## 2022-10-06 DIAGNOSIS — Z12.83 SKIN CANCER SCREENING: ICD-10-CM

## 2022-10-06 DIAGNOSIS — L81.4 LENTIGO: ICD-10-CM

## 2022-10-06 DIAGNOSIS — L82.1 SEBORRHEIC KERATOSIS: ICD-10-CM

## 2022-10-06 PROCEDURE — 99212 OFFICE O/P EST SF 10 MIN: CPT | Performed by: DERMATOLOGY

## 2022-10-06 NOTE — PROGRESS NOTES
CC: MALISSA     Subjective: Establish pt here for full skin exam    HPI/location: scalp  Time present: 2 mths   Painful lesion: No  Itching lesion: No  Enlarging lesion: No  Anything make it better or worse?    History of skin cancer: Yes, Details: KA RIGHT arm.   History of precancers/actinic keratoses: No  History of biopsies:Yes, Details: benign mole  History of blistering/severe sunburns:Yes, Details: child  Family history of skin cancer:Yes, Details: melanoma aunt   Family history of atypical moles:No    ROS: no fevers/chills. No itch.  No cough  Relevant PMH: mult med comorbidities  Social: former smoker    PE: Gen:WDWN female in NAD. Skin: Scalp/face/eyes/lips/neck/chest/back/arms/legs/hands/feet/buttocks - without suspicious lesions noted.  Genitals exam declined  -scar well healed on right shoulder/arm  -scattered hyperpigmented macules/papules, appearing benign on torso and extremities.  Diffuse waxy papules on torso>extremities, appearing benign  -cherry red vascular macules/papules    A/P:  Nevi: benign appearing:  -Reviewed ABCDEs  -Reviewed skin cancer detection/prevention  -RTC PRN growth/changes/concerning features    Lentigos/SKs: benign  -reassurance  -reviewed skin cancer detection/prevention    Cherry angioma: benign    Hx of skin cancer: KA right arm  -cont'd sunprotection and skin cancer surveillance  -Q 6mo-annual exam recommended; f/u suspicious lesions PRN      I have reviewed medications relevant to my specialty.

## 2022-11-15 ENCOUNTER — TELEPHONE (OUTPATIENT)
Dept: MEDICAL GROUP | Facility: PHYSICIAN GROUP | Age: 62
End: 2022-11-15
Payer: COMMERCIAL

## 2022-11-15 NOTE — TELEPHONE ENCOUNTER
"  Phone Number Called: [phone] (home)       Call outcome: Spoke to patient regarding message below.    Message: Gave pt Devon's message:    \"Beyond the information that Ximena provided I would not be able to give any further confirmation that her insurance would be accepted by our office\"    Pt will wait to see when her new insurance starts.   "

## 2023-01-10 RX ORDER — METOPROLOL SUCCINATE 25 MG/1
25 TABLET, EXTENDED RELEASE ORAL EVERY EVENING
Qty: 90 TABLET | Refills: 0 | Status: SHIPPED | OUTPATIENT
Start: 2023-01-10 | End: 2023-02-09 | Stop reason: SDUPTHER

## 2023-01-10 NOTE — TELEPHONE ENCOUNTER
Is the patient due for a refill? Yes    Was the patient seen the past year? No    Date of last office visit: 12/16/2021    Does the patient have an upcoming appointment?  No   If yes, When?     Provider to refill:ZENAIDA    Does the patients insurance require a 100 day supply?  No

## 2023-01-19 DIAGNOSIS — I10 ESSENTIAL HYPERTENSION: ICD-10-CM

## 2023-01-20 RX ORDER — LOSARTAN POTASSIUM 25 MG/1
25 TABLET ORAL DAILY
Qty: 90 TABLET | Refills: 0 | Status: SHIPPED | OUTPATIENT
Start: 2023-01-20 | End: 2023-02-09 | Stop reason: SDUPTHER

## 2023-01-20 NOTE — TELEPHONE ENCOUNTER
Is the patient due for a refill? Yes courtesy    Was the patient seen the past year? No    Date of last office visit: 12/16/21    Does the patient have an upcoming appointment?  No   If yes, When?     Provider to refill:ZENAIDA    Does the patients insurance require a 100 day supply?  No

## 2023-01-31 ENCOUNTER — HOSPITAL ENCOUNTER (OUTPATIENT)
Dept: RADIOLOGY | Facility: MEDICAL CENTER | Age: 63
End: 2023-01-31
Attending: NURSE PRACTITIONER
Payer: COMMERCIAL

## 2023-01-31 DIAGNOSIS — Z12.31 VISIT FOR SCREENING MAMMOGRAM: ICD-10-CM

## 2023-02-09 ENCOUNTER — OFFICE VISIT (OUTPATIENT)
Dept: MEDICAL GROUP | Facility: PHYSICIAN GROUP | Age: 63
End: 2023-02-09
Payer: COMMERCIAL

## 2023-02-09 VITALS
SYSTOLIC BLOOD PRESSURE: 110 MMHG | HEIGHT: 67 IN | DIASTOLIC BLOOD PRESSURE: 70 MMHG | WEIGHT: 186.4 LBS | HEART RATE: 74 BPM | OXYGEN SATURATION: 98 % | TEMPERATURE: 97.4 F | BODY MASS INDEX: 29.26 KG/M2

## 2023-02-09 DIAGNOSIS — E78.1 HYPERTRIGLYCERIDEMIA: ICD-10-CM

## 2023-02-09 DIAGNOSIS — J45.20 MILD INTERMITTENT ASTHMA WITHOUT COMPLICATION: ICD-10-CM

## 2023-02-09 DIAGNOSIS — F41.8 SITUATIONAL ANXIETY: ICD-10-CM

## 2023-02-09 DIAGNOSIS — R00.2 PALPITATION: ICD-10-CM

## 2023-02-09 DIAGNOSIS — I10 ESSENTIAL HYPERTENSION: ICD-10-CM

## 2023-02-09 DIAGNOSIS — Z12.31 ENCOUNTER FOR SCREENING MAMMOGRAM FOR MALIGNANT NEOPLASM OF BREAST: ICD-10-CM

## 2023-02-09 DIAGNOSIS — Z23 NEED FOR VACCINATION: ICD-10-CM

## 2023-02-09 DIAGNOSIS — F40.243 FLYING PHOBIA: ICD-10-CM

## 2023-02-09 DIAGNOSIS — I10 PRIMARY HYPERTENSION: ICD-10-CM

## 2023-02-09 DIAGNOSIS — Z00.00 WELLNESS EXAMINATION: ICD-10-CM

## 2023-02-09 DIAGNOSIS — N64.4 BREAST PAIN, RIGHT: ICD-10-CM

## 2023-02-09 PROCEDURE — 90715 TDAP VACCINE 7 YRS/> IM: CPT | Performed by: NURSE PRACTITIONER

## 2023-02-09 PROCEDURE — 99396 PREV VISIT EST AGE 40-64: CPT | Performed by: NURSE PRACTITIONER

## 2023-02-09 PROCEDURE — 99213 OFFICE O/P EST LOW 20 MIN: CPT | Mod: 25 | Performed by: NURSE PRACTITIONER

## 2023-02-09 PROCEDURE — 90471 IMMUNIZATION ADMIN: CPT | Performed by: NURSE PRACTITIONER

## 2023-02-09 RX ORDER — LOSARTAN POTASSIUM 25 MG/1
25 TABLET ORAL DAILY
Qty: 90 TABLET | Refills: 3 | Status: SHIPPED | OUTPATIENT
Start: 2023-02-09

## 2023-02-09 RX ORDER — METOPROLOL SUCCINATE 25 MG/1
25 TABLET, EXTENDED RELEASE ORAL EVERY EVENING
Qty: 90 TABLET | Refills: 3 | Status: SHIPPED | OUTPATIENT
Start: 2023-02-09 | End: 2023-02-09

## 2023-02-09 RX ORDER — LOSARTAN POTASSIUM 25 MG/1
25 TABLET ORAL DAILY
Qty: 90 TABLET | Refills: 3 | Status: SHIPPED | OUTPATIENT
Start: 2023-02-09 | End: 2023-02-09

## 2023-02-09 RX ORDER — LORAZEPAM 0.5 MG/1
0.5 TABLET ORAL EVERY 8 HOURS PRN
Qty: 15 TABLET | Refills: 0 | Status: SHIPPED | OUTPATIENT
Start: 2023-02-09 | End: 2023-02-14

## 2023-02-09 RX ORDER — METOPROLOL SUCCINATE 25 MG/1
25 TABLET, EXTENDED RELEASE ORAL EVERY EVENING
Qty: 90 TABLET | Refills: 3 | Status: SHIPPED | OUTPATIENT
Start: 2023-02-09

## 2023-02-09 SDOH — ECONOMIC STABILITY: INCOME INSECURITY: HOW HARD IS IT FOR YOU TO PAY FOR THE VERY BASICS LIKE FOOD, HOUSING, MEDICAL CARE, AND HEATING?: NOT VERY HARD

## 2023-02-09 SDOH — HEALTH STABILITY: PHYSICAL HEALTH: ON AVERAGE, HOW MANY MINUTES DO YOU ENGAGE IN EXERCISE AT THIS LEVEL?: 30 MIN

## 2023-02-09 SDOH — ECONOMIC STABILITY: TRANSPORTATION INSECURITY
IN THE PAST 12 MONTHS, HAS LACK OF RELIABLE TRANSPORTATION KEPT YOU FROM MEDICAL APPOINTMENTS, MEETINGS, WORK OR FROM GETTING THINGS NEEDED FOR DAILY LIVING?: NO

## 2023-02-09 SDOH — ECONOMIC STABILITY: FOOD INSECURITY: WITHIN THE PAST 12 MONTHS, YOU WORRIED THAT YOUR FOOD WOULD RUN OUT BEFORE YOU GOT MONEY TO BUY MORE.: NEVER TRUE

## 2023-02-09 SDOH — ECONOMIC STABILITY: HOUSING INSECURITY: IN THE LAST 12 MONTHS, HOW MANY PLACES HAVE YOU LIVED?: 1

## 2023-02-09 SDOH — ECONOMIC STABILITY: INCOME INSECURITY: IN THE LAST 12 MONTHS, WAS THERE A TIME WHEN YOU WERE NOT ABLE TO PAY THE MORTGAGE OR RENT ON TIME?: NO

## 2023-02-09 SDOH — ECONOMIC STABILITY: HOUSING INSECURITY
IN THE LAST 12 MONTHS, WAS THERE A TIME WHEN YOU DID NOT HAVE A STEADY PLACE TO SLEEP OR SLEPT IN A SHELTER (INCLUDING NOW)?: NO

## 2023-02-09 SDOH — ECONOMIC STABILITY: FOOD INSECURITY: WITHIN THE PAST 12 MONTHS, THE FOOD YOU BOUGHT JUST DIDN'T LAST AND YOU DIDN'T HAVE MONEY TO GET MORE.: NEVER TRUE

## 2023-02-09 SDOH — HEALTH STABILITY: PHYSICAL HEALTH: ON AVERAGE, HOW MANY DAYS PER WEEK DO YOU ENGAGE IN MODERATE TO STRENUOUS EXERCISE (LIKE A BRISK WALK)?: 6 DAYS

## 2023-02-09 SDOH — ECONOMIC STABILITY: TRANSPORTATION INSECURITY
IN THE PAST 12 MONTHS, HAS THE LACK OF TRANSPORTATION KEPT YOU FROM MEDICAL APPOINTMENTS OR FROM GETTING MEDICATIONS?: NO

## 2023-02-09 SDOH — ECONOMIC STABILITY: TRANSPORTATION INSECURITY
IN THE PAST 12 MONTHS, HAS LACK OF TRANSPORTATION KEPT YOU FROM MEETINGS, WORK, OR FROM GETTING THINGS NEEDED FOR DAILY LIVING?: NO

## 2023-02-09 SDOH — HEALTH STABILITY: MENTAL HEALTH
STRESS IS WHEN SOMEONE FEELS TENSE, NERVOUS, ANXIOUS, OR CAN'T SLEEP AT NIGHT BECAUSE THEIR MIND IS TROUBLED. HOW STRESSED ARE YOU?: ONLY A LITTLE

## 2023-02-09 ASSESSMENT — LIFESTYLE VARIABLES
HOW OFTEN DO YOU HAVE A DRINK CONTAINING ALCOHOL: 2-3 TIMES A WEEK
HOW OFTEN DO YOU HAVE SIX OR MORE DRINKS ON ONE OCCASION: LESS THAN MONTHLY
SKIP TO QUESTIONS 9-10: 0
AUDIT-C TOTAL SCORE: 5
HOW MANY STANDARD DRINKS CONTAINING ALCOHOL DO YOU HAVE ON A TYPICAL DAY: 3 OR 4

## 2023-02-09 ASSESSMENT — ENCOUNTER SYMPTOMS
FEVER: 0
DIZZINESS: 0
PALPITATIONS: 0
SHORTNESS OF BREATH: 0
WHEEZING: 0
COUGH: 0
DEPRESSION: 0
HEADACHES: 0
CHILLS: 0
HEARTBURN: 0

## 2023-02-09 ASSESSMENT — SOCIAL DETERMINANTS OF HEALTH (SDOH)
HOW OFTEN DO YOU GET TOGETHER WITH FRIENDS OR RELATIVES?: THREE TIMES A WEEK
WITHIN THE PAST 12 MONTHS, YOU WORRIED THAT YOUR FOOD WOULD RUN OUT BEFORE YOU GOT THE MONEY TO BUY MORE: NEVER TRUE
DO YOU BELONG TO ANY CLUBS OR ORGANIZATIONS SUCH AS CHURCH GROUPS UNIONS, FRATERNAL OR ATHLETIC GROUPS, OR SCHOOL GROUPS?: NO
HOW OFTEN DO YOU ATTEND CHURCH OR RELIGIOUS SERVICES?: NEVER
HOW OFTEN DO YOU GET TOGETHER WITH FRIENDS OR RELATIVES?: THREE TIMES A WEEK
HOW OFTEN DO YOU ATTEND CHURCH OR RELIGIOUS SERVICES?: NEVER
HOW HARD IS IT FOR YOU TO PAY FOR THE VERY BASICS LIKE FOOD, HOUSING, MEDICAL CARE, AND HEATING?: NOT VERY HARD
HOW OFTEN DO YOU ATTENT MEETINGS OF THE CLUB OR ORGANIZATION YOU BELONG TO?: NEVER
IN A TYPICAL WEEK, HOW MANY TIMES DO YOU TALK ON THE PHONE WITH FAMILY, FRIENDS, OR NEIGHBORS?: THREE TIMES A WEEK
HOW OFTEN DO YOU HAVE A DRINK CONTAINING ALCOHOL: 2-3 TIMES A WEEK
DO YOU BELONG TO ANY CLUBS OR ORGANIZATIONS SUCH AS CHURCH GROUPS UNIONS, FRATERNAL OR ATHLETIC GROUPS, OR SCHOOL GROUPS?: NO
HOW MANY DRINKS CONTAINING ALCOHOL DO YOU HAVE ON A TYPICAL DAY WHEN YOU ARE DRINKING: 3 OR 4
HOW OFTEN DO YOU ATTENT MEETINGS OF THE CLUB OR ORGANIZATION YOU BELONG TO?: NEVER
HOW OFTEN DO YOU HAVE SIX OR MORE DRINKS ON ONE OCCASION: LESS THAN MONTHLY
IN A TYPICAL WEEK, HOW MANY TIMES DO YOU TALK ON THE PHONE WITH FAMILY, FRIENDS, OR NEIGHBORS?: THREE TIMES A WEEK

## 2023-02-09 ASSESSMENT — FIBROSIS 4 INDEX: FIB4 SCORE: 1.312004408342218057

## 2023-02-09 ASSESSMENT — PATIENT HEALTH QUESTIONNAIRE - PHQ9: CLINICAL INTERPRETATION OF PHQ2 SCORE: 0

## 2023-02-10 NOTE — PROGRESS NOTES
Subjective:     Chief Complaint   Patient presents with    Breast Pain     Right side under armpit was painful the day of mammogram     Medication Management    Medication Refill     Losartan, metoprolol, ativan        HPI:   Ambar presents today with     Problem   Breast Pain, Right    States January 31 she was having some mild outer right breast discmofort which has resolved completely at this time. She is not sure if it was associated with her bra underwire. States no redness or bruising. Will complete screening as she is having no symptoms.      Palpitation    Chronic in nature. Is doing well on metoprolol 25 mg. No dizziness or headaches.      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  Controlled with medication.  Patient states that she has continued to use medication very intermittently for flights and states that the Ativan 0.5 mg works very well to control her anxiety related to travel. Upcoming trip to [place].   Is the medication improving the patient’s symptoms: Yes  Any adverse effects: No    Alcohol or illicit drug use:   She  reports current alcohol use of about 7.2 oz of alcohol per week.  She  reports current drug use. Drug: Inhaled.     History of controlled substance used in a way other than prescribed? No  Any early refills of a controlled substance: No  History of lost or stolen controlled substance prescription: No  Any aberrant behavior or intoxication while on a controlled substance: No  Has the patient self-modified their dose or frequency of the medication :No  Compliant with treatment recommendations and plan: Yes  Any major health change to the patient: No  Concerns for misuse, abuse or addiction: No    /NarxCheck report reviewed: Yes  History of abnormal drug screening: N/A       Hypertriglyceridemia    Chronic in nature. Stable. Order labs for update.     Hypertension    Chronic in nature.  Stable.  Blood pressure today is 110/70.  She denies chest pain, palpitations, dizziness, blurry  vision.  States that she has been seeing cardiology and that they have been working to adjust her medications she states she has still had symptoms pretty significant fluctuation in blood pressure results. States she is doing well.      Mild Intermittent Asthma Without Complication    Chronic in nature. States she does generally use her inhaler daily.  Doing well on her albuterol. States that she will generally feel short of breath when she first wakes up.  States that when we tried the Qvar last year she did use it for a couple of weeks but did not notice it creating significant change and as such stopped the medication.  She denies any nighttime waking or cough.          Current Outpatient Medications Ordered in Epic   Medication Sig Dispense Refill    LORazepam (ATIVAN) 0.5 MG Tab Take 1 Tablet by mouth every 8 hours as needed for Anxiety for up to 5 days. Indications: flying phobia 15 Tablet 0    losartan (COZAAR) 25 MG Tab Take 1 Tablet by mouth every day. 90 Tablet 3    metoprolol SR (TOPROL XL) 25 MG TABLET SR 24 HR Take 1 Tablet by mouth every evening. 90 Tablet 3    albuterol (PROAIR HFA) 108 (90 Base) MCG/ACT Aero Soln inhalation aerosol Inhale 2 Puffs every 6 hours. INHALE 2 PUFFS BY MOUTH EVERY 6 HOURS AS NEEDED FOR SHORTNESS OF BREATH. 8.5 g 6    Loratadine (CLARITIN PO) Take 1 tablet by mouth every day.      omeprazole (PRILOSEC) 20 MG delayed-release capsule TAKE 1 CAPSULE BY MOUTH EVERY DAY 90 Cap 0     No current Epic-ordered facility-administered medications on file.       Health Maintenance: Completed    Review of Systems   Constitutional:  Negative for chills, fever and malaise/fatigue.   Respiratory:  Negative for cough, shortness of breath and wheezing.    Cardiovascular:  Negative for chest pain, palpitations and leg swelling.   Gastrointestinal:  Negative for heartburn.   Genitourinary:  Negative for dysuria and urgency.   Neurological:  Negative for dizziness and headaches.  "  Psychiatric/Behavioral:  Negative for depression.        Objective:     Exam:  /70 (BP Location: Left arm, Patient Position: Sitting, BP Cuff Size: Adult)   Pulse 74   Temp 36.3 °C (97.4 °F) (Temporal)   Ht 1.702 m (5' 7\")   Wt 84.6 kg (186 lb 6.4 oz)   LMP 10/14/2001 (Approximate)   SpO2 98%   BMI 29.19 kg/m²  Body mass index is 29.19 kg/m².    Physical Exam  Constitutional:       Appearance: Normal appearance.   HENT:      Head: Normocephalic and atraumatic.   Eyes:      Pupils: Pupils are equal, round, and reactive to light.   Cardiovascular:      Rate and Rhythm: Normal rate and regular rhythm.      Pulses: Normal pulses.      Heart sounds: Normal heart sounds.   Pulmonary:      Effort: Pulmonary effort is normal.      Breath sounds: Normal breath sounds.   Abdominal:      General: Abdomen is flat.      Palpations: Abdomen is soft.   Skin:     General: Skin is warm and dry.      Capillary Refill: Capillary refill takes less than 2 seconds.   Neurological:      General: No focal deficit present.      Mental Status: She is alert and oriented to person, place, and time.     Assessment & Plan:     62 y.o. female with the following -     Problem List Items Addressed This Visit       Breast pain, right    Flying phobia     - lorazepam 0.5 mg as needed for severe anxiety or flying phobia         Relevant Medications    LORazepam (ATIVAN) 0.5 MG Tab    Hypertension     -continue losartan 25 mg daily           Relevant Medications    losartan (COZAAR) 25 MG Tab    metoprolol SR (TOPROL XL) 25 MG TABLET SR 24 HR    Hypertriglyceridemia     -update labs         Relevant Medications    losartan (COZAAR) 25 MG Tab    metoprolol SR (TOPROL XL) 25 MG TABLET SR 24 HR    Mild intermittent asthma without complication    Palpitation     -continue metoprolol 25 mg.         Relevant Medications    metoprolol SR (TOPROL XL) 25 MG TABLET SR 24 HR     Other Visit Diagnoses       Encounter for screening mammogram for " malignant neoplasm of breast        Wellness examination        Relevant Orders    CBC WITH DIFFERENTIAL    Comp Metabolic Panel    Lipid Profile    Need for vaccination        Relevant Orders    Tdap Vaccine =>6YO IM (Completed)    Essential hypertension        Relevant Medications    losartan (COZAAR) 25 MG Tab    metoprolol SR (TOPROL XL) 25 MG TABLET SR 24 HR    Situational anxiety        Relevant Medications    LORazepam (ATIVAN) 0.5 MG Tab            Return in about 1 year (around 2/9/2024) for Annual Physical.    I have placed the below orders and discussed them with an approved delegating provider. The MA is performing the below orders under the direction of Dr. Mitchell.     Please note that this dictation was created using voice recognition software. I have made every reasonable attempt to correct obvious errors, but I expect that there are errors of grammar and possibly content that I did not discover before finalizing the note.

## 2023-02-10 NOTE — PROGRESS NOTES
Subjective:     CC:   Chief Complaint   Patient presents with    Breast Pain     Right side under armpit was painful the day of mammogram     Medication Management    Medication Refill     Losartan, metoprolol, ativan        HPI:   Ambar Jj is a 62 y.o. female who presents for annual exam. She is feeling well and denies any complaints.    Ob-Gyn/ History:    Patient has GYN provider: no  /Para:  2/1  Last Pap Smear: Recommended, total hysterectomy with removal of cervix.  No history of abnormal pap smears.  Gyn Surgery: Total hysterectomy.  No significant bloating/fluid retention, pelvic pain, or dyspareunia. No vaginal discharge  Post-menopausal bleeding: none  Urinary incontinence: denies  Folate intake: +multi    Health Maintenance  Advanced directive: not on file   Osteoporosis Screen/ DEXA: not of age   Cholesterol Screening: ordered   Diabetes Screening: ordered   Diet: improving.  Patient does report eating an overall healthy diet and has been working on weight loss.    Exercise: Patient has been resizing regularly   substance Abuse: No concern   Safe in relationship.   Seat belts, bike helmet, gun safety discussed.  Sun protection used.    Cancer screening  Colorectal Cancer Screening: Up to date  Breast Cancer Screening: up to date     Infectious disease screening/Immunizations  --STI Screening: not needed today   --Practices safe sex.  --HIV Screening: complete in past   --Hepatitis C Screening: complete   --Immunizations: Up To date or declined   She  has a past medical history of Anemia, Anemia, Anesthesia, Bowel habit changes (2018), Bronchitis (), Colostomy present (HCC), Diverticulitis (2017), Female bladder prolapse (2018), GERD (gastroesophageal reflux disease), Heart burn, HTN, HTN (hypertension) (2018), Indigestion, Other specified symptom associated with female genital organs, Pap smear (2009), PONV (postoperative nausea and vomiting), Psychiatric  problem, Reactive airway disease (2018), Unspecified urinary incontinence (2018), and Urinary bladder disorder.  She  has a past surgical history that includes bladder suspension; endoscopy; tonsillectomy and adenoidectomy; bladder suspension (N/A, 2015); cystoscopy (N/A, 2015); other; sigmoid colectomy (N/A, 2018); colostomy takedown (2019); cholecystectomy; endoscopy procedure; appendectomy (2019); abdominal hysterectomy total; anterior and posterior repair (N/A, 2015); and ventral hernia repair robotic xi (10/24/2019).    Family History   Problem Relation Age of Onset    Cancer Mother         Breast    Lung Disease Mother         COPD    Heart Disease Mother     Diabetes Mother     Lung Disease Father         COPD    Heart Disease Father     Alcohol/Drug Sister     Lung Disease Sister         sister 2 Asthma    Heart Disease Sister         sister 1 Heart murmur    Alcohol/Drug Brother     Other Son         IBS; working on his diet       Social History     Socioeconomic History    Marital status:      Spouse name: Not on file    Number of children: Not on file    Years of education: Not on file    Highest education level: Some college, no degree   Occupational History    Not on file   Tobacco Use    Smoking status: Former     Packs/day: 0.00     Years: 34.00     Pack years: 0.00     Types: Cigarettes     Quit date: 2010     Years since quittin.1    Smokeless tobacco: Never    Tobacco comments:     2010   Vaping Use    Vaping Use: Never used   Substance and Sexual Activity    Alcohol use: Yes     Alcohol/week: 7.2 oz     Types: 6 Cans of beer, 6 Standard drinks or equivalent per week     Comment: 4 per week    Drug use: Yes     Types: Inhaled, Marijuana     Comment: marijuana occasionally     Sexual activity: Yes     Partners: Male   Other Topics Concern    Not on file   Social History Narrative    Not on file     Social Determinants of Health     Financial  Resource Strain: Low Risk     Difficulty of Paying Living Expenses: Not very hard   Food Insecurity: No Food Insecurity    Worried About Running Out of Food in the Last Year: Never true    Ran Out of Food in the Last Year: Never true   Transportation Needs: No Transportation Needs    Lack of Transportation (Medical): No    Lack of Transportation (Non-Medical): No   Physical Activity: Sufficiently Active    Days of Exercise per Week: 6 days    Minutes of Exercise per Session: 30 min   Stress: No Stress Concern Present    Feeling of Stress : Only a little   Social Connections: Socially Isolated    Frequency of Communication with Friends and Family: Three times a week    Frequency of Social Gatherings with Friends and Family: Three times a week    Attends [employer] Services: Never    Active Member of Clubs or Organizations: No    Attends Club or Organization Meetings: Never    Marital Status:    Intimate Partner Violence: Not on file   Housing Stability: Low Risk     Unable to Pay for Housing in the Last Year: No    Number of Places Lived in the Last Year: 1    Unstable Housing in the Last Year: No       Patient Active Problem List    Diagnosis Date Noted    Breast pain, right 02/09/2023    Skin lesion of right arm 01/31/2022    Toe pain, chronic, left 09/27/2021    Vertigo 06/08/2021    Palpitation 06/08/2021    Seborrheic keratoses 01/21/2021    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    Hypokalemia 09/06/2018    Proteinuria 04/17/2017    Status post partial colectomy 12/09/2016    Hypertriglyceridemia 11/29/2016    Family history of breast cancer in mother 10/15/2015    History of tobacco use 10/15/2015    History of hysterectomy for benign disease 10/15/2015    Urinary incontinence 05/06/2015    Vitamin D deficiency disease 10/07/2013    GERD (gastroesophageal reflux disease)     Hypertension     Mild intermittent asthma without complication          Current Outpatient Medications   Medication Sig Dispense Refill     "LORazepam (ATIVAN) 0.5 MG Tab Take 1 Tablet by mouth every 8 hours as needed for Anxiety for up to 5 days. Indications: flying phobia 15 Tablet 0    losartan (COZAAR) 25 MG Tab Take 1 Tablet by mouth every day. 90 Tablet 3    metoprolol SR (TOPROL XL) 25 MG TABLET SR 24 HR Take 1 Tablet by mouth every evening. 90 Tablet 3    albuterol (PROAIR HFA) 108 (90 Base) MCG/ACT Aero Soln inhalation aerosol Inhale 2 Puffs every 6 hours. INHALE 2 PUFFS BY MOUTH EVERY 6 HOURS AS NEEDED FOR SHORTNESS OF BREATH. 8.5 g 6    Loratadine (CLARITIN PO) Take 1 tablet by mouth every day.      omeprazole (PRILOSEC) 20 MG delayed-release capsule TAKE 1 CAPSULE BY MOUTH EVERY DAY 90 Cap 0     No current facility-administered medications for this visit.     Allergies   Allergen Reactions    Codeine Vomiting       Review of Systems   Constitutional: Negative for fever, chills and malaise/fatigue.   HENT: Negative for congestion.    Eyes: Negative for pain.   Respiratory: Negative for cough and shortness of breath.    Cardiovascular: Negative for leg swelling.   Gastrointestinal: Negative for nausea, vomiting, abdominal pain and diarrhea.   Genitourinary: Negative for dysuria and hematuria.   Skin: Negative for rash.   Neurological: Negative for dizziness, focal weakness and headaches.   Endo/Heme/Allergies: Does not bruise/bleed easily.   Psychiatric/Behavioral: Negative for depression.  The patient is not nervous/anxious.      Objective:     /70 (BP Location: Left arm, Patient Position: Sitting, BP Cuff Size: Adult)   Pulse 74   Temp 36.3 °C (97.4 °F) (Temporal)   Ht 1.702 m (5' 7\")   Wt 84.6 kg (186 lb 6.4 oz)   LMP 10/14/2001 (Approximate)   SpO2 98%   BMI 29.19 kg/m²   Body mass index is 29.19 kg/m².  Wt Readings from Last 4 Encounters:   02/09/23 84.6 kg (186 lb 6.4 oz)   09/26/22 83.9 kg (185 lb)   09/11/22 86.2 kg (190 lb)   05/05/22 82.1 kg (181 lb)       Physical Exam:  Constitutional: Well-developed and well-nourished. " Not diaphoretic. No distress.   Skin: Skin is warm and dry. No rash noted.  Head: Atraumatic without lesions.  Eyes: Conjunctivae and extraocular motions are normal. Pupils are equal, round, and reactive to light. No scleral icterus.   Ears:  External ears unremarkable. Tympanic membranes clear and intact.  Nose: Nares patent. Septum midline. Turbinates without erythema nor edema. No discharge.   Mouth/Throat: Dentition is WNL. Tongue normal. Oropharynx is clear and moist. Posterior pharynx without erythema or exudates.  Neck: Supple, trachea midline. Normal range of motion. No thyromegaly present. No lymphadenopathy--cervical or supraclavicular.  Cardiovascular: Regular rate and rhythm, S1 and S2 without murmur, rubs, or gallops.  Lungs: Normal inspiratory effort, CTA bilaterally, no wheezes/rhonchi/rales  Abdomen: Soft, non tender, and without distention. Active bowel sounds in all four quadrants. No rebound, guarding, masses or HSM.  Extremities: No cyanosis, clubbing, erythema, nor edema. Distal pulses intact and symmetric.   Musculoskeletal: All major joints AROM full in all directions without pain.  Neurological: Alert and oriented x 3.  No cranial nerve deficit. 5/5 myotomes. Sensation intact.   Psychiatric:  Behavior, mood, and affect are appropriate.      Assessment and Plan:     1. Encounter for screening mammogram for malignant neoplasm of breast        2. Breast pain, right        3. Wellness examination  CBC WITH DIFFERENTIAL    Comp Metabolic Panel    Lipid Profile      4. Need for vaccination  Tdap Vaccine =>6YO IM      5. Essential hypertension  losartan (COZAAR) 25 MG Tab    DISCONTINUED: losartan (COZAAR) 25 MG Tab      6. Primary hypertension        7. Hypertriglyceridemia        8. Mild intermittent asthma without complication        9. Palpitation  metoprolol SR (TOPROL XL) 25 MG TABLET SR 24 HR    DISCONTINUED: metoprolol SR (TOPROL XL) 25 MG TABLET SR 24 HR      10. Flying phobia  LORazepam  (ATIVAN) 0.5 MG Tab      11. Situational anxiety  LORazepam (ATIVAN) 0.5 MG Tab          HCM:     Labs per orders  Immunizations per orders  Patient counseled about skin care, diet, supplements, vitamins, safe sex and exercise.    Follow-up: Return in about 1 year (around 2/9/2024) for Annual Physical.

## 2023-02-20 NOTE — TELEPHONE ENCOUNTER
Was the patient seen in the last year in this department? Yes    Does patient have an active prescription for medications requested? No     Received Request Via: Pharmacy  
IV discontinued, cath removed intact

## 2023-03-10 ENCOUNTER — HOSPITAL ENCOUNTER (OUTPATIENT)
Dept: RADIOLOGY | Facility: MEDICAL CENTER | Age: 63
End: 2023-03-10
Attending: NURSE PRACTITIONER
Payer: COMMERCIAL

## 2023-03-10 PROCEDURE — 77063 BREAST TOMOSYNTHESIS BI: CPT

## 2023-03-24 ENCOUNTER — HOSPITAL ENCOUNTER (OUTPATIENT)
Dept: LAB | Facility: MEDICAL CENTER | Age: 63
End: 2023-03-24
Attending: NURSE PRACTITIONER
Payer: COMMERCIAL

## 2023-03-24 DIAGNOSIS — Z00.00 WELLNESS EXAMINATION: ICD-10-CM

## 2023-03-24 LAB
ALBUMIN SERPL BCP-MCNC: 4.4 G/DL (ref 3.2–4.9)
ALBUMIN/GLOB SERPL: 1.4 G/DL
ALP SERPL-CCNC: 100 U/L (ref 30–99)
ALT SERPL-CCNC: 37 U/L (ref 2–50)
ANION GAP SERPL CALC-SCNC: 12 MMOL/L (ref 7–16)
AST SERPL-CCNC: 39 U/L (ref 12–45)
BASOPHILS # BLD AUTO: 1.1 % (ref 0–1.8)
BASOPHILS # BLD: 0.06 K/UL (ref 0–0.12)
BILIRUB SERPL-MCNC: 0.4 MG/DL (ref 0.1–1.5)
BUN SERPL-MCNC: 12 MG/DL (ref 8–22)
CALCIUM ALBUM COR SERPL-MCNC: 9.2 MG/DL (ref 8.5–10.5)
CALCIUM SERPL-MCNC: 9.5 MG/DL (ref 8.5–10.5)
CHLORIDE SERPL-SCNC: 108 MMOL/L (ref 96–112)
CHOLEST SERPL-MCNC: 196 MG/DL (ref 100–199)
CO2 SERPL-SCNC: 22 MMOL/L (ref 20–33)
CREAT SERPL-MCNC: 0.71 MG/DL (ref 0.5–1.4)
EOSINOPHIL # BLD AUTO: 0.33 K/UL (ref 0–0.51)
EOSINOPHIL NFR BLD: 6.2 % (ref 0–6.9)
ERYTHROCYTE [DISTWIDTH] IN BLOOD BY AUTOMATED COUNT: 44.3 FL (ref 35.9–50)
FASTING STATUS PATIENT QL REPORTED: NORMAL
GFR SERPLBLD CREATININE-BSD FMLA CKD-EPI: 97 ML/MIN/1.73 M 2
GLOBULIN SER CALC-MCNC: 3.2 G/DL (ref 1.9–3.5)
GLUCOSE SERPL-MCNC: 96 MG/DL (ref 65–99)
HCT VFR BLD AUTO: 43 % (ref 37–47)
HDLC SERPL-MCNC: 38 MG/DL
HGB BLD-MCNC: 14.1 G/DL (ref 12–16)
IMM GRANULOCYTES # BLD AUTO: 0.01 K/UL (ref 0–0.11)
IMM GRANULOCYTES NFR BLD AUTO: 0.2 % (ref 0–0.9)
LDLC SERPL CALC-MCNC: 115 MG/DL
LYMPHOCYTES # BLD AUTO: 1.61 K/UL (ref 1–4.8)
LYMPHOCYTES NFR BLD: 30.4 % (ref 22–41)
MCH RBC QN AUTO: 29.2 PG (ref 27–33)
MCHC RBC AUTO-ENTMCNC: 32.8 G/DL (ref 33.6–35)
MCV RBC AUTO: 89 FL (ref 81.4–97.8)
MONOCYTES # BLD AUTO: 0.33 K/UL (ref 0–0.85)
MONOCYTES NFR BLD AUTO: 6.2 % (ref 0–13.4)
NEUTROPHILS # BLD AUTO: 2.95 K/UL (ref 2–7.15)
NEUTROPHILS NFR BLD: 55.9 % (ref 44–72)
NRBC # BLD AUTO: 0 K/UL
NRBC BLD-RTO: 0 /100 WBC
PLATELET # BLD AUTO: 209 K/UL (ref 164–446)
PMV BLD AUTO: 10.5 FL (ref 9–12.9)
POTASSIUM SERPL-SCNC: 4.3 MMOL/L (ref 3.6–5.5)
PROT SERPL-MCNC: 7.6 G/DL (ref 6–8.2)
RBC # BLD AUTO: 4.83 M/UL (ref 4.2–5.4)
SODIUM SERPL-SCNC: 142 MMOL/L (ref 135–145)
TRIGL SERPL-MCNC: 215 MG/DL (ref 0–149)
WBC # BLD AUTO: 5.3 K/UL (ref 4.8–10.8)

## 2023-03-24 PROCEDURE — 85025 COMPLETE CBC W/AUTO DIFF WBC: CPT

## 2023-03-24 PROCEDURE — 36415 COLL VENOUS BLD VENIPUNCTURE: CPT

## 2023-03-24 PROCEDURE — 80061 LIPID PANEL: CPT

## 2023-03-24 PROCEDURE — 80053 COMPREHEN METABOLIC PANEL: CPT

## 2023-04-03 ENCOUNTER — HOSPITAL ENCOUNTER (OUTPATIENT)
Dept: LAB | Facility: MEDICAL CENTER | Age: 63
End: 2023-04-03
Attending: NURSE PRACTITIONER
Payer: COMMERCIAL

## 2023-04-03 ENCOUNTER — OFFICE VISIT (OUTPATIENT)
Dept: MEDICAL GROUP | Facility: PHYSICIAN GROUP | Age: 63
End: 2023-04-03
Payer: COMMERCIAL

## 2023-04-03 VITALS
OXYGEN SATURATION: 95 % | HEIGHT: 67 IN | TEMPERATURE: 97.7 F | WEIGHT: 185.2 LBS | BODY MASS INDEX: 29.07 KG/M2 | SYSTOLIC BLOOD PRESSURE: 110 MMHG | HEART RATE: 74 BPM | DIASTOLIC BLOOD PRESSURE: 72 MMHG

## 2023-04-03 DIAGNOSIS — G89.29 RECTAL PAIN, CHRONIC: ICD-10-CM

## 2023-04-03 DIAGNOSIS — R74.8 ELEVATED ALKALINE PHOSPHATASE LEVEL: ICD-10-CM

## 2023-04-03 DIAGNOSIS — M25.551 HIP PAIN, RIGHT: ICD-10-CM

## 2023-04-03 DIAGNOSIS — E78.5 DYSLIPIDEMIA: ICD-10-CM

## 2023-04-03 DIAGNOSIS — I10 PRIMARY HYPERTENSION: ICD-10-CM

## 2023-04-03 DIAGNOSIS — K62.89 RECTAL PAIN, CHRONIC: ICD-10-CM

## 2023-04-03 LAB — GGT SERPL-CCNC: 37 U/L (ref 7–34)

## 2023-04-03 PROCEDURE — 82977 ASSAY OF GGT: CPT

## 2023-04-03 PROCEDURE — 36415 COLL VENOUS BLD VENIPUNCTURE: CPT

## 2023-04-03 PROCEDURE — 99214 OFFICE O/P EST MOD 30 MIN: CPT | Performed by: NURSE PRACTITIONER

## 2023-04-03 ASSESSMENT — ENCOUNTER SYMPTOMS
CONSTIPATION: 0
VOMITING: 0
NAUSEA: 0
COUGH: 0
WEAKNESS: 0
SHORTNESS OF BREATH: 0
FEVER: 0
DIARRHEA: 0
ABDOMINAL PAIN: 0
LOSS OF CONSCIOUSNESS: 0
BLURRED VISION: 0
DIZZINESS: 0

## 2023-04-03 ASSESSMENT — FIBROSIS 4 INDEX: FIB4 SCORE: 1.84

## 2023-04-03 NOTE — ASSESSMENT & PLAN NOTE
- Continue reduced amount of alcohol intake and maintain healthy diet, he is making excellent improvements  - Continue to engage in regular exercise   - GGT for elevated alkaline phosphatase evaluation   - Follow up in 3 months for update

## 2023-04-03 NOTE — ASSESSMENT & PLAN NOTE
- Continue weight bearing exercises for strengthening   - Follow up if pain worsens or if additional symptoms present, overall pain is most consistent with arthritis  -Discussed the possibility of an x-ray, we will hold off for now and consider this if pain worsens.

## 2023-04-03 NOTE — ASSESSMENT & PLAN NOTE
- Follow up with GI specialist   - Continue to monitor for bleeding with BMs, follow up for any increased amount of blood on toilet paper, bloody BMs, vomiting, dizziness, abdominal pain, increased rectal pain

## 2023-04-03 NOTE — PROGRESS NOTES
Subjective:     Chief Complaint   Patient presents with    Lab Results       HPI:   Ambar presents today with     Problem   Dyslipidemia    Chronic in nature. Triglycerides 215, HDL 38, , total cholesterol 196. Alkaline phosphatase mildly elevated at 100. ASCVD risk <5%. Denies smoking, reports recent increase in ETOH use. Consumes multiple alcoholic drinks per day but has a plan to reduce the amount. She describes a healthy diet that includes fruits, vegetables, and turkey options. She exercises daily by walking and participates in strength training. Denies changes in bowel movements or abdominal pain. She has an appointment scheduled with her GI specialist for one week from now. Denies chest pain, dizziness, or vomiting.      Rectal Pain, Chronic    Chronic in nature. Reports rectal pain has been present for years, but she has been hesitant to get it evaluated based on previous experiences regarding her GI conditions and barriers of having to find a new GI specialist. Positive history of hemorrhoids. S/p partial colectomy. Denies any abdominal pain or changes in bowel functioning. BMs are normal with use of metamucil. Reports small amount of blood on toilet paper when wiping. No bloody BMs. Recent CBC from 1 week ago with normal hemoglobin and hematocrit. Denies dizziness. Appointment with new GI specialist is scheduled for one week from now.      Hip Pain, Right    This is a new issue for this patient. Reports right hip/buttock pain that occurs intermittently and is provoked by periods of remaining sedentary. The pain is worse after resting and is associated with feeling stiff. No prior injury to area reported. Denies loss of sensation in lower extremities, no numbness or tingling. No abnormal gait or falls related to symptoms. Pain does interfere with activities, she is able to walk regularly for exercise.      Hypertension    Chronic in nature.  Stable.  Blood pressure today is 110/72.  She denies chest  "pain, palpitations, dizziness, blurry vision.          Current Outpatient Medications Ordered in Epic   Medication Sig Dispense Refill    losartan (COZAAR) 25 MG Tab Take 1 Tablet by mouth every day. 90 Tablet 3    metoprolol SR (TOPROL XL) 25 MG TABLET SR 24 HR Take 1 Tablet by mouth every evening. 90 Tablet 3    albuterol (PROAIR HFA) 108 (90 Base) MCG/ACT Aero Soln inhalation aerosol Inhale 2 Puffs every 6 hours. INHALE 2 PUFFS BY MOUTH EVERY 6 HOURS AS NEEDED FOR SHORTNESS OF BREATH. 8.5 g 6    Loratadine (CLARITIN PO) Take 1 tablet by mouth every day.      omeprazole (PRILOSEC) 20 MG delayed-release capsule TAKE 1 CAPSULE BY MOUTH EVERY DAY 90 Cap 0     No current Epic-ordered facility-administered medications on file.       Health Maintenance: Completed    Review of Systems   Constitutional:  Negative for fever.   Eyes:  Negative for blurred vision.   Respiratory:  Negative for cough and shortness of breath.    Cardiovascular:  Negative for chest pain.   Gastrointestinal:  Negative for abdominal pain, constipation, diarrhea, melena, nausea and vomiting.   Neurological:  Negative for dizziness, loss of consciousness and weakness.       Objective:     Exam:  /72 (BP Location: Left arm, Patient Position: Sitting, BP Cuff Size: Adult)   Pulse 74   Temp 36.5 °C (97.7 °F) (Temporal)   Ht 1.702 m (5' 7\")   Wt 84 kg (185 lb 3.2 oz)   LMP 10/14/2001 (Approximate)   SpO2 95%   BMI 29.01 kg/m²  Body mass index is 29.01 kg/m².    Physical Exam  Vitals reviewed.   Constitutional:       Appearance: Normal appearance.   Eyes:      Extraocular Movements: Extraocular movements intact.      Pupils: Pupils are equal, round, and reactive to light.   Cardiovascular:      Rate and Rhythm: Normal rate and regular rhythm.      Pulses: Normal pulses.      Heart sounds: Normal heart sounds.   Pulmonary:      Effort: Pulmonary effort is normal.      Breath sounds: Normal breath sounds.   Abdominal:      General: Abdomen is " flat. There is no distension.      Palpations: Abdomen is soft. There is no mass.      Tenderness: There is no abdominal tenderness. There is no guarding or rebound.      Hernia: No hernia is present.   Musculoskeletal:      Right hip: No deformity. Normal range of motion. Normal strength.   Skin:     General: Skin is warm and dry.   Neurological:      Mental Status: She is alert and oriented to person, place, and time.   Psychiatric:         Mood and Affect: Mood normal.         Behavior: Behavior normal.     Assessment & Plan:     62 y.o. female with the following -     Problem List Items Addressed This Visit       Dyslipidemia     - Continue reduced amount of alcohol intake and maintain healthy diet, he is making excellent improvements  - Continue to engage in regular exercise   - GGT for elevated alkaline phosphatase evaluation   - Follow up in 3 months for update         Hip pain, right     - Continue weight bearing exercises for strengthening   - Follow up if pain worsens or if additional symptoms present, overall pain is most consistent with arthritis  -Discussed the possibility of an x-ray, we will hold off for now and consider this if pain worsens.         Hypertension     - Continue losartan 25 mg daily, metoprolol 25 mg every evening           Rectal pain, chronic     - Follow up with GI specialist   - Continue to monitor for bleeding with BMs, follow up for any increased amount of blood on toilet paper, bloody BMs, vomiting, dizziness, abdominal pain, increased rectal pain           Other Visit Diagnoses       Elevated alkaline phosphatase level        Relevant Orders    GAMMA GT (GGT)            Return in about 3 months (around 7/3/2023), or if symptoms worsen or fail to improve.      Please note that this dictation was created using voice recognition software. I have made every reasonable attempt to correct obvious errors, but I expect that there are errors of grammar and possibly content that I did  not discover before finalizing the note.

## 2023-04-07 ENCOUNTER — OFFICE VISIT (OUTPATIENT)
Dept: DERMATOLOGY | Facility: IMAGING CENTER | Age: 63
End: 2023-04-07
Payer: COMMERCIAL

## 2023-04-07 DIAGNOSIS — L82.1 SEBORRHEIC KERATOSIS: ICD-10-CM

## 2023-04-07 DIAGNOSIS — L65.9 ALOPECIA: ICD-10-CM

## 2023-04-07 DIAGNOSIS — D18.01 CHERRY ANGIOMA: ICD-10-CM

## 2023-04-07 DIAGNOSIS — Z85.89 HISTORY OF SQUAMOUS CELL CARCINOMA: ICD-10-CM

## 2023-04-07 DIAGNOSIS — D22.9 NEVUS: ICD-10-CM

## 2023-04-07 DIAGNOSIS — L81.4 LENTIGO: ICD-10-CM

## 2023-04-07 DIAGNOSIS — L90.8 SKIN AGING: ICD-10-CM

## 2023-04-07 DIAGNOSIS — Z12.83 SKIN CANCER SCREENING: ICD-10-CM

## 2023-04-07 DIAGNOSIS — D49.2 NEOPLASM OF SKIN: ICD-10-CM

## 2023-04-07 PROCEDURE — 11102 TANGNTL BX SKIN SINGLE LES: CPT | Performed by: DERMATOLOGY

## 2023-04-07 PROCEDURE — 99213 OFFICE O/P EST LOW 20 MIN: CPT | Mod: 25 | Performed by: DERMATOLOGY

## 2023-04-07 NOTE — PROGRESS NOTES
CC: MALISSA     Subjective: Establish pt here for full skin exam  Pt would like to talk about thinning hair - noted after menopause, progressive. Had tried rogaine without notable effect. Hairdresser suggesting awaken.   Mother similar thinning. Father - good hair density.   Had colon/colostomy procedures preceding.   Instructed she had low iron in the past.    Denies sites I/B/C/B    History of skin cancer: Yes, Details: KA RIGHT arm.   History of precancers/actinic keratoses: No  History of biopsies:Yes, Details: benign mole  History of blistering/severe sunburns:Yes, Details: child  Family history of skin cancer:Yes, Details: melanoma aunt   Family history of atypical moles:No    ROS: no fevers/chills. No itch.  No cough  Relevant PMH: mult med comorbidities  Social: former smoker    PE: Gen:WDWN female in NAD. Skin: Scalp/face/eyes/lips/neck/chest/back/arms/legs/hands/feet/buttocks - examined.  Genitals exam declined  -thinning of scalp hair, midline part widening  -scar well healed on right shoulder/arm  -dark macule, radial borders on right anterior shoulder, approx 2mm  -scattered hyperpigmented macules/papules, appearing benign on torso and extremities.  Diffuse waxy papules on torso>extremities, appearing benign  -cherry red vascular macules/papules    Labs: march 2023: CBC - no anemia      A/P:  Nevi: benign appearing:  -Reviewed skin cancer detection/prevention  -RTC PRN growth/changes/concerning features    Lentigos/SKs: benign  -reassurance  -reviewed skin cancer detection/prevention    Cherry angioma: benign    Hx of skin cancer: KA right arm  -cont'd sunprotection and skin cancer surveillance  -Q 6mo-annual exam recommended; f/u suspicious lesions PRN    Neoplasm NOS: chest. R.o atypia  -consent for bx, including R/B/A. Cleaned with EtOH, anesthesia with lidocaine 1% + epinephrine, shave bx, AlCl3 for hemostasis  -vaseline/bandage and wound care reviewed    Alopecia, likely androgenetic, cannot exclude  element of TE:  -reviewed dx/tx including rogaine, oral minoxidil, propecia; r/b/a  -declined oral tx today - to pursue  recommendation for awaken products  -reviewed camouflage; reviewed sunprotection  -reviewed hair nutrition  -Consider future Vit D, TSH, Ferritin testing   f/u PRN    I have reviewed medications relevant to my specialty.

## 2023-09-01 DIAGNOSIS — J45.20 MILD INTERMITTENT ASTHMA WITHOUT COMPLICATION: ICD-10-CM

## 2023-09-05 RX ORDER — ALBUTEROL SULFATE 90 UG/1
2 AEROSOL, METERED RESPIRATORY (INHALATION) EVERY 6 HOURS PRN
Qty: 8.5 G | Refills: 0 | Status: SHIPPED | OUTPATIENT
Start: 2023-09-05 | End: 2023-10-06

## 2023-10-06 DIAGNOSIS — J45.20 MILD INTERMITTENT ASTHMA WITHOUT COMPLICATION: ICD-10-CM

## 2023-10-06 RX ORDER — ALBUTEROL SULFATE 90 UG/1
2 AEROSOL, METERED RESPIRATORY (INHALATION) EVERY 6 HOURS PRN
Qty: 8.5 G | Refills: 0 | Status: SHIPPED | OUTPATIENT
Start: 2023-10-06 | End: 2023-12-13 | Stop reason: SDUPTHER

## 2023-11-10 ENCOUNTER — OFFICE VISIT (OUTPATIENT)
Dept: DERMATOLOGY | Facility: IMAGING CENTER | Age: 63
End: 2023-11-10
Payer: COMMERCIAL

## 2023-11-10 DIAGNOSIS — L85.3 XEROSIS OF SKIN: ICD-10-CM

## 2023-11-10 DIAGNOSIS — L82.1 SEBORRHEIC KERATOSIS: ICD-10-CM

## 2023-11-10 DIAGNOSIS — D22.9 NEVUS: ICD-10-CM

## 2023-11-10 DIAGNOSIS — L90.8 SKIN AGING: ICD-10-CM

## 2023-11-10 DIAGNOSIS — L81.4 LENTIGO: ICD-10-CM

## 2023-11-10 DIAGNOSIS — L65.9 ALOPECIA: ICD-10-CM

## 2023-11-10 DIAGNOSIS — D18.01 CHERRY ANGIOMA: ICD-10-CM

## 2023-11-10 DIAGNOSIS — L91.8 SKIN TAG: ICD-10-CM

## 2023-11-10 DIAGNOSIS — Z12.83 SKIN CANCER SCREENING: ICD-10-CM

## 2023-11-10 DIAGNOSIS — L81.8 IDIOPATHIC GUTTATE HYPOMELANOSIS: ICD-10-CM

## 2023-11-10 DIAGNOSIS — Z85.89 HISTORY OF SQUAMOUS CELL CARCINOMA: ICD-10-CM

## 2023-11-10 PROCEDURE — 99213 OFFICE O/P EST LOW 20 MIN: CPT | Performed by: DERMATOLOGY

## 2023-11-10 NOTE — PROGRESS NOTES
"CC: MALISSA     Subjective: Establish pt here for full skin exam, skin tag underneath rt breast that is bothersome.     Prior note   \"Pt would like to talk about thinning hair - noted after menopause, progressive. Had tried rogaine without notable effect. Hairdresser suggesting awaken.   Mother similar thinning. Father - good hair density.   Had colon/colostomy procedures preceding.   Instructed she had low iron in the past.\"    History of skin cancer: Yes, Details: SCC RIGHT arm. 03/03/23    History of precancers/actinic keratoses: No  History of biopsies:Yes, Details: benign mole  History of blistering/severe sunburns:Yes, Details: child  Family history of skin cancer:Yes, Details: melanoma aunt   Family history of atypical moles:No    ROS: no fevers/chills. No itch.  No cough  Relevant PMH: mult med comorbidities  Social: former smoker    PE: Gen:WDWN female in NAD. Skin: Scalp/face/eyes/lips/neck/chest/back/arms/legs/hands/feet/buttocks - examined.  Genitals exam declined  -thinning of scalp hair, midline part widening  -scar well healed on right shoulder/arm  -scattered hyperpigmented macules/papules, appearing benign on torso and extremities.  Diffuse waxy papules on torso>extremities, appearing benign. Hypopigmented macules on arms>legs  -cherry red vascular macules/papules  -xerosis of hands    Labs: march 2023: CBC - no anemia      A/P:  Nevi: benign appearing:  -Reviewed skin cancer detection/prevention  -RTC PRN growth/changes/concerning features    IGH/Lentigos/SKs: benign  -reassurance  -reviewed skin cancer detection/prevention    Cherry angioma: benign    Hx of skin cancer: KA right arm  -cont'd sunprotection and skin cancer surveillance  -Q 6mo-annual exam recommended; f/u suspicious lesions PRN    Alopecia, likely androgenetic, cannot exclude element of TE:  -prev reviewed dx/tx including rogaine, oral minoxidil, propecia; r/b/a  -prev reviewed camouflage; reviewed sunprotection  -prev reviewed hair " nutrition  -Consider future Vit D, TSH, Ferritin testing   f/u PRN    Xerosis, diffuse:  -counseled re: dx/tx  -OTC moisturizers recommended- advised vaseline petrolatum/aquaphor    Skin tag: infra right breast: reviewed removal options  -declined LN2 today  -to f/u PRN    I have reviewed medications relevant to my specialty.

## 2023-12-11 ENCOUNTER — APPOINTMENT (OUTPATIENT)
Dept: MEDICAL GROUP | Facility: PHYSICIAN GROUP | Age: 63
End: 2023-12-11
Payer: COMMERCIAL

## 2023-12-13 ENCOUNTER — OFFICE VISIT (OUTPATIENT)
Dept: MEDICAL GROUP | Facility: PHYSICIAN GROUP | Age: 63
End: 2023-12-13
Payer: COMMERCIAL

## 2023-12-13 VITALS
OXYGEN SATURATION: 98 % | HEART RATE: 68 BPM | DIASTOLIC BLOOD PRESSURE: 70 MMHG | HEIGHT: 67 IN | SYSTOLIC BLOOD PRESSURE: 112 MMHG | WEIGHT: 190 LBS | BODY MASS INDEX: 29.82 KG/M2 | TEMPERATURE: 97.2 F

## 2023-12-13 DIAGNOSIS — F41.8 SITUATIONAL ANXIETY: ICD-10-CM

## 2023-12-13 DIAGNOSIS — J45.20 MILD INTERMITTENT ASTHMA WITHOUT COMPLICATION: ICD-10-CM

## 2023-12-13 DIAGNOSIS — F40.243 FLYING PHOBIA: ICD-10-CM

## 2023-12-13 DIAGNOSIS — K21.9 GASTRO-ESOPHAGEAL REFLUX DISEASE WITHOUT ESOPHAGITIS: ICD-10-CM

## 2023-12-13 PROBLEM — D12.6 BENIGN NEOPLASM OF COLON: Status: ACTIVE | Noted: 2023-12-13

## 2023-12-13 PROBLEM — K57.30 DIVERTICULOSIS OF COLON: Status: ACTIVE | Noted: 2023-12-13

## 2023-12-13 PROBLEM — I10 HYPERTENSION: Status: ACTIVE | Noted: 2023-12-13

## 2023-12-13 PROCEDURE — 99214 OFFICE O/P EST MOD 30 MIN: CPT | Performed by: NURSE PRACTITIONER

## 2023-12-13 PROCEDURE — 3078F DIAST BP <80 MM HG: CPT | Performed by: NURSE PRACTITIONER

## 2023-12-13 PROCEDURE — 3074F SYST BP LT 130 MM HG: CPT | Performed by: NURSE PRACTITIONER

## 2023-12-13 RX ORDER — ALBUTEROL SULFATE 90 UG/1
2 AEROSOL, METERED RESPIRATORY (INHALATION) EVERY 6 HOURS PRN
Qty: 2 EACH | Refills: 3 | Status: SHIPPED | OUTPATIENT
Start: 2023-12-13

## 2023-12-13 RX ORDER — LORAZEPAM 0.5 MG/1
0.5 TABLET ORAL EVERY 8 HOURS PRN
Qty: 15 TABLET | Refills: 0 | Status: SHIPPED | OUTPATIENT
Start: 2023-12-13 | End: 2023-12-18

## 2023-12-13 ASSESSMENT — ENCOUNTER SYMPTOMS
CHILLS: 0
PALPITATIONS: 0
FEVER: 0
HEADACHES: 0
COUGH: 0
HEARTBURN: 0
SHORTNESS OF BREATH: 0
DEPRESSION: 0
WHEEZING: 1
DIZZINESS: 0

## 2023-12-13 ASSESSMENT — FIBROSIS 4 INDEX: FIB4 SCORE: 1.87

## 2023-12-13 NOTE — PROGRESS NOTES
Subjective:     Chief Complaint   Patient presents with    Medication Refill       HPI:   Ambar presents today with     Problem   Hypertension    Chronic in nature.  Stable.  Blood pressure today is 110/72.  She denies chest pain, palpitations, dizziness, blurry vision.     Gastro-Esophageal Reflux Disease Without Esophagitis    Chronic in nature.  Stable.  Patient has been taking her medication daily.  States that this medication has been working well.  Does continue follow-up with gastroenterology.     Benign Neoplasm of Colon   Diverticulosis of Colon   Flying Phobia    Chronic in nature.  Controlled with medication.  Doing well using this as needed for plane flights, has been on multiple trips this year and has several plan for next year.    Is the medication improving the patient’s symptoms: Yes  Any adverse effects: No    Alcohol or illicit drug use:   She  reports current alcohol use of about 7.2 oz of alcohol per week.  She  reports current drug use. Drug: Inhaled.     History of controlled substance used in a way other than prescribed? No  Any early refills of a controlled substance: No  History of lost or stolen controlled substance prescription: No  Any aberrant behavior or intoxication while on a controlled substance: No  Has the patient self-modified their dose or frequency of the medication :No  Compliant with treatment recommendations and plan: Yes  Any major health change to the patient: No  Concerns for misuse, abuse or addiction: No    /NarxCheck report reviewed: Yes  History of abnormal drug screening: N/A       Schatzki's Ring   Mild Intermittent Asthma Without Complication    Chronic in nature. Stable overall. States has had some episodes with increased SOB. States she does generally use her inhaler daily.  Doing well on her albuterol. States that she will generally feel short of breath when she first wakes up, stayed about the same.   She denies any nighttime waking or cough.          Current  "Outpatient Medications Ordered in Epic   Medication Sig Dispense Refill    beclomethasone HFA (QVAR REDIHALER) 40 MCG/ACT inhaler Inhale 1 Puff 2 times a day. 1 Each 2    albuterol 108 (90 Base) MCG/ACT Aero Soln inhalation aerosol Inhale 2 Puffs every 6 hours as needed for Shortness of Breath. 2 Each 3    LORazepam (ATIVAN) 0.5 MG Tab Take 1 Tablet by mouth every 8 hours as needed for Anxiety for up to 5 days. 15 Tablet 0    losartan (COZAAR) 25 MG Tab Take 1 Tablet by mouth every day. 90 Tablet 3    metoprolol SR (TOPROL XL) 25 MG TABLET SR 24 HR Take 1 Tablet by mouth every evening. 90 Tablet 3    Loratadine (CLARITIN PO) Take 1 tablet by mouth every day.      omeprazole (PRILOSEC) 20 MG delayed-release capsule TAKE 1 CAPSULE BY MOUTH EVERY DAY 90 Cap 0     No current Epic-ordered facility-administered medications on file.       Health Maintenance: discuss at follow-up    Review of Systems   Constitutional:  Negative for chills, fever and malaise/fatigue.   Respiratory:  Positive for wheezing. Negative for cough and shortness of breath.    Cardiovascular:  Negative for chest pain, palpitations and leg swelling.   Gastrointestinal:  Negative for heartburn.   Neurological:  Negative for dizziness and headaches.   Psychiatric/Behavioral:  Negative for depression.          Objective:     Exam:  /70 (BP Location: Left arm, Patient Position: Sitting, BP Cuff Size: Adult)   Pulse 68   Temp 36.2 °C (97.2 °F) (Temporal)   Ht 1.702 m (5' 7\")   Wt 86.2 kg (190 lb)   LMP 10/14/2001 (Approximate)   SpO2 98%   BMI 29.76 kg/m²  Body mass index is 29.76 kg/m².    Physical Exam  Constitutional:       Appearance: Normal appearance.   HENT:      Head: Normocephalic and atraumatic.   Cardiovascular:      Rate and Rhythm: Normal rate.      Pulses: Normal pulses.   Pulmonary:      Effort: Pulmonary effort is normal.      Breath sounds: Normal breath sounds.   Skin:     General: Skin is warm and dry.   Neurological:      " General: No focal deficit present.      Mental Status: She is alert and oriented to person, place, and time.       Assessment & Plan:     63 y.o. female with the following -     Problem List Items Addressed This Visit       Flying phobia     -continue lorazepam 0.5 mg PO as needed for severe anxiety/flying phobia         Relevant Medications    LORazepam (ATIVAN) 0.5 MG Tab    Gastro-esophageal reflux disease without esophagitis     -Continue omeprazole 20 mg by mouth daily.         Mild intermittent asthma without complication     -considering increased daily symptoms will add QVAR  -albuterol refilled.         Relevant Medications    beclomethasone HFA (QVAR REDIHALER) 40 MCG/ACT inhaler    albuterol 108 (90 Base) MCG/ACT Aero Soln inhalation aerosol     Other Visit Diagnoses       Situational anxiety        Relevant Medications    LORazepam (ATIVAN) 0.5 MG Tab                Return in about 1 month (around 1/13/2024), or if symptoms worsen or fail to improve, for asthma.    I have placed the below orders and discussed them with an approved delegating provider. The MA is performing the below orders under the direction of Dr. Mitchell.     Please note that this dictation was created using voice recognition software. I have made every reasonable attempt to correct obvious errors, but I expect that there are errors of grammar and possibly content that I did not discover before finalizing the note.

## 2024-04-08 ENCOUNTER — OFFICE VISIT (OUTPATIENT)
Dept: MEDICAL GROUP | Facility: PHYSICIAN GROUP | Age: 64
End: 2024-04-08
Payer: COMMERCIAL

## 2024-04-08 VITALS
HEIGHT: 67 IN | WEIGHT: 183.2 LBS | SYSTOLIC BLOOD PRESSURE: 128 MMHG | DIASTOLIC BLOOD PRESSURE: 74 MMHG | HEART RATE: 67 BPM | BODY MASS INDEX: 28.75 KG/M2 | TEMPERATURE: 96.8 F | OXYGEN SATURATION: 98 %

## 2024-04-08 DIAGNOSIS — I10 PRIMARY HYPERTENSION: ICD-10-CM

## 2024-04-08 DIAGNOSIS — J45.20 MILD INTERMITTENT ASTHMA WITHOUT COMPLICATION: ICD-10-CM

## 2024-04-08 DIAGNOSIS — N64.4 BREAST PAIN, RIGHT: ICD-10-CM

## 2024-04-08 DIAGNOSIS — Z11.4 ENCOUNTER FOR SCREENING FOR HIV: ICD-10-CM

## 2024-04-08 DIAGNOSIS — H00.022 HORDEOLUM INTERNUM OF RIGHT LOWER EYELID: ICD-10-CM

## 2024-04-08 DIAGNOSIS — H04.121 CHRONIC DRYNESS OF RIGHT EYE: ICD-10-CM

## 2024-04-08 DIAGNOSIS — Z00.00 WELLNESS EXAMINATION: ICD-10-CM

## 2024-04-08 DIAGNOSIS — H57.89 IRRITATION OF RIGHT EYE: ICD-10-CM

## 2024-04-08 PROCEDURE — 99214 OFFICE O/P EST MOD 30 MIN: CPT | Performed by: NURSE PRACTITIONER

## 2024-04-08 PROCEDURE — 3078F DIAST BP <80 MM HG: CPT | Performed by: NURSE PRACTITIONER

## 2024-04-08 PROCEDURE — 3074F SYST BP LT 130 MM HG: CPT | Performed by: NURSE PRACTITIONER

## 2024-04-08 RX ORDER — LOTEPREDNOL ETABONATE 5 MG/ML
1 SUSPENSION/ DROPS OPHTHALMIC 4 TIMES DAILY
Qty: 5 ML | Refills: 0 | Status: SHIPPED | OUTPATIENT
Start: 2024-04-08

## 2024-04-08 ASSESSMENT — FIBROSIS 4 INDEX: FIB4 SCORE: 1.87

## 2024-04-08 ASSESSMENT — PATIENT HEALTH QUESTIONNAIRE - PHQ9: CLINICAL INTERPRETATION OF PHQ2 SCORE: 0

## 2024-04-08 NOTE — PROGRESS NOTES
Verbal consent was acquired by the patient to use [employer] ambient listening note generation during this visit yes    Subjective:     HPI:   Ambar is a 63 y.o.female established patient who presents today for:    History of Present Illness  The patient presents for evaluation of multiple medical concerns.    The patient reports experiencing eye irritation, which initiated approximately 10 days ago and intensified on Thursday. She describes the sensation as akin to having sand in her eye, although there was no discharge or crust. The irritation has improved today. She experiences intermittent itchiness at the tip of her eye, and the inside of her eye appears red, leading her to suspect a possible scratch. She is currently on allergy medication and has been applying warm compresses. She has a history of frequent styes during her childhood.    The patient is due for her annual mammogram. Last year, she experienced mild pain in her right breast, which she initially attributed to her bra. However, her regular mammogram was normal, and pain had resolved at our visit last January. She continues to experience discomfort and heaviness in her right breast, which she describes as if underwire is poking her. She also reports itchiness in her right breast. She has noticed that her right breast appears larger than her left breast, which is a new development for her. She does not feel anything in her breast, but occasionally experiences a burning sensation under her arm. She does not believe the pain is associated with hormone changes,  she still has her ovaries but is postmenopausal and has not noted an association with pain and time of month. She occasionally wakes up at night with severe itching under her skin. She denies any rash. She has a red spot on her breast that occasionally bleeds after showering. Her dermatologist did not find any other underlying issues. She has been following up with her dermatologist annually. She  has not changed her bra.    The patient's blood pressure is well-controlled. She monitors her blood pressure periodically. She notes that salty foods elevate her blood pressure. She is currently taking losartan 25 mg daily.    Asthma is well-controlled. She started using the Qvar inhaler since her last visit, which has helped her a lot.     Her mother had breast cancer.     has a past medical history of Anemia, Anemia, Anesthesia, Bowel habit changes (11/19/2018), Bronchitis (2010), Colostomy present (HCC), Diverticulitis (11/2017), Female bladder prolapse (11/19/2018), GERD (gastroesophageal reflux disease), Heart burn, HTN, HTN (hypertension) (11/19/2018), Indigestion, Other specified symptom associated with female genital organs, Pap smear (01/29/2009), PONV (postoperative nausea and vomiting), Psychiatric problem, Reactive airway disease (11/19/2018), Unspecified urinary incontinence (11/19/2018), and Urinary bladder disorder.   has a past surgical history that includes bladder suspension; endoscopy; tonsillectomy and adenoidectomy; bladder suspension (N/A, 5/6/2015); cystoscopy (N/A, 5/6/2015); other; sigmoid colectomy (N/A, 11/21/2018); colostomy takedown (5/6/2019); cholecystectomy; endoscopy procedure; appendectomy (5/6/2019); abdominal hysterectomy total; anterior and posterior repair (N/A, 5/6/2015); and ventral hernia repair robotic xi (10/24/2019).    Family History   Problem Relation Age of Onset    Cancer Mother         Breast    Lung Disease Mother         COPD    Heart Disease Mother     Diabetes Mother     Lung Disease Father         COPD    Heart Disease Father     Alcohol/Drug Sister     Lung Disease Sister         sister 2 Asthma    Heart Disease Sister         sister 1 Heart murmur    Alcohol/Drug Brother     Other Son         IBS; working on his diet       Social History     Socioeconomic History    Marital status:      Spouse name: Not on file    Number of children: Not on file     Years of education: Not on file    Highest education level: Some college, no degree   Occupational History    Not on file   Tobacco Use    Smoking status: Former     Current packs/day: 0.00     Types: Cigarettes     Quit date: 1976     Years since quittin.3    Smokeless tobacco: Never    Tobacco comments:     2010   Vaping Use    Vaping Use: Never used   Substance and Sexual Activity    Alcohol use: Yes     Alcohol/week: 7.2 oz     Types: 6 Cans of beer, 6 Standard drinks or equivalent per week     Comment: 4 per week    Drug use: Yes     Types: Inhaled, Marijuana     Comment: marijuana occasionally     Sexual activity: Yes     Partners: Male   Other Topics Concern    Not on file   Social History Narrative    Not on file     Social Determinants of Health     Financial Resource Strain: Low Risk  (2023)    Overall Financial Resource Strain (CARDIA)     Difficulty of Paying Living Expenses: Not very hard   Food Insecurity: No Food Insecurity (2023)    Hunger Vital Sign     Worried About Running Out of Food in the Last Year: Never true     Ran Out of Food in the Last Year: Never true   Transportation Needs: No Transportation Needs (2023)    PRAPARE - Transportation     Lack of Transportation (Medical): No     Lack of Transportation (Non-Medical): No   Physical Activity: Sufficiently Active (2023)    Exercise Vital Sign     Days of Exercise per Week: 6 days     Minutes of Exercise per Session: 30 min   Stress: No Stress Concern Present (2023)    [employer] [place] [employer] - Occupational Stress Questionnaire     Feeling of Stress : Only a little   Social Connections: Socially Isolated (2023)    Social Connection and Isolation Panel [NHANES]     Frequency of Communication with Friends and Family: Three times a week     Frequency of Social Gatherings with Friends and Family: Three times a week     Attends Oriental orthodox Services: Never     Active Member of Clubs or  Organizations: No     Attends Club or Organization Meetings: Never     Marital Status:    Intimate Partner Violence: Not on file   Housing Stability: Low Risk  (2/9/2023)    Housing Stability Vital Sign     Unable to Pay for Housing in the Last Year: No     Number of Places Lived in the Last Year: 1     Unstable Housing in the Last Year: No       Patient Active Problem List    Diagnosis Date Noted    Irritation of right eye 04/08/2024    Hypertension 12/13/2023    Gastro-esophageal reflux disease without esophagitis 12/13/2023    Benign neoplasm of colon 12/13/2023    Diverticulosis of colon 12/13/2023    Dyslipidemia 04/03/2023    Rectal pain, chronic 04/03/2023    Hip pain, right 04/03/2023    Breast pain, right 02/09/2023    Skin lesion of right arm 01/31/2022    Toe pain, chronic, left 09/27/2021    Vertigo 06/08/2021    Palpitation 06/08/2021    Seborrheic keratoses 01/21/2021    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    Hypokalemia 09/06/2018    Proteinuria 04/17/2017    Status post partial colectomy 12/09/2016    Hypertriglyceridemia 11/29/2016    Family history of breast cancer in mother 10/15/2015    History of tobacco use 10/15/2015    History of hysterectomy for benign disease 10/15/2015    Urinary incontinence 05/06/2015    Schatzki's ring 01/14/2014    Vitamin D deficiency disease 10/07/2013    Mild intermittent asthma without complication          Current Outpatient Medications Ordered in Epic   Medication Sig Dispense Refill    loteprednol etabonate (LOTEMAX) 0.5 % ophthalmic suspension Administer 1 Drop into the right eye 4 times a day. 5 mL 0    beclomethasone HFA (QVAR REDIHALER) 40 MCG/ACT inhaler Inhale 1 Puff 2 times a day. 1 Each 2    albuterol 108 (90 Base) MCG/ACT Aero Soln inhalation aerosol Inhale 2 Puffs every 6 hours as needed for Shortness of Breath. 2 Each 3    losartan (COZAAR) 25 MG Tab Take 1 Tablet by mouth every day. 90 Tablet 3    metoprolol SR (TOPROL XL) 25 MG TABLET SR 24 HR Take 1  "Tablet by mouth every evening. 90 Tablet 3    Loratadine (CLARITIN PO) Take 1 tablet by mouth every day.      omeprazole (PRILOSEC) 20 MG delayed-release capsule TAKE 1 CAPSULE BY MOUTH EVERY DAY 90 Cap 0     No current Epic-ordered facility-administered medications on file.     Allergies   Allergen Reactions    Codeine Vomiting       Health Maintenance: recommended shingles and COVID-19    Objective:     Exam:  /74 (BP Location: Left arm, Patient Position: Sitting, BP Cuff Size: Adult)   Pulse 67   Temp 36 °C (96.8 °F) (Temporal)   Ht 1.702 m (5' 7\")   Wt 83.1 kg (183 lb 3.2 oz)   LMP 10/14/2001 (Approximate)   SpO2 98%   BMI 28.69 kg/m²  Body mass index is 28.69 kg/m².    Physical Exam  Constitutional:       Appearance: Normal appearance.   HENT:      Head: Normocephalic and atraumatic.   Cardiovascular:      Rate and Rhythm: Normal rate and regular rhythm.      Pulses: Normal pulses.      Heart sounds: Normal heart sounds.   Pulmonary:      Effort: Pulmonary effort is normal.      Breath sounds: Normal breath sounds.   Chest:      Chest wall: No mass, lacerations, deformity, swelling, tenderness, crepitus or edema. There is no dullness to percussion.   Breasts:     Breasts are symmetrical.      Right: Normal.      Left: Normal.   Abdominal:      General: Abdomen is flat. Bowel sounds are normal.      Palpations: Abdomen is soft.   Skin:     General: Skin is warm and dry.   Neurological:      General: No focal deficit present.      Mental Status: She is alert and oriented to person, place, and time.             Results      Assessment & Plan:     1. Irritation of right eye        2. Breast pain, right  MA-DIAGNOSTIC MAMMO BILAT W/TOMOSYNTHESIS W/CAD      3. Encounter for screening for HIV  HIV AG/AB COMBO ASSAY SCREENING      4. Wellness examination  Comp Metabolic Panel    Lipid Profile      5. Primary hypertension        6. Chronic dryness of right eye  loteprednol etabonate (LOTEMAX) 0.5 % " ophthalmic suspension      7. Hordeolum internum of right lower eyelid  loteprednol etabonate (LOTEMAX) 0.5 % ophthalmic suspension          Assessment & Plan  1. Right breast pain.  A diagnostic mammogram will be ordered.    2. Hypertension.  The patient's blood pressure is well-managed. The patient is advised to limit her salt intake. Continue Losartan 25 mg PO daily    3. Eye irritation.  The irritation does not exhibit conjunctivitis, but rather a stye is present. Significant dryness also noted.The patient is advised to continue applying warm compresses. A prescription for Lotemax will be provided, to be used up to 4 times daily for the next 1 to 2 weeks. If there is no improvement after 48 hours, the patient is advised to discontinue the medication.    4. Health maintenance.  Liver, kidney function, fasting blood sugar, lipids, and HIV screening will be ordered.    5.  Asthma  Continue Qvar 40 mcg per actuation        Referral for genetic research was offered. Patient declined.    I have placed the below orders and discussed them with an approved delegating provider. The MA is performing the below orders under the direction of Dr. Mitchell.      Return in about 1 month (around 5/8/2024) for Annual Physical.    Please note that this dictation was created using voice recognition software. I have made every reasonable attempt to correct obvious errors, but I expect that there are errors of grammar and possibly content that I did not discover before finalizing the note.

## 2024-04-25 ENCOUNTER — HOSPITAL ENCOUNTER (OUTPATIENT)
Dept: RADIOLOGY | Facility: MEDICAL CENTER | Age: 64
End: 2024-04-25
Attending: NURSE PRACTITIONER
Payer: COMMERCIAL

## 2024-04-28 ENCOUNTER — TELEMEDICINE (OUTPATIENT)
Dept: TELEHEALTH | Facility: TELEMEDICINE | Age: 64
End: 2024-04-28
Payer: COMMERCIAL

## 2024-04-28 DIAGNOSIS — B96.89 BACTERIAL CONJUNCTIVITIS OF BOTH EYES: ICD-10-CM

## 2024-04-28 DIAGNOSIS — H10.9 BACTERIAL CONJUNCTIVITIS OF BOTH EYES: ICD-10-CM

## 2024-04-28 RX ORDER — POLYMYXIN B SULFATE AND TRIMETHOPRIM 1; 10000 MG/ML; [USP'U]/ML
1 SOLUTION OPHTHALMIC EVERY 4 HOURS
Qty: 10 ML | Refills: 0 | Status: SHIPPED | OUTPATIENT
Start: 2024-04-28 | End: 2024-05-05

## 2024-04-28 ASSESSMENT — ENCOUNTER SYMPTOMS
COUGH: 1
EYE REDNESS: 1
VISUAL CHANGE: 0
EYE DISCHARGE: 1
FEVER: 0
BLURRED VISION: 1

## 2024-04-28 NOTE — PROGRESS NOTES
Virtual Visit: Established Patient   This visit was conducted via Zoom using secure and encrypted videoconferencing technology.   The patient was in their home in the [facility].    The patient's identity was confirmed and verbal consent was obtained for this virtual visit.    Subjective:   CC:   Chief Complaint   Patient presents with    Conjunctivitis     X 3 days     Ambar Jj is a 63 y.o. female presenting for evaluation and management of:    This is a new problem.  The patient presents  via virtual visit complaining of possible pinkeye x 3 days.  The patient states over the past several days she has been experiencing redness to her left eye with associated discharge/drainage.  The patient states she woke up this morning with her left eye glued/matted shut with some mild swelling.  The patient states she is now also experiencing symptoms of the right eye.  The patient states she has been sick with URI-like symptoms.  She reports no associated fever.  The patient reports slight blurred vision of her left eye secondary to persistent discharge/drainage.  The patient has not taken any OTC medications for her eye, but is taking OTC DayQuil and NyQuil for her cold-like symptoms.    Conjunctivitis  Associated symptoms include congestion and coughing. Pertinent negatives include no fever or visual change.     PMH:  has a past medical history of Anemia, Anemia, Anesthesia, Bowel habit changes (11/19/2018), Bronchitis (2010), Colostomy present (HCC), Diverticulitis (11/2017), Female bladder prolapse (11/19/2018), GERD (gastroesophageal reflux disease), Heart burn, HTN, HTN (hypertension) (11/19/2018), Indigestion, Other specified symptom associated with female genital organs, Pap smear (01/29/2009), PONV (postoperative nausea and vomiting), Psychiatric problem, Reactive airway disease (11/19/2018), Unspecified urinary incontinence (11/19/2018), and Urinary bladder disorder.  MEDS:   Current Outpatient  Medications:     loteprednol etabonate (LOTEMAX) 0.5 % ophthalmic suspension, Administer 1 Drop into the right eye 4 times a day., Disp: 5 mL, Rfl: 0    beclomethasone HFA (QVAR REDIHALER) 40 MCG/ACT inhaler, Inhale 1 Puff 2 times a day., Disp: 1 Each, Rfl: 2    albuterol 108 (90 Base) MCG/ACT Aero Soln inhalation aerosol, Inhale 2 Puffs every 6 hours as needed for Shortness of Breath., Disp: 2 Each, Rfl: 3    losartan (COZAAR) 25 MG Tab, Take 1 Tablet by mouth every day., Disp: 90 Tablet, Rfl: 3    metoprolol SR (TOPROL XL) 25 MG TABLET SR 24 HR, Take 1 Tablet by mouth every evening., Disp: 90 Tablet, Rfl: 3    Loratadine (CLARITIN PO), Take 1 tablet by mouth every day., Disp: , Rfl:     omeprazole (PRILOSEC) 20 MG delayed-release capsule, TAKE 1 CAPSULE BY MOUTH EVERY DAY, Disp: 90 Cap, Rfl: 0  ALLERGIES:   Allergies   Allergen Reactions    Codeine Vomiting     SURGHX:   Past Surgical History:   Procedure Laterality Date    VENTRAL HERNIA REPAIR ROBOTIC XI  10/24/2019    Procedure: REPAIR, HERNIA, VENTRAL, ROBOT-ASSISTED, USING DA SANDRINE XI- FOR INCISIONAL WITH MESH;  Surgeon: Justus Will M.D.;  Location: SURGERY SAME DAY [facility];  Service: General    COLOSTOMY TAKEDOWN  5/6/2019    Procedure: COLOSTOMY TAKEDOWN;  Surgeon: Justus Will M.D.;  Location: SURGERY [facility];  Service: General    APPENDECTOMY  5/6/2019    Procedure: APPENDECTOMY;  Surgeon: Justus Will M.D.;  Location: SURGERY [facility];  Service: General    SIGMOID COLECTOMY N/A 11/21/2018    Procedure: SIGMOID COLECTOMY;  Surgeon: Justus Will M.D.;  Location: SURGERY [facility];  Service: General    BLADDER SUSPENSION N/A 5/6/2015    Procedure: BLADDER SUSPENSION [57.89] TOT;  Surgeon: Yazmin Armando M.D.;  Location: SURGERY SAME DAY [facility];  Service:     CYSTOSCOPY N/A 5/6/2015    Procedure: CYSTOSCOPY;  Surgeon: Yazmin Armando M.D.;  Location: SURGERY SAME DAY [facility];   "Service:     ANTERIOR AND POSTERIOR REPAIR N/A 5/6/2015    Procedure: ANTERIOR AND POSTERIOR REPAIR [70.53];  Surgeon: Yazmin Armando M.D.;  Location: SURGERY SAME DAY [facility];  Service:     ABDOMINAL HYSTERECTOMY TOTAL      Ovaries intact    BLADDER SUSPENSION      CHOLECYSTECTOMY      ENDOSCOPY      ENDOSCOPY PROCEDURE      dilation due to stricture    OTHER      \"Abdominal abcess drained twice 2018\".    TONSILLECTOMY AND ADENOIDECTOMY       SOCHX:  reports that she quit smoking about 48 years ago. Her smoking use included cigarettes. She has never used smokeless tobacco. She reports current alcohol use of about 7.2 oz of alcohol per week. She reports current drug use. Drugs: Inhaled and Marijuana.  FH: Family history was reviewed, no pertinent findings to report    ROS   Review of Systems   Constitutional:  Negative for fever.   HENT:  Positive for congestion.    Eyes:  Positive for blurred vision (The patient reports slight blurred vision to her left eye secondary to persistent discharge/drainage.), discharge and redness.   Respiratory:  Positive for cough.               Objective:   LMP 10/14/2001 (Approximate)     Physical Exam:  Constitutional: Alert, no distress, well-groomed.  Skin: No rashes in visible areas.  Eye: The patient has conjunctival injection to the bilateral conjunctiva (left greater than right) with discharge/drainage.  There is slight erythema to the patient's left upper eyelid.  No obvious edema.  ENMT: Lips pink without lesions, good dentition, moist mucous membranes. Phonation normal.  Neck: No masses, no thyromegaly. Moves freely without pain.  Respiratory: Unlabored respiratory effort, no cough or audible wheeze  Psych: Alert and oriented x3, normal affect and mood.     Assessment and Plan:   The following treatment plan was discussed:     1. Bacterial conjunctivitis of both eyes  - polymixin-trimethoprim (POLYTRIM) 82370-1.1 UNIT/ML-% Solution; Administer 1 Drop into both eyes " every 4 hours for 7 days.  Dispense: 10 mL; Refill: 0    Differential diagnoses, supportive care, and indications for immediate follow-up discussed with patient.   Instructed to return to clinic or nearest emergency department for any change in condition, further concerns, or worsening of symptoms.    OTC Tylenol or Motrin for fever/discomfort.  Avoid touching eyes  Hand Hygiene   Follow-up with PCP  AVS printed  Return to clinic or go to the ED if symptoms worsen or fail to improve, or if patient should develop worsening/increasing/persistent eye redness, eye drainage, eye pain, eye itchiness, vision changes, periorbital redness or swelling, headache, fever/chills, and/or any concerning symptoms.    Discussed plan with the patient, and she agrees to the above.     I personally reviewed prior external notes and test results pertinent to today's visit.  I have independently reviewed and interpreted all diagnostics ordered during this urgent care visit.     Please note that this dictation was created using voice recognition software. I have made every reasonable attempt to correct obvious errors, but I expect that there may be errors of grammar and possibly content that I did not discover before finalizing the note.     This note was electronically signed by Nayla Hermosillo PA-C      Follow-up: No follow-ups on file.

## 2024-05-06 ENCOUNTER — HOSPITAL ENCOUNTER (OUTPATIENT)
Dept: RADIOLOGY | Facility: MEDICAL CENTER | Age: 64
End: 2024-05-06
Attending: NURSE PRACTITIONER
Payer: COMMERCIAL

## 2024-05-06 DIAGNOSIS — R00.2 PALPITATION: ICD-10-CM

## 2024-05-06 DIAGNOSIS — N64.4 BREAST PAIN, RIGHT: ICD-10-CM

## 2024-05-07 RX ORDER — METOPROLOL SUCCINATE 25 MG/1
25 TABLET, EXTENDED RELEASE ORAL EVERY EVENING
Qty: 90 TABLET | Refills: 3 | Status: SHIPPED | OUTPATIENT
Start: 2024-05-07 | End: 2024-05-08 | Stop reason: SDUPTHER

## 2024-05-07 NOTE — TELEPHONE ENCOUNTER
Received request via: Pharmacy    Was the patient seen in the last year in this department? Yes    Does the patient have an active prescription (recently filled or refills available) for medication(s) requested? No    Pharmacy Name: [facility] - [place], [place] - [facility] ISABELL Mallory [phone]     Does the patient have senior living Plus and need 100 day supply (blood pressure, diabetes and cholesterol meds only)? Patient does not have SCP

## 2024-05-08 DIAGNOSIS — R00.2 PALPITATION: ICD-10-CM

## 2024-05-08 DIAGNOSIS — I10 ESSENTIAL HYPERTENSION: ICD-10-CM

## 2024-05-08 NOTE — TELEPHONE ENCOUNTER
Received request via: Pharmacy    Was the patient seen in the last year in this department? Yes    Does the patient have an active prescription (recently filled or refills available) for medication(s) requested? No    [email or web address]    Does the patient have senior living Plus and need 100 day supply (blood pressure, diabetes and cholesterol meds only)? Patient does not have SCP

## 2024-05-09 RX ORDER — METOPROLOL SUCCINATE 25 MG/1
25 TABLET, EXTENDED RELEASE ORAL EVERY EVENING
Qty: 90 TABLET | Refills: 0 | Status: SHIPPED | OUTPATIENT
Start: 2024-05-09

## 2024-05-09 RX ORDER — LOSARTAN POTASSIUM 25 MG/1
25 TABLET ORAL DAILY
Qty: 90 TABLET | Refills: 0 | Status: SHIPPED | OUTPATIENT
Start: 2024-05-09

## 2024-05-16 ENCOUNTER — HOSPITAL ENCOUNTER (OUTPATIENT)
Dept: LAB | Facility: MEDICAL CENTER | Age: 64
End: 2024-05-16
Attending: NURSE PRACTITIONER
Payer: COMMERCIAL

## 2024-05-16 DIAGNOSIS — Z11.4 ENCOUNTER FOR SCREENING FOR HIV: ICD-10-CM

## 2024-05-16 DIAGNOSIS — Z00.00 WELLNESS EXAMINATION: ICD-10-CM

## 2024-05-16 LAB
ALBUMIN SERPL BCP-MCNC: 4.4 G/DL (ref 3.2–4.9)
ALBUMIN/GLOB SERPL: 1.6 G/DL
ALP SERPL-CCNC: 90 U/L (ref 30–99)
ALT SERPL-CCNC: 23 U/L (ref 2–50)
ANION GAP SERPL CALC-SCNC: 13 MMOL/L (ref 7–16)
AST SERPL-CCNC: 28 U/L (ref 12–45)
BILIRUB SERPL-MCNC: 0.3 MG/DL (ref 0.1–1.5)
BUN SERPL-MCNC: 9 MG/DL (ref 8–22)
CALCIUM ALBUM COR SERPL-MCNC: 8.8 MG/DL (ref 8.5–10.5)
CALCIUM SERPL-MCNC: 9.1 MG/DL (ref 8.5–10.5)
CHLORIDE SERPL-SCNC: 105 MMOL/L (ref 96–112)
CHOLEST SERPL-MCNC: 184 MG/DL (ref 100–199)
CO2 SERPL-SCNC: 23 MMOL/L (ref 20–33)
CREAT SERPL-MCNC: 0.58 MG/DL (ref 0.5–1.4)
FASTING STATUS PATIENT QL REPORTED: NORMAL
GFR SERPLBLD CREATININE-BSD FMLA CKD-EPI: 103 ML/MIN/1.73 M 2
GLOBULIN SER CALC-MCNC: 2.7 G/DL (ref 1.9–3.5)
GLUCOSE SERPL-MCNC: 97 MG/DL (ref 65–99)
HDLC SERPL-MCNC: 38 MG/DL
HIV 1+2 AB+HIV1 P24 AG SERPL QL IA: NORMAL
LDLC SERPL CALC-MCNC: 115 MG/DL
POTASSIUM SERPL-SCNC: 4 MMOL/L (ref 3.6–5.5)
PROT SERPL-MCNC: 7.1 G/DL (ref 6–8.2)
SODIUM SERPL-SCNC: 141 MMOL/L (ref 135–145)
TRIGL SERPL-MCNC: 156 MG/DL (ref 0–149)

## 2024-05-23 ENCOUNTER — OFFICE VISIT (OUTPATIENT)
Dept: MEDICAL GROUP | Facility: PHYSICIAN GROUP | Age: 64
End: 2024-05-23
Payer: COMMERCIAL

## 2024-05-23 VITALS
BODY MASS INDEX: 28.44 KG/M2 | OXYGEN SATURATION: 95 % | HEART RATE: 74 BPM | DIASTOLIC BLOOD PRESSURE: 80 MMHG | TEMPERATURE: 97.8 F | WEIGHT: 181.2 LBS | HEIGHT: 67 IN | SYSTOLIC BLOOD PRESSURE: 100 MMHG

## 2024-05-23 DIAGNOSIS — Z00.00 WELLNESS EXAMINATION: ICD-10-CM

## 2024-05-23 DIAGNOSIS — Z87.891 HISTORY OF SMOKING 30 OR MORE PACK YEARS: ICD-10-CM

## 2024-05-23 PROCEDURE — 3074F SYST BP LT 130 MM HG: CPT | Performed by: NURSE PRACTITIONER

## 2024-05-23 PROCEDURE — 99396 PREV VISIT EST AGE 40-64: CPT | Performed by: NURSE PRACTITIONER

## 2024-05-23 PROCEDURE — 3079F DIAST BP 80-89 MM HG: CPT | Performed by: NURSE PRACTITIONER

## 2024-05-23 ASSESSMENT — FIBROSIS 4 INDEX: FIB4 SCORE: 1.7

## 2024-05-23 NOTE — PROGRESS NOTES
Subjective:     CC:   Chief Complaint   Patient presents with    Annual Exam     Eyes issues         HPI:   Ambar Jj is a 63 y.o. female who presents for annual exam. She is feeling well and denies any complaints.    Ob-Gyn/ History:    Patient has GYN provider: no  /Para:  2/1  Last Pap Smear:  not complete. no history of abnormal pap smears.  Gyn Surgery:  total abdominal hysterectomy.  Current Contraceptive Method:  none  Last menstrual period:  none.    No significant bloating/fluid retention, pelvic pain, or dyspareunia. No vaginal discharge  Post-menopausal bleeding: none  Urinary incontinence: none    Health Maintenance  Advanced directive: not on file   Osteoporosis Screen/ DEXA: DEXA due in 4 years   Cholesterol Screening: labs   Diabetes Screening: WNL     Anticipatory Guidance  Diet: overall healthy   Exercise: states moving, not walking as much  Substance Abuse: no concern   Seat belts, bike helmet, gun safety discussed.  Sun protection used.    Cancer screening  Colorectal Cancer Screenin    Lung Cancer Screening: ordered    Cervical Cancer Screening: not recommended   Breast Cancer Screening: mammogram UTD     Infectious disease screening/Immunizations  --STI Screening: declines   --Practices safe sex.  --HIV Screening: completed   --Hepatitis C Screening: completed   --Immunizations:    She  has a past medical history of Anemia, Anemia, Anesthesia, Bowel habit changes (2018), Bronchitis (), Colostomy present (), Diverticulitis (2017), Female bladder prolapse (2018), GERD (gastroesophageal reflux disease), Heart burn, HTN, HTN (hypertension) (2018), Indigestion, Other specified symptom associated with female genital organs, Pap smear (2009), PONV (postoperative nausea and vomiting), Psychiatric problem, Reactive airway disease (2018), Unspecified urinary incontinence (2018), and Urinary bladder disorder.  She  has a past surgical  history that includes bladder suspension; endoscopy; tonsillectomy and adenoidectomy; bladder suspension (N/A, 2015); cystoscopy (N/A, 2015); other; sigmoid colectomy (N/A, 2018); colostomy takedown (2019); cholecystectomy; endoscopy procedure; appendectomy (2019); abdominal hysterectomy total; anterior and posterior repair (N/A, 2015); and ventral hernia repair robotic xi (10/24/2019).    Family History   Problem Relation Age of Onset    Cancer Mother         Breast    Lung Disease Mother         COPD    Heart Disease Mother     Diabetes Mother     Lung Disease Father         COPD    Heart Disease Father     Alcohol/Drug Sister     Lung Disease Sister         sister 2 Asthma    Heart Disease Sister         sister 1 Heart murmur    Alcohol/Drug Brother     Other Son         IBS; working on his diet       Social History     Socioeconomic History    Marital status:      Spouse name: Not on file    Number of children: Not on file    Years of education: Not on file    Highest education level: Some college, no degree   Occupational History    Not on file   Tobacco Use    Smoking status: Former     Current packs/day: 0.00     Types: Cigarettes     Quit date: 1976     Years since quittin.4    Smokeless tobacco: Never    Tobacco comments:     2010   Vaping Use    Vaping status: Never Used   Substance and Sexual Activity    Alcohol use: Yes     Alcohol/week: 7.2 oz     Types: 6 Cans of beer, 6 Standard drinks or equivalent per week     Comment: 4 per week    Drug use: Yes     Types: Inhaled, Marijuana     Comment: marijuana occasionally     Sexual activity: Yes     Partners: Male   Other Topics Concern    Not on file   Social History Narrative    Not on file     Social Determinants of Health     Financial Resource Strain: Low Risk  (2023)    Overall Financial Resource Strain (CARDIA)     Difficulty of Paying Living Expenses: Not very hard   Food Insecurity: No Food  Insecurity (2/9/2023)    Hunger Vital Sign     Worried About Running Out of Food in the Last Year: Never true     Ran Out of Food in the Last Year: Never true   Transportation Needs: No Transportation Needs (2/9/2023)    PRAPARE - Transportation     Lack of Transportation (Medical): No     Lack of Transportation (Non-Medical): No   Physical Activity: Sufficiently Active (2/9/2023)    Exercise Vital Sign     Days of Exercise per Week: 6 days     Minutes of Exercise per Session: 30 min   Stress: No Stress Concern Present (2/9/2023)    [employer] [place] [employer] - Occupational Stress Questionnaire     Feeling of Stress : Only a little   Social Connections: Socially Isolated (2/9/2023)    Social Connection and Isolation Panel [NHANES]     Frequency of Communication with Friends and Family: Three times a week     Frequency of Social Gatherings with Friends and Family: Three times a week     Attends Adventism Services: Never     Active Member of Clubs or Organizations: No     Attends Club or Organization Meetings: Never     Marital Status:    Intimate Partner Violence: Not on file   Housing Stability: Low Risk  (2/9/2023)    Housing Stability Vital Sign     Unable to Pay for Housing in the Last Year: No     Number of Places Lived in the Last Year: 1     Unstable Housing in the Last Year: No       Patient Active Problem List    Diagnosis Date Noted    Irritation of right eye 04/08/2024    Hypertension 12/13/2023    Gastro-esophageal reflux disease without esophagitis 12/13/2023    Benign neoplasm of colon 12/13/2023    Diverticulosis of colon 12/13/2023    Dyslipidemia 04/03/2023    Rectal pain, chronic 04/03/2023    Hip pain, right 04/03/2023    Breast pain, right 02/09/2023    Skin lesion of right arm 01/31/2022    Toe pain, chronic, left 09/27/2021    Vertigo 06/08/2021    Palpitation 06/08/2021    Seborrheic keratoses 01/21/2021    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 90 Tablet 0    metoprolol SR (TOPROL XL) 25 MG TABLET SR 24 HR Take 1 Tablet by mouth every evening. 90 Tablet 0    beclomethasone HFA (QVAR REDIHALER) 40 MCG/ACT inhaler Inhale 1 Puff 2 times a day. 1 Each 2    albuterol 108 (90 Base) MCG/ACT Aero Soln inhalation aerosol Inhale 2 Puffs every 6 hours as needed for Shortness of Breath. 2 Each 3    Loratadine (CLARITIN PO) Take 1 tablet by mouth every day.      omeprazole (PRILOSEC) 20 MG delayed-release capsule TAKE 1 CAPSULE BY MOUTH EVERY DAY 90 Cap 0     No current facility-administered medications for this visit.     Allergies   Allergen Reactions    Codeine Vomiting       Review of Systems   Constitutional: Negative for fever, chills and malaise/fatigue.   HENT: Negative for congestion.    Eyes: Negative for pain.    Respiratory: Negative for cough and shortness of breath.  Cardiovascular: Negative for leg swelling.   Gastrointestinal: Negative for nausea, vomiting, abdominal pain and diarrhea.   Genitourinary: Negative for dysuria and hematuria.   Skin: Negative for rash.   Neurological: Negative for dizziness, focal weakness and headaches.   Endo/Heme/Allergies: Does not bleed easily.   Psychiatric/Behavioral: Negative for depression.  The patient is not nervous/anxious.      Objective:     /80 (BP Location: Left arm, Patient Position: Sitting, BP Cuff Size: Adult)   Pulse 74   Temp 36.6 °C (97.8 °F) (Temporal)   Ht 1.702 m  "(5' 7\")   Wt 82.2 kg (181 lb 3.2 oz)   LMP 10/14/2001 (Approximate)   SpO2 95%   BMI 28.38 kg/m²   Body mass index is 28.38 kg/m².  Wt Readings from Last 4 Encounters:   05/23/24 82.2 kg (181 lb 3.2 oz)   04/08/24 83.1 kg (183 lb 3.2 oz)   12/13/23 86.2 kg (190 lb)   04/03/23 84 kg (185 lb 3.2 oz)       Physical Exam:  Constitutional: Well-developed and well-nourished. Not diaphoretic. No distress.   Skin: Skin is warm and dry. No rash noted.  Head: Atraumatic without lesions.  Eyes: Conjunctivae and extraocular motions are normal. Pupils are equal, round, and reactive to light. No scleral icterus.   Ears:  External ears unremarkable. Tympanic membranes clear and intact.  Nose: Nares patent. Septum midline. Turbinates without erythema nor edema. No discharge.   Mouth/Throat: Dentition is WNL. Tongue normal. Oropharynx is clear and moist. Posterior pharynx without erythema or exudates.  Neck: Supple, trachea midline. Normal range of motion. No thyromegaly present. No lymphadenopathy--cervical or supraclavicular.  Cardiovascular: Regular rate and rhythm, S1 and S2 without murmur, rubs, or gallops.  Lungs: Normal inspiratory effort, CTA bilaterally, no wheezes/rhonchi/rales  Breast: deferred  Abdomen: Soft, non tender, and without distention. Active bowel sounds in all four quadrants. No rebound, guarding, masses or HSM.  :deferred  Extremities: No cyanosis, clubbing, erythema, nor edema. Distal pulses intact and symmetric.   Musculoskeletal: All major joints AROM full in all directions without pain.  Neurological: Alert and oriented x 3. DTRs 2+/3 and symmetric. No cranial nerve deficit. 5/5 myotomes. Sensation intact.   Psychiatric:  Behavior, mood, and affect are appropriate.      Assessment and Plan:     1. Wellness examination        2. History of smoking 30 or more pack years  REFERRAL TO LUNG CANCER SCREENING PROGRAM          HCM:     Labs per orders  Immunizations per orders  Patient counseled about skin " care, diet, supplements, prenatal vitamins, safe sex and exercise.      Follow-up: Return in about 6 months (around 11/23/2024), or if symptoms worsen or fail to improve.

## 2024-05-23 NOTE — OR NURSING
Pre op complete. POC discussed with pt, verbalized understanding. Call light within reach, rounding in place.   
Sister at bedside.  Said goodbye to pt and gave pt her cell phone.  
n/a

## 2024-05-24 ENCOUNTER — TELEPHONE (OUTPATIENT)
Dept: HEALTH INFORMATION MANAGEMENT | Facility: OTHER | Age: 64
End: 2024-05-24

## 2024-05-24 NOTE — TELEPHONE ENCOUNTER
Outcome: Left Message     Please transfer to Patient Outreach Team at [phone] when patient returns call.     Attempt # 1

## 2024-06-05 NOTE — TELEPHONE ENCOUNTER
Outcome: Left Message     Please transfer to Patient Outreach Team at [phone] when patient returns call.     Attempt # 2

## 2024-08-09 DIAGNOSIS — I10 ESSENTIAL HYPERTENSION: ICD-10-CM

## 2024-08-09 NOTE — TELEPHONE ENCOUNTER
Received request via: Pharmacy    Was the patient seen in the last year in this department? Yes    Does the patient have an active prescription (recently filled or refills available) for medication(s) requested? No    [facility]    Does the patient have custodial Plus and need 100-day supply? (This applies to ALL medications) Patient does not have SCP

## 2024-08-12 ENCOUNTER — PATIENT MESSAGE (OUTPATIENT)
Dept: MEDICAL GROUP | Facility: PHYSICIAN GROUP | Age: 64
End: 2024-08-12
Payer: COMMERCIAL

## 2024-08-12 DIAGNOSIS — I10 ESSENTIAL HYPERTENSION: ICD-10-CM

## 2024-08-12 RX ORDER — LOSARTAN POTASSIUM 25 MG/1
25 TABLET ORAL DAILY
Qty: 90 TABLET | Refills: 3 | Status: SHIPPED | OUTPATIENT
Start: 2024-08-12 | End: 2024-08-12 | Stop reason: SDUPTHER

## 2024-08-12 RX ORDER — LOSARTAN POTASSIUM 25 MG/1
25 TABLET ORAL DAILY
Qty: 90 TABLET | Refills: 0 | Status: SHIPPED | OUTPATIENT
Start: 2024-08-12

## 2024-08-12 NOTE — PATIENT COMMUNICATION
Received request via: Patient    Was the patient seen in the last year in this department? Yes    Does the patient have an active prescription (recently filled or refills available) for medication(s) requested?  Yes but is req rx [facility]     [facility]armacy [facility]     Does the patient have long term Plus and need 100-day supply? (This applies to ALL medications) Patient does not have SCP

## 2024-11-15 ENCOUNTER — OFFICE VISIT (OUTPATIENT)
Dept: DERMATOLOGY | Facility: IMAGING CENTER | Age: 64
End: 2024-11-15
Payer: COMMERCIAL

## 2024-11-15 DIAGNOSIS — L82.0 INFLAMED SEBORRHEIC KERATOSIS: ICD-10-CM

## 2024-11-15 DIAGNOSIS — L90.8 SKIN AGING: ICD-10-CM

## 2024-11-15 DIAGNOSIS — D22.9 NEVUS: ICD-10-CM

## 2024-11-15 DIAGNOSIS — L82.1 SEBORRHEIC KERATOSIS: ICD-10-CM

## 2024-11-15 DIAGNOSIS — L81.4 LENTIGO: ICD-10-CM

## 2024-11-15 DIAGNOSIS — L85.3 XEROSIS OF SKIN: ICD-10-CM

## 2024-11-15 DIAGNOSIS — Z85.89 HISTORY OF SQUAMOUS CELL CARCINOMA: ICD-10-CM

## 2024-11-15 DIAGNOSIS — D18.01 CHERRY ANGIOMA: ICD-10-CM

## 2024-11-15 DIAGNOSIS — Z12.83 SKIN CANCER SCREENING: ICD-10-CM

## 2024-11-15 PROCEDURE — 17110 DESTRUCTION B9 LES UP TO 14: CPT | Performed by: DERMATOLOGY

## 2024-11-15 PROCEDURE — 99213 OFFICE O/P EST LOW 20 MIN: CPT | Mod: 25 | Performed by: DERMATOLOGY

## 2024-11-15 NOTE — PROGRESS NOTES
CC: MALISSA     Subjective: Establish pt here for full skin exam    HPI/location: [place]  Time present: Approx 10 years  Painful lesion: No  Itching lesion: No  Enlarging lesion: Yes    HPI/location: Behind left ear  Time present: Approx 10 yrs  Painful lesion: No  Itching lesion: Yes  Enlarging lesion: Yes  Anything make it better or worse? Hair get caught      History of skin cancer: Yes, Details: SCC RIGHT arm. 03/03/23    History of precancers/actinic keratoses: No  History of biopsies:Yes, Details: benign mole  History of blistering/severe sunburns:Yes, Details: child  Family history of skin cancer:Yes, Details: melanoma aunt   Family history of atypical moles:No    ROS: no fevers/chills. No itch.  No cough  Relevant PMH: mult med comorbidities  Social: former smoker    PE: Gen:WDWN female in NAD. Skin: Scalp/face/eyes/lips/neck/chest/back/arms/legs/hands/feet/buttocks - examined.  Genitals exam declined  -thinning of scalp hair, midline part widening  -waxy papules/small plaque, right temple/left posterior ear. Appearing minimally irritated  -scar well healed on right shoulder/arm  -scattered hyperpigmented macules/papules, appearing benign on torso and extremities.  Diffuse waxy papules on torso>extremities, appearing benign.   -cherry red vascular macules/papules  -xerosis of hands    A/P:  Benign appearing skin lesions: nevi/lentigos/SK/cherry angioma:  -reviewed sunprotection/detection  -ABCDEs reviewed  -f/u PRN growth/changes/suspicious features    ISK, post left ear:  -LN2 20 sec X 2cycles X 1lesion  -f/u PRN  -can RTC PRN trx right temple site if worsens/desires    Hx of skin cancer: KA right arm  -cont'd sunprotection and skin cancer surveillance  -Q 6mo-annual exam recommended; f/u suspicious lesions PRN    Xerosis, diffuse:  -counseled re: dx/tx  -OTC moisturizers recommended- advised vaseline petrolatum/aquaphor and gloves/wet wraps for increased effectiveness    I have reviewed medications relevant  to my specialty.

## 2024-12-04 DIAGNOSIS — I10 ESSENTIAL HYPERTENSION: ICD-10-CM

## 2024-12-04 NOTE — TELEPHONE ENCOUNTER
Received request via: Pharmacy    Was the patient seen in the last year in this department? Yes    Does the patient have an active prescription (recently filled or refills available) for medication(s) requested? No    Pharmacy Name:     Does the patient have jail Plus and need 100-day supply? (This applies to ALL medications) Patient does not have SCP

## 2024-12-05 RX ORDER — LOSARTAN POTASSIUM 25 MG/1
25 TABLET ORAL DAILY
Qty: 90 TABLET | Refills: 3 | Status: SHIPPED | OUTPATIENT
Start: 2024-12-05

## 2025-02-04 ENCOUNTER — APPOINTMENT (OUTPATIENT)
Dept: MEDICAL GROUP | Facility: PHYSICIAN GROUP | Age: 65
End: 2025-02-04
Payer: COMMERCIAL

## 2025-02-04 VITALS
SYSTOLIC BLOOD PRESSURE: 112 MMHG | HEART RATE: 87 BPM | HEIGHT: 67 IN | OXYGEN SATURATION: 96 % | BODY MASS INDEX: 28.41 KG/M2 | WEIGHT: 181 LBS | DIASTOLIC BLOOD PRESSURE: 60 MMHG | TEMPERATURE: 96.8 F

## 2025-02-04 DIAGNOSIS — J45.20 MILD INTERMITTENT ASTHMA WITHOUT COMPLICATION: ICD-10-CM

## 2025-02-04 DIAGNOSIS — I10 PRIMARY HYPERTENSION: ICD-10-CM

## 2025-02-04 DIAGNOSIS — M25.50 MULTIPLE JOINT PAIN: ICD-10-CM

## 2025-02-04 DIAGNOSIS — F41.8 SITUATIONAL ANXIETY: ICD-10-CM

## 2025-02-04 DIAGNOSIS — F40.243 FLYING PHOBIA: ICD-10-CM

## 2025-02-04 PROCEDURE — 99214 OFFICE O/P EST MOD 30 MIN: CPT | Performed by: NURSE PRACTITIONER

## 2025-02-04 PROCEDURE — 3078F DIAST BP <80 MM HG: CPT | Performed by: NURSE PRACTITIONER

## 2025-02-04 PROCEDURE — 3074F SYST BP LT 130 MM HG: CPT | Performed by: NURSE PRACTITIONER

## 2025-02-04 RX ORDER — LORAZEPAM 0.5 MG/1
0.5 TABLET ORAL EVERY 8 HOURS PRN
Qty: 15 TABLET | Refills: 0 | Status: SHIPPED | OUTPATIENT
Start: 2025-02-04 | End: 2025-02-09

## 2025-02-04 RX ORDER — ALBUTEROL SULFATE 90 UG/1
2 INHALANT RESPIRATORY (INHALATION) EVERY 6 HOURS PRN
Qty: 2 EACH | Refills: 3 | Status: SHIPPED | OUTPATIENT
Start: 2025-02-04

## 2025-02-04 ASSESSMENT — FIBROSIS 4 INDEX: FIB4 SCORE: 1.73

## 2025-02-04 ASSESSMENT — PATIENT HEALTH QUESTIONNAIRE - PHQ9: CLINICAL INTERPRETATION OF PHQ2 SCORE: 0
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
## 2025-02-23 ENCOUNTER — APPOINTMENT (OUTPATIENT)
Dept: URGENT CARE | Facility: CLINIC | Age: 65
End: 2025-02-23
Payer: COMMERCIAL

## 2025-02-23 ENCOUNTER — APPOINTMENT (OUTPATIENT)
Dept: TELEHEALTH | Facility: TELEMEDICINE | Age: 65
End: 2025-02-23
Payer: COMMERCIAL

## 2025-03-12 ENCOUNTER — RESULTS FOLLOW-UP (OUTPATIENT)
Dept: URGENT CARE | Facility: CLINIC | Age: 65
End: 2025-03-12

## 2025-03-12 ENCOUNTER — OFFICE VISIT (OUTPATIENT)
Dept: URGENT CARE | Facility: CLINIC | Age: 65
End: 2025-03-12
Payer: COMMERCIAL

## 2025-03-12 VITALS
TEMPERATURE: 99.6 F | OXYGEN SATURATION: 98 % | WEIGHT: 181 LBS | SYSTOLIC BLOOD PRESSURE: 122 MMHG | BODY MASS INDEX: 28.41 KG/M2 | HEIGHT: 67 IN | DIASTOLIC BLOOD PRESSURE: 78 MMHG | RESPIRATION RATE: 18 BRPM | HEART RATE: 109 BPM

## 2025-03-12 DIAGNOSIS — J02.9 PHARYNGITIS, UNSPECIFIED ETIOLOGY: ICD-10-CM

## 2025-03-12 DIAGNOSIS — J10.1 INFLUENZA A: ICD-10-CM

## 2025-03-12 LAB
FLUAV RNA SPEC QL NAA+PROBE: POSITIVE
FLUBV RNA SPEC QL NAA+PROBE: NEGATIVE
RSV RNA SPEC QL NAA+PROBE: NEGATIVE
SARS-COV-2 RNA RESP QL NAA+PROBE: NEGATIVE

## 2025-03-12 PROCEDURE — 0241U POCT CEPHEID COV-2, FLU A/B, RSV - PCR: CPT

## 2025-03-12 PROCEDURE — 99213 OFFICE O/P EST LOW 20 MIN: CPT

## 2025-03-12 RX ORDER — OSELTAMIVIR PHOSPHATE 75 MG/1
75 CAPSULE ORAL 2 TIMES DAILY
Qty: 10 CAPSULE | Refills: 0 | Status: SHIPPED | OUTPATIENT
Start: 2025-03-12 | End: 2025-03-31

## 2025-03-12 ASSESSMENT — ENCOUNTER SYMPTOMS
NAUSEA: 0
VOMITING: 0
SPUTUM PRODUCTION: 0
COUGH: 0
DIAPHORESIS: 0
ABDOMINAL PAIN: 0
EYE DISCHARGE: 0
SHORTNESS OF BREATH: 0
SINUS PAIN: 0
MYALGIAS: 0
PSYCHIATRIC NEGATIVE: 1
FEVER: 1
HEADACHES: 1
DIZZINESS: 0
SORE THROAT: 1
BLOOD IN STOOL: 0
CHILLS: 1
WHEEZING: 0
WEAKNESS: 0
BLURRED VISION: 0
DIARRHEA: 0

## 2025-03-12 ASSESSMENT — FIBROSIS 4 INDEX: FIB4 SCORE: 1.73

## 2025-03-12 NOTE — PATIENT INSTRUCTIONS
-A cough can persist for up to 6 weeks.  -Increase fluids.  -Eat whole foods that are high in vitamins and minerals to aid in healing.  -REST! REST! Rest! Let your body rest so it can heal.   -Cool-mist humidifier for nighttime use.   -Practice good hand hygiene.  -You may use either acetaminophen or ibuprofen over-the-counter every 6-8 hours for pain or fever if that is not contraindicated with your body.   -Chloraseptic lozenge, warm tea with honey or throat spray to help with discomfort.   -Zinc 25 mg has been shown to be effective in reducing the duration of cold symptoms.  -Saline Nasal Spray and Flonase may be used for congestion. Nasal saline in the nose helps to help break up nasal secretions and is beneficial for nasal symptoms and throat pain.  -Saline Nasal Rinse with a Neti pot or [place] med rinse can be used multiple times a day. Be sure to use distilled water or boil tap water for 10 mins. Add a saline packet. Be sure the water is not too hot (around your body temp of 98 degrees)  -Decongestants are not recommended as they can have a rebound congestion effect along with many side effects (especially if you have high blood pressure).   -Salt water gargles for sore throat several times a day.

## 2025-03-12 NOTE — PROGRESS NOTES
"Subjective:   Ambar Jj is a 64 y.o. female who presents for Sore Throat (Sore throat , fever , chills , loss of taste x yesterday )      HPI  Patient complains of sore throat, fever, HA, chills and loss of taste since yesterday.  She has tried nyquil, zinc and elderberry, Advil  at 0900    Negative: muffled voice, drooling, post nasal drip, vision changes, recent travel         Review of Systems   Constitutional:  Positive for chills and fever. Negative for diaphoresis and malaise/fatigue.   HENT:  Positive for sore throat. Negative for congestion, ear pain and sinus pain.    Eyes:  Negative for blurred vision and discharge.   Respiratory:  Negative for cough, sputum production, shortness of breath and wheezing.    Cardiovascular:  Negative for chest pain.   Gastrointestinal:  Negative for abdominal pain, blood in stool, diarrhea, nausea and vomiting.   Genitourinary: Negative.    Musculoskeletal:  Negative for myalgias.   Skin:  Negative for rash.   Neurological:  Positive for headaches. Negative for dizziness and weakness.   Endo/Heme/Allergies: Negative.    Psychiatric/Behavioral: Negative.     All other systems reviewed and are negative.      Medical History:  Past Medical History:   Diagnosis Date    Anemia     Anemia     Anesthesia     PONV    Bowel habit changes 11/19/2018    \"Constipation/diarrhea\".    Bronchitis 2010    Colostomy present (HCC)     Diverticulitis 11/2017    Female bladder prolapse 11/19/2018    GERD (gastroesophageal reflux disease)     Heart burn     HTN     resolved with wt loss and lifestyle changes    HTN (hypertension) 11/19/2018    Takes Losartan    Indigestion     Other specified symptom associated with female genital organs     Pap smear 01/29/2009    Normal    PONV (postoperative nausea and vomiting)     x2 days post op    Psychiatric problem     anxiety    Reactive airway disease 11/19/2018    Inhaler use PRN    Unspecified urinary incontinence 11/19/2018    Wears Pads    " "Urinary bladder disorder     prolapse        Allergies:  Allergies   Allergen Reactions    Codeine Vomiting       Social history, surgical history, medications, and current problem list reviewed today in Epic.       Objective:     /78 (BP Location: Left arm, Patient Position: Sitting, BP Cuff Size: Adult)   Pulse (!) 109   Temp 37.6 °C (99.6 °F) (Temporal)   Resp 18   Ht 1.702 m (5' 7\")   Wt 82.1 kg (181 lb)   SpO2 98%     Physical Exam  Vitals reviewed.   Constitutional:       General: She is not in acute distress.     Appearance: Normal appearance. She is not ill-appearing, toxic-appearing or diaphoretic.   HENT:      Head: Normocephalic.      Jaw: There is normal jaw occlusion.      Right Ear: Tympanic membrane, ear canal and external ear normal.      Left Ear: Tympanic membrane, ear canal and external ear normal.      Nose: Congestion and rhinorrhea present.      Right Sinus: No maxillary sinus tenderness or frontal sinus tenderness.      Left Sinus: No maxillary sinus tenderness or frontal sinus tenderness.      Mouth/Throat:      Lips: Pink.      Mouth: Mucous membranes are moist.      Tongue: Tongue does not deviate from midline.      Pharynx: Oropharynx is clear. Uvula midline. No oropharyngeal exudate, posterior oropharyngeal erythema or uvula swelling.   Eyes:      Extraocular Movements: Extraocular movements intact.      Conjunctiva/sclera: Conjunctivae normal.      Pupils: Pupils are equal, round, and reactive to light.   Cardiovascular:      Rate and Rhythm: Normal rate and regular rhythm.      Pulses: Normal pulses.      Heart sounds: Normal heart sounds.   Pulmonary:      Effort: Pulmonary effort is normal.      Breath sounds: Normal breath sounds.   Abdominal:      General: Abdomen is flat.      Palpations: Abdomen is soft.      Tenderness: There is no abdominal tenderness.   Musculoskeletal:         General: Normal range of motion.      Cervical back: Normal range of motion and neck " supple.   Lymphadenopathy:      Cervical: No cervical adenopathy.   Skin:     General: Skin is warm.      Capillary Refill: Capillary refill takes less than 2 seconds.   Neurological:      General: No focal deficit present.      Mental Status: She is alert and oriented to person, place, and time.   Psychiatric:         Mood and Affect: Mood normal.         Behavior: Behavior normal.         Assessment/Plan:       Diagnosis and associated orders:     1. Pharyngitis, unspecified etiology  POCT CEPHEID COV-2, FLU A/B, RSV - PCR      2. Influenza A  oseltamivir (TAMIFLU) 75 MG Cap           Comments/MDM:   I personally reviewed prior external notes and test results pertinent to today's visit as well as additional imaging and testing completed in clinic today.     Very pleasant 64-year-old afebrile, hemodynamically stable, generally well-appearing female, presenting with complaints of sore throat, fever, HA, chills and loss of taste since yesterday.  Normal pulmonary exam, no concern for pneumonia or bronchitis.  Normal ENT exam outside of nasal congestion and rhinorrhea, no concern for acute otitis media, strep pharyngitis, or bacterial sinus infection. In clinic viral testing was positive for influenza A; Tamiflu has been sent to the pharmacy.   They were encouraged to increase fluids and rest, cool-mist humidifier, saline nasal rinse with distilled water, cough/throat drops, salt water gargles, tylenol or ibuprofen for fever and/or bodyaches.  At this time I do not believe antibiotics would improve patient's symptoms.  They were encouraged to follow-up with the clinic in 48 hours for re-evaluation as needed.  They were given strict instructions to follow up with the emergency room if they develop worsening symptoms and they verbalized understanding.  Patient is clinically stable at today's acute urgent care visit. Vital signs are normal and reassuring.  No acute distress noted. Appropriate for outpatient management at  this time. No red flag warnings noted.  Patient given strict instructions to follow up with emergency room if they develop any red flag warnings which were discussed in depth.  They verbalized understanding. Differential diagnosis, natural history, supportive care, and indications for immediate follow-up discussed. All questions answered. They agree with the plan of care.      Please note that this dictation was created using voice recognition software. I have made every reasonable attempt to correct obvious errors, but I expect that there are errors of grammar and possibly content that I did not discover before finalizing the note.

## 2025-03-12 NOTE — LETTER
March 12, 2025         Patient: Ambar Jj   YOB: 1960   Date of Visit: 3/12/2025           To Whom it May Concern:    Ambar Jj was seen in my clinic on 3/12/2025. She may return to work on 3/16/25.    If you have any questions or concerns, please don't hesitate to call.        Sincerely,           SHAJI Hall.  Electronically Signed

## 2025-03-18 ENCOUNTER — HOSPITAL ENCOUNTER (OUTPATIENT)
Dept: LAB | Facility: MEDICAL CENTER | Age: 65
End: 2025-03-18
Attending: NURSE PRACTITIONER
Payer: COMMERCIAL

## 2025-03-18 DIAGNOSIS — I10 PRIMARY HYPERTENSION: ICD-10-CM

## 2025-03-18 DIAGNOSIS — M25.50 MULTIPLE JOINT PAIN: ICD-10-CM

## 2025-03-18 LAB
ALBUMIN SERPL BCP-MCNC: 4.6 G/DL (ref 3.2–4.9)
ALBUMIN/GLOB SERPL: 1.4 G/DL
ALP SERPL-CCNC: 89 U/L (ref 30–99)
ALT SERPL-CCNC: 91 U/L (ref 2–50)
ANION GAP SERPL CALC-SCNC: 11 MMOL/L (ref 7–16)
AST SERPL-CCNC: 62 U/L (ref 12–45)
BILIRUB SERPL-MCNC: 0.3 MG/DL (ref 0.1–1.5)
BUN SERPL-MCNC: 9 MG/DL (ref 8–22)
CALCIUM ALBUM COR SERPL-MCNC: 8.9 MG/DL (ref 8.5–10.5)
CALCIUM SERPL-MCNC: 9.4 MG/DL (ref 8.5–10.5)
CHLORIDE SERPL-SCNC: 105 MMOL/L (ref 96–112)
CHOLEST SERPL-MCNC: 120 MG/DL (ref 100–199)
CO2 SERPL-SCNC: 25 MMOL/L (ref 20–33)
CREAT SERPL-MCNC: 0.75 MG/DL (ref 0.5–1.4)
FASTING STATUS PATIENT QL REPORTED: NORMAL
GFR SERPLBLD CREATININE-BSD FMLA CKD-EPI: 90 ML/MIN/1.73 M 2
GLOBULIN SER CALC-MCNC: 3.2 G/DL (ref 1.9–3.5)
GLUCOSE SERPL-MCNC: 99 MG/DL (ref 65–99)
HDLC SERPL-MCNC: 31 MG/DL
LDLC SERPL CALC-MCNC: 54 MG/DL
POTASSIUM SERPL-SCNC: 4.3 MMOL/L (ref 3.6–5.5)
PROT SERPL-MCNC: 7.8 G/DL (ref 6–8.2)
RHEUMATOID FACT SER IA-ACNC: <10 IU/ML (ref 0–14)
SODIUM SERPL-SCNC: 141 MMOL/L (ref 135–145)
TRIGL SERPL-MCNC: 177 MG/DL (ref 0–149)
URATE SERPL-MCNC: 5.7 MG/DL (ref 1.9–8.2)

## 2025-03-18 PROCEDURE — 86431 RHEUMATOID FACTOR QUANT: CPT

## 2025-03-18 PROCEDURE — 80061 LIPID PANEL: CPT

## 2025-03-18 PROCEDURE — 84550 ASSAY OF BLOOD/URIC ACID: CPT

## 2025-03-18 PROCEDURE — 80053 COMPREHEN METABOLIC PANEL: CPT

## 2025-03-18 PROCEDURE — 36415 COLL VENOUS BLD VENIPUNCTURE: CPT

## 2025-03-18 PROCEDURE — 86200 CCP ANTIBODY: CPT

## 2025-03-20 LAB — CCP IGA+IGG SERPL IA-ACNC: 3 UNITS (ref 0–19)

## 2025-03-21 ENCOUNTER — RESULTS FOLLOW-UP (OUTPATIENT)
Dept: MEDICAL GROUP | Facility: PHYSICIAN GROUP | Age: 65
End: 2025-03-21
Payer: COMMERCIAL

## 2025-03-31 ENCOUNTER — OFFICE VISIT (OUTPATIENT)
Dept: MEDICAL GROUP | Facility: PHYSICIAN GROUP | Age: 65
End: 2025-03-31
Payer: COMMERCIAL

## 2025-03-31 VITALS
WEIGHT: 182.6 LBS | HEART RATE: 76 BPM | HEIGHT: 67 IN | BODY MASS INDEX: 28.66 KG/M2 | DIASTOLIC BLOOD PRESSURE: 70 MMHG | SYSTOLIC BLOOD PRESSURE: 122 MMHG | OXYGEN SATURATION: 97 % | TEMPERATURE: 98.6 F

## 2025-03-31 DIAGNOSIS — M19.91 PRIMARY OSTEOARTHRITIS, UNSPECIFIED SITE: ICD-10-CM

## 2025-03-31 DIAGNOSIS — M79.89 MASS OF SOFT TISSUE OF LOWER LEG: ICD-10-CM

## 2025-03-31 DIAGNOSIS — R74.01 TRANSAMINITIS: ICD-10-CM

## 2025-03-31 DIAGNOSIS — E78.1 HYPERTRIGLYCERIDEMIA: ICD-10-CM

## 2025-03-31 PROBLEM — Z86.0100 HISTORY OF COLONIC POLYPS: Status: ACTIVE | Noted: 2025-03-31

## 2025-04-01 NOTE — PROGRESS NOTES
Verbal consent was acquired by the patient to use [employer] ambient listening note generation during this visit yes    Subjective:     HPI:   History of Present Illness  The patient presents for evaluation of elevated liver enzymes, influenza A, osteoarthritis, and right leg injury.    Influenza A  She has been diagnosed with influenza A, which was managed with Tamiflu. She reports a rapid recovery from the illness, attributing it to the efficacy of Tamiflu. However, she expresses concern that the medication may have led to an elevation in her liver enzyme levels, as she had completed the course of Tamiflu the night prior to her blood test. She also reports experiencing yellow and red discoloration in her eyes on the day of her blood test but otherwise felt well. Additionally, she experienced a loss of smell and taste during her illness, leading her to suspect a COVID-19 infection, but she tested negative. She also took Xopenex during her illness. She had a high fever and chills, which were so severe that she hurt her neck from shaking.  - Onset: During the course of influenza A.  - Duration: Rapid recovery from influenza A.  - Character: High fever, chills, loss of smell and taste, yellow and red discoloration in eyes.  - Alleviating/Aggravating Factors: Managed with Tamiflu; concern about Tamiflu causing elevated liver enzymes.  - Severity: Severe chills causing neck injury.    Elevated Liver Enzymes  She expresses concern that the medication (Tamiflu) may have led to an elevation in her liver enzyme levels, as she had completed the course of Tamiflu the night prior to her blood test.  - Onset: After completing Tamiflu course.  - Character: Elevated liver enzymes.  - Timing: Blood test conducted the night after completing Tamiflu.    Osteoarthritis  The initial purpose of the blood tests was to investigate potential rheumatoid arthritis (RA) and gout, but the results were normal. She suspects that her symptoms  "may be due to osteoarthritis, exacerbated by her new job, which involves prolonged standing and walking.  - Onset: Symptoms noticed after starting new job.  - Character: Suspected osteoarthritis.  - Alleviating/Aggravating Factors: Exacerbated by prolonged standing and walking.    Right Leg Injury  She noticed an injury to her right leg approximately 2 months ago. Initially, she thought it might be due to muscle development from increased physical activity, including walking, standing, and stair climbing. However, upon closer inspection, she realized that the condition was not present on her left leg and decided to seek medical evaluation.  - Onset: Approximately 2 months ago.  - Location: Right leg.  - Character: Injury initially thought to be muscle development.  - Timing: Noticed condition was not present on left leg.    She has significantly reduced her alcohol consumption over the past year, including a period of complete abstinence in July 2024.    SOCIAL HISTORY  She has cut down on her alcohol consumption over the past year.    MEDICATIONS  - Current: losartan, metoprolol, Xopenex  - Discontinued: Tamiflu    Health Maintenance: Completed    Objective:     Exam:  /70 (BP Location: Left arm, Patient Position: Sitting, BP Cuff Size: Adult)   Pulse 76   Temp 37 °C (98.6 °F) (Temporal)   Ht 1.702 m (5' 7\")   Wt 82.8 kg (182 lb 9.6 oz)   LMP 10/14/2001 (Approximate)   SpO2 97%   BMI 28.60 kg/m²  Body mass index is 28.6 kg/m².    Physical Exam  Constitutional:       Appearance: Normal appearance.   HENT:      Head: Normocephalic and atraumatic.   Cardiovascular:      Rate and Rhythm: Normal rate and regular rhythm.      Pulses: Normal pulses.      Heart sounds: Normal heart sounds.   Pulmonary:      Effort: Pulmonary effort is normal.   Skin:     General: Skin is warm and dry.      Capillary Refill: Capillary refill takes less than 2 seconds.   Neurological:      General: No focal deficit present.      " Mental Status: She is alert and oriented to person, place, and time.           Results  Laboratory Studies  Liver enzymes mildly elevated. LDL cholesterol decreased, triglycerides slightly increased. Rheumatoid factor and CCP are negative. No elevated inflammatory markers. Uric acid is normal. Globulin is normal. Bilirubin is normal.    Assessment & Plan:     1. Hypertriglyceridemia  Lipid Profile      2. Transaminitis  Comp Metabolic Panel      3. Mass of soft tissue of lower leg  US-EXTREMITY NON VASCULAR UNILATERAL RIGHT      4. Primary osteoarthritis, unspecified site            Assessment & Plan  1. Elevated liver enzymes: Likely due to recent Tamiflu use.  - Repeat CHEM panel in 1 month to monitor liver enzyme levels.  - If liver enzymes remain elevated after 1 month, order ultrasound and additional labs.    2. Influenza A: Completed Tamiflu course two Mondays ago with significant improvement.  - Start Tamiflu within 48 hours of flu symptom onset in future episodes.  - Use over-the-counter flu tests for immediate testing.  - Consult telemedicine doctor or urgent care for Tamiflu prescription if test is positive.    3. Osteoarthritis: Rheumatoid factor and CCP negative, no elevated inflammatory markers.  - Uric acid level within normal range.  - Globulin levels normal.  - Likely exacerbated by increased standing.    4. Right leg injury: Noticed change in right leg about 2 months ago, possibly due to increased walking and standing.  - Conduct physical examination to assess the issue.    5. Cholesterol management: LDL cholesterol decreased, triglycerides slightly increased.  - Repeat cholesterol test in 3 months to monitor lipid levels.    Follow-up  - Follow up in 3 months.        Return if symptoms worsen or fail to improve.    I have placed the below orders and discussed them with an approved delegating provider. The MA is performing the below orders under the direction of Dr. Mitchell.      Please note that  this dictation was created using voice recognition software. I have made every reasonable attempt to correct obvious errors, but I expect that there are errors of grammar and possibly content that I did not discover before finalizing the note.

## 2025-04-21 ENCOUNTER — APPOINTMENT (OUTPATIENT)
Dept: LAB | Facility: MEDICAL CENTER | Age: 65
End: 2025-04-21
Payer: COMMERCIAL

## 2025-04-30 ENCOUNTER — HOSPITAL ENCOUNTER (OUTPATIENT)
Dept: LAB | Facility: MEDICAL CENTER | Age: 65
End: 2025-04-30
Attending: NURSE PRACTITIONER
Payer: COMMERCIAL

## 2025-04-30 DIAGNOSIS — R74.01 TRANSAMINITIS: ICD-10-CM

## 2025-04-30 DIAGNOSIS — E78.1 HYPERTRIGLYCERIDEMIA: ICD-10-CM

## 2025-04-30 LAB
ALBUMIN SERPL BCP-MCNC: 4.5 G/DL (ref 3.2–4.9)
ALBUMIN/GLOB SERPL: 1.3 G/DL
ALP SERPL-CCNC: 87 U/L (ref 30–99)
ALT SERPL-CCNC: 43 U/L (ref 2–50)
ANION GAP SERPL CALC-SCNC: 15 MMOL/L (ref 7–16)
AST SERPL-CCNC: 42 U/L (ref 12–45)
BILIRUB SERPL-MCNC: 0.3 MG/DL (ref 0.1–1.5)
BUN SERPL-MCNC: 12 MG/DL (ref 8–22)
CALCIUM ALBUM COR SERPL-MCNC: 9.3 MG/DL (ref 8.5–10.5)
CALCIUM SERPL-MCNC: 9.7 MG/DL (ref 8.5–10.5)
CHLORIDE SERPL-SCNC: 108 MMOL/L (ref 96–112)
CO2 SERPL-SCNC: 19 MMOL/L (ref 20–33)
CREAT SERPL-MCNC: 0.67 MG/DL (ref 0.5–1.4)
GFR SERPLBLD CREATININE-BSD FMLA CKD-EPI: 99 ML/MIN/1.73 M 2
GLOBULIN SER CALC-MCNC: 3.4 G/DL (ref 1.9–3.5)
GLUCOSE SERPL-MCNC: 96 MG/DL (ref 65–99)
POTASSIUM SERPL-SCNC: 4.3 MMOL/L (ref 3.6–5.5)
PROT SERPL-MCNC: 7.9 G/DL (ref 6–8.2)
SODIUM SERPL-SCNC: 142 MMOL/L (ref 135–145)

## 2025-04-30 PROCEDURE — 36415 COLL VENOUS BLD VENIPUNCTURE: CPT

## 2025-04-30 PROCEDURE — 80053 COMPREHEN METABOLIC PANEL: CPT

## 2025-05-01 ENCOUNTER — RESULTS FOLLOW-UP (OUTPATIENT)
Dept: MEDICAL GROUP | Facility: PHYSICIAN GROUP | Age: 65
End: 2025-05-01
Payer: COMMERCIAL

## 2025-05-05 NOTE — DISCHARGE PLANNING
Anticipated Discharge Disposition: Home with Outpatient Wound Care.    Action: CLARICE spoke with Star with the [facility].  Per Star, patient's appointment is scheduled for tomorrow Tuesday November 27th at 1300, RN notified via phone.    Barriers to Discharge: None.    Plan: Home with Outpatient Wound Care.   BP goal less than 140/90    Current Outpatient Medications   Medication Instructions    ketoconazole (NIZORAL) 2 % cream Ok to continue    lisinopril-hydroCHLOROthiazide (ZESTORETIC) 10-12.5 MG per tablet Hold 24 hours before surgery    mupirocin (BACTROBAN) 2 % ointment Ok to continue - please let surgeon know if you develop abscess     Probiotic Product (Probiotic Daily) Cap Ok to continue    triamcinolone (ARISTOCORT) 0.1 % cream Ok to continue    vitamin - therapeutic multivitamins w/minerals (CENTRUM SILVER,THERA-M) Tab Hold morning of surgery

## 2025-05-13 ENCOUNTER — OFFICE VISIT (OUTPATIENT)
Dept: MEDICAL GROUP | Facility: PHYSICIAN GROUP | Age: 65
End: 2025-05-13
Payer: COMMERCIAL

## 2025-05-13 VITALS
WEIGHT: 183 LBS | TEMPERATURE: 97.5 F | DIASTOLIC BLOOD PRESSURE: 80 MMHG | HEART RATE: 91 BPM | HEIGHT: 67 IN | BODY MASS INDEX: 28.72 KG/M2 | SYSTOLIC BLOOD PRESSURE: 132 MMHG | OXYGEN SATURATION: 96 %

## 2025-05-13 DIAGNOSIS — J01.11 ACUTE RECURRENT FRONTAL SINUSITIS: ICD-10-CM

## 2025-05-13 DIAGNOSIS — H10.022 PINK EYE DISEASE OF LEFT EYE: ICD-10-CM

## 2025-05-13 DIAGNOSIS — B37.31 YEAST VAGINITIS: ICD-10-CM

## 2025-05-13 DIAGNOSIS — Z12.31 ENCOUNTER FOR SCREENING MAMMOGRAM FOR MALIGNANT NEOPLASM OF BREAST: ICD-10-CM

## 2025-05-13 PROCEDURE — 3079F DIAST BP 80-89 MM HG: CPT | Performed by: NURSE PRACTITIONER

## 2025-05-13 PROCEDURE — 99213 OFFICE O/P EST LOW 20 MIN: CPT | Performed by: NURSE PRACTITIONER

## 2025-05-13 PROCEDURE — 3075F SYST BP GE 130 - 139MM HG: CPT | Performed by: NURSE PRACTITIONER

## 2025-05-13 RX ORDER — POLYMYXIN B SULFATE AND TRIMETHOPRIM 1; 10000 MG/ML; [USP'U]/ML
1 SOLUTION OPHTHALMIC 4 TIMES DAILY
Qty: 10 ML | Refills: 0 | Status: SHIPPED | OUTPATIENT
Start: 2025-05-13

## 2025-05-13 RX ORDER — FLUCONAZOLE 150 MG/1
150 TABLET ORAL DAILY
Qty: 2 TABLET | Refills: 0 | Status: SHIPPED | OUTPATIENT
Start: 2025-05-13

## 2025-05-13 NOTE — PROGRESS NOTES
"Chief Complaint   Patient presents with    Cough     X 11 days    Nasal Congestion     X 11 days, different colored mucus    Eye Pain     Feels gritty, watery, x 1 day        HPI: Patient is a 64 y.o. female complaining of 11 days of illness including: productive of green sputum cough, facial pain, nasal congestion.   Mucus is: thick.  Similarly ill exposures: yes  Treatments tried: saline spray, dayquil  She  reports that she quit smoking about 49 years ago. Her smoking use included cigarettes. She has never used smokeless tobacco..     ROS:  No fever, cough, nausea, changes in bowel movements or skin rash.      I reviewed the patient's medications, allergies and medical history:  Current Outpatient Medications   Medication Sig Dispense Refill    amoxicillin-clavulanate (AUGMENTIN) 875-125 MG Tab Take 1 Tablet by mouth 2 times a day for 5 days. 10 Tablet 0    polymixin-trimethoprim (POLYTRIM) 77612-2.1 UNIT/ML-% Solution Administer 1 Drop into both eyes 4 times a day. 10 mL 0    fluconazole (DIFLUCAN) 150 MG tablet Take 1 Tablet by mouth every day. 2 Tablet 0    albuterol 108 (90 Base) MCG/ACT Aero Soln inhalation aerosol Inhale 2 Puffs every 6 hours as needed for Shortness of Breath. 2 Each 3    losartan (COZAAR) 25 MG Tab Take 1 Tablet by mouth every day. 90 Tablet 3    metoprolol SR (TOPROL XL) 25 MG TABLET SR 24 HR Take 1 Tablet by mouth every evening. 90 Tablet 0    Loratadine (CLARITIN PO) Take 1 tablet by mouth every day.      omeprazole (PRILOSEC) 20 MG delayed-release capsule TAKE 1 CAPSULE BY MOUTH EVERY DAY 90 Cap 0     No current facility-administered medications for this visit.     Codeine  Past Medical History:   Diagnosis Date    Anemia     Anemia     Anesthesia     PONV    Bowel habit changes 11/19/2018    \"Constipation/diarrhea\".    Bronchitis 2010    Colostomy present ([facility])     Diverticulitis 11/2017    Female bladder prolapse 11/19/2018    GERD (gastroesophageal reflux disease)     Heart burn     " "HTN     resolved with wt loss and lifestyle changes    HTN (hypertension) 11/19/2018    Takes Losartan    Indigestion     Other specified symptom associated with female genital organs     Pap smear 01/29/2009    Normal    PONV (postoperative nausea and vomiting)     x2 days post op    Psychiatric problem     anxiety    Reactive airway disease 11/19/2018    Inhaler use PRN    Unspecified urinary incontinence 11/19/2018    Wears Pads    Urinary bladder disorder     prolapse         EXAM:  /80 (BP Location: Left arm, Patient Position: Sitting, BP Cuff Size: Adult)   Pulse 91   Temp 36.4 °C (97.5 °F) (Temporal)   Ht 1.702 m (5' 7\")   Wt 83 kg (183 lb)   SpO2 96%   General: Alert, no conversational dyspnea or audible wheeze, non-toxic appearance.  Eyes: PERRL, conjunctiva slightly injected, positive eye discharge.  Ears: Normal pinnae,TM's Not visualized due to cerumen impaction bilaterally.  Nares: Patent with thick mucus.  Sinuses: tender over maxillary / frontal sinuses.  Throat: Erythematous injection without exudate.   Neck: Supple, with moderately enlarged cervical nodes.  Lungs: Clear to auscultation bilaterally, no wheeze, crackles or rhonchi.   Heart: Regular rate without murmur.  Skin: Warm and dry without rash.     ASSESSMENT:   1. Encounter for screening mammogram for malignant neoplasm of breast    2. Acute recurrent frontal sinusitis    3. Pink eye disease of left eye    4. Yeast vaginitis          PLAN:  1. Educated patient that majority of upper respiratory infections are viral and do not need antibiotics. As symptoms have been worsening over the past few days, will treat with antibiotics.Augmentin 875/125 mg twice daily for 5 days, polytrim for pink eye  2. Twice daily use of nasal saline rinse or Neti-Pot.  3. OTC anti-pyretics and decongestants as needed.  4. Follow-up in office or urgent care for worsening symptoms, difficulty breathing, lack of expected recovery, or should new symptoms or " problems arise.    My recommendations for an upper respiratory infection:  - Use a humidifier, especially at night. Cold or warm water humidifiers have the same effect.  - [employer] squeeze bottle sinus rinses twice a day.  - Hot tea + honey + fresh lemon juice  - Throat Coat herbal tea  - Honey by itself has been shown to help fight bugs and provide cough relief  - Chicken noodle soup has also been shown to help fight bugs    Effective treatments for an upper respiratory infection:    Acetaminophen 500-1000  mg Single dose Improves nasal congestion & runny nose   Antihistamine + decongestant Varies Varies Improves congestion   NSAIDs (ibuprofen, advil, motrin, aleve, etc) Varies Single dose to 7 day course Improves sneezing, headache, ear pain, muscle pain, joint pain   Zinc acetate or gluconate 80-92 mg per day Start within 3 days & continue as long as symptoms persist Can shorten duration of symptoms

## 2025-06-30 ENCOUNTER — APPOINTMENT (OUTPATIENT)
Dept: MEDICAL GROUP | Facility: PHYSICIAN GROUP | Age: 65
End: 2025-06-30
Payer: COMMERCIAL

## 2025-07-02 ENCOUNTER — HOSPITAL ENCOUNTER (OUTPATIENT)
Dept: RADIOLOGY | Facility: MEDICAL CENTER | Age: 65
End: 2025-07-02
Attending: NURSE PRACTITIONER
Payer: COMMERCIAL

## 2025-07-02 DIAGNOSIS — M79.89 MASS OF SOFT TISSUE OF LOWER LEG: ICD-10-CM

## 2025-07-02 DIAGNOSIS — Z12.31 ENCOUNTER FOR SCREENING MAMMOGRAM FOR MALIGNANT NEOPLASM OF BREAST: ICD-10-CM

## 2025-07-02 PROCEDURE — 77063 BREAST TOMOSYNTHESIS BI: CPT

## 2025-07-02 PROCEDURE — 76882 US LMTD JT/FCL EVL NVASC XTR: CPT | Mod: RT

## 2025-07-03 ENCOUNTER — RESULTS FOLLOW-UP (OUTPATIENT)
Dept: MEDICAL GROUP | Facility: PHYSICIAN GROUP | Age: 65
End: 2025-07-03
Payer: COMMERCIAL

## 2025-07-08 ENCOUNTER — RESULTS FOLLOW-UP (OUTPATIENT)
Dept: MEDICAL GROUP | Facility: PHYSICIAN GROUP | Age: 65
End: 2025-07-08
Payer: COMMERCIAL

## 2025-08-05 ENCOUNTER — APPOINTMENT (OUTPATIENT)
Dept: MEDICAL GROUP | Facility: PHYSICIAN GROUP | Age: 65
End: 2025-08-05
Payer: COMMERCIAL

## (undated) DEVICE — GOWN SURGEONS X-LARGE - DISP. (30/CA)

## (undated) DEVICE — TUBING CLEARLINK DUO-VENT - C-FLO (48EA/CA)

## (undated) DEVICE — CLIP MED INTNL HRZN TI ESCP - (25/BX)

## (undated) DEVICE — STAPLE 75MM LINEAR (12EA/BX)

## (undated) DEVICE — HEAD HOLDER JUNIOR/ADULT

## (undated) DEVICE — SET LEADWIRE 5 LEAD BEDSIDE DISPOSABLE ECG (1SET OF 5/EA)

## (undated) DEVICE — SEAL 5MM-8MM UNIVERSAL  BOX OF 10

## (undated) DEVICE — NEEDLE DRIVER LARGE DA VINCI 10X'S REUSABLE

## (undated) DEVICE — CLIP MED LG INTNL HRZN TI ESCP - (20/BX)

## (undated) DEVICE — SET EXTENSION WITH 2 PORTS (48EA/CA) ***PART #2C8610 IS A SUBSTITUTE*****

## (undated) DEVICE — GLOVE BIOGEL INDICATOR SZ 8.5 SURGICAL PF LTX - (50/BX 4BX/CA)

## (undated) DEVICE — SUCTION INSTRUMENT YANKAUER BULBOUS TIP W/O VENT (50EA/CA)

## (undated) DEVICE — KIT 2.25IN COL ILSTM 2 PC DRN - 57MM 2 1/4 INCH THIS IS FOR THE OR ONLY (5/BX)

## (undated) DEVICE — GLOVE SZ 8 BIOGEL PI MICRO - PF LF (50PR/BX)

## (undated) DEVICE — BOVIE  BLADE 6 EXTENDED - (50/PK)

## (undated) DEVICE — SEAL CANNULA STAPLER 12 MM (10EA/BX)

## (undated) DEVICE — DRESSING PETROLEUM GAUZE 5 X 9" (50EA/BX 4BX/CA)"

## (undated) DEVICE — REDUCER XI STAPLER 12MM TO 8MM (6EA/BX)

## (undated) DEVICE — LACTATED RINGERS INJ 1000 ML - (14EA/CA 60CA/PF)

## (undated) DEVICE — KIT ROOM DECONTAMINATION

## (undated) DEVICE — SUTURE 4-0 VICRYL PLUS FS-2 - 27 INCH (36/BX)

## (undated) DEVICE — SUTURE 2-0 PROLENE SH D/A (36PK/BX)

## (undated) DEVICE — SLEEVE, VASO, THIGH, MED

## (undated) DEVICE — PROTECTOR ULNA NERVE - (36PR/CA)

## (undated) DEVICE — GOWN WARMING STANDARD FLEX - (30/CA)

## (undated) DEVICE — NEPTUNE 4 PORT MANIFOLD - (20/PK)

## (undated) DEVICE — TRAY CATHETER FOLEY URINE METER W/STATLOCK 350ML (10EA/CA)

## (undated) DEVICE — SUTURE 2-0 VICRYL PLUS SH - 8 X 18 INCH (12/BX)

## (undated) DEVICE — SUTURE 1 VICRYL PLUS CTX - 8 X 18 INCH (12/BX)

## (undated) DEVICE — BLOCK

## (undated) DEVICE — Device

## (undated) DEVICE — TUBE E-T HI-LO CUFF 6.5MM (10EA/BX)

## (undated) DEVICE — SPONGE GAUZESTER. 2X2 4-PL - (2/PK 50PK/BX 30BX/CS)

## (undated) DEVICE — TROCAR 12 X 150 KII FIOS Z THREAD (6EA/BX)

## (undated) DEVICE — SPONGE GAUZESTER 4 X 4 4PLY - (128PK/CA)

## (undated) DEVICE — STAPLER PREMIUM PLUS 28ML - CEEA (3EA/CA) - ORDER ITEM #26237

## (undated) DEVICE — MASK ANESTHESIA ADULT  - (100/CA)

## (undated) DEVICE — SODIUM CHL IRRIGATION 0.9% 1000ML (12EA/CA)

## (undated) DEVICE — KIT ANESTHESIA W/CIRCUIT & 3/LT BAG W/FILTER (20EA/CA)

## (undated) DEVICE — TUBE CONNECT SUCTION CLEAR 120 X 1/4" (50EA/CA)"

## (undated) DEVICE — SYRINGE ASEPTO - (50EA/CA

## (undated) DEVICE — GLOVE SZ 6.5 BIOGEL PI MICRO - PF LF (50PR/BX)

## (undated) DEVICE — SUTURE GENERAL

## (undated) DEVICE — TRAY ANESTHESIA - CONTINUOUS EPIDURAL (10EA/CA) THIS IS A CUSTOM PACK

## (undated) DEVICE — DRAPE COLUMN  BOX OF 20

## (undated) DEVICE — SENSOR SPO2 NEO LNCS ADHESIVE (20/BX) SEE USER NOTES

## (undated) DEVICE — NEEDLE INSFL 120MM 14GA VRRS - (20/BX)

## (undated) DEVICE — TOWELS CLOTH SURGICAL - (4/PK 20PK/CA)

## (undated) DEVICE — ELECTRODE DUAL RETURN W/ CORD - (50/PK)

## (undated) DEVICE — CHLORAPREP 26 ML APPLICATOR - ORANGE TINT(25/CA)

## (undated) DEVICE — STAPLE 40MM GREEN 4.8MM (6EA/BX)

## (undated) DEVICE — RETAINER 9 1/8INX5 7/8IN MED - (10/BX)

## (undated) DEVICE — PACK MAJOR BASIN - (2EA/CA)

## (undated) DEVICE — GLOVE BIOGEL SZ 8 SURGICAL PF LTX - (50PR/BX 4BX/CA)

## (undated) DEVICE — NEEDLE DRIVER MEGA SUTURECUT DA VINCI 15X'S REUSABLE

## (undated) DEVICE — DRESSING TRANSPARENT FILM TEGADERM 4 X 4.75" (50EA/BX)"

## (undated) DEVICE — STAPLER SKIN DISP - (6/BX 10BX/CA) VISISTAT

## (undated) DEVICE — GLOVE BIOGEL PI ORTHO SZ 7.5 PF LF (40PR/BX)

## (undated) DEVICE — TRAY SRGPRP PVP IOD WT PRP - (20/CA)

## (undated) DEVICE — STAPLER 75MM LINEAR OPEN (3EA/BX)

## (undated) DEVICE — SUTURE 1 VICRYL PLUS CTX - 36 INCH (36/BX)

## (undated) DEVICE — ELECTRODE 850 FOAM ADHESIVE - HYDROGEL RADIOTRNSPRNT (50/PK)

## (undated) DEVICE — PAD LAP STERILE 18 X 18 - (5/PK 40PK/CA)

## (undated) DEVICE — SUTURE2-0 20CM STRATAFIX SPIRAL PDS CT-1 (12EA/BX)

## (undated) DEVICE — SHEARS MONOPOLAR CURVED  DA VINCI 10X'S REUSABLE

## (undated) DEVICE — GLOVE BIOGEL PI INDICATOR SZ 7.0 SURGICAL PF LF - (50/BX 4BX/CA)

## (undated) DEVICE — DRAPE ARM  BOX OF 20

## (undated) DEVICE — OBTURATOR BLADELESS STANDARD 8MM (6EA/BX)

## (undated) DEVICE — STAPLER CONTOUR CURVED GREEN 4.8MM W/STAPLE (3EA/BX)

## (undated) DEVICE — DRAPE MAYO STAND - (30/CA)

## (undated) DEVICE — KIT SIGMOIDOSCOPE W/BULB AND - SUCTION (1/PK 10PK/CA)

## (undated) DEVICE — BLADE SURGICAL CLIPPER - (50EA/CA)

## (undated) DEVICE — SUTURE 2-0 VICRYL PLUS SH - 27 INCH (36/BX)

## (undated) DEVICE — CANISTER SUCTION 3000ML MECHANICAL FILTER AUTO SHUTOFF MEDI-VAC NONSTERILE LF DISP  (40EA/CA)

## (undated) DEVICE — SHEARS ELECTROSURGICAL W/ HANDCONTROL BUTTON STERILE CURVE L23 CM OD5 MM 3 (6EA/BX)

## (undated) DEVICE — GLOVE BIOGEL INDICATOR SZ 8 SURGICAL PF LTX - (50/BX 4BX/CA)

## (undated) DEVICE — SURGIFOAM (12X7) - (12EA/CA)

## (undated) DEVICE — DRESSING TRANSPARENT FILM TEGADERM 2.375 X 2.75"  (100EA/BX)"

## (undated) DEVICE — SUTURE 0 ETHIBOND MO6 C/R - (12/BX) 8-18 INCH ETHICON

## (undated) DEVICE — SUTURE 3-0 VICRYL PLUS SH - 8X 18 INCH (12/BX)

## (undated) DEVICE — SUTURE 3.5-0 ETHIBOND GREEN CT-2 TAPER (36PK/BX)

## (undated) DEVICE — COVER TIP ENDOWRIST HOT SHEAR - (10EA/BX) DA VINCI

## (undated) DEVICE — SYRINGE 30 ML LS (56/BX)

## (undated) DEVICE — DRAPE LAPAROTOMY T SHEET - (12EA/CA)

## (undated) DEVICE — TROCAR 5X100 NON BLADED Z-TH - READ KII (6/BX)

## (undated) DEVICE — ROBOTIC SURGERY SERVICES

## (undated) DEVICE — LEGGING LITHOTOMY 31 X 48 IN - (2EA/PK 20PK/CA)

## (undated) DEVICE — CLIP LG INTNL HRZN TI ESCP LGT - (20/BX)

## (undated) DEVICE — GOWN SURGEONS LARGE - (32/CA)

## (undated) DEVICE — GVL 3 STAT DISPOSABLE - (10/BX)

## (undated) DEVICE — DRAPE UNDER BUTTOCK LRG (40/CA)